# Patient Record
Sex: MALE | Race: WHITE | Employment: OTHER | URBAN - METROPOLITAN AREA
[De-identification: names, ages, dates, MRNs, and addresses within clinical notes are randomized per-mention and may not be internally consistent; named-entity substitution may affect disease eponyms.]

---

## 2019-08-01 ENCOUNTER — HOSPITAL ENCOUNTER (OUTPATIENT)
Dept: MRI IMAGING | Age: 74
Discharge: HOME | End: 2019-08-01
Attending: FAMILY MEDICINE
Payer: MEDICARE

## 2019-08-01 DIAGNOSIS — M54.42 ACUTE BILATERAL LOW BACK PAIN WITH BILATERAL SCIATICA: ICD-10-CM

## 2019-08-01 DIAGNOSIS — M48.50XA VERTEBRAL COMPRESSION FRACTURE (HCC): ICD-10-CM

## 2019-08-01 DIAGNOSIS — M54.41 ACUTE BILATERAL LOW BACK PAIN WITH BILATERAL SCIATICA: ICD-10-CM

## 2019-08-01 PROCEDURE — 72148 MRI LUMBAR SPINE W/O DYE: CPT

## 2019-08-02 NOTE — PROGRESS NOTES
Tell the patient  He does have significant stenosis or narrowing around lumbar spine so can refer to Dr Evelyne Plasencia or spine specialist if having persistent pain.

## 2021-05-20 ENCOUNTER — HOSPITAL ENCOUNTER (OUTPATIENT)
Dept: GENERAL RADIOLOGY | Age: 76
Discharge: HOME | End: 2021-05-20
Payer: MEDICARE
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
## 2021-05-20 DIAGNOSIS — S20.212A HEMATOMA OF LEFT CHEST WALL, INITIAL ENCOUNTER: ICD-10-CM

## 2021-05-20 PROBLEM — L02.213 CHEST WALL ABSCESS: Status: ACTIVE | Noted: 2021-05-20

## 2021-05-20 PROBLEM — L03.313 CELLULITIS OF CHEST WALL: Status: ACTIVE | Noted: 2021-05-20

## 2021-05-20 PROCEDURE — 71046 X-RAY EXAM CHEST 2 VIEWS: CPT

## 2021-05-24 ENCOUNTER — HOSPITAL ENCOUNTER (INPATIENT)
Age: 76
LOS: 25 days | Discharge: HOME HEALTH | DRG: 264 | End: 2021-06-18
Attending: SURGERY | Admitting: SURGERY
Payer: MEDICARE

## 2021-05-24 DIAGNOSIS — S31.109A OPEN WOUND OF ABDOMINAL WALL, INITIAL ENCOUNTER: ICD-10-CM

## 2021-05-24 DIAGNOSIS — D89.89 SUPPRESSION OF IMMUNE SYSTEM SUBTHERAPEUTIC (HCC): ICD-10-CM

## 2021-05-24 DIAGNOSIS — D72.828 OTHER ELEVATED WHITE BLOOD CELL (WBC) COUNT: ICD-10-CM

## 2021-05-24 DIAGNOSIS — F05 SUNDOWNING: ICD-10-CM

## 2021-05-24 DIAGNOSIS — S31.109A OPEN WOUND OF FLANK, INITIAL ENCOUNTER: ICD-10-CM
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
## 2021-05-24 DIAGNOSIS — S20.212D HEMATOMA OF LEFT CHEST WALL, SUBSEQUENT ENCOUNTER: ICD-10-CM

## 2021-05-24 DIAGNOSIS — S21.102D OPEN WOUND OF LEFT CHEST WALL, SUBSEQUENT ENCOUNTER: ICD-10-CM

## 2021-05-24 DIAGNOSIS — I47.1 SVT (SUPRAVENTRICULAR TACHYCARDIA) (HCC): ICD-10-CM

## 2021-05-24 DIAGNOSIS — S30.1XXA HEMATOMA OF RIGHT FLANK, INITIAL ENCOUNTER: ICD-10-CM

## 2021-05-24 DIAGNOSIS — S21.202D BACK WOUND, LEFT, SUBSEQUENT ENCOUNTER: ICD-10-CM

## 2021-05-24 DIAGNOSIS — R50.9 FEVER, UNSPECIFIED FEVER CAUSE: ICD-10-CM

## 2021-05-24 DIAGNOSIS — S41.102D OPEN WOUND OF LEFT AXILLARY REGION, SUBSEQUENT ENCOUNTER: ICD-10-CM

## 2021-05-24 DIAGNOSIS — S31.109D OPEN WOUND OF FLANK, SUBSEQUENT ENCOUNTER: ICD-10-CM

## 2021-05-24 DIAGNOSIS — S30.1XXD HEMATOMA OF RIGHT FLANK, SUBSEQUENT ENCOUNTER: ICD-10-CM

## 2021-05-24 DIAGNOSIS — N28.9 ACUTE RENAL INSUFFICIENCY: ICD-10-CM

## 2021-05-24 DIAGNOSIS — L02.213 CHEST WALL ABSCESS: ICD-10-CM

## 2021-05-24 DIAGNOSIS — S31.000D OPEN WOUND OF LOWER BACK AND PELVIS WITHOUT PENETRATION INTO RETROPERITONEUM, SUBSEQUENT ENCOUNTER: ICD-10-CM

## 2021-05-24 DIAGNOSIS — R41.0 CONFUSION: ICD-10-CM

## 2021-05-24 DIAGNOSIS — L03.313 CELLULITIS OF CHEST WALL: ICD-10-CM

## 2021-05-24 LAB
ALBUMIN SERPL-MCNC: 2.4 G/DL (ref 3.2–4.6)
ALBUMIN/GLOB SERPL: 0.6 {RATIO} (ref 1.2–3.5)
ALP SERPL-CCNC: 124 U/L (ref 50–136)
ALT SERPL-CCNC: 80 U/L (ref 12–65)
ANION GAP SERPL CALC-SCNC: 7 MMOL/L (ref 7–16)
AST SERPL-CCNC: 53 U/L (ref 15–37)
BILIRUB SERPL-MCNC: 0.9 MG/DL (ref 0.2–1.1)
BUN SERPL-MCNC: 51 MG/DL (ref 8–23)
CALCIUM SERPL-MCNC: 8.3 MG/DL (ref 8.3–10.4)
CHLORIDE SERPL-SCNC: 96 MMOL/L (ref 98–107)
CO2 SERPL-SCNC: 28 MMOL/L (ref 21–32)
CREAT SERPL-MCNC: 1.71 MG/DL (ref 0.8–1.5)
GLOBULIN SER CALC-MCNC: 4.1 G/DL (ref 2.3–3.5)
GLUCOSE SERPL-MCNC: 83 MG/DL (ref 65–100)
POTASSIUM SERPL-SCNC: 4.7 MMOL/L (ref 3.5–5.1)
PROT SERPL-MCNC: 6.5 G/DL (ref 6.3–8.2)
SODIUM SERPL-SCNC: 131 MMOL/L (ref 138–145)

## 2021-05-24 PROCEDURE — 77030040361 HC SLV COMPR DVT MDII -B

## 2021-05-24 PROCEDURE — 74011250636 HC RX REV CODE- 250/636: Performed by: SURGERY

## 2021-05-24 PROCEDURE — 74011000258 HC RX REV CODE- 258: Performed by: SURGERY

## 2021-05-24 PROCEDURE — 2709999900 HC NON-CHARGEABLE SUPPLY

## 2021-05-24 PROCEDURE — 74011250637 HC RX REV CODE- 250/637: Performed by: SURGERY

## 2021-05-24 PROCEDURE — 36415 COLL VENOUS BLD VENIPUNCTURE: CPT

## 2021-05-24 PROCEDURE — 80053 COMPREHEN METABOLIC PANEL: CPT

## 2021-05-24 PROCEDURE — 99223 1ST HOSP IP/OBS HIGH 75: CPT | Performed by: SURGERY

## 2021-05-24 PROCEDURE — 65270000029 HC RM PRIVATE

## 2021-05-24 RX ORDER — ONDANSETRON 2 MG/ML
4 INJECTION INTRAMUSCULAR; INTRAVENOUS
Status: DISCONTINUED | OUTPATIENT
Start: 2021-05-24 | End: 2021-06-18 | Stop reason: HOSPADM

## 2021-05-24 RX ORDER — FAMOTIDINE 20 MG/1
20 TABLET, FILM COATED ORAL 2 TIMES DAILY
Status: DISCONTINUED | OUTPATIENT
Start: 2021-05-24 | End: 2021-05-24

## 2021-05-24 RX ORDER — FAMOTIDINE 20 MG/1
20 TABLET, FILM COATED ORAL DAILY
Status: DISCONTINUED | OUTPATIENT
Start: 2021-05-24 | End: 2021-06-01

## 2021-05-24 RX ORDER — HYDROCODONE BITARTRATE AND ACETAMINOPHEN 5; 325 MG/1; MG/1
1 TABLET ORAL
Status: DISCONTINUED | OUTPATIENT
Start: 2021-05-24 | End: 2021-06-18 | Stop reason: HOSPADM

## 2021-05-24 RX ORDER — ACETAMINOPHEN 500 MG
1000 TABLET ORAL
Status: DISCONTINUED | OUTPATIENT
Start: 2021-05-24 | End: 2021-06-18 | Stop reason: HOSPADM

## 2021-05-24 RX ORDER — TAMSULOSIN HYDROCHLORIDE 0.4 MG/1
0.4 CAPSULE ORAL DAILY
Status: DISCONTINUED | OUTPATIENT
Start: 2021-05-25 | End: 2021-06-18 | Stop reason: HOSPADM

## 2021-05-24 RX ADMIN — CEFTRIAXONE 1 G: 1 INJECTION, POWDER, FOR SOLUTION INTRAMUSCULAR; INTRAVENOUS at 13:51

## 2021-05-24 RX ADMIN — VANCOMYCIN HYDROCHLORIDE 1000 MG: 1 INJECTION, POWDER, LYOPHILIZED, FOR SOLUTION INTRAVENOUS at 13:58

## 2021-05-24 RX ADMIN — HYDROCODONE BITARTRATE AND ACETAMINOPHEN 1 TABLET: 5; 325 TABLET ORAL at 18:49

## 2021-05-24 RX ADMIN — FAMOTIDINE 20 MG: 20 TABLET ORAL at 14:00

## 2021-05-24 NOTE — PROGRESS NOTES
END OF SHIFT NOTE:    INTAKE/OUTPUT  No intake/output data recorded. Voiding: YES  Catheter: NO  Drain:              Flatus: Patient does have flatus present. Stool:  0 occurrences. Characteristics:       Emesis: 0 occurrences. Characteristics:        VITAL SIGNS  Patient Vitals for the past 12 hrs:   Temp Pulse Resp BP SpO2   05/24/21 1454 97.6 °F (36.4 °C) 85 18 120/61 96 %   05/24/21 1250 97.4 °F (36.3 °C) 89 18 127/78 98 %       Pain Assessment  Pain Intensity 1: 7 (05/24/21 1849)  Pain Location 1: Flank  Pain Intervention(s) 1: Medication (see MAR)  Patient Stated Pain Goal: 0    Ambulating  Yes    Shift report given to oncoming nurse at the bedside.     Amor Lindsay

## 2021-05-24 NOTE — PROGRESS NOTES
Care Management Interventions  PCP Verified by CM: Yes  Mode of Transport at Discharge: Self  Transition of Care Consult (CM Consult): Discharge Planning  Discharge Durable Medical Equipment: No  Physical Therapy Consult: No  Occupational Therapy Consult: No  Speech Therapy Consult: No  Current Support Network: Own Home, Lives Alone  Confirm Follow Up Transport: Self  The Patient and/or Patient Representative was Provided with a Choice of Provider and Agrees with the Discharge Plan?: Yes  Name of the Patient Representative Who was Provided with a Choice of Provider and Agrees with the Discharge Plan: self  Freedom of Choice List was Provided with Basic Dialogue that Supports the Patient's Individualized Plan of Care/Goals, Treatment Preferences and Shares the Quality Data Associated with the Providers?: Yes  [place] Resource Information Provided?: No  Discharge Location  Discharge Placement: Home    Chart screened by  for discharge planning. Patient will likely return home at discharge. PT/OT have not been consulted at this time. It is unclear at this time if patient will need outpatient IV antibiotics at discharge. CM will continue top follow patient during hospitalization for discharge planning and needs. No needs identified at this time. Please consult  if any new issues arise.

## 2021-05-24 NOTE — H&P
H&P/Consult Note/Progress Note/Office Note:   Wing Matos MRN: [id number]  :1945  Age:75 y.o.    HPI: Wing Matos is a 77 y.o. male who was referred in consultation by Cristi Velazquez PA-C with a left chest wall hematoma and cellulitis   after a fall at home on the floor approx 21. He sustained a blunt injury to his left chest wall. CXR below showed no PTx or rib fractures. He taked Humira for arthritis. We saw him for the 1st time on 21 with erythema of the left chest wall and placed him on Bactrim. Nothing in particular made it better or worse. When he came to the office on 2021 he reported high fevers over 101 degrees associate with nausea with his Bactrim. The erythema had extended well beyond the original inscribed lines of his left chest wall and extended down his left flank and in his left lower quadrant. I offered him direct admission to the hospital.    He denies any new falls since 2021. On 2021 he did have a new hematoma involving his right flank which was not present on 2021 CXR, 2 views  Hx: Left chest wall hematoma with cellulitis; r/o rib fracture or pneumothorax after a fall at home on the floor.     Clear lungs. The cardiac and mediastinal contours and pulmonary vascularity are normal.   The bones and soft tissues are within normal limits. No evidence of a pneumothorax.     IMPRESSION:  Normal chest.            Past Medical History:   Diagnosis Date    Depression     H/O urinary frequency 2013    History of BPH 2013    HLD (hyperlipidemia) 2013    Hx: UTI (urinary tract infection) 2013    Insomnia     Psoriasis     Psoriatic arthritis ([facility] [address] 2013    Wears hearing aid 2013     No past surgical history on file.   Current Facility-Administered Medications   Medication Dose Route Frequency    vancomycin (VANCOCIN) 1,000 mg in 0.9% sodium chloride 250 mL (VIAL-MATE)  1,000 mg IntraVENous Q12H    cefTRIAXone (ROCEPHIN) 2 g in 0.9% sodium chloride (MBP/ADV) 50 mL MBP  2 g IntraVENous Q24H    famotidine (PEPCID) tablet 20 mg  20 mg Oral BID    acetaminophen (TYLENOL) tablet 1,000 mg  1,000 mg Oral Q6H PRN    ondansetron (ZOFRAN) injection 4 mg  4 mg IntraVENous Q4H PRN     Current Outpatient Medications   Medication Sig    trimethoprim-sulfamethoxazole (Bactrim DS) 160-800 mg per tablet Take 1 Tablet by mouth two (2) times a day for 14 days.  HYDROcodone-acetaminophen (NORCO) 5-325 mg per tablet Take 1 Tablet by mouth every four (4) hours as needed for Pain for up to 7 days. Max Daily Amount: 6 Tablets.  clonazePAM (KlonoPIN) 1 mg tablet Take 1 Tab by mouth three (3) times daily. Max Daily Amount: 3 mg. Cancel Xanax    predniSONE (DELTASONE) 10 mg tablet 3 po daily x 5 days, 2 po daily x 5 days, 1 po daily x 5 days, 1/2 po daily x 5 days and stop    adalimumab (Humira Pen) 40 mg/0.8 mL injection pen 40 mg by SubCUTAneous route Once every 2 weeks.  finasteride (PROSCAR) 5 mg tablet Take 1 Tab by mouth daily. Stop dutasteride 0.5 mg cap 0.5 mg, tamsulosin 0.4 mg cap 0.4 mg [    tamsulosin (FLOMAX) 0.4 mg capsule Take 2 Caps by mouth daily. Stop dutasteride 0.5 mg cap 0.5 mg, tamsulosin 0.4 mg cap 0.4 mg [    methotrexate (RHEUMATREX) 2.5 mg tablet 8 tablets po weekly    meloxicam (MOBIC) 15 mg tablet Take 1 Tab by mouth daily.  fluocinoNIDE (LIDEX) 0.05 % topical cream APPLY  CREAM TOPICALLY TO AFFECTED AREA ON SCALP TWICE DAILY    hydrocortisone valerate (WESTCORT) 0.2 % topical cream APPLY  A THIN LAYER TOPICALLY TO AFFECTED AREA ON FACE TWICE DAILY    DULoxetine (CYMBALTA) 60 mg capsule Take 1 Cap by mouth daily. Patient has no known allergies.   Social History     Socioeconomic History    Marital status: SINGLE     Spouse name: Not on file    Number of children: Not on file    Years of education: Not on file    Highest education level: Not on file   Tobacco Use    Smoking status: Former Smoker    Smokeless tobacco: Never Used    Tobacco comment: pt states quit in early 60's late 52's    Vaping Use    Vaping Use: Never used   Substance and Sexual Activity    Alcohol use: No     Alcohol/week: 0.0 standard drinks    Drug use: No    Sexual activity: Yes     Partners: Female     Social Determinants of Health     Financial Resource Strain:     Difficulty of Paying Living Expenses:    Food Insecurity:     Worried About Running Out of Food in the Last Year:     Ran Out of Food in the Last Year:    Transportation Needs:     Lack of Transportation (Medical):  Lack of Transportation (Non-Medical):    Physical Activity:     Days of Exercise per Week:     Minutes of Exercise per Session:    Stress:     Feeling of Stress :    Social Connections:     Frequency of Communication with Friends and Family:     Frequency of Social Gatherings with Friends and Family:     Attends Holiness Services:     Active Member of Clubs or Organizations:     Attends Club or Organization Meetings:     Marital Status:      Social History     Tobacco Use   Smoking Status Former Smoker   Smokeless Tobacco Never Used   Tobacco Comment    pt states quit in early 60's late 52's      Family History   Problem Relation Age of Onset    Heart Attack Father     Cancer Father         skin     Other Sister     Cancer Sister     Other Brother         melanoma     ROS: The patient has no difficulty with chest pain or shortness of breath. No fever or chills. Comprehensive review of systems was otherwise unremarkable except as noted above. Physical Exam:   There were no vitals taken for this visit. There were no vitals filed for this visit. [unfilled]  [unfilled]    Constitutional: Alert, oriented, cooperative patient in no acute distress; appears stated age    Eyes:Sclera are clear. EOMs intact  ENMT: no external lesions; he is hard of hearing.  no obvious neck masses, no ear or lip lesions, nares normal  CV: RRR. Normal perfusion      He is draining pus from his left anterior axillary line with some ischemic skin necrosis present there which is about 1-1/2 x 1-1/2 cm in size. The erythema of the left chest wall improved from about 12 cm to about 9 cm in size   There is induration. We evacuated about 20 cc of pus in the office just prior to admission. The erythema and cellulitis extends from the left chest wall down to the left lower quadrant and left flank and involves an area of about 30 cm² in size. New hematoma right flank    Resp: No JVD. Breathing is  non-labored; no audible wheezing. GI: soft and non-distended     Musculoskeletal: unremarkable with normal function. No embolic signs or cyanosis. Neuro:  Oriented; moves all 4; no focal deficits; slow moving and slow talking. Psychiatric: normal affect and mood, no memory impairment    Recent vitals (if inpt):  @IPVITALS(24:)@    Amount and/or Complexity of Data Reviewed and Analyzed:  I reviewed and analyzed all of the unique labs and radiologic studies that are shown below as well as any that are in the HPI, and any that are in the expanded problem list below  *Each unique test, order, or document contributes to the combination of 2 or combination of 3 in Category 1 below. For this visit I also reviewed old records and prior notes. No results for input(s): WBC, HGB, PLT, NA, K, CL, CO2, BUN, CREA, GLU, PTP, INR, APTT, TBIL, TBILI, CBIL, ALT, AP, AML, AML, LPSE, LCAD, NH4, TROPT, TROIQ, PCO2, PO2, HCO3, HGBEXT, PLTEXT, INREXT, HGBEXT, PLTEXT, INREXT in the last 72 hours.     No lab exists for component: SGOT, GPT,  PH  Review of most recent CBC  Lab Results   Component Value Date/Time    WBC 8.8 03/31/2016 11:26 AM    HGB 14.3 03/31/2016 11:26 AM    HCT 42.9 03/31/2016 11:26 AM    PLATELET 811 51/11/3715 11:26 AM    MCV 92 03/31/2016 11:26 AM       Review of most recent BMP  Lab Results   Component Value Date/Time Sodium 140 03/31/2016 11:26 AM    Potassium 4.8 03/31/2016 11:26 AM    Chloride 101 03/31/2016 11:26 AM    CO2 24 03/31/2016 11:26 AM    Glucose 92 03/31/2016 11:26 AM    BUN 14 03/31/2016 11:26 AM    Creatinine 0.73 (L) 03/31/2016 11:26 AM    BUN/Creatinine ratio 19 03/31/2016 11:26 AM    GFR est  03/31/2016 11:26 AM    GFR est non-AA 95 03/31/2016 11:26 AM    Calcium 8.7 03/31/2016 11:26 AM       Review of most recent LFTs (and lipase if done)  Lab Results   Component Value Date/Time    ALT (SGPT) 19 03/31/2016 11:26 AM    AST (SGOT) 17 03/31/2016 11:26 AM    Alk. phosphatase 68 03/31/2016 11:26 AM    Bilirubin, total 0.4 03/31/2016 11:26 AM     No results found for: LPSE    No results found for: INR, APTT, CBIL, LCAD, NH4, TROPT, TROIQ, INREXT, INREXT    Review of most recent HgbA1c  No results found for: HBA1C, WJK8YZIO, KYC6QOOP, ZUH9HMPJ    Nutritional assessment screen for wound healing issues:  Lab Results   Component Value Date/Time    Protein, total 6.5 03/31/2016 11:26 AM    Albumin 4.0 03/31/2016 11:26 AM       @lastcovr@  XR Results (most recent):  Results from Hospital Encounter encounter on 05/20/21    XR CHEST PA LAT    Narrative  EXAM: XR CHEST PA LAT    INDICATION: Left chest wall hematoma with cellulitis rule out rib fracture or  pneumothorax after a fall at home on the floor. COMPARISON: None. FINDINGS: PA and lateral radiographs of the chest demonstrate clear lungs. The  cardiac and mediastinal contours and pulmonary vascularity are normal. The bones  and soft tissues are within normal limits. No evidence of a pneumothorax. Impression  Normal chest.      CT Results (most recent):  No results found for this or any previous visit. US Results (most recent):  No results found for this or any previous visit.         Admission date (for inpatients): (Not on file)   * No surgery found *  * No surgery found *        ASSESSMENT/PLAN:  Problem List  Date Reviewed: 5/24/2021 Codes Class Noted    Fever ICD-10-CM: R50.9  ICD-9-CM: 780.60  5/24/2021        Suppression of immune system subtherapeutic ([facility] ICD-10-CM: D07.41  ICD-9-CM: 279.8  5/24/2021        Right flank hematoma ICD-10-CM: S30. 1XXA  ICD-9-CM: 922.2  5/24/2021        Chest wall abscess ICD-10-CM: L02.213  ICD-9-CM: 682.2  5/20/2021        Hematoma of left chest wall ICD-10-CM: N04.935I  ICD-9-CM: 922.1  5/20/2021        Cellulitis of chest wall ICD-10-CM: L03.313  ICD-9-CM: 682.2  5/20/2021        Fall ICD-10-CM: O01. Kirk Hazy  ICD-9-CM: E888.9  5/20/2021        Psoriasis ICD-10-CM: L40.9  ICD-9-CM: 696.1  Unknown        Insomnia ICD-10-CM: G47.00  ICD-9-CM: 780.52  Unknown        Depression ICD-10-CM: F32.9  ICD-9-CM: 214  Unknown        Tachycardia ICD-10-CM: R00.0  ICD-9-CM: 785.0  6/13/2016        Murmur ICD-10-CM: R01.1  ICD-9-CM: 785.2  6/13/2016        Psoriatic arthritis ([facility] ICD-10-CM: L40.50  ICD-9-CM: 696.0  2/1/2013        Wears hearing aid ICD-10-CM: Z97.4  ICD-9-CM: V45.89  2/1/2013        HLD (hyperlipidemia) ICD-10-CM: E78.5  ICD-9-CM: 272.4  2/1/2013        H/O urinary frequency ICD-10-CM: K16.007  ICD-9-CM: V13.09  2/1/2013        History of BPH ICD-10-CM: V76.915  ICD-9-CM: V13.89  2/1/2013            Active Problems:    Chest wall abscess (5/20/2021)      Cellulitis of chest wall (5/20/2021)      Fall (5/20/2021)      Fever (5/24/2021)      Suppression of immune system subtherapeutic ([address] (5/24/2021)      Right flank hematoma (5/24/2021)           Number and Complexity of Problems addressed and   Risks of complications and/or morbidity of management        Progressive cellulitis of left chest wall with abscess and extension of cellulitis in the left flank and left lower quadrant associated with fever  This failed outpt Rx with PO Bactrim  We decided to proceed with admission for IV antibiotics. He has a left chest wall hematoma with superimposed cellulitis. Despite p.o.  Bactrim his cellulitis has progressed and extended down his left flank and left lower quadrant. He was having fevers and draining pus from his left chest wall where he spontaneously developed ischemic necrosis of the skin. I evacuated as much of this is possible and debrided the wound in the office on 5/24/2021  He has a new hematoma involving his left flank which may be from a second fall. He is elderly frail and hard of hearing. He and his family did not call when the erythema extended beyond the marked border from his first visit on 5/20/2021. His CXR identified no PTx or rib fractures    Chronic immune suppression with acute infection and fever  We will hold his methotrexate, prednisone, and Humira to help reduce his immune suppression. I ordered IV Vanc and Rocpehin  Will watch for vanc toxiticty  Baseline Bun/Cr and labs      Open left chest wound  Repack with dry 4x4 daily and prn    Hematoma right flank  Watch  Check Hgb            Level of MDM (2/3 elements below)  Number and Complexity of Problems Addressed Amount and/or Complexity of Data to be Reviewed and Analyzed  *Each unique test, order, or document contributes to the combination of 2 or combination of 3 in Category 1 below.  Risk of Complications and/or Morbidity or Mortality of pt Management     25827  83900 SF Minimal  1self-limited or minor problem Minimal or none Minimal risk of morbidity from additional diagnostic testing or Rx   77736  86926 Low Low  2or more self-limited or minor problems;    or  1stable chronic illness;    or  9BFSDA, uncomplicated illness or injury   Limited  (Must meet the requirements of at least 1 of the 2 categories)  Category 1: Tests and documents   Any combination of 2 from the following:  Review of prior external note(s) from each unique source*;  review of the result(s) of each unique test*;   ordering of each unique test*    or   Category 2: Assessment requiring an independent historian(s)  (For the categories of independent interpretation of tests and discussion of management or test interpretation, see moderate or high) Low risk of morbidity from additional diagnostic testing or treatment     14582  13611 Mod Moderate  1or more chronic illnesses with exacerbation, progression, or side effects of treatment;    or  2or more stable chronic illnesses;    or  1undiagnosed new problem with uncertain prognosis;    or  1acute illness with systemic symptoms;    or  [id number] complicated injury   Moderate  (Must meet the requirements of at least 1 out of 3 categories)  Category 1: Tests, documents, or independent historian(s)  Any combination of 3 from the following:   Review of prior external note(s) from each unique source*;  Review of the result(s) of each unique test*;  Ordering of each unique test*;  Assessment requiring an independent historian(s)    or  Category 2: Independent interpretation of tests   Independent interpretation of a test performed by another physician/other qualified health care professional (not separately reported);     or  Category 3: Discussion of management or test interpretation  Discussion of management or test interpretation with external physician/other qualified health care professional/appropriate source (not separately reported)   Moderate risk of morbidity from additional diagnostic testing or treatment  Examples only:  Prescription drug management   Decision regarding minor surgery with identified patient or procedure risk factors  Decision regarding elective major surgery without identified patient or procedure risk factors   Diagnosis or treatment significantly limited by social determinants of health       [id number] High High  1or more chronic illnesses with severe exacerbation, progression, or side effects of treatment;    or  1 acute or chronic illness or injury that poses a threat to life or bodily function   Extensive  (Must meet the requirements of at least 2 out of 3 categories)  Category 1: Tests, documents, or independent historian(s)  Any combination of 3 from the following:   Review of prior external note(s) from each unique source*;  Review of the result(s) of each unique test*;   Ordering of each unique test*;   Assessment requiring an independent historian(s)    or   Category 2: Independent interpretation of tests   Independent interpretation of a test performed by another physician/other qualified health care professional (not separately reported);     or  Category 3: Discussion of management or test interpretation  Discussion of management or test interpretation with external physician/other qualified health care professional/appropriate source (not separately reported)   High risk of morbidity from additional diagnostic testing or treatment  Examples only:  Drug therapy requiring intensive monitoring for toxicity  Decision regarding elective major surgery with identified patient or procedure risk factors  Decision regarding emergency major surgery  Decision regarding hospitalization  Decision not to resuscitate or to de-escalate care because of poor prognosis             I have personally performed a face-to-face diagnostic evaluation and management  service on this patient. I have independently seen the patient. I have independently obtained the above history from the patient/family. I have independently examined the patient with above findings. I have independently reviewed data/labs for this patient and developed the above plan of care (MDM). Signed: Glendy Andrade.  Rasta Brown MD, FACS

## 2021-05-24 NOTE — PROGRESS NOTES
NOTED:    NO MARY PREFERENCE    FRIEND -  MOON    LIVES IN [facility]    NO CODE STATUS ON FILE    NO ACP ON FILE    EXPECTED D/C  5  DAYS    WEARS HEARING AIDS    DEPRESSION        Will continue to assess how to best serve this family

## 2021-05-25 PROBLEM — N28.9 ACUTE RENAL INSUFFICIENCY: Status: ACTIVE | Noted: 2021-05-25

## 2021-05-25 LAB — CREAT SERPL-MCNC: 1.2 MG/DL (ref 0.8–1.5)

## 2021-05-25 PROCEDURE — 74011000258 HC RX REV CODE- 258: Performed by: SURGERY

## 2021-05-25 PROCEDURE — 65270000029 HC RM PRIVATE

## 2021-05-25 PROCEDURE — 87077 CULTURE AEROBIC IDENTIFY: CPT

## 2021-05-25 PROCEDURE — 2709999900 HC NON-CHARGEABLE SUPPLY

## 2021-05-25 PROCEDURE — 97161 PT EVAL LOW COMPLEX 20 MIN: CPT

## 2021-05-25 PROCEDURE — 97530 THERAPEUTIC ACTIVITIES: CPT

## 2021-05-25 PROCEDURE — 82565 ASSAY OF CREATININE: CPT

## 2021-05-25 PROCEDURE — 87186 SC STD MICRODIL/AGAR DIL: CPT

## 2021-05-25 PROCEDURE — 74011250636 HC RX REV CODE- 250/636: Performed by: SURGERY

## 2021-05-25 PROCEDURE — 74011250637 HC RX REV CODE- 250/637: Performed by: SURGERY

## 2021-05-25 PROCEDURE — 74011000302 HC RX REV CODE- 302: Performed by: SURGERY

## 2021-05-25 PROCEDURE — 86580 TB INTRADERMAL TEST: CPT | Performed by: SURGERY

## 2021-05-25 PROCEDURE — 99233 SBSQ HOSP IP/OBS HIGH 50: CPT | Performed by: SURGERY

## 2021-05-25 PROCEDURE — 36415 COLL VENOUS BLD VENIPUNCTURE: CPT

## 2021-05-25 PROCEDURE — 87205 SMEAR GRAM STAIN: CPT

## 2021-05-25 RX ORDER — VANCOMYCIN HYDROCHLORIDE
1250
Status: DISCONTINUED | OUTPATIENT
Start: 2021-05-25 | End: 2021-05-28

## 2021-05-25 RX ORDER — SODIUM CHLORIDE, SODIUM LACTATE, POTASSIUM CHLORIDE, CALCIUM CHLORIDE 600; 310; 30; 20 MG/100ML; MG/100ML; MG/100ML; MG/100ML
30 INJECTION, SOLUTION INTRAVENOUS CONTINUOUS
Status: DISCONTINUED | OUTPATIENT
Start: 2021-05-25 | End: 2021-05-28

## 2021-05-25 RX ADMIN — CEFTRIAXONE 1 G: 1 INJECTION, POWDER, FOR SOLUTION INTRAMUSCULAR; INTRAVENOUS at 11:49

## 2021-05-25 RX ADMIN — TAMSULOSIN HYDROCHLORIDE 0.4 MG: 0.4 CAPSULE ORAL at 08:22

## 2021-05-25 RX ADMIN — HYDROCODONE BITARTRATE AND ACETAMINOPHEN 1 TABLET: 5; 325 TABLET ORAL at 03:17

## 2021-05-25 RX ADMIN — FAMOTIDINE 20 MG: 20 TABLET ORAL at 08:22

## 2021-05-25 RX ADMIN — SODIUM CHLORIDE, SODIUM LACTATE, POTASSIUM CHLORIDE, AND CALCIUM CHLORIDE 100 ML/HR: 600; 310; 30; 20 INJECTION, SOLUTION INTRAVENOUS at 12:11

## 2021-05-25 RX ADMIN — VANCOMYCIN HYDROCHLORIDE 1000 MG: 1 INJECTION, POWDER, LYOPHILIZED, FOR SOLUTION INTRAVENOUS at 01:30

## 2021-05-25 RX ADMIN — TUBERCULIN PURIFIED PROTEIN DERIVATIVE 5 UNITS: 5 INJECTION, SOLUTION INTRADERMAL at 15:26

## 2021-05-25 RX ADMIN — HYDROCODONE BITARTRATE AND ACETAMINOPHEN 1 TABLET: 5; 325 TABLET ORAL at 13:39

## 2021-05-25 RX ADMIN — VANCOMYCIN HYDROCHLORIDE 1250 MG: 10 INJECTION, POWDER, LYOPHILIZED, FOR SOLUTION INTRAVENOUS at 17:38

## 2021-05-25 NOTE — PROGRESS NOTES
Pharmacokinetic Consult to Pharmacist    Lei Avalos. is a 77 y.o. male being treated for SSTI with vancomycin. Height: 5' 11\" (180.3 cm)  Weight: 83 kg (183 lb)  Lab Results   Component Value Date/Time    BUN 51 (H) 05/24/2021 12:57 PM    Creatinine 1.20 05/25/2021 01:03 PM    WBC 8.8 03/31/2016 11:26 AM      Estimated Creatinine Clearance: 57.5 mL/min (based on SCr of 1.2 mg/dL). No results found for: Zoë Noble    Day 2 of vancomycin. Goal trough is 10-20. Repeat Scr 1.2 today. Dosing changed to 1250 mg q18h. Will continue to follow patient and order levels when clinically indicated.       Thank you,  Steff Barnes, PharmD, [address] [place] [address]  Clinical Pharmacist  [phone]

## 2021-05-25 NOTE — PROGRESS NOTES
END OF SHIFT NOTE:    INTAKE/OUTPUT  05/24 0701 - 05/25 0700  In: 829 [P.O.:240; I.V.:253]  Out: 825 [Urine:825]  Voiding: YES  Catheter: NO  Drain:      Flatus: Patient does have flatus present. Stool:  0 occurrences. Emesis: 0 occurrences. VITAL SIGNS  Patient Vitals for the past 12 hrs:   Temp Pulse Resp BP SpO2   05/25/21 0302 98.3 °F (36.8 °C) 77 17 127/74 93 %   05/24/21 2350 98.6 °F (37 °C) 92 18 (!) 106/52 92 %   05/24/21 1939 98.7 °F (37.1 °C) 100 18 120/73 95 %     Pain Assessment  Pain Intensity 1: 8 (05/25/21 0317)  Pain Location 1: Flank  Pain Intervention(s) 1: Medication (see MAR)  Patient Stated Pain Goal: 0    Ambulating  Yes    Shift report given to oncoming nurse at the bedside.     [address] [facility] Systemsphillip

## 2021-05-25 NOTE — PROGRESS NOTES
END OF SHIFT NOTE:    INTAKE/OUTPUT  05/24 0701 - 05/25 0700  In: 608 [P.O.:240; I.V.:253]  Out: 825 [Urine:825]  Voiding: YES  Catheter: NO  Drain:              Flatus: Patient does have flatus present. Stool:  0 occurrences. Characteristics:       Emesis: 0 occurrences. Characteristics:        VITAL SIGNS  Patient Vitals for the past 12 hrs:   Temp Pulse Resp BP SpO2   05/25/21 1522 98.6 °F (37 °C) 69 18 117/67 96 %   05/25/21 1123 97.9 °F (36.6 °C) 84 18 119/70 96 %   05/25/21 0710 97.9 °F (36.6 °C) 90 20 133/76 97 %       Pain Assessment  Pain Intensity 1: 0 (05/25/21 1430)  Pain Location 1: Flank  Pain Intervention(s) 1: Rest  Patient Stated Pain Goal: 0    Ambulating  Yes    Shift report given to oncoming nurse at the bedside.     Richard Montgomery

## 2021-05-25 NOTE — PROGRESS NOTES
PT has recommended STR for patient. CM attempted to speak with patient however, he is confused and not making sense at this time. CM called and left message for Hillary who is listed as patient emergency contact. CM will attempt to follow up with Hillary again for discharge planning.

## 2021-05-25 NOTE — PROGRESS NOTES
ACUTE PHYSICAL THERAPY GOALS:  (Developed with and agreed upon by patient and/or caregiver. )    LTG:  (1.)Mr. Lisa Kiser will move from supine to sit and sit to supine , scoot up and down and roll side to side in bed with MODIFIED INDEPENDENCE within 7 treatment day(s). (2.)Mr. Lisa Kiser will transfer from bed to chair and chair to bed with SUPERVISION using the least restrictive device within 7 treatment day(s). (3.)Mr. Lisa Kiser will ambulate with SUPERVISION for 250 feet with the least restrictive device within 7 treatment day(s). (4.)Mr. Lisa Kiser will perform seated and standing exercises for 15+ minutes to improve strength and mobility within 7 days.   ________________________________________________________________________________________________      PHYSICAL THERAPY ASSESSMENT: Initial Assessment and AM PT Treatment Day # 1      Bradly Yee is a 77 y.o. male   PRIMARY DIAGNOSIS: <principal problem not specified>  Cellulitis of chest wall [L03.313]       Reason for Referral:  Weakness, fall  ICD-10: Treatment Diagnosis: Generalized Muscle Weakness (M62.81)  Difficulty in walking, Not elsewhere classified (R26.2)  Other abnormalities of gait and mobility (R26.89)  INPATIENT: Payor: [facility] MEDICARE / Plan: [employer] / Product Type: Managed Care Medicare /     ASSESSMENT:     REHAB RECOMMENDATIONS:   Recommendation to date pending progress:  Setting:   Short-term Rehab  Equipment:    To Be Determined     PRIOR LEVEL OF FUNCTION:  (Prior to Hospitalization) INITIAL/CURRENT LEVEL OF FUNCTION:  (Most Recently Demonstrated)   Bed Mobility:   Independent  Sit to Stand:   Independent  Transfers:   Independent  Gait/Mobility:   Modified Independent Bed Mobility:   Minimal Assistance  Sit to Stand:   Minimal Assistance  Transfers:   Minimal Assistance  Gait/Mobility:   Not tested     ASSESSMENT:  Mr. Lisa Kiser is a 77year old male admitted with chest wall cellulitis. He reports living with wife and ambulating with cane or walker; did have a recent fall. Per case management note, pt lives alone, so he may be a questionable historian. Presents today with some confusion, needs heavy encouragement to participate with limited activity. Transfers to sitting with min assist. Intact seated balance; worked on scooting and seated mobility, functional activities at edge of bed. Attempted sit-stand transfer however pt did not fully stand, then declined further attempts due to weakness. Attempted multiple times to encourage pt to stand again and take a few steps, however adamantly refuses. Positioned comfortably in supine after activity with needs in reach, bed alarm on, and family at bedside. Per nurse, pt was up and walked to bathroom this morning with unsteady gait. Will continue with therapy efforts. STR vs HH PT pending progress. SUBJECTIVE:   Mr. Dong Ashley states, \"I can't do it today. \"    SOCIAL HISTORY/LIVING ENVIRONMENT: Pt is poor historian; he reports living with wife, however per case management note he lives alone?   Home Environment: Private residence  One/Two Story Residence: One story  Living Alone: No  Support Systems: Spouse/Significant Other/Partner  OBJECTIVE:     PAIN: VITAL SIGNS: LINES/DRAINS:   Pre Treatment: Pain Screen  Pain Scale 1: Numeric (0 - 10)  Pain Intensity 1: 0  Post Treatment: 0/10 Vital Signs  O2 Device: None (Room air) none  O2 Device: None (Room air)     GROSS EVALUATION:   Within Functional Limits Abnormal/ Functional Abnormal/ Non-Functional (see comments) Not Tested Comments:   AROM [x] [] [] []    PROM [] [] [] []    Strength [] [x] [] []    Balance [] [x] [] []    Posture [] [x] [] []    Sensation [] [] [] []    Coordination [] [] [] []    Tone [] [] [] []    Edema [] [] [] []    Activity Tolerance [] [x] [] []     [] [] [] []      COGNITION/  PERCEPTION: Intact Impaired   (see comments) Comments:   Orientation [] [x]    Vision [x] []    Hearing [] [x]    Command Following [] [x]    Safety Awareness [] [x]     [] []      MOBILITY: I Mod I S SBA CGA Min Mod Max Total  NT x2 Comments:   Bed Mobility    Rolling [] [] [] [] [] [x] [] [] [] [] []    Supine to Sit [] [] [] [] [] [x] [] [] [] [] []    Scooting [] [] [] [] [] [x] [] [] [] [] []    Sit to Supine [] [] [] [] [] [x] [] [] [] [] []    Transfers    Sit to Stand [] [] [] [] [] [x] [] [] [] [] []    Bed to Chair [] [] [] [] [] [] [] [] [] [x] []    Stand to Sit [] [] [] [] [] [x] [] [] [] [] []    I=Independent, Mod I=Modified Independent, S=Supervision, SBA=Standby Assistance, CGA=Contact Guard Assistance,   Min=Minimal Assistance, Mod=Moderate Assistance, Max=Maximal Assistance, Total=Total Assistance, NT=Not Tested  GAIT: I Mod I S SBA CGA Min Mod Max Total  NT x2 Comments:   Level of Assistance [] [] [] [] [] [] [] [] [] [x] []    Distance NA    DME N/A    Gait Quality NA    Weightbearing Status N/A     I=Independent, Mod I=Modified Independent, S=Supervision, SBA=Standby Assistance, CGA=Contact Guard Assistance,   Min=Minimal Assistance, Mod=Moderate Assistance, Max=Maximal Assistance, Total=Total Assistance, NT=Not Tested    [address] [place] [facility]\"" [address] [address] [facility] [address] [facility] Mobility Inpatient Short Form       How much difficulty does the patient currently have. .. Unable A Lot A Little None   1. Turning over in bed (including adjusting bedclothes, sheets and blankets)? [] 1   [] 2   [x] 3   [] 4   2. Sitting down on and standing up from a chair with arms ( e.g., wheelchair, bedside commode, etc.)   [] 1   [] 2   [x] 3   [] 4   3. Moving from lying on back to sitting on the side of the bed? [] 1   [] 2   [x] 3   [] 4   How much help from another person does the patient currently need. .. Total A Lot A Little None   4. Moving to and from a bed to a chair (including a wheelchair)? [] 1   [] 2   [x] 3   [] 4   5. Need to walk in hospital room? [] 1   [] 2   [x] 3   [] 4   6. Climbing 3-5 steps with a railing? [] 1   [x] 2   [] 3   [] 4   © 2007, Trustees of [facility] [employer], under license to [employer]. All rights reserved     Score:  Initial: 17 Most Recent: X (Date: -- )    Interpretation of Tool:  Represents activities that are increasingly more difficult (i.e. Bed mobility, Transfers, Gait). PLAN:   FREQUENCY/DURATION: PT Plan of Care: 3 times/week for duration of hospital stay or until stated goals are met, whichever comes first.    PROBLEM LIST:   (Skilled intervention is medically necessary to address:)  1. Decreased Activity Tolerance  2. Decreased Cognition  3. Decreased Coordination  4. Decreased Gait Ability  5. Decreased Strength  6. Decreased Transfer Abilities   INTERVENTIONS PLANNED:   (Benefits and precautions of physical therapy have been discussed with the patient.)  1. Therapeutic Activity  2. Therapeutic Exercise/HEP  3. Neuromuscular Re-education  4. Gait Training  5. Manual Therapy  6. Education     TREATMENT:     EVALUATION: Low Complexity : (Untimed Charge)    TREATMENT:   ($$ Therapeutic Activity: 8-22 mins    )  Therapeutic Activity (8 Minutes): Therapeutic activity included Rolling, Supine to Sit, Sit to Supine, Scooting, Transfer Training and Sitting balance  to improve functional Mobility, Strength, Activity tolerance and balance.     TREATMENT GRID:  N/A    AFTER TREATMENT POSITION/PRECAUTIONS:  Alarm Activated, Bed, Needs within reach, RN notified and Visitors at bedside    INTERDISCIPLINARY COLLABORATION:  RN/PCT and PT/PTA    TOTAL TREATMENT DURATION:  PT Patient Time In/Time Out  Time In: 1042  Time Out: [place] 80, DPT

## 2021-05-25 NOTE — PROGRESS NOTES
Patient girlfriend Hillary at bedside. CM discussed discharge planning and PT recommendations. Patient has been recommended for STR. Patient and girlfriend Lubna First are in agreement and provided [facility] and [facility] as choices. CM has sent referrals. [facility] has offered patient a bed, patient accepted. Insurance authorization has been initiated.

## 2021-05-26 PROBLEM — D72.829 LEUKOCYTOSIS: Status: ACTIVE | Noted: 2021-05-26

## 2021-05-26 LAB
ANION GAP SERPL CALC-SCNC: 5 MMOL/L (ref 7–16)
BUN SERPL-MCNC: 26 MG/DL (ref 8–23)
CALCIUM SERPL-MCNC: 7.7 MG/DL (ref 8.3–10.4)
CHLORIDE SERPL-SCNC: 104 MMOL/L (ref 98–107)
CO2 SERPL-SCNC: 26 MMOL/L (ref 21–32)
CREAT SERPL-MCNC: 0.99 MG/DL (ref 0.8–1.5)
ERYTHROCYTE [DISTWIDTH] IN BLOOD BY AUTOMATED COUNT: 15.8 % (ref 11.9–14.6)
GLUCOSE SERPL-MCNC: 112 MG/DL (ref 65–100)
HCT VFR BLD AUTO: 39 % (ref 41.1–50.3)
HGB BLD-MCNC: 13.5 G/DL (ref 13.6–17.2)
MCH RBC QN AUTO: 32.8 PG (ref 26.1–32.9)
MCHC RBC AUTO-ENTMCNC: 34.6 G/DL (ref 31.4–35)
MCV RBC AUTO: 94.7 FL (ref 79.6–97.8)
MM INDURATION POC: 0 MM (ref 0–0)
NRBC # BLD: 0 K/UL (ref 0–0.2)
PLATELET # BLD AUTO: 186 K/UL (ref 150–450)
PMV BLD AUTO: 9.9 FL (ref 9.4–12.3)
POTASSIUM SERPL-SCNC: 4.5 MMOL/L (ref 3.5–5.1)
PPD POC: NEGATIVE NEGATIVE
RBC # BLD AUTO: 4.12 M/UL (ref 4.23–5.6)
SARS-COV-2, COV2: NORMAL
SODIUM SERPL-SCNC: 135 MMOL/L (ref 136–145)
WBC # BLD AUTO: 14.9 K/UL (ref 4.3–11.1)

## 2021-05-26 PROCEDURE — 36415 COLL VENOUS BLD VENIPUNCTURE: CPT

## 2021-05-26 PROCEDURE — 2709999900 HC NON-CHARGEABLE SUPPLY

## 2021-05-26 PROCEDURE — 74011250637 HC RX REV CODE- 250/637: Performed by: SURGERY

## 2021-05-26 PROCEDURE — 80048 BASIC METABOLIC PNL TOTAL CA: CPT

## 2021-05-26 PROCEDURE — 99233 SBSQ HOSP IP/OBS HIGH 50: CPT | Performed by: SURGERY

## 2021-05-26 PROCEDURE — 74011250636 HC RX REV CODE- 250/636: Performed by: SURGERY

## 2021-05-26 PROCEDURE — 85027 COMPLETE CBC AUTOMATED: CPT

## 2021-05-26 PROCEDURE — 74011000258 HC RX REV CODE- 258: Performed by: SURGERY

## 2021-05-26 PROCEDURE — 65270000029 HC RM PRIVATE

## 2021-05-26 PROCEDURE — U0005 INFEC AGEN DETEC AMPLI PROBE: HCPCS

## 2021-05-26 RX ADMIN — FAMOTIDINE 20 MG: 20 TABLET ORAL at 09:25

## 2021-05-26 RX ADMIN — VANCOMYCIN HYDROCHLORIDE 1250 MG: 10 INJECTION, POWDER, LYOPHILIZED, FOR SOLUTION INTRAVENOUS at 13:32

## 2021-05-26 RX ADMIN — SODIUM CHLORIDE, SODIUM LACTATE, POTASSIUM CHLORIDE, AND CALCIUM CHLORIDE 75 ML/HR: 600; 310; 30; 20 INJECTION, SOLUTION INTRAVENOUS at 04:01

## 2021-05-26 RX ADMIN — TAMSULOSIN HYDROCHLORIDE 0.4 MG: 0.4 CAPSULE ORAL at 09:25

## 2021-05-26 RX ADMIN — CEFTRIAXONE 1 G: 1 INJECTION, POWDER, FOR SOLUTION INTRAMUSCULAR; INTRAVENOUS at 12:23

## 2021-05-26 NOTE — PROGRESS NOTES
END OF SHIFT NOTE:    INTAKE/OUTPUT  05/25 0701 - 05/26 0700  In: 2276.6 [P.O.:840; I.V.:1436.6]  Out: 2300 [Urine:2125]  Voiding: YES  Catheter: NO  Drain:              Flatus: Patient does have flatus present. Stool:  0 occurrences. Characteristics:       Emesis: 0 occurrences. Characteristics:        VITAL SIGNS  Patient Vitals for the past 12 hrs:   Temp Pulse Resp BP SpO2   05/26/21 1610 98.7 °F (37.1 °C) 86 19 134/74 95 %   05/26/21 1036 98.3 °F (36.8 °C) 93 19 137/72 94 %   05/26/21 0721 98 °F (36.7 °C) 94 19 134/71 96 %       Pain Assessment  Pain Intensity 1: 0 (05/25/21 2158)  Pain Location 1: Flank  Pain Intervention(s) 1: Rest  Patient Stated Pain Goal: 0    Ambulating  Yes    Shift report given to oncoming nurse at the bedside.     Roberta Bales RN

## 2021-05-26 NOTE — PROGRESS NOTES
Attempted to change pt's dressing, but pt stated he would like to get in shower. Crystal Stratton NP asked if pt could get in the shower and stated to keep wound dry. Offered to place tegaderm over dressing for pt to shower and the pt stated that he would like to wait for dinner and that he would call me when he was ready for shower and to change his dressing. Will pass along to night shift. Pt very addiment on waiting for dressing change at this time, and pt was alert and oriented to person and place and partially situation.

## 2021-05-26 NOTE — PROGRESS NOTES
END OF SHIFT NOTE:    INTAKE/OUTPUT  05/25 0701 - 05/26 0700  In: 2276.6 [P.O.:840; I.V.:1436.6]  Out: 2300 [Urine:2125]  Voiding: YES  Catheter: NO    Flatus: Patient does have flatus present. Stool:  0 occurrences. Emesis: 0 occurrences. VITAL SIGNS  Patient Vitals for the past 12 hrs:   Temp Pulse Resp BP SpO2   05/26/21 0227 98.7 °F (37.1 °C) 87 19 (!) 145/80 94 %   05/25/21 2310 98.6 °F (37 °C) 70 17 133/74 95 %   05/25/21 1901 98.9 °F (37.2 °C) (!) 104 20 117/70 93 %     Pain Assessment  Pain Intensity 1: 0 (05/25/21 2158)  Pain Location 1: Flank  Pain Intervention(s) 1: Rest  Patient Stated Pain Goal: 0    Ambulating  Yes    Shift report to be given to oncoming nurse at the bedside.     [address]

## 2021-05-26 NOTE — PROGRESS NOTES
H&P/Consult Note/Progress Note/Office Note:   Ayaka Boo MRN: [id number]  :1945  Age:75 y.o.    HPI: Ayaka Agudelo. is a 77 y.o. male who was referred in consultation by Enoc Lainez PA-C with a left chest wall hematoma and cellulitis   after a fall at home on the floor approx 21. He sustained a blunt injury to his left chest wall. CXR below showed no PTx or rib fractures. He taked Humira for arthritis. We saw him for the 1st time on 21 with erythema of the left chest wall and placed him on Bactrim. Nothing in particular made it better or worse. When he came to the office on 2021 he reported high fevers over 101 degrees associate with nausea with his Bactrim. The erythema had extended well beyond the original inscribed lines of his left chest wall and extended down his left flank and in his left lower quadrant. I offered him direct admission to the hospital.    He denies any new falls since 2021. On 2021 he did have a new hematoma involving his right flank which was not present on 2021 CXR, 2 views  Hx: Left chest wall hematoma with cellulitis; r/o rib fracture or pneumothorax after a fall at home on the floor.     Clear lungs. The cardiac and mediastinal contours and pulmonary vascularity are normal.   The bones and soft tissues are within normal limits.  No evidence of a pneumothorax.     IMPRESSION:  Normal chest.      Additional hx:  21 admitted directly from office  21 continues IV Abx for seveer soft tissue infection left chest and left flank  21 confused and delerious; requires a sitter;  IV Vanc and Rocephin; leukocytosis noted for 1st time; creatinine better with IVF (1.7-->0.99)                Past Medical History:   Diagnosis Date    Depression     H/O urinary frequency 2013    History of BPH 2013    HLD (hyperlipidemia) 2013    Hx: UTI (urinary tract infection) 2013    Insomnia  Psoriasis     Psoriatic arthritis ([facility] [address] 2/1/2013    Wears hearing aid 2/1/2013     No past surgical history on file. Current Facility-Administered Medications   Medication Dose Route Frequency    lactated Ringers infusion  75 mL/hr IntraVENous CONTINUOUS    vancomycin (VANCOCIN) 1250 mg in  ml infusion  1,250 mg IntraVENous Q18H    tuberculin injection 5 Units  5 Units IntraDERMal ONCE    acetaminophen (TYLENOL) tablet 1,000 mg  1,000 mg Oral Q6H PRN    ondansetron (ZOFRAN) injection 4 mg  4 mg IntraVENous Q4H PRN    cefTRIAXone (ROCEPHIN) 1 g in 0.9% sodium chloride (MBP/ADV) 50 mL MBP  1 g IntraVENous Q24H    HYDROcodone-acetaminophen (NORCO) 5-325 mg per tablet 1 Tablet  1 Tablet Oral Q6H PRN    tamsulosin (FLOMAX) capsule 0.4 mg  0.4 mg Oral DAILY    famotidine (PEPCID) tablet 20 mg  20 mg Oral DAILY     Patient has no known allergies. Social History     Socioeconomic History    Marital status: SINGLE     Spouse name: Not on file    Number of children: Not on file    Years of education: Not on file    Highest education level: Not on file   Tobacco Use    Smoking status: Former Smoker    Smokeless tobacco: Never Used    Tobacco comment: pt states quit in early 60's late 52's    Vaping Use    Vaping Use: Never used   Substance and Sexual Activity    Alcohol use: No     Alcohol/week: 0.0 standard drinks    Drug use: No    Sexual activity: Yes     Partners: Female     Social Determinants of Health     Financial Resource Strain:     Difficulty of Paying Living Expenses:    Food Insecurity:     Worried About Running Out of Food in the Last Year:     [address] in the Last Year:    Transportation Needs:     Lack of Transportation (Medical):      Lack of Transportation (Non-Medical):    Physical Activity:     Days of Exercise per Week:     Minutes of Exercise per Session:    Stress:     Feeling of Stress :    Social Connections:     Frequency of Communication with Friends and Family:     Frequency of Social Gatherings with Friends and Family:     Attends [employer] Services:     Active Member of Clubs or Organizations:     Attends Club or Organization Meetings:     Marital Status:      Social History     Tobacco Use   Smoking Status Former Smoker   Smokeless Tobacco Never Used   Tobacco Comment    pt states quit in early 60's late 52's      Family History   Problem Relation Age of Onset    Heart Attack Father     Cancer Father         skin     Other Sister     Cancer Sister     Other Brother         melanoma     ROS: The patient has no difficulty with chest pain or shortness of breath. No fever or chills. Comprehensive review of systems was otherwise unremarkable except as noted above. Physical Exam:   Visit Vitals  BP (!) 145/80   Pulse 87   Temp 98.7 °F (37.1 °C)   Resp 19   Ht 5' 11\" (1.803 m)   Wt 183 lb (83 kg)   SpO2 94%   BMI 25.52 kg/m²     Vitals:    05/25/21 1522 05/25/21 1901 05/25/21 2310 05/26/21 0227   BP: 117/67 117/70 133/74 (!) 145/80   Pulse: 69 (!) 104 70 87   Resp: 18 20 17 19   Temp: 98.6 °F (37 °C) 98.9 °F (37.2 °C) 98.6 °F (37 °C) 98.7 °F (37.1 °C)   SpO2: 96% 93% 95% 94%   Weight:       Height:         [unfilled]  [unfilled]    Constitutional: Alert, oriented, cooperative patient in no acute distress; appears stated age    Eyes:Sclera are clear. EOMs intact  ENMT: no external lesions; he is hard of hearing. no obvious neck masses, no ear or lip lesions, nares normal  CV: RRR. Normal perfusion      left anterior axillary line wound with packing and with some ischemic skin necrosis present there which is about 1-1/2 x 1-1/2 cm in size. No fluctuance  The erythema of the left chest wall improved from about 12 cm to about 9 cm in size   There is induration. We evacuated about 20 cc of pus in the office just prior to admission.   The erythema and cellulitis extends from the left chest wall down to the left lower quadrant and left flank and involves an area of about 30 cm² in size. slight improvement on 5/26/21  New hematoma right flank noted at time of admission    Resp: No JVD. Breathing is  non-labored; no audible wheezing. GI: soft and non-distended     Musculoskeletal: unremarkable with normal function. No embolic signs or cyanosis. Neuro:  Confused; restless, agitaate; no focal deficits; slow moving and slow talking. Psychiatric: blunted affect, no memory impairment    Recent vitals (if inpt):  @IPVITALS(24:)@    Amount and/or Complexity of Data Reviewed and Analyzed:  I reviewed and analyzed all of the unique labs and radiologic studies that are shown below as well as any that are in the HPI, and any that are in the expanded problem list below  *Each unique test, order, or document contributes to the combination of 2 or combination of 3 in Category 1 below. For this visit I also reviewed old records and prior notes.       Recent Labs     05/26/21  0508 05/24/21  1257   WBC 14.9*  --    HGB 13.5*  --      --    * 131*   K 4.5 4.7    96*   CO2 26 28   BUN 26* 51*   CREA 0.99 1.71*   * 83   TBILI  --  0.9   ALT  --  80*   AP  --  124     Review of most recent CBC  Lab Results   Component Value Date/Time    WBC 14.9 (H) 05/26/2021 05:08 AM    HGB 13.5 (L) 05/26/2021 05:08 AM    HCT 39.0 (L) 05/26/2021 05:08 AM    PLATELET 077 46/00/3783 05:08 AM    MCV 94.7 05/26/2021 05:08 AM       Review of most recent BMP  Lab Results   Component Value Date/Time    Sodium 135 (L) 05/26/2021 05:08 AM    Potassium 4.5 05/26/2021 05:08 AM    Chloride 104 05/26/2021 05:08 AM    CO2 26 05/26/2021 05:08 AM    Anion gap 5 (L) 05/26/2021 05:08 AM    Glucose 112 (H) 05/26/2021 05:08 AM    BUN 26 (H) 05/26/2021 05:08 AM    Creatinine 0.99 05/26/2021 05:08 AM    BUN/Creatinine ratio 19 03/31/2016 11:26 AM    GFR est AA >60 05/26/2021 05:08 AM    GFR est non-AA >60 05/26/2021 05:08 AM    Calcium 7.7 (L) 05/26/2021 05:08 AM       Review of most recent LFTs (and lipase if done)  Lab Results   Component Value Date/Time    ALT (SGPT) 80 (H) 05/24/2021 12:57 PM    AST (SGOT) 53 (H) 05/24/2021 12:57 PM    Alk. phosphatase 124 05/24/2021 12:57 PM    Bilirubin, total 0.9 05/24/2021 12:57 PM     No results found for: LPSE    No results found for: INR, APTT, CBIL, LCAD, NH4, TROPT, TROIQ, INREXT, INREXT    Review of most recent HgbA1c  No results found for: HBA1C, VQS9QJFG, AXQ2FSQW, SXR5FOVC    Nutritional assessment screen for wound healing issues:  Lab Results   Component Value Date/Time    Protein, total 6.5 05/24/2021 12:57 PM    Albumin 2.4 (L) 05/24/2021 12:57 PM       @lastcovr@  XR Results (most recent):  Results from Hospital Encounter encounter on 05/20/21    XR CHEST PA LAT    Narrative  EXAM: XR CHEST PA LAT    INDICATION: Left chest wall hematoma with cellulitis rule out rib fracture or  pneumothorax after a fall at home on the floor. COMPARISON: None. FINDINGS: PA and lateral radiographs of the chest demonstrate clear lungs. The  cardiac and mediastinal contours and pulmonary vascularity are normal. The bones  and soft tissues are within normal limits. No evidence of a pneumothorax. Impression  Normal chest.      CT Results (most recent):  No results found for this or any previous visit. US Results (most recent):  No results found for this or any previous visit. Admission date (for inpatients): 5/24/2021   * No surgery found *  * No surgery found *        ASSESSMENT/PLAN:  Problem List  Date Reviewed: 5/24/2021        Codes Class Noted    Leukocytosis ICD-10-CM: D72.829  ICD-9-CM: 288.60  5/26/2021        Acute renal insufficiency ICD-10-CM: N28.9  ICD-9-CM: 593.9  5/25/2021        Fever ICD-10-CM: R50.9  ICD-9-CM: 780.60  5/24/2021        Suppression of immune system subtherapeutic (HCC) ICD-10-CM: D89.89  ICD-9-CM: 279.8  5/24/2021        Right flank hematoma ICD-10-CM: S30. 1XXA  ICD-9-CM: 922.2  5/24/2021        Chest wall abscess ICD-10-CM: L02.213  ICD-9-CM: 682.2  5/20/2021        Hematoma of left chest wall ICD-10-CM: B80.532S  ICD-9-CM: 922.1  5/20/2021        Cellulitis of chest wall ICD-10-CM: L03.313  ICD-9-CM: 682.2  5/20/2021        Fall ICD-10-CM: W19. Liu Neely  ICD-9-CM: E888.9  5/20/2021        Psoriasis ICD-10-CM: L40.9  ICD-9-CM: 696.1  Unknown        Insomnia ICD-10-CM: G47.00  ICD-9-CM: 780.52  Unknown        Depression ICD-10-CM: F32.9  ICD-9-CM: 233  Unknown        Tachycardia ICD-10-CM: R00.0  ICD-9-CM: 785.0  6/13/2016        Murmur ICD-10-CM: R01.1  ICD-9-CM: 785.2  6/13/2016        Psoriatic arthritis ([facility] ICD-10-CM: L40.50  ICD-9-CM: 696.0  2/1/2013        Wears hearing aid ICD-10-CM: Z97.4  ICD-9-CM: V45.89  2/1/2013        HLD (hyperlipidemia) ICD-10-CM: E78.5  ICD-9-CM: 272.4  2/1/2013        H/O urinary frequency ICD-10-CM: Z87.898  ICD-9-CM: V13.09  2/1/2013        History of BPH ICD-10-CM: D78.020  ICD-9-CM: V13.89  2/1/2013            Active Problems:    Chest wall abscess (5/20/2021)      Cellulitis of chest wall (5/20/2021)      Fall (5/20/2021)      Fever (5/24/2021)      Suppression of immune system subtherapeutic (HCC) (5/24/2021)      Right flank hematoma (5/24/2021)      Acute renal insufficiency (5/25/2021)      Leukocytosis (5/26/2021)           Number and Complexity of Problems addressed and   Risks of complications and/or morbidity of management        Acute renal insuff, Cr 1.7, new problem  Hydrate with LR  Lengthen Vanc dose interval  Vanc trough follow-up with pharmacy  Cr better by 5/26/21     Progressive cellulitis of left chest wall with abscess and extension of cellulitis in the left flank and left lower quadrant associated with fever  This failed outpt Rx with PO Bactrim  Continue inpt admission  Leukocytosis is new problem as of 5/26/21; follow on IV Vanc/Rocephin  Wound culture pending  He has a left chest wall hematoma with superimposed cellulitis with a large area of involvement. Despite p.o. Bactrim his cellulitis has progressed and extended down his left flank and left lower quadrant. He was having fevers and draining pus from his left chest wall where he spontaneously developed ischemic necrosis of the skin. I evacuated as much of this is possible and debrided the wound in the office on 5/24/2021  Wound culture ordered  He has a new hematoma involving his left flank which may be from a second fall. He is elderly frail and hard of hearing. He is also delerious, confused, sundowns, pulls IVs out and requires a sitter for supervision  He and his family did not call when the erythema extended beyond the marked border from his first visit on 5/20/2021. His CXR identified no PTx or rib fractures    Chronic immune suppression with acute infection and fever  We will hold his methotrexate, prednisone, and Humira to help reduce his immune suppression. I ordered IV Vanc and Rocpehin  Will watch for vanc toxicity      Open left chest wound  Repack with dry 4x4 daily and prn    Hematoma right flank  Watch  Check Hgb            Level of MDM (2/3 elements below)  Number and Complexity of Problems Addressed Amount and/or Complexity of Data to be Reviewed and Analyzed  *Each unique test, order, or document contributes to the combination of 2 or combination of 3 in Category 1 below.  Risk of Complications and/or Morbidity or Mortality of pt Management     24421  86369 SF Minimal  1self-limited or minor problem Minimal or none Minimal risk of morbidity from additional diagnostic testing or Rx   30085  71132 Low Low  2or more self-limited or minor problems;    or  1stable chronic illness;    or  7TCLMA, uncomplicated illness or injury   Limited  (Must meet the requirements of at least 1 of the 2 categories)  Category 1: Tests and documents   Any combination of 2 from the following:  Review of prior external note(s) from each unique source*;  review of the result(s) of each unique test*;   ordering of each unique test*    or   Category 2: Assessment requiring an independent historian(s)  (For the categories of independent interpretation of tests and discussion of management or test interpretation, see moderate or high) Low risk of morbidity from additional diagnostic testing or treatment     49017  39238 Mod Moderate  1or more chronic illnesses with exacerbation, progression, or side effects of treatment;    or  2or more stable chronic illnesses;    or  1undiagnosed new problem with uncertain prognosis;    or  1acute illness with systemic symptoms;    or  5UCAYO complicated injury   Moderate  (Must meet the requirements of at least 1 out of 3 categories)  Category 1: Tests, documents, or independent historian(s)  Any combination of 3 from the following:   Review of prior external note(s) from each unique source*;  Review of the result(s) of each unique test*;  Ordering of each unique test*;  Assessment requiring an independent historian(s)    or  Category 2: Independent interpretation of tests   Independent interpretation of a test performed by another physician/other qualified health care professional (not separately reported);     or  Category 3: Discussion of management or test interpretation  Discussion of management or test interpretation with external physician/other qualified health care professional/appropriate source (not separately reported)   Moderate risk of morbidity from additional diagnostic testing or treatment  Examples only:  Prescription drug management   Decision regarding minor surgery with identified patient or procedure risk factors  Decision regarding elective major surgery without identified patient or procedure risk factors   Diagnosis or treatment significantly limited by social determinants of health       23541 [id number] High High  1or more chronic illnesses with severe exacerbation, progression, or side effects of treatment;    or  1 acute or chronic illness or injury that poses a threat to life or bodily function-- acute infection involving large portion of left torso(chest/left flank) with leukocytosis and renal insuff, delerium; Risk of sepsis   Extensive  (Must meet the requirements of at least 2 out of 3 categories)  Category 1: Tests, documents, or independent historian(s)  Any combination of 3 from the following:   Review of prior external note(s) from each unique source*;  Review of the result(s) of each unique test*;   Ordering of each unique test*;   Assessment requiring an independent historian(s)    or   Category 2: Independent interpretation of tests   Independent interpretation of a test performed by another physician/other qualified health care professional (not separately reported);     or  Category 3: Discussion of management or test interpretation  Discussion of management or test interpretation with external physician/other qualified health care professional/appropriate source (not separately reported)   High risk of morbidity from additional diagnostic testing or treatment  Examples only:  Drug therapy requiring intensive monitoring for toxicity  Decision regarding elective major surgery with identified patient or procedure risk factors  Decision regarding emergency major surgery  Decision regarding hospitalization  Decision not to resuscitate or to de-escalate care because of poor prognosis             I have personally performed a face-to-face diagnostic evaluation and management  service on this patient. I have independently seen the patient. I have independently obtained the above history from the patient/family. I have independently examined the patient with above findings. I have independently reviewed data/labs for this patient and developed the above plan of care (MDM). Signed: Dave Still.  Bria Diaz MD, FACS

## 2021-05-27 PROBLEM — F05 SUNDOWNING: Status: ACTIVE | Noted: 2021-05-27

## 2021-05-27 PROBLEM — R41.0 CONFUSION: Status: ACTIVE | Noted: 2021-05-27

## 2021-05-27 LAB
ANION GAP SERPL CALC-SCNC: 7 MMOL/L (ref 7–16)
BUN SERPL-MCNC: 22 MG/DL (ref 8–23)
CALCIUM SERPL-MCNC: 7.5 MG/DL (ref 8.3–10.4)
CHLORIDE SERPL-SCNC: 106 MMOL/L (ref 98–107)
CO2 SERPL-SCNC: 23 MMOL/L (ref 21–32)
CREAT SERPL-MCNC: 0.79 MG/DL (ref 0.8–1.5)
ERYTHROCYTE [DISTWIDTH] IN BLOOD BY AUTOMATED COUNT: 16.1 % (ref 11.9–14.6)
GLUCOSE SERPL-MCNC: 115 MG/DL (ref 65–100)
HCT VFR BLD AUTO: 38.8 % (ref 41.1–50.3)
HGB BLD-MCNC: 13.1 G/DL (ref 13.6–17.2)
MCH RBC QN AUTO: 32.4 PG (ref 26.1–32.9)
MCHC RBC AUTO-ENTMCNC: 33.8 G/DL (ref 31.4–35)
MCV RBC AUTO: 96 FL (ref 79.6–97.8)
MM INDURATION POC: 0 MM (ref 0–0)
NRBC # BLD: 0 K/UL (ref 0–0.2)
PLATELET # BLD AUTO: 220 K/UL (ref 150–450)
PMV BLD AUTO: 9.9 FL (ref 9.4–12.3)
POTASSIUM SERPL-SCNC: 4.3 MMOL/L (ref 3.5–5.1)
PPD POC: NEGATIVE NEGATIVE
RBC # BLD AUTO: 4.04 M/UL (ref 4.23–5.6)
SARS-COV-2, COV2: NOT DETECTED
SODIUM SERPL-SCNC: 136 MMOL/L (ref 138–145)
SPECIMEN SOURCE, FCOV2M: NORMAL
WBC # BLD AUTO: 12.9 K/UL (ref 4.3–11.1)

## 2021-05-27 PROCEDURE — 99233 SBSQ HOSP IP/OBS HIGH 50: CPT | Performed by: SURGERY

## 2021-05-27 PROCEDURE — 80202 ASSAY OF VANCOMYCIN: CPT

## 2021-05-27 PROCEDURE — 74011000258 HC RX REV CODE- 258: Performed by: SURGERY

## 2021-05-27 PROCEDURE — 97530 THERAPEUTIC ACTIVITIES: CPT

## 2021-05-27 PROCEDURE — 80048 BASIC METABOLIC PNL TOTAL CA: CPT

## 2021-05-27 PROCEDURE — 65270000029 HC RM PRIVATE

## 2021-05-27 PROCEDURE — 74011250637 HC RX REV CODE- 250/637: Performed by: SURGERY

## 2021-05-27 PROCEDURE — 36415 COLL VENOUS BLD VENIPUNCTURE: CPT

## 2021-05-27 PROCEDURE — 74011250636 HC RX REV CODE- 250/636: Performed by: SURGERY

## 2021-05-27 PROCEDURE — 85027 COMPLETE CBC AUTOMATED: CPT

## 2021-05-27 PROCEDURE — 2709999900 HC NON-CHARGEABLE SUPPLY

## 2021-05-27 RX ADMIN — FAMOTIDINE 20 MG: 20 TABLET ORAL at 08:30

## 2021-05-27 RX ADMIN — SODIUM CHLORIDE, SODIUM LACTATE, POTASSIUM CHLORIDE, AND CALCIUM CHLORIDE 75 ML/HR: 600; 310; 30; 20 INJECTION, SOLUTION INTRAVENOUS at 09:11

## 2021-05-27 RX ADMIN — HYDROCODONE BITARTRATE AND ACETAMINOPHEN 1 TABLET: 5; 325 TABLET ORAL at 12:19

## 2021-05-27 RX ADMIN — VANCOMYCIN HYDROCHLORIDE 1250 MG: 10 INJECTION, POWDER, LYOPHILIZED, FOR SOLUTION INTRAVENOUS at 05:59

## 2021-05-27 RX ADMIN — CEFTRIAXONE 1 G: 1 INJECTION, POWDER, FOR SOLUTION INTRAMUSCULAR; INTRAVENOUS at 12:19

## 2021-05-27 RX ADMIN — TAMSULOSIN HYDROCHLORIDE 0.4 MG: 0.4 CAPSULE ORAL at 08:30

## 2021-05-27 NOTE — PROGRESS NOTES
END OF SHIFT NOTE:    INTAKE/OUTPUT  05/26 0701 - 05/27 0700  In: 1488.7 [P.O.:840; I.V.:648.7]  Out: 3300 [Urine:3300]  Voiding: YES  Catheter: NO  Drain:      Flatus: Patient does have flatus present. Stool:  1 occurrences. Characteristics:  Stool Assessment  Stool Color: Brown  Stool Appearance: Soft  Stool Amount: Medium  Stool Source/Status: Rectum    Emesis: 0 occurrences. VITAL SIGNS  Patient Vitals for the past 12 hrs:   Temp Pulse Resp BP SpO2   05/27/21 0241 98.7 °F (37.1 °C) 80 17 (!) 142/75 95 %   05/26/21 2307 99.8 °F (37.7 °C) 74 17 (!) 148/81 95 %   05/26/21 1912 99.5 °F (37.5 °C) 63 17 (!) 142/66 95 %     Pain Assessment  Pain Intensity 1: 0 (05/26/21 2015)  Pain Location 1: Flank  Pain Intervention(s) 1: Rest  Patient Stated Pain Goal: 0    Ambulating  Yes    Shift report to be given to oncoming nurse at the bedside.     [address]

## 2021-05-27 NOTE — PROGRESS NOTES
ACUTE PHYSICAL THERAPY GOALS:  (Developed with and agreed upon by patient and/or caregiver.)     LTG:  (1.)Mr. Lisa Kiser will move from supine to sit and sit to supine , scoot up and down and roll side to side in bed with MODIFIED INDEPENDENCE within 7 treatment day(s). (2.)Mr. Lisa Kiser will transfer from bed to chair and chair to bed with SUPERVISION using the least restrictive device within 7 treatment day(s). (3.)Mr. Lisa Kiser will ambulate with SUPERVISION for 250 feet with the least restrictive device within 7 treatment day(s). (4.)Mr. iLsa Kiser will perform seated and standing exercises for 15+ minutes to improve strength and mobility within 7 days. PHYSICAL THERAPY: Daily Note and AM Treatment Day # 2    Bradly Yee is a 77 y.o. male   PRIMARY DIAGNOSIS: <principal problem not specified>  Cellulitis of chest wall [L03.313]         ASSESSMENT:     REHAB RECOMMENDATIONS: CURRENT LEVEL OF FUNCTION:  (Most Recently Demonstrated)   Recommendation to date pending progress:  Setting:   Short-term Rehab  Equipment:    To Be Determined Bed Mobility:   Standby Assistance  Sit to Stand:  [employer] Assistance  Transfers:   Contact Guard Assistance  Gait/Mobility:   Contact Guard Assistance     ASSESSMENT:  Mr. Lisa Kiser was up in restroom on arrival. He is confused and not wanting to cooperate with therapy. He ambulated out into room but refused to walk any further. He sat up in his chair for a few minutes before walking back over to the bed. He declined brushing his teeth. He states he is very tired and just wants to return to bed to sleep. Pt left supine with needs in reach. SUBJECTIVE:   Mr. Lisa Kiser states, \"I'm just really tired. \"    SOCIAL HISTORY/ LIVING ENVIRONMENT:   Home Environment: Private residence  One/Two Story Residence: One story  Living Alone: No  Support Systems: Spouse/Significant Other/Partner  OBJECTIVE:     PAIN: VITAL SIGNS: LINES/DRAINS:   Pre Treatment:   not rated  Post Treatment: not rated   IV  O2 Device: None (Room air)     MOBILITY: I Mod I S SBA CGA Min Mod Max Total  NT x2 Comments:   Bed Mobility    Rolling [] [] [] [] [] [] [] [] [] [] []    Supine to Sit [] [] [] [] [] [] [] [] [] [] []    Scooting [] [] [] [x] [] [] [] [] [] [] []    Sit to Supine [] [] [] [x] [] [] [] [] [] [] []    Transfers    Sit to Stand [] [] [] [] [x] [] [] [] [] [] []    Bed to Chair [] [] [] [] [x] [] [] [] [] [] []    Stand to Sit [] [] [] [] [x] [] [] [] [] [] []    I=Independent, Mod I=Modified Independent, S=Supervision, SBA=Standby Assistance, CGA=Contact Guard Assistance,   Min=Minimal Assistance, Mod=Moderate Assistance, Max=Maximal Assistance, Total=Total Assistance, NT=Not Tested    BALANCE: Good Fair+ Fair Fair- Poor NT Comments   Sitting Static [] [x] [] [] [] []    Sitting Dynamic [] [x] [] [] [] []              Standing Static [] [] [x] [] [] []    Standing Dynamic [] [] [x] [] [] []      GAIT: I Mod I S SBA CGA Min Mod Max Total  NT x2 Comments:   Level of Assistance [] [] [] [] [x] [] [] [] [] [] []    Distance 10' plus additional 5'     DME None    Gait Quality Unsteady and impulsive    Weightbearing  Status N/A     I=Independent, Mod I=Modified Independent, S=Supervision, SBA=Standby Assistance, CGA=Contact Guard Assistance,   Min=Minimal Assistance, Mod=Moderate Assistance, Max=Maximal Assistance, Total=Total Assistance, NT=Not Tested    PLAN:   FREQUENCY/DURATION: PT Plan of Care: 3 times/week for duration of hospital stay or until stated goals are met, whichever comes first.  TREATMENT:     TREATMENT:   ($$ Therapeutic Activity: 8-22 mins    )  Therapeutic Activity (10 Minutes): Therapeutic activity included Sit to Supine, Ambulation on level ground, Sitting balance  and Standing balance to improve functional Mobility, Strength and Activity tolerance.     TREATMENT GRID:  N/A    AFTER TREATMENT POSITION/PRECAUTIONS:  Bed and sitter at bedside     INTERDISCIPLINARY COLLABORATION:  RN/PCT    TOTAL TREATMENT DURATION:  PT Patient Time In/Time Out  Time In: 1040  Time Out: 2211 [address]

## 2021-05-27 NOTE — PROGRESS NOTES
H&P/Consult Note/Progress Note/Office Note:   Danny Hardin MRN: [id number]  :1945  Age:75 y.o.    HPI: Danny Hardin is a 77 y.o. male who was referred in consultation by Haider Gallego PA-C with a left chest wall hematoma and cellulitis   after a fall at home on the floor approx 21. He sustained a blunt injury to his left chest wall. CXR below showed no PTx or rib fractures. He taked Humira for arthritis. We saw him for the 1st time on 21 with erythema of the left chest wall and placed him on Bactrim. Nothing in particular made it better or worse. When he came to the office on 2021 he reported high fevers over 101 degrees associate with nausea with his Bactrim. The erythema had extended well beyond the original inscribed lines of his left chest wall and extended down his left flank and in his left lower quadrant. I offered him direct admission to the hospital.    He denies any new falls since 2021. On 2021 he did have a new hematoma involving his right flank which was not present on 2021 CXR, 2 views  Hx: Left chest wall hematoma with cellulitis; r/o rib fracture or pneumothorax after a fall at home on the floor.     Clear lungs. The cardiac and mediastinal contours and pulmonary vascularity are normal.   The bones and soft tissues are within normal limits.  No evidence of a pneumothorax.     IMPRESSION:  Normal chest.      Additional hx:  21 admitted directly from office  21 continues IV Abx for seveer soft tissue infection left chest and left flank  21 confused and delerious; requires a sitter;  IV Vanc and Rocephin; leukocytosis noted for 1st time; creatinine better with IVF (1.7-->0.99)  21 more pus from chest wound; cellulitis improving; wbc improving; Cr normal                  Past Medical History:   Diagnosis Date    Depression     H/O urinary frequency 2013    History of BPH 2013    HLD (hyperlipidemia) 2/1/2013    Hx: UTI (urinary tract infection) 2/1/2013    Insomnia     Psoriasis     Psoriatic arthritis ([facility] [address] 2/1/2013    Wears hearing aid 2/1/2013     No past surgical history on file. Current Facility-Administered Medications   Medication Dose Route Frequency    Vancomycin Trough Reminder   Other ONCE    lactated Ringers infusion  75 mL/hr IntraVENous CONTINUOUS    vancomycin (VANCOCIN) 1250 mg in  ml infusion  1,250 mg IntraVENous Q18H    acetaminophen (TYLENOL) tablet 1,000 mg  1,000 mg Oral Q6H PRN    ondansetron (ZOFRAN) injection 4 mg  4 mg IntraVENous Q4H PRN    cefTRIAXone (ROCEPHIN) 1 g in 0.9% sodium chloride (MBP/ADV) 50 mL MBP  1 g IntraVENous Q24H    HYDROcodone-acetaminophen (NORCO) 5-325 mg per tablet 1 Tablet  1 Tablet Oral Q6H PRN    tamsulosin (FLOMAX) capsule 0.4 mg  0.4 mg Oral DAILY    famotidine (PEPCID) tablet 20 mg  20 mg Oral DAILY     Patient has no known allergies. Social History     Socioeconomic History    Marital status: SINGLE     Spouse name: Not on file    Number of children: Not on file    Years of education: Not on file    Highest education level: Not on file   Tobacco Use    Smoking status: Former Smoker    Smokeless tobacco: Never Used    Tobacco comment: pt states quit in early 60's late 52's    Vaping Use    Vaping Use: Never used   Substance and Sexual Activity    Alcohol use: No     Alcohol/week: 0.0 standard drinks    Drug use: No    Sexual activity: Yes     Partners: Female     Social Determinants of Health     Financial Resource Strain:     Difficulty of Paying Living Expenses:    Food Insecurity:     Worried About Running Out of Food in the Last Year:     [address] in the Last Year:    Transportation Needs:     Lack of Transportation (Medical):      Lack of Transportation (Non-Medical):    Physical Activity:     Days of Exercise per Week:     Minutes of Exercise per Session:    Stress:     Feeling of Stress :    Social Connections:     Frequency of Communication with Friends and Family:     Frequency of Social Gatherings with Friends and Family:     Attends [employer] Services:     Active Member of Clubs or Organizations:     Attends Club or Organization Meetings:     Marital Status:      Social History     Tobacco Use   Smoking Status Former Smoker   Smokeless Tobacco Never Used   Tobacco Comment    pt states quit in early 60's late 52's      Family History   Problem Relation Age of Onset    Heart Attack Father     Cancer Father         skin     Other Sister     Cancer Sister     Other Brother         melanoma     ROS: The patient has no difficulty with chest pain or shortness of breath. No fever or chills. Comprehensive review of systems was otherwise unremarkable except as noted above. Physical Exam:   Visit Vitals  BP (!) 146/72   Pulse 75   Temp 98.6 °F (37 °C)   Resp 18   Ht 5' 11\" (1.803 m)   Wt 180 lb 12.8 oz (82 kg)   SpO2 97%   BMI 25.22 kg/m²     Vitals:    05/27/21 0241 05/27/21 0705 05/27/21 1116 05/27/21 1517   BP: (!) 142/75 (!) 148/83 (!) 147/79 (!) 146/72   Pulse: 80 75 73 75   Resp: 17 17 17 18   Temp: 98.7 °F (37.1 °C) 98.7 °F (37.1 °C) 98.4 °F (36.9 °C) 98.6 °F (37 °C)   SpO2: 95% 96% 95% 97%   Weight: 180 lb 12.8 oz (82 kg)      Height:         [facility]  [unfilled]    Constitutional: Alert, oriented, cooperative patient in no acute distress; appears stated age    Eyes:Sclera are clear. EOMs intact  ENMT: no external lesions; he is hard of hearing. no obvious neck masses, no ear or lip lesions, nares normal  CV: RRR. Normal perfusion      left anterior axillary line wound with packing and with some ischemic skin necrosis present there which is about 1-1/2 x 1-1/2 cm in size. No fluctuance  The erythema of the left chest wall improved from about 12 cm to about 9 cm in size   There is induration. We evacuated about 20 cc of pus in the office just prior to admission.   The erythema and cellulitis extends from the left chest wall down to the left lower quadrant and left flank and involves an area of about 30 cm² in size. slight improvement on 5/26/21  New hematoma right flank noted at time of admission    Resp: No JVD. Breathing is  non-labored; no audible wheezing. GI: soft and non-distended     Musculoskeletal: unremarkable with normal function. No embolic signs or cyanosis. Neuro:  Confused; restless, agitaate; no focal deficits; slow moving and slow talking. Psychiatric: blunted affect, no memory impairment    Recent vitals (if inpt):  @IPVITALS(24:)@    Amount and/or Complexity of Data Reviewed and Analyzed:  I reviewed and analyzed all of the unique labs and radiologic studies that are shown below as well as any that are in the HPI, and any that are in the expanded problem list below  *Each unique test, order, or document contributes to the combination of 2 or combination of 3 in Category 1 below. For this visit I also reviewed old records and prior notes.       Recent Labs     05/27/21  0425   WBC 12.9*   HGB 13.1*      *   K 4.3      CO2 23   BUN 22   CREA 0.79*   *     Review of most recent CBC  Lab Results   Component Value Date/Time    WBC 12.9 (H) 05/27/2021 04:25 AM    HGB 13.1 (L) 05/27/2021 04:25 AM    HCT 38.8 (L) 05/27/2021 04:25 AM    PLATELET 851 94/20/2703 04:25 AM    MCV 96.0 05/27/2021 04:25 AM       Review of most recent BMP  Lab Results   Component Value Date/Time    Sodium 136 (L) 05/27/2021 04:25 AM    Potassium 4.3 05/27/2021 04:25 AM    Chloride 106 05/27/2021 04:25 AM    CO2 23 05/27/2021 04:25 AM    Anion gap 7 05/27/2021 04:25 AM    Glucose 115 (H) 05/27/2021 04:25 AM    BUN 22 05/27/2021 04:25 AM    Creatinine 0.79 (L) 05/27/2021 04:25 AM    BUN/Creatinine ratio 19 03/31/2016 11:26 AM    GFR est AA >60 05/27/2021 04:25 AM    GFR est non-AA >60 05/27/2021 04:25 AM    Calcium 7.5 (L) 05/27/2021 04:25 AM       Review of most recent LFTs (and lipase if done)  Lab Results   Component Value Date/Time    ALT (SGPT) 80 (H) 05/24/2021 12:57 PM    AST (SGOT) 53 (H) 05/24/2021 12:57 PM    Alk. phosphatase 124 05/24/2021 12:57 PM    Bilirubin, total 0.9 05/24/2021 12:57 PM     No results found for: LPSE    No results found for: INR, APTT, CBIL, LCAD, NH4, TROPT, TROIQ, INREXT, INREXT    Review of most recent HgbA1c  No results found for: HBA1C, RTQ3MPTE, QEI3GIPC, ALL8RZYB    Nutritional assessment screen for wound healing issues:  Lab Results   Component Value Date/Time    Protein, total 6.5 05/24/2021 12:57 PM    Albumin 2.4 (L) 05/24/2021 12:57 PM       @lastcovr@  XR Results (most recent):  Results from Hospital Encounter encounter on 05/20/21    XR CHEST PA LAT    Narrative  EXAM: XR CHEST PA LAT    INDICATION: Left chest wall hematoma with cellulitis rule out rib fracture or  pneumothorax after a fall at home on the floor. COMPARISON: None. FINDINGS: PA and lateral radiographs of the chest demonstrate clear lungs. The  cardiac and mediastinal contours and pulmonary vascularity are normal. The bones  and soft tissues are within normal limits. No evidence of a pneumothorax. Impression  Normal chest.      CT Results (most recent):  No results found for this or any previous visit. US Results (most recent):  No results found for this or any previous visit. Admission date (for inpatients): 5/24/2021   * No surgery found *  * No surgery found *        ASSESSMENT/PLAN:  Problem List  Date Reviewed: 5/24/2021        Codes Class Noted    Leukocytosis ICD-10-CM: D72.829  ICD-9-CM: 288.60  5/26/2021        Acute renal insufficiency ICD-10-CM: N28.9  ICD-9-CM: 593.9  5/25/2021        Fever ICD-10-CM: R50.9  ICD-9-CM: 780.60  5/24/2021        Suppression of immune system subtherapeutic (HCC) ICD-10-CM: D89.89  ICD-9-CM: 279.8  5/24/2021        Right flank hematoma ICD-10-CM: S30. 1XXA  ICD-9-CM: 922.2  5/24/2021        Chest wall abscess ICD-10-CM: L02.213  ICD-9-CM: 682.2  5/20/2021        Hematoma of left chest wall ICD-10-CM: A68.528B  ICD-9-CM: 922.1  5/20/2021        Cellulitis of chest wall ICD-10-CM: L03.313  ICD-9-CM: 682.2  5/20/2021        Fall ICD-10-CM: W19. Humphrey Maid  ICD-9-CM: E888.9  5/20/2021        Psoriasis ICD-10-CM: L40.9  ICD-9-CM: 696.1  Unknown        Insomnia ICD-10-CM: G47.00  ICD-9-CM: 780.52  Unknown        Depression ICD-10-CM: F32.9  ICD-9-CM: 493  Unknown        Tachycardia ICD-10-CM: R00.0  ICD-9-CM: 785.0  6/13/2016        Murmur ICD-10-CM: R01.1  ICD-9-CM: 785.2  6/13/2016        Psoriatic arthritis ([facility] [address] ICD-10-CM: L40.50  ICD-9-CM: 696.0  2/1/2013        Wears hearing aid ICD-10-CM: Z97.4  ICD-9-CM: V45.89  2/1/2013        HLD (hyperlipidemia) ICD-10-CM: E78.5  ICD-9-CM: 272.4  2/1/2013        H/O urinary frequency ICD-10-CM: Z87.898  ICD-9-CM: V13.09  2/1/2013        History of BPH ICD-10-CM: C56.568  ICD-9-CM: V13.89  2/1/2013            Active Problems:    Chest wall abscess (5/20/2021)      Cellulitis of chest wall (5/20/2021)      Fall (5/20/2021)      Fever (5/24/2021)      Suppression of immune system subtherapeutic ([facility] [address] (5/24/2021)      Right flank hematoma (5/24/2021)      Acute renal insufficiency (5/25/2021)      Leukocytosis (5/26/2021)           Number and Complexity of Problems addressed and   Risks of complications and/or morbidity of management        Acute renal insuff, Cr 1.7, new problem  Hydrate with LR  Lengthen Vanc dose interval  Vanc trough follow-up with pharmacy  Cr better by 5/26/21 and 5/27/1     Progressive cellulitis of left chest wall with abscess and extension of cellulitis in the left flank and left lower quadrant associated with fever  This failed outpt Rx with PO Bactrim    He and his family did not call when the erythema extended beyond the marked border from his first visit on 5/20/2021. Despite p.o.  Bactrim as an outpt his the chest wall hematoma progressed to cellulitis and then abscess and extended down his left flank and left lower quadrant. There is a large area of involvement >20cm. He also has a new hematoma involving his left flank which may be from a second fall. His admission CXR identified no PTx or rib fractures      He was having fevers and draining pus from his left chest wall where he spontaneously developed ischemic necrosis of the skin. I evacuated as much of this is possible and debrided the wound in the office on 5/24/2021  Wound culture pending  I evacuated 30cc of pus from the chest wall wound at bedside on 5/27/21  He will need more frequent dressing changes    Continue inpt admission  Leukocytosis is new problem as of 5/26/21 and still elevated on 5/27/21; follow on IV Vanc/Rocephin      He is elderly frail and hard of hearing. He is also delerious, confused, sundowns, pulls IVs out and requires a sitter for supervision        Chronic immune suppression with acute infection and fever  We will hold his methotrexate, prednisone, and Humira to help reduce his immune suppression. Continue IV Vanc and Rocpehin  Will watch for vanc toxicity      Open left chest wound  Repack with dry 4x4 TID  and prn    Hematoma right flank  Watch  Check Hgb            Level of MDM (2/3 elements below)  Number and Complexity of Problems Addressed Amount and/or Complexity of Data to be Reviewed and Analyzed  *Each unique test, order, or document contributes to the combination of 2 or combination of 3 in Category 1 below.  Risk of Complications and/or Morbidity or Mortality of pt Management     08068  94634 SF Minimal  1self-limited or minor problem Minimal or none Minimal risk of morbidity from additional diagnostic testing or Rx   08740  81689 Low Low  2or more self-limited or minor problems;    or  1stable chronic illness;    or  9DDKOG, uncomplicated illness or injury   Limited  (Must meet the requirements of at least 1 of the 2 categories)  Category 1: Tests and documents   Any combination of 2 from the following:  Review of prior external note(s) from each unique source*;  review of the result(s) of each unique test*;   ordering of each unique test*    or   Category 2: Assessment requiring an independent historian(s)  (For the categories of independent interpretation of tests and discussion of management or test interpretation, see moderate or high) Low risk of morbidity from additional diagnostic testing or treatment     12869  93721 Mod Moderate  1or more chronic illnesses with exacerbation, progression, or side effects of treatment;    or  2or more stable chronic illnesses;    or  1undiagnosed new problem with uncertain prognosis;    or  1acute illness with systemic symptoms;    or  9HLGDN complicated injury   Moderate  (Must meet the requirements of at least 1 out of 3 categories)  Category 1: Tests, documents, or independent historian(s)  Any combination of 3 from the following:   Review of prior external note(s) from each unique source*;  Review of the result(s) of each unique test*;  Ordering of each unique test*;  Assessment requiring an independent historian(s)    or  Category 2: Independent interpretation of tests   Independent interpretation of a test performed by another physician/other qualified health care professional (not separately reported);     or  Category 3: Discussion of management or test interpretation  Discussion of management or test interpretation with external physician/other qualified health care professional/appropriate source (not separately reported)   Moderate risk of morbidity from additional diagnostic testing or treatment  Examples only:  Prescription drug management   Decision regarding minor surgery with identified patient or procedure risk factors  Decision regarding elective major surgery without identified patient or procedure risk factors   Diagnosis or treatment significantly limited by social determinants of [place] [id number]  91161 High High  1or more chronic illnesses with severe exacerbation, progression, or side effects of treatment;    or  1 acute or chronic illness or injury that poses a threat to life or bodily function-- acute infection involving large portion of left torso(chest/left flank) with leukocytosis and renal insuff, delerium; Risk of sepsis   Extensive  (Must meet the requirements of at least 2 out of 3 categories)  Category 1: Tests, documents, or independent historian(s)  Any combination of 3 from the following:   Review of prior external note(s) from each unique source*;  Review of the result(s) of each unique test*;   Ordering of each unique test*;   Assessment requiring an independent historian(s)    or   Category 2: Independent interpretation of tests   Independent interpretation of a test performed by another physician/other qualified health care professional (not separately reported);     or  Category 3: Discussion of management or test interpretation  Discussion of management or test interpretation with external physician/other qualified health care professional/appropriate source (not separately reported)   High risk of morbidity from additional diagnostic testing or treatment  Examples only:  Drug therapy requiring intensive monitoring for toxicity  Decision regarding elective major surgery with identified patient or procedure risk factors  Decision regarding emergency major surgery  Decision regarding hospitalization  Decision not to resuscitate or to de-escalate care because of poor prognosis             I have personally performed a face-to-face diagnostic evaluation and management  service on this patient. I have independently seen the patient. I have independently obtained the above history from the patient/family. I have independently examined the patient with above findings.   I have independently reviewed data/labs for this patient and developed the above plan of care (MDM). Signed: Micheal Johnson.  Lisa Cerda MD, FACS

## 2021-05-27 NOTE — PROGRESS NOTES
Physician Progress Note      Amrit Bryant  CSN #:                  [id number]  :                       1945  ADMIT DATE:       2021 12:26 PM  [address] [place] DATE:  RESPONDING  PROVIDER #:        Mere Richards MD          QUERY TEXT:    Pt admitted with cellulitis and on May 26 documentation of confusion and sundowns and requiring a sitter. If possible, please document in progress notes and discharge summary if patient is being treated for any of the following: The medical record reflects the following:  Risk Factors: advanced age, confusion  Clinical Indicators: Documentation of confusion, agitation. As per progress note, He is also delerious, confused, sundowns, pulls IVs out and requires a sitter for supervision  Treatment: Sitter. Antibiotics. Thank Kristi Young RN [facility]  [phone]  Options provided:  -- Metabolic encephalopathy  -- Septic encephalopathy  -- Confusion only with sundowing  -- Other - I will add my own diagnosis  -- Disagree - Not applicable / Not valid  -- Disagree - Clinically unable to determine / Unknown  -- Refer to Clinical Documentation Reviewer    PROVIDER RESPONSE TEXT:    This patient has confusion only with sundowing. Query created by: Any Soliz on 2021 1:51 PM      QUERY TEXT:    Pt admitted with cellulitis of chest wall. Pt noted to have leukocytosis and documentation of confusion. If possible, please document in the progress notes and discharge summary if you are evaluating and /or treating any of the following: The medical record reflects the following:  Risk Factors: cellulitis  Clinical Indicators: WBC 14.9 on lab check on May 26. HR > 90. Documentation of confusion.   Treatment: Antibiotics    Thank Chelsey Puentes [facility]  [phone]  Options provided:  -- Sepsis, present on admission  -- Sepsis, not present on admission  -- No Sepsis,  cellulitis only  -- Other - I will add my own diagnosis  -- Disagree - Not applicable / Not valid  -- Disagree - Clinically unable to determine / Unknown  -- Refer to Clinical Documentation Reviewer    PROVIDER RESPONSE TEXT:    This patient has cellulitis only, patient is not septic.     Query created by: Arturo Lacey on 5/26/2021 1:58 PM      Electronically signed by:  Moishe Dance MD 5/27/2021 7:40 PM

## 2021-05-27 NOTE — PROGRESS NOTES
Insurance authorization pending for patient to discharge to [facility].  will continue to follow patient during hospitalization for discharge planning and needs.

## 2021-05-28 LAB
ANION GAP SERPL CALC-SCNC: 5 MMOL/L (ref 7–16)
BACTERIA SPEC CULT: ABNORMAL
BACTERIA SPEC CULT: ABNORMAL
BUN SERPL-MCNC: 20 MG/DL (ref 8–23)
CALCIUM SERPL-MCNC: 7.6 MG/DL (ref 8.3–10.4)
CHLORIDE SERPL-SCNC: 106 MMOL/L (ref 98–107)
CO2 SERPL-SCNC: 26 MMOL/L (ref 21–32)
CREAT SERPL-MCNC: 0.87 MG/DL (ref 0.8–1.5)
ERYTHROCYTE [DISTWIDTH] IN BLOOD BY AUTOMATED COUNT: 16 % (ref 11.9–14.6)
GLUCOSE SERPL-MCNC: 118 MG/DL (ref 65–100)
GRAM STN SPEC: ABNORMAL
GRAM STN SPEC: ABNORMAL
HCT VFR BLD AUTO: 39.6 % (ref 41.1–50.3)
HGB BLD-MCNC: 13.6 G/DL (ref 13.6–17.2)
MCH RBC QN AUTO: 33 PG (ref 26.1–32.9)
MCHC RBC AUTO-ENTMCNC: 34.3 G/DL (ref 31.4–35)
MCV RBC AUTO: 96.1 FL (ref 79.6–97.8)
NRBC # BLD: 0 K/UL (ref 0–0.2)
PLATELET # BLD AUTO: 209 K/UL (ref 150–450)
PMV BLD AUTO: 9.9 FL (ref 9.4–12.3)
POTASSIUM SERPL-SCNC: 4.4 MMOL/L (ref 3.5–5.1)
RBC # BLD AUTO: 4.12 M/UL (ref 4.23–5.6)
SERVICE CMNT-IMP: ABNORMAL
SODIUM SERPL-SCNC: 137 MMOL/L (ref 138–145)
VANCOMYCIN TROUGH SERPL-MCNC: 8.4 UG/ML (ref 5–20)
WBC # BLD AUTO: 12 K/UL (ref 4.3–11.1)

## 2021-05-28 PROCEDURE — 85027 COMPLETE CBC AUTOMATED: CPT

## 2021-05-28 PROCEDURE — 99232 SBSQ HOSP IP/OBS MODERATE 35: CPT | Performed by: SURGERY

## 2021-05-28 PROCEDURE — 36415 COLL VENOUS BLD VENIPUNCTURE: CPT

## 2021-05-28 PROCEDURE — 97530 THERAPEUTIC ACTIVITIES: CPT

## 2021-05-28 PROCEDURE — 65270000029 HC RM PRIVATE

## 2021-05-28 PROCEDURE — 74011250636 HC RX REV CODE- 250/636: Performed by: SURGERY

## 2021-05-28 PROCEDURE — 80048 BASIC METABOLIC PNL TOTAL CA: CPT

## 2021-05-28 PROCEDURE — 74011000258 HC RX REV CODE- 258: Performed by: SURGERY

## 2021-05-28 PROCEDURE — 2709999900 HC NON-CHARGEABLE SUPPLY

## 2021-05-28 PROCEDURE — 74011250637 HC RX REV CODE- 250/637: Performed by: SURGERY

## 2021-05-28 RX ORDER — VANCOMYCIN/0.9 % SOD CHLORIDE 1.5G/250ML
1500 PLASTIC BAG, INJECTION (ML) INTRAVENOUS
Status: COMPLETED | OUTPATIENT
Start: 2021-05-28 | End: 2021-06-06

## 2021-05-28 RX ADMIN — SODIUM CHLORIDE, SODIUM LACTATE, POTASSIUM CHLORIDE, AND CALCIUM CHLORIDE 30 ML/HR: 600; 310; 30; 20 INJECTION, SOLUTION INTRAVENOUS at 05:08

## 2021-05-28 RX ADMIN — Medication: at 15:13

## 2021-05-28 RX ADMIN — VANCOMYCIN HYDROCHLORIDE 1500 MG: 10 INJECTION, POWDER, LYOPHILIZED, FOR SOLUTION INTRAVENOUS at 18:50

## 2021-05-28 RX ADMIN — CEFTRIAXONE 1 G: 1 INJECTION, POWDER, FOR SOLUTION INTRAMUSCULAR; INTRAVENOUS at 11:54

## 2021-05-28 RX ADMIN — HYDROCODONE BITARTRATE AND ACETAMINOPHEN 1 TABLET: 5; 325 TABLET ORAL at 19:54

## 2021-05-28 RX ADMIN — Medication: at 22:00

## 2021-05-28 RX ADMIN — TAMSULOSIN HYDROCHLORIDE 0.4 MG: 0.4 CAPSULE ORAL at 10:49

## 2021-05-28 RX ADMIN — Medication: at 15:12

## 2021-05-28 RX ADMIN — VANCOMYCIN HYDROCHLORIDE 1250 MG: 10 INJECTION, POWDER, LYOPHILIZED, FOR SOLUTION INTRAVENOUS at 00:30

## 2021-05-28 RX ADMIN — FAMOTIDINE 20 MG: 20 TABLET ORAL at 10:49

## 2021-05-28 RX ADMIN — HYDROCODONE BITARTRATE AND ACETAMINOPHEN 1 TABLET: 5; 325 TABLET ORAL at 00:52

## 2021-05-28 NOTE — PROGRESS NOTES
Pharmacokinetic Consult to Pharmacist    Mellisa Izquierdo. is a 77 y.o. male being treated for SSTI with vancomycin. Height: 5' 11\" (180.3 cm)  Weight: 84.8 kg (187 lb)  Lab Results   Component Value Date/Time    BUN 20 05/28/2021 04:31 AM    Creatinine 0.87 05/28/2021 04:31 AM    WBC 12.0 (H) 05/28/2021 04:31 AM      Estimated Creatinine Clearance: 79.3 mL/min (based on SCr of 0.87 mg/dL). Lab Results   Component Value Date/Time    Vancomycin,trough 8.4 05/27/2021 11:27 PM       Day 5 of vancomycin. Goal trough is 10-20. Creatinine improving compared to admission, trough low at 8.4. Will increase dose to 1500 mg every 18 hours for a calculated trough of 10 with current kinetics. Will continue to follow patient and order levels when clinically indicated. Thank you,  Martine Suazo, Pharm. D.   PGY1 Pharmacy Resident

## 2021-05-28 NOTE — PROGRESS NOTES
END OF SHIFT NOTE:    INTAKE/OUTPUT  05/26 0701 - 05/27 0700  In: 1488.7 [P.O.:840; I.V.:648.7]  Out: 3300 [Urine:3300]  Voiding: YES  Catheter: NO  Drain:              Flatus: Patient does have flatus present. Stool:  0 occurrences. Characteristics:  Stool Assessment  Stool Color: Brown  Stool Appearance: Soft  Stool Amount: Medium  Stool Source/Status: Rectum    Emesis: 0 occurrences. Characteristics:        VITAL SIGNS  Patient Vitals for the past 12 hrs:   Temp Pulse Resp BP SpO2   05/27/21 1918 99 °F (37.2 °C) 77 18 (!) 165/87 97 %   05/27/21 1517 98.6 °F (37 °C) 75 18 (!) 146/72 97 %   05/27/21 1116 98.4 °F (36.9 °C) 73 17 (!) 147/79 95 %       Pain Assessment  Pain Intensity 1: 0 (05/26/21 2015)  Pain Location 1: Flank  Pain Intervention(s) 1: Rest  Patient Stated Pain Goal: 0    Ambulating  Yes    Shift report given to oncoming nurse at the bedside.     Harika Booth RN

## 2021-05-28 NOTE — PROGRESS NOTES
ACUTE PHYSICAL THERAPY GOALS:  (Developed with and agreed upon by patient and/or caregiver. )  LTG:  (1.)Mr. Coffey will move from supine to sit and sit to supine , scoot up and down and roll side to side in bed with MODIFIED INDEPENDENCE within 7 treatment day(s).  (2.)Mr. Coffey will transfer from bed to chair and chair to bed with SUPERVISION using the least restrictive device within 7 treatment day(s).    (3.)Mr. Coffey will ambulate with SUPERVISION for 250 feet with the least restrictive device within 7 treatment day(s). (4.)Mr. Jaqui Vera will perform seated and standing exercises for 15+ minutes to improve strength and mobility within 7 days. PHYSICAL THERAPY: Daily Note and AM Treatment Day # 3    Alcides Fan is a 77 y.o. male   PRIMARY DIAGNOSIS: <principal problem not specified>  Cellulitis of chest wall [L03.313]         ASSESSMENT:     REHAB RECOMMENDATIONS: CURRENT LEVEL OF FUNCTION:  (Most Recently Demonstrated)   Recommendation to date pending progress:  Setting:   Short-term Rehab  Equipment:    Rolling Walker Bed Mobility:   Standby Assistance  Sit to Stand:  [employer] Assistance  Transfers:   Contact Guard Assistance  Gait/Mobility:   Contact Guard Assistance     ASSESSMENT:  Mr. Jaqui Vera presents walking out of the bathroom with the sitter by his side. His gait is slightly unsteady but he is holding to the door and grab bars for stability. He was oriented to his name and place and was agreeable to have therapy. He states he did not want to walk outside of the room and complained of his left side hurting with increased activity. He sat on the EOB and participated in BLE AROM exercises. He stood with SBA at the bedside and worked on some standing balance and mobility activities: forward walking/backward walking and side stepping with BUE HHA. He sat to rest, then stood again and stepped in place using the RW for UE support.   He stepped 10 times then ambulated forward and backward 10 steps with the RW with improved stability. He returned to sit and asked to lie back down. He was kind and cooperative today with therapy. PT told him we would be back and he would need to be ready to ambulate more out in the maldonado. Patient stated once his side was not hurting so much he would be glad to walk more. PT will continue to treat and progress as tolerated.      SUBJECTIVE:   Mr. Mariana Ch states, \"I sure am sore on my side where I fell\"    SOCIAL HISTORY/ LIVING ENVIRONMENT:   Home Environment: Private residence  One/Two Story Residence: One story  Living Alone: No  Support Systems: Spouse/Significant Other/Partner  OBJECTIVE:     PAIN: VITAL SIGNS: LINES/DRAINS:   Pre Treatment: Pain Screen  Pain Scale 1: FLACC  Pain Intensity 1: 2  Post Treatment: 2/10     Visit Vitals  BP (!) 147/77   Pulse 77   Temp 97.9 °F (36.6 °C)   Resp 18   Ht 5' 11\" (1.803 m)   Wt 84.8 kg (187 lb)   SpO2 98%   BMI 26.08 kg/m²    IV  O2 Device: None (Room air)     MOBILITY: I Mod I S SBA CGA Min Mod Max Total  NT x2 Comments:   Bed Mobility    Rolling [] [x] [] [] [] [] [] [] [] [] []    Supine to Sit [] [x] [] [] [] [] [] [] [] [] []    Scooting [] [x] [] [] [] [] [] [] [] [] []    Sit to Supine [] [x] [] [] [] [] [] [] [] [] []    Transfers    Sit to Stand [] [] [] [x] [] [] [] [] [] [] []    Bed to Chair [] [] [] [] [x] [] [] [] [] [] []    Stand to Sit [] [] [] [x] [] [] [] [] [] [] []    I=Independent, Mod I=Modified Independent, S=Supervision, SBA=Standby Assistance, CGA=Contact Guard Assistance,   Min=Minimal Assistance, Mod=Moderate Assistance, Max=Maximal Assistance, Total=Total Assistance, NT=Not Tested    BALANCE: Good Fair+ Fair Fair- Poor NT Comments   Sitting Static [x] [] [] [] [] []    Sitting Dynamic [x] [] [] [] [] []              Standing Static [] [x] [] [] [] []    Standing Dynamic [] [] [x] [] [] []      GAIT: I Mod I S SBA CGA Min Mod Max Total  NT x2 Comments:   Level of Assistance [] [] [] [] [x] [] [] [] [] [] []    Distance 25' in room forward/backward/side stepping and short distance gait    DME Rolling Walker    Gait Quality Unsteady and without RW    Weightbearing  Status N/A     I=Independent, Mod I=Modified Independent, S=Supervision, SBA=Standby Assistance, CGA=Contact Guard Assistance,   Min=Minimal Assistance, Mod=Moderate Assistance, Max=Maximal Assistance, Total=Total Assistance, NT=Not Tested    PLAN:   FREQUENCY/DURATION: PT Plan of Care: 3 times/week for duration of hospital stay or until stated goals are met, whichever comes first.  TREATMENT:     TREATMENT:   ($$ Therapeutic Activity: 23-37 mins    )  Therapeutic Activity (24  Minutes): Therapeutic activity included Rolling, Supine to Sit, Sit to Supine, Scooting, Ambulation on level ground, Sitting balance , Standing balance and sit to stand to improve functional Mobility, Strength and Activity tolerance. TREATMENT GRID:   Date:  5/28/21 Date:   Date:     Activity/Exercise Parameters Parameters Parameters   Ankle pumps 10 B     Seated knee extension 10 B     Seated hip flexion/marching 10 B           Standing:   Forward stepping 5 x 2     Backward stepping 5 x 2     Side stepping 5 x 2       AFTER TREATMENT POSITION/PRECAUTIONS:  Bed, Needs within reach, RN notified and sitter at the bedside    INTERDISCIPLINARY COLLABORATION:  RN/PCT    TOTAL TREATMENT DURATION:  PT Patient Time In/Time Out  Time In: 1120  Time Out: 520 [facility]

## 2021-05-28 NOTE — PROGRESS NOTES
H&P/Consult Note/Progress Note/Office Note:   Ayaka Boo MRN: [id number]  :1945  Age:75 y.o.    HPI: Ayaka Boo is a 77 y.o. male who was referred in consultation by Enoc Lainez PA-C with a left chest wall hematoma and cellulitis   after a fall at home on the floor approx 21. He sustained a blunt injury to his left chest wall. CXR below showed no PTx or rib fractures. He taked Humira for arthritis and is immune suppressed. We saw him for the 1st time on 21 with erythema of the left chest wall and placed him on Bactrim and marked the cellulitis border. He was instructed to call if any fevers or worsening erythema and given f/u 4 days later  He did not call  When he came to the office on 21 he reported high fevers over 101 degrees associate with nausea with his Bactrim. The erythema had extended >20cm  beyond the original inscribed lines of his left chest wall and extended down his left flank and in his left lower quadrant. We was admitted directly from office. He denies any new falls since 21. On 21 he also had a new hematoma involving his right flank which was not present on 21 (likely a second fall)        21 CXR, 2 views  Hx: Left chest wall hematoma with cellulitis; r/o rib fracture or pneumothorax after a fall at home on the floor.     Clear lungs. The cardiac and mediastinal contours and pulmonary vascularity are normal.   The bones and soft tissues are within normal limits.  No evidence of a pneumothorax.     IMPRESSION:  Normal chest.      Additional hx:  21 wound culture growing MRSA; Pharmacy dosing IV Vanc; trough tonight  21  Less Confused; no fever; WBC elevated but better;  on IV Abx         Past Medical History:   Diagnosis Date    Depression     H/O urinary frequency 2013    History of BPH 2013    HLD (hyperlipidemia) 2013    Hx: UTI (urinary tract infection) 2013    Insomnia     Psoriasis     Psoriatic arthritis ([facility] [address] 2/1/2013    Wears hearing aid 2/1/2013     No past surgical history on file. Current Facility-Administered Medications   Medication Dose Route Frequency    vancomycin (VANCOCIN) 1250 mg in  ml infusion  1,250 mg IntraVENous Q18H    acetaminophen (TYLENOL) tablet 1,000 mg  1,000 mg Oral Q6H PRN    ondansetron (ZOFRAN) injection 4 mg  4 mg IntraVENous Q4H PRN    cefTRIAXone (ROCEPHIN) 1 g in 0.9% sodium chloride (MBP/ADV) 50 mL MBP  1 g IntraVENous Q24H    HYDROcodone-acetaminophen (NORCO) 5-325 mg per tablet 1 Tablet  1 Tablet Oral Q6H PRN    tamsulosin (FLOMAX) capsule 0.4 mg  0.4 mg Oral DAILY    famotidine (PEPCID) tablet 20 mg  20 mg Oral DAILY     Patient has no known allergies. Social History     Socioeconomic History    Marital status: SINGLE     Spouse name: Not on file    Number of children: Not on file    Years of education: Not on file    Highest education level: Not on file   Tobacco Use    Smoking status: Former Smoker    Smokeless tobacco: Never Used    Tobacco comment: pt states quit in early 60's late 52's    Vaping Use    Vaping Use: Never used   Substance and Sexual Activity    Alcohol use: No     Alcohol/week: 0.0 standard drinks    Drug use: No    Sexual activity: Yes     Partners: Female     Social Determinants of Health     Financial Resource Strain:     Difficulty of Paying Living Expenses:    Food Insecurity:     Worried About Running Out of Food in the Last Year:     [address] in the Last Year:    Transportation Needs:     Lack of Transportation (Medical):      Lack of Transportation (Non-Medical):    Physical Activity:     Days of Exercise per Week:     Minutes of Exercise per Session:    Stress:     Feeling of Stress :    Social Connections:     Frequency of Communication with Friends and Family:     Frequency of Social Gatherings with Friends and Family:     Attends Faith Services:     Active Member of Clubs or Organizations:     Attends Club or Organization Meetings:     Marital Status:      Social History     Tobacco Use   Smoking Status Former Smoker   Smokeless Tobacco Never Used   Tobacco Comment    pt states quit in early 63's late 52's      Family History   Problem Relation Age of Onset    Heart Attack Father     Cancer Father         skin     Other Sister     Cancer Sister     Other Brother         melanoma     ROS: The patient has no difficulty with chest pain or shortness of breath. No fever or chills. Comprehensive review of systems was otherwise unremarkable except as noted above. Physical Exam:   Visit Vitals  BP (!) 159/78   Pulse (!) 55   Temp 98.1 °F (36.7 °C)   Resp 18   Ht 5' 11\" (1.803 m)   Wt 187 lb (84.8 kg)   SpO2 95%   BMI 26.08 kg/m²     Vitals:    05/28/21 0036 05/28/21 0126 05/28/21 0411 05/28/21 0446   BP: (!) 184/89 (!) 166/84 (!) 159/78    Pulse: 78  (!) 55    Resp: 18  18    Temp: 98.2 °F (36.8 °C)  98.1 °F (36.7 °C)    SpO2: 95%  95%    Weight:    187 lb (84.8 kg)   Height:         [unfilled]  [unfilled]    Constitutional: Alert, oriented, cooperative patient in no acute distress; appears stated age    Eyes:Sclera are clear. EOMs intact  ENMT: no external lesions; he is hard of hearing. no obvious neck masses, no ear or lip lesions, nares normal  CV: RRR. Normal perfusion      Left anterior axillary line open wound approx 2cm with surrounding dermatitis and with purulent drainage. The erythema of the left chest wall improved is improving   There is induration. We evacuated about 20 cc of pus in the office just prior to admission. The erythema and cellulitis extended from the left chest wall down to the left lower quadrant and left flank and involves an area of about 30 cm² in size. New hematoma right flank    Resp: No JVD. Breathing is  non-labored; no audible wheezing. GI: soft and non-distended     Musculoskeletal: unremarkable with normal function.  No embolic signs or cyanosis. Neuro:  Intermittent confusion; moves all 4; no focal deficits; slow moving and slow talking. Psychiatric: blunted affect, no memory impairment    Recent vitals (if inpt):  @IPVITALS(24:)@    Amount and/or Complexity of Data Reviewed and Analyzed:  I reviewed and analyzed all of the unique labs and radiologic studies that are shown below as well as any that are in the HPI, and any that are in the expanded problem list below  *Each unique test, order, or document contributes to the combination of 2 or combination of 3 in Category 1 below. For this visit I also reviewed old records and prior notes. Recent Labs     05/28/21  0431   WBC 12.0*   HGB 13.6      *   K 4.4      CO2 26   BUN 20   CREA 0.87   *     Review of most recent CBC  Lab Results   Component Value Date/Time    WBC 12.0 (H) 05/28/2021 04:31 AM    HGB 13.6 05/28/2021 04:31 AM    HCT 39.6 (L) 05/28/2021 04:31 AM    PLATELET 990 14/44/9815 04:31 AM    MCV 96.1 05/28/2021 04:31 AM       Review of most recent BMP  Lab Results   Component Value Date/Time    Sodium 137 (L) 05/28/2021 04:31 AM    Potassium 4.4 05/28/2021 04:31 AM    Chloride 106 05/28/2021 04:31 AM    CO2 26 05/28/2021 04:31 AM    Anion gap 5 (L) 05/28/2021 04:31 AM    Glucose 118 (H) 05/28/2021 04:31 AM    BUN 20 05/28/2021 04:31 AM    Creatinine 0.87 05/28/2021 04:31 AM    BUN/Creatinine ratio 19 03/31/2016 11:26 AM    GFR est AA >60 05/28/2021 04:31 AM    GFR est non-AA >60 05/28/2021 04:31 AM    Calcium 7.6 (L) 05/28/2021 04:31 AM       Review of most recent LFTs (and lipase if done)  Lab Results   Component Value Date/Time    ALT (SGPT) 80 (H) 05/24/2021 12:57 PM    AST (SGOT) 53 (H) 05/24/2021 12:57 PM    Alk.  phosphatase 124 05/24/2021 12:57 PM    Bilirubin, total 0.9 05/24/2021 12:57 PM     No results found for: LPSE    No results found for: INR, APTT, CBIL, LCAD, NH4, TROPT, TROIQ, INREXT, INREXT    Review of most recent HgbA1c  No results found for: HBA1C, WNS1KAEY, JON0IGSX, QBA5YCNA    Nutritional assessment screen for wound healing issues:  Lab Results   Component Value Date/Time    Protein, total 6.5 05/24/2021 12:57 PM    Albumin 2.4 (L) 05/24/2021 12:57 PM       @lastcovr@  XR Results (most recent):  Results from Hospital Encounter encounter on 05/20/21    XR CHEST PA LAT    Narrative  EXAM: XR CHEST PA LAT    INDICATION: Left chest wall hematoma with cellulitis rule out rib fracture or  pneumothorax after a fall at home on the floor. COMPARISON: None. FINDINGS: PA and lateral radiographs of the chest demonstrate clear lungs. The  cardiac and mediastinal contours and pulmonary vascularity are normal. The bones  and soft tissues are within normal limits. No evidence of a pneumothorax. Impression  Normal chest.      CT Results (most recent):  No results found for this or any previous visit. US Results (most recent):  No results found for this or any previous visit. Admission date (for inpatients): 5/24/2021   * No surgery found *  * No surgery found *        ASSESSMENT/PLAN:  Problem List  Date Reviewed: 5/24/2021        Codes Class Noted    Confusion ICD-10-CM: R41.0  ICD-9-CM: 298.9  5/27/2021        Sundowning ICD-10-CM: F05  ICD-9-CM: Anirudh Qureshi  5/27/2021        Leukocytosis ICD-10-CM: F52.009  ICD-9-CM: 288.60  5/26/2021        Acute renal insufficiency ICD-10-CM: N28.9  ICD-9-CM: 593.9  5/25/2021        Fever ICD-10-CM: R50.9  ICD-9-CM: 780.60  5/24/2021        Suppression of immune system subtherapeutic ([facility] [address] ICD-10-CM: D89.89  ICD-9-CM: 279.8  5/24/2021        Right flank hematoma ICD-10-CM: S30. 1XXA  ICD-9-CM: 922.2  5/24/2021        Chest wall abscess ICD-10-CM: L02.213  ICD-9-CM: 682.2  5/20/2021        Hematoma of left chest wall ICD-10-CM: T33.384J  ICD-9-CM: 922.1  5/20/2021        Cellulitis of chest wall ICD-10-CM: L03.313  ICD-9-CM: 682.2  5/20/2021        Fall ICD-10-CM: K81. Robert Nuñez  ICD-9-CM: A622.9  5/20/2021        Psoriasis ICD-10-CM: L40.9  ICD-9-CM: 696.1  Unknown        Insomnia ICD-10-CM: G47.00  ICD-9-CM: 780.52  Unknown        Depression ICD-10-CM: F32.9  ICD-9-CM: 569  Unknown        Tachycardia ICD-10-CM: R00.0  ICD-9-CM: 785.0  6/13/2016        Murmur ICD-10-CM: R01.1  ICD-9-CM: 785.2  6/13/2016        Psoriatic arthritis ([facility] [address] ICD-10-CM: L40.50  ICD-9-CM: 696.0  2/1/2013        Wears hearing aid ICD-10-CM: Z97.4  ICD-9-CM: V45.89  2/1/2013        HLD (hyperlipidemia) ICD-10-CM: E78.5  ICD-9-CM: 272.4  2/1/2013        H/O urinary frequency ICD-10-CM: P52.931  ICD-9-CM: V13.09  2/1/2013        History of BPH ICD-10-CM: L06.871  ICD-9-CM: V13.89  2/1/2013            Active Problems:    Chest wall abscess (5/20/2021)      Cellulitis of chest wall (5/20/2021)      Fall (5/20/2021)      Fever (5/24/2021)      Supp[facility] of immune system [facility]the[facility] (5/24/2021)      Right flank hematoma (5/24/2021)      Acute renal insufficiency (5/25/2021)      Leukocytosis (5/26/2021)      Confusion (5/27/2021)      Sundowning (5/27/2021)           Number and Complexity of Problems addressed and   Risks of complications and/or morbidity of management        MRSA Cellulitis of left chest wall with abscess and extension of cellulitis in the left flank and left lower quadrant associated with fever  This failed outpt Rx with PO Bactrim    Continue inpt admission with IV Vanc/Rocephin for left chest wall hematoma with superimposed cellulitis/abscess. He was having fevers and draining pus from his left chest wall where he spontaneously developed ischemic necrosis of the skin. We evacuated as much fluid as possible though the open wound on 5/24/21, 5/27/21 and 5/28/21    He has a new hematoma involving his left flank which may be from a second fall. He is elderly frail and hard of hearing.       His CXR identified no PTx or rib fractures    We will hold his methotrexate, prednisone, and Humira to help reduce his immune suppression. Dermatitis around left chest wall wound from moisture maceration  Increase frequency of dressing changes to TID  Desitin TID to destinee-wound skin also        Confusion/sundowning  He has been uncooperative and pulling IV sites this admission  This is improving significantly  Sitter prn            Level of MDM (2/3 elements below)  Number and Complexity of Problems Addressed Amount and/or Complexity of Data to be Reviewed and Analyzed  *Each unique test, order, or document contributes to the combination of 2 or combination of 3 in Category 1 below.  Risk of Complications and/or Morbidity or Mortality of pt Management     99248  42653 SF Minimal  1self-limited or minor problem Minimal or none Minimal risk of morbidity from additional diagnostic testing or Rx   21269  06605 Low Low  2or more self-limited or minor problems;    or  1stable chronic illness;    or  2LWGMD, uncomplicated illness or injury   Limited  (Must meet the requirements of at least 1 of the 2 categories)  Category 1: Tests and documents   Any combination of 2 from the following:  Review of prior external note(s) from each unique source*;  review of the result(s) of each unique test*;   ordering of each unique test*    or   Category 2: Assessment requiring an independent historian(s)  (For the categories of independent interpretation of tests and discussion of management or test interpretation, see moderate or high) Low risk of morbidity from additional diagnostic testing or treatment     27415  65935 Mod Moderate  1or more chronic illnesses with exacerbation, progression, or side effects of treatment;    or  2or more stable chronic illnesses;    or  1undiagnosed new problem with uncertain prognosis;    or  1acute illness with systemic symptoms;    or  1WKSDQ complicated injury   Moderate  (Must meet the requirements of at least 1 out of 3 categories)  Category 1: Tests, documents, or independent historian(s)  Any combination of 3 from the following:   Review of prior external note(s) from each unique source*;  Review of the result(s) of each unique test*;  Ordering of each unique test*;  Assessment requiring an independent historian(s)    or  Category 2: Independent interpretation of tests   Independent interpretation of a test performed by another physician/other qualified health care professional (not separately reported);     or  Category 3: Discussion of management or test interpretation  Discussion of management or test interpretation with external physician/other qualified health care professional/appropriate source (not separately reported)   Moderate risk of morbidity from additional diagnostic testing or treatment  Examples only:  Prescription drug management   Decision regarding minor surgery with identified patient or procedure risk factors  Decision regarding elective major surgery without identified patient or procedure risk factors   Diagnosis or treatment significantly limited by social determinants of health       03363  32832 High High  1or more chronic illnesses with severe exacerbation, progression, or side effects of treatment;    or  1 acute or chronic illness or injury that poses a threat to life or bodily function   Extensive  (Must meet the requirements of at least 2 out of 3 categories)  Category 1: Tests, documents, or independent historian(s)  Any combination of 3 from the following:   Review of prior external note(s) from each unique source*;  Review of the result(s) of each unique test*;   Ordering of each unique test*;   Assessment requiring an independent historian(s)    or   Category 2: Independent interpretation of tests   Independent interpretation of a test performed by another physician/other qualified health care professional (not separately reported);     or  Category 3: Discussion of management or test interpretation  Discussion of management or test interpretation with external physician/other qualified health care professional/appropriate source (not separately reported)   High risk of morbidity from additional diagnostic testing or treatment  Examples only:  Drug therapy requiring intensive monitoring for toxicity  Decision regarding elective major surgery with identified patient or procedure risk factors  Decision regarding emergency major surgery  Decision regarding hospitalization  Decision not to resuscitate or to de-escalate care because of poor prognosis             I have personally performed a face-to-face diagnostic evaluation and management  service on this patient. I have independently seen the patient. I have independently obtained the above history from the patient/family. I have independently examined the patient with above findings. I have independently reviewed data/labs for this patient and developed the above plan of care (MDM). Signed: Erick Aguilar.  Shubham Anderson MD, FACS

## 2021-05-29 LAB
ANION GAP SERPL CALC-SCNC: 5 MMOL/L (ref 7–16)
BUN SERPL-MCNC: 16 MG/DL (ref 8–23)
CALCIUM SERPL-MCNC: 7.7 MG/DL (ref 8.3–10.4)
CHLORIDE SERPL-SCNC: 106 MMOL/L (ref 98–107)
CO2 SERPL-SCNC: 26 MMOL/L (ref 21–32)
CREAT SERPL-MCNC: 0.68 MG/DL (ref 0.8–1.5)
ERYTHROCYTE [DISTWIDTH] IN BLOOD BY AUTOMATED COUNT: 15.7 % (ref 11.9–14.6)
GLUCOSE SERPL-MCNC: 107 MG/DL (ref 65–100)
HCT VFR BLD AUTO: 39.4 % (ref 41.1–50.3)
HGB BLD-MCNC: 13.5 G/DL (ref 13.6–17.2)
MCH RBC QN AUTO: 32.5 PG (ref 26.1–32.9)
MCHC RBC AUTO-ENTMCNC: 34.3 G/DL (ref 31.4–35)
MCV RBC AUTO: 94.9 FL (ref 79.6–97.8)
NRBC # BLD: 0 K/UL (ref 0–0.2)
PLATELET # BLD AUTO: 230 K/UL (ref 150–450)
PMV BLD AUTO: 9.7 FL (ref 9.4–12.3)
POTASSIUM SERPL-SCNC: 3.9 MMOL/L (ref 3.5–5.1)
RBC # BLD AUTO: 4.15 M/UL (ref 4.23–5.6)
SODIUM SERPL-SCNC: 137 MMOL/L (ref 136–145)
WBC # BLD AUTO: 12.3 K/UL (ref 4.3–11.1)

## 2021-05-29 PROCEDURE — 80048 BASIC METABOLIC PNL TOTAL CA: CPT

## 2021-05-29 PROCEDURE — 74011250637 HC RX REV CODE- 250/637: Performed by: SURGERY

## 2021-05-29 PROCEDURE — 36415 COLL VENOUS BLD VENIPUNCTURE: CPT

## 2021-05-29 PROCEDURE — 2709999900 HC NON-CHARGEABLE SUPPLY

## 2021-05-29 PROCEDURE — 65270000029 HC RM PRIVATE

## 2021-05-29 PROCEDURE — 85027 COMPLETE CBC AUTOMATED: CPT

## 2021-05-29 PROCEDURE — 74011000258 HC RX REV CODE- 258: Performed by: SURGERY

## 2021-05-29 PROCEDURE — 74011250636 HC RX REV CODE- 250/636: Performed by: SURGERY

## 2021-05-29 RX ADMIN — FAMOTIDINE 20 MG: 20 TABLET ORAL at 08:27

## 2021-05-29 RX ADMIN — HYDROCODONE BITARTRATE AND ACETAMINOPHEN 1 TABLET: 5; 325 TABLET ORAL at 19:32

## 2021-05-29 RX ADMIN — TAMSULOSIN HYDROCHLORIDE 0.4 MG: 0.4 CAPSULE ORAL at 08:27

## 2021-05-29 RX ADMIN — Medication: at 14:00

## 2021-05-29 RX ADMIN — CEFTRIAXONE 1 G: 1 INJECTION, POWDER, FOR SOLUTION INTRAMUSCULAR; INTRAVENOUS at 12:39

## 2021-05-29 RX ADMIN — Medication: at 06:00

## 2021-05-29 RX ADMIN — Medication: at 22:00

## 2021-05-29 RX ADMIN — VANCOMYCIN HYDROCHLORIDE 1500 MG: 10 INJECTION, POWDER, LYOPHILIZED, FOR SOLUTION INTRAVENOUS at 13:28

## 2021-05-29 NOTE — PROGRESS NOTES
END OF SHIFT NOTE:    INTAKE/OUTPUT  05/28 0701 - 05/29 0700  In: 2113 [P.O.:720; I.V.:1393]  Out: 4700 [Urine:4700]  Voiding: YES  Catheter: NO  Drain:              Flatus: Patient does have flatus present. Stool:  0 occurrences. Characteristics:  Stool Assessment  Stool Color: Brown  Stool Appearance: Soft  Stool Amount: Medium  Stool Source/Status: Rectum    Emesis: 0 occurrences. Characteristics:        VITAL SIGNS  Patient Vitals for the past 12 hrs:   Temp Pulse Resp BP SpO2   05/29/21 1558 99.1 °F (37.3 °C) 74 18 (!) 150/81 95 %   05/29/21 1219 98.2 °F (36.8 °C) 68 18 (!) 165/98 97 %       Pain Assessment  Pain Intensity 1: 6 (05/29/21 1930)  Pain Location 1: Abdomen, Flank  Pain Intervention(s) 1: Medication (see MAR)  Patient Stated Pain Goal: 0    Ambulating  Yes    Shift report given to oncoming nurse at the bedside.     Mark Alaniz RN

## 2021-05-29 NOTE — PROGRESS NOTES
END OF SHIFT NOTE:    INTAKE/OUTPUT  05/28 0701 - 05/29 0700  In: 2113 [P.O.:720; I.V.:1393]  Out: 4700 [Urine:4700]  Voiding: YES  Catheter: NO  Drain:              Flatus: Patient does have flatus present. Stool:  0 occurrences. Characteristics:  Stool Assessment  Stool Color: Brown  Stool Appearance: Soft  Stool Amount: Medium  Stool Source/Status: Rectum    Emesis: 0 occurrences. Characteristics:        VITAL SIGNS  Patient Vitals for the past 12 hrs:   Temp Pulse Resp BP SpO2   05/29/21 0340 98.2 °F (36.8 °C) 76 18 (!) 148/78 95 %   05/28/21 2255 98.4 °F (36.9 °C) 67 18 (!) 160/86 95 %   05/28/21 1926 97.2 °F (36.2 °C) 76 17 (!) 140/79 95 %       Pain Assessment  Pain Intensity 1: 4 (05/28/21 2054)  Pain Location 1: Flank  Pain Intervention(s) 1: Ambulation/Increased Activity  Patient Stated Pain Goal: 0    Ambulating  Yes    Shift report given to oncoming nurse at the bedside.     Caryle Munroe, RN

## 2021-05-29 NOTE — PROGRESS NOTES
Reviewed notes for spiritual concerns    Noted:      DISCHARGE  SOON      Will continue to assess how we can best serve this family

## 2021-05-29 NOTE — PROGRESS NOTES
H&P/Consult Note/Progress Note/Office Note:   Cheri Jacques MRN: [id number]  :1945  Age:75 y.o.    HPI: Cheri Jacques is a 77 y.o. male who was referred in consultation by Glendy Irving PA-C with a left chest wall hematoma and cellulitis   after a fall at home on the floor approx 21. He sustained a blunt injury to his left chest wall. CXR below showed no PTx or rib fractures. He taked Humira for arthritis and is immune suppressed. We saw him for the 1st time on 21 with erythema of the left chest wall and placed him on Bactrim and marked the cellulitis border. He was instructed to call if any fevers or worsening erythema and given f/u 4 days later  He did not call  When he came to the office on 21 he reported high fevers over 101 degrees associate with nausea with his Bactrim. The erythema had extended >20cm  beyond the original inscribed lines of his left chest wall and extended down his left flank and in his left lower quadrant. We was admitted directly from office. He denies any new falls since 21. On 21 he also had a new hematoma involving his right flank which was not present on 21 (likely a second fall)        21 CXR, 2 views  Hx: Left chest wall hematoma with cellulitis; r/o rib fracture or pneumothorax after a fall at home on the floor.     Clear lungs. The cardiac and mediastinal contours and pulmonary vascularity are normal.   The bones and soft tissues are within normal limits.  No evidence of a pneumothorax.     IMPRESSION:  Normal chest.      Additional hx:  21 wound culture growing MRSA; Pharmacy dosing IV Vanc; trough tonight  21  Less Confused; no fever; WBC elevated but better;  on IV Abx  21  Resting in bed; no fever; WBC 12.3;  on IV Abx         Past Medical History:   Diagnosis Date    Depression     H/O urinary frequency 2013    History of BPH 2013    HLD (hyperlipidemia) 2013    Hx: UTI (urinary tract infection) 2/1/2013    Insomnia     Psoriasis     Psoriatic arthritis ([facility] [address] 2/1/2013    Wears hearing aid 2/1/2013     No past surgical history on file. Current Facility-Administered Medications   Medication Dose Route Frequency    vancomycin (VANCOCIN) 1500 mg in  ml infusion  1,500 mg IntraVENous Q18H    zinc oxide-cod liver oil (DESITIN) 40 % paste   Topical Q8H    acetaminophen (TYLENOL) tablet 1,000 mg  1,000 mg Oral Q6H PRN    ondansetron (ZOFRAN) injection 4 mg  4 mg IntraVENous Q4H PRN    cefTRIAXone (ROCEPHIN) 1 g in 0.9% sodium chloride (MBP/ADV) 50 mL MBP  1 g IntraVENous Q24H    HYDROcodone-acetaminophen (NORCO) 5-325 mg per tablet 1 Tablet  1 Tablet Oral Q6H PRN    tamsulosin (FLOMAX) capsule 0.4 mg  0.4 mg Oral DAILY    famotidine (PEPCID) tablet 20 mg  20 mg Oral DAILY     Patient has no known allergies. Social History     Socioeconomic History    Marital status: SINGLE     Spouse name: Not on file    Number of children: Not on file    Years of education: Not on file    Highest education level: Not on file   Tobacco Use    Smoking status: Former Smoker    Smokeless tobacco: Never Used    Tobacco comment: pt states quit in early 60's late 52's    Vaping Use    Vaping Use: Never used   Substance and Sexual Activity    Alcohol use: No     Alcohol/week: 0.0 standard drinks    Drug use: No    Sexual activity: Yes     Partners: Female     Social Determinants of Health     Financial Resource Strain:     Difficulty of Paying Living Expenses:    Food Insecurity:     Worried About Running Out of Food in the Last Year:     [address] in the Last Year:    Transportation Needs:     Lack of Transportation (Medical):      Lack of Transportation (Non-Medical):    Physical Activity:     Days of Exercise per Week:     Minutes of Exercise per Session:    Stress:     Feeling of Stress :    Social Connections:     Frequency of Communication with Friends and Family:     Frequency of Social Gatherings with Friends and Family:     Attends Rastafarian Services:     Active Member of Clubs or Organizations:     Attends Club or Organization Meetings:     Marital Status:      Social History     Tobacco Use   Smoking Status Former Smoker   Smokeless Tobacco Never Used   Tobacco Comment    pt states quit in early 60's late 52's      Family History   Problem Relation Age of Onset    Heart Attack Father     Cancer Father         skin     Other Sister     Cancer Sister     Other Brother         melanoma     ROS: The patient has no difficulty with chest pain or shortness of breath. No fever or chills. Comprehensive review of systems was otherwise unremarkable except as noted above. Physical Exam:   Visit Vitals  BP (!) 167/78   Pulse 73   Temp 98 °F (36.7 °C)   Resp 18   Ht 5' 11\" (1.803 m)   Wt 187 lb (84.8 kg)   SpO2 97%   BMI 26.08 kg/m²     Vitals:    05/28/21 1926 05/28/21 2255 05/29/21 0340 05/29/21 0721   BP: (!) 140/79 (!) 160/86 (!) 148/78 (!) 167/78   Pulse: 76 67 76 73   Resp: 17 18 18 18   Temp: 97.2 °F (36.2 °C) 98.4 °F (36.9 °C) 98.2 °F (36.8 °C) 98 °F (36.7 °C)   SpO2: 95% 95% 95% 97%   Weight:       Height:         [unfilled]  [unfilled]    Constitutional: Alert, oriented, cooperative patient in no acute distress; appears stated age    Eyes:Sclera are clear. EOMs intact  ENMT: no external lesions; he is hard of hearing. no obvious neck masses, no ear or lip lesions, nares normal  CV: RRR. Normal perfusion      Left anterior axillary line open wound approx 2cm with surrounding dermatitis and with purulent drainage. The erythema of the left chest wall improved is improving   There is induration. We evacuated about 20 cc of pus in the office just prior to admission. The erythema and cellulitis extended from the left chest wall down to the left lower quadrant and left flank and involves an area of about 30 cm² in size.     New hematoma right flank    Resp: No JVD. Breathing is  non-labored; no audible wheezing. GI: soft and non-distended     Musculoskeletal: unremarkable with normal function. No embolic signs or cyanosis. Neuro:  Intermittent confusion; moves all 4; no focal deficits; slow moving and slow talking. Psychiatric: blunted affect, no memory impairment    Recent vitals (if inpt):  @IPVITALS(24:)@    Amount and/or Complexity of Data Reviewed and Analyzed:  I reviewed and analyzed all of the unique labs and radiologic studies that are shown below as well as any that are in the HPI, and any that are in the expanded problem list below  *Each unique test, order, or document contributes to the combination of 2 or combination of 3 in Category 1 below. For this visit I also reviewed old records and prior notes. Recent Labs     05/29/21  0437   WBC 12.3*   HGB 13.5*         K 3.9      CO2 26   BUN 16   CREA 0.68*   *     Review of most recent CBC  Lab Results   Component Value Date/Time    WBC 12.3 (H) 05/29/2021 04:37 AM    HGB 13.5 (L) 05/29/2021 04:37 AM    HCT 39.4 (L) 05/29/2021 04:37 AM    PLATELET 950 78/84/8980 04:37 AM    MCV 94.9 05/29/2021 04:37 AM       Review of most recent BMP  Lab Results   Component Value Date/Time    Sodium 137 05/29/2021 04:37 AM    Potassium 3.9 05/29/2021 04:37 AM    Chloride 106 05/29/2021 04:37 AM    CO2 26 05/29/2021 04:37 AM    Anion gap 5 (L) 05/29/2021 04:37 AM    Glucose 107 (H) 05/29/2021 04:37 AM    BUN 16 05/29/2021 04:37 AM    Creatinine 0.68 (L) 05/29/2021 04:37 AM    BUN/Creatinine ratio 19 03/31/2016 11:26 AM    GFR est AA >60 05/29/2021 04:37 AM    GFR est non-AA >60 05/29/2021 04:37 AM    Calcium 7.7 (L) 05/29/2021 04:37 AM       Review of most recent LFTs (and lipase if done)  Lab Results   Component Value Date/Time    ALT (SGPT) 80 (H) 05/24/2021 12:57 PM    AST (SGOT) 53 (H) 05/24/2021 12:57 PM    Alk.  phosphatase 124 05/24/2021 12:57 PM    Bilirubin, total 0.9 05/24/2021 12:57 PM     No results found for: LPSE    No results found for: INR, APTT, CBIL, LCAD, NH4, TROPT, TROIQ, INREXT, INREXT    Review of most recent HgbA1c  No results found for: HBA1C, LNP7GWER, RBF6UQUD, GHS3TWCX    Nutritional assessment screen for wound healing issues:  Lab Results   Component Value Date/Time    Protein, total 6.5 05/24/2021 12:57 PM    Albumin 2.4 (L) 05/24/2021 12:57 PM       @lastcovr@  XR Results (most recent):  Results from Hospital Encounter encounter on 05/20/21    XR CHEST PA LAT    Narrative  EXAM: XR CHEST PA LAT    INDICATION: Left chest wall hematoma with cellulitis rule out rib fracture or  pneumothorax after a fall at home on the floor. COMPARISON: None. FINDINGS: PA and lateral radiographs of the chest demonstrate clear lungs. The  cardiac and mediastinal contours and pulmonary vascularity are normal. The bones  and soft tissues are within normal limits. No evidence of a pneumothorax. Impression  Normal chest.      CT Results (most recent):  No results found for this or any previous visit. US Results (most recent):  No results found for this or any previous visit. Admission date (for inpatients): 5/24/2021   * No surgery found *  * No surgery found *        ASSESSMENT/PLAN:  Problem List  Date Reviewed: 5/24/2021        Codes Class Noted    Confusion ICD-10-CM: R41.0  ICD-9-CM: 298.9  5/27/2021        Sundowning ICD-10-CM: F05  ICD-9-CM: Clint Rinne  5/27/2021        Leukocytosis ICD-10-CM: J93.574  ICD-9-CM: 288.60  5/26/2021        Acute renal insufficiency ICD-10-CM: N28.9  ICD-9-CM: 593.9  5/25/2021        Fever ICD-10-CM: R50.9  ICD-9-CM: 780.60  5/24/2021        Suppression of immune system subtherapeutic ([facility] [address] ICD-10-CM: D89.89  ICD-9-CM: 279.8  5/24/2021        Right flank hematoma ICD-10-CM: S30. 1XXA  ICD-9-CM: 922.2  5/24/2021        * (Principal) Chest wall abscess ICD-10-CM: L02.213  ICD-9-CM: 682.2  5/20/2021        Hematoma of left chest wall ICD-10-CM: O69.872X  ICD-9-CM: 922.1  5/20/2021        Cellulitis of chest wall ICD-10-CM: L03.313  ICD-9-CM: 682.2  5/20/2021        Fall ICD-10-CM: W19. Colltaylor Dice  ICD-9-CM: E888.9  5/20/2021        Psoriasis ICD-10-CM: L40.9  ICD-9-CM: 696.1  Unknown        Insomnia ICD-10-CM: G47.00  ICD-9-CM: 780.52  Unknown        Depression ICD-10-CM: F32.9  ICD-9-CM: 882  Unknown        Tachycardia ICD-10-CM: R00.0  ICD-9-CM: 785.0  6/13/2016        Murmur ICD-10-CM: R01.1  ICD-9-CM: 785.2  6/13/2016        Psoriatic arthritis ([facility] [address] ICD-10-CM: L40.50  ICD-9-CM: 696.0  2/1/2013        Wears hearing aid ICD-10-CM: Z97.4  ICD-9-CM: V45.89  2/1/2013        HLD (hyperlipidemia) ICD-10-CM: E78.5  ICD-9-CM: 272.4  2/1/2013        H/O urinary frequency ICD-10-CM: M45.095  ICD-9-CM: V13.09  2/1/2013        History of BPH ICD-10-CM: R84.217  ICD-9-CM: V13.89  2/1/2013            Principal Problem:    Chest wall abscess (5/20/2021)    Active Problems:    Cellulitis of chest wall (5/20/2021)      Fall (5/20/2021)      Fever (5/24/2021)      Suppression of immune system subtherapeutic ([facility] [address] (5/24/2021)      Right flank hematoma (5/24/2021)      Acute renal insufficiency (5/25/2021)      Leukocytosis (5/26/2021)      Confusion (5/27/2021)      Sundowning (5/27/2021)           Number and Complexity of Problems addressed and   Risks of complications and/or morbidity of management        MRSA Cellulitis of left chest wall with abscess and extension of cellulitis in the left flank and left lower quadrant associated with fever  This failed outpt Rx with PO Bactrim    Continue inpt admission with IV Vanc/Rocephin for left chest wall hematoma with superimposed cellulitis/abscess. He was having fevers and draining pus from his left chest wall where he spontaneously developed ischemic necrosis of the skin.     We evacuated as much fluid as possible though the open wound on 5/24/21, 5/27/21 and 5/28/21    He has a new hematoma involving his left flank which may be from a second fall. He is elderly frail and hard of hearing. His CXR identified no PTx or rib fractures    We will hold his methotrexate, prednisone, and Humira to help reduce his immune suppression.       Dermatitis around left chest wall wound from moisture maceration  Increase frequency of dressing changes to TID  Desitin TID to destinee-wound skin also        Confusion/sundowning  He has been uncooperative and pulling IV sites this admission  This is improving significantly  Charlee Claudio, NP

## 2021-05-30 LAB
ANION GAP SERPL CALC-SCNC: 4 MMOL/L (ref 7–16)
BUN SERPL-MCNC: 17 MG/DL (ref 8–23)
CALCIUM SERPL-MCNC: 7.9 MG/DL (ref 8.3–10.4)
CHLORIDE SERPL-SCNC: 104 MMOL/L (ref 98–107)
CO2 SERPL-SCNC: 28 MMOL/L (ref 21–32)
CREAT SERPL-MCNC: 0.79 MG/DL (ref 0.8–1.5)
ERYTHROCYTE [DISTWIDTH] IN BLOOD BY AUTOMATED COUNT: 15.6 % (ref 11.9–14.6)
GLUCOSE SERPL-MCNC: 113 MG/DL (ref 65–100)
HCT VFR BLD AUTO: 40.7 % (ref 41.1–50.3)
HGB BLD-MCNC: 13.5 G/DL (ref 13.6–17.2)
MCH RBC QN AUTO: 32.4 PG (ref 26.1–32.9)
MCHC RBC AUTO-ENTMCNC: 33.2 G/DL (ref 31.4–35)
MCV RBC AUTO: 97.6 FL (ref 79.6–97.8)
NRBC # BLD: 0 K/UL (ref 0–0.2)
PLATELET # BLD AUTO: 243 K/UL (ref 150–450)
PMV BLD AUTO: 9.6 FL (ref 9.4–12.3)
POTASSIUM SERPL-SCNC: 4 MMOL/L (ref 3.5–5.1)
RBC # BLD AUTO: 4.17 M/UL (ref 4.23–5.6)
SODIUM SERPL-SCNC: 136 MMOL/L (ref 138–145)
VANCOMYCIN TROUGH SERPL-MCNC: 11.9 UG/ML (ref 5–20)
WBC # BLD AUTO: 13.8 K/UL (ref 4.3–11.1)

## 2021-05-30 PROCEDURE — 74011000258 HC RX REV CODE- 258: Performed by: SURGERY

## 2021-05-30 PROCEDURE — 36415 COLL VENOUS BLD VENIPUNCTURE: CPT

## 2021-05-30 PROCEDURE — 65270000029 HC RM PRIVATE

## 2021-05-30 PROCEDURE — 74011250636 HC RX REV CODE- 250/636: Performed by: SURGERY

## 2021-05-30 PROCEDURE — 85027 COMPLETE CBC AUTOMATED: CPT

## 2021-05-30 PROCEDURE — 2709999900 HC NON-CHARGEABLE SUPPLY

## 2021-05-30 PROCEDURE — 80202 ASSAY OF VANCOMYCIN: CPT

## 2021-05-30 PROCEDURE — 80048 BASIC METABOLIC PNL TOTAL CA: CPT

## 2021-05-30 PROCEDURE — 74011250637 HC RX REV CODE- 250/637: Performed by: SURGERY

## 2021-05-30 RX ADMIN — HYDROCODONE BITARTRATE AND ACETAMINOPHEN 1 TABLET: 5; 325 TABLET ORAL at 05:11

## 2021-05-30 RX ADMIN — VANCOMYCIN HYDROCHLORIDE 1500 MG: 10 INJECTION, POWDER, LYOPHILIZED, FOR SOLUTION INTRAVENOUS at 05:12

## 2021-05-30 RX ADMIN — Medication: at 12:40

## 2021-05-30 RX ADMIN — CEFTRIAXONE 1 G: 1 INJECTION, POWDER, FOR SOLUTION INTRAMUSCULAR; INTRAVENOUS at 12:28

## 2021-05-30 RX ADMIN — Medication: at 05:12

## 2021-05-30 RX ADMIN — HYDROCODONE BITARTRATE AND ACETAMINOPHEN 1 TABLET: 5; 325 TABLET ORAL at 19:32

## 2021-05-30 RX ADMIN — Medication: at 20:15

## 2021-05-30 RX ADMIN — FAMOTIDINE 20 MG: 20 TABLET ORAL at 08:50

## 2021-05-30 RX ADMIN — TAMSULOSIN HYDROCHLORIDE 0.4 MG: 0.4 CAPSULE ORAL at 08:50

## 2021-05-30 NOTE — PROGRESS NOTES
H&P/Consult Note/Progress Note/Office Note:   Sarah Larose MRN: [id number]  :1945  Age:75 y.o.    HPI: Sarah Larose is a 77 y.o. male who was referred in consultation by Gee Rene PA-C with a left chest wall hematoma and cellulitis   after a fall at home on the floor approx 21. He sustained a blunt injury to his left chest wall. CXR below showed no PTx or rib fractures. He taked Humira for arthritis and is immune suppressed. We saw him for the 1st time on 21 with erythema of the left chest wall and placed him on Bactrim and marked the cellulitis border. He was instructed to call if any fevers or worsening erythema and given f/u 4 days later  He did not call  When he came to the office on 21 he reported high fevers over 101 degrees associate with nausea with his Bactrim. The erythema had extended >20cm  beyond the original inscribed lines of his left chest wall and extended down his left flank and in his left lower quadrant. We was admitted directly from office. He denies any new falls since 21. On 21 he also had a new hematoma involving his right flank which was not present on 21 (likely a second fall)        21 CXR, 2 views  Hx: Left chest wall hematoma with cellulitis; r/o rib fracture or pneumothorax after a fall at home on the floor.     Clear lungs. The cardiac and mediastinal contours and pulmonary vascularity are normal.   The bones and soft tissues are within normal limits.  No evidence of a pneumothorax.     IMPRESSION:  Normal chest.      Additional hx:  21 wound culture growing MRSA; Pharmacy dosing IV Vanc; trough tonight  21  Less Confused; no fever; WBC elevated but better;  on IV Abx  21  Resting in bed; no fever; WBC 12.3;  on IV Abx  21  Sitting on side of bed; no fever; WBC 13.8;  on IV Abx            Past Medical History:   Diagnosis Date    Depression     H/O urinary frequency 2013    History of BPH 2/1/2013    HLD (hyperlipidemia) 2/1/2013    Hx: UTI (urinary tract infection) 2/1/2013    Insomnia     Psoriasis     Psoriatic arthritis ([address] 2/1/2013    Wears hearing aid 2/1/2013     No past surgical history on file. Current Facility-Administered Medications   Medication Dose Route Frequency    Vancomycin Trough Reminder   Other ONCE    vancomycin (VANCOCIN) 1500 mg in  ml infusion  1,500 mg IntraVENous Q18H    zinc oxide-cod liver oil (DESITIN) 40 % paste   Topical Q8H    acetaminophen (TYLENOL) tablet 1,000 mg  1,000 mg Oral Q6H PRN    ondansetron (ZOFRAN) injection 4 mg  4 mg IntraVENous Q4H PRN    cefTRIAXone (ROCEPHIN) 1 g in 0.9% sodium chloride (MBP/ADV) 50 mL MBP  1 g IntraVENous Q24H    HYDROcodone-acetaminophen (NORCO) 5-325 mg per tablet 1 Tablet  1 Tablet Oral Q6H PRN    tamsulosin (FLOMAX) capsule 0.4 mg  0.4 mg Oral DAILY    famotidine (PEPCID) tablet 20 mg  20 mg Oral DAILY     Patient has no known allergies. Social History     Socioeconomic History    Marital status: SINGLE     Spouse name: Not on file    Number of children: Not on file    Years of education: Not on file    Highest education level: Not on file   Tobacco Use    Smoking status: Former Smoker    Smokeless tobacco: Never Used    Tobacco comment: pt states quit in early 60's late 52's    Vaping Use    Vaping Use: Never used   Substance and Sexual Activity    Alcohol use: No     Alcohol/week: 0.0 standard drinks    Drug use: No    Sexual activity: Yes     Partners: Female     Social Determinants of Health     Financial Resource Strain:     Difficulty of Paying Living Expenses:    Food Insecurity:     Worried About Running Out of Food in the Last Year:     [address] in the Last Year:    Transportation Needs:     Lack of Transportation (Medical):      Lack of Transportation (Non-Medical):    Physical Activity:     Days of Exercise per Week:     Minutes of Exercise per Session: Stress:     Feeling of Stress :    Social Connections:     Frequency of Communication with Friends and Family:     Frequency of Social Gatherings with Friends and Family:     Attends Restoration Services:     Active Member of Clubs or Organizations:     Attends Club or Organization Meetings:     Marital Status:      Social History     Tobacco Use   Smoking Status Former Smoker   Smokeless Tobacco Never Used   Tobacco Comment    pt states quit in early 60's late 52's      Family History   Problem Relation Age of Onset    Heart Attack Father     Cancer Father         skin     Other Sister     Cancer Sister     Other Brother         melanoma     ROS: The patient has no difficulty with chest pain or shortness of breath. No fever or chills. Comprehensive review of systems was otherwise unremarkable except as noted above. Physical Exam:   Visit Vitals  BP (!) 150/85   Pulse 69   Temp 98.2 °F (36.8 °C)   Resp 17   Ht 5' 11\" (1.803 m)   Wt 187 lb (84.8 kg)   SpO2 98%   BMI 26.08 kg/m²     Vitals:    05/29/21 1910 05/29/21 2315 05/30/21 0235 05/30/21 0737   BP: (!) 160/84 (!) 157/81 (!) 152/81 (!) 150/85   Pulse: 89 88 72 69   Resp: 17 17 17 17   Temp: 99.3 °F (37.4 °C) 97.5 °F (36.4 °C) 98.2 °F (36.8 °C) 98.2 °F (36.8 °C)   SpO2: 97% 96% 98% 98%   Weight:       Height:         [unfilled]  [unfilled]    Constitutional: Alert, oriented, cooperative patient in no acute distress; appears stated age    Eyes:Sclera are clear. EOMs intact  ENMT: no external lesions; he is hard of hearing. no obvious neck masses, no ear or lip lesions, nares normal  CV: RRR. Normal perfusion      Left anterior axillary line open wound approx 2cm with surrounding dermatitis and with purulent drainage. The erythema of the left chest wall improved is improving   There is induration. We evacuated about 20 cc of pus in the office just prior to admission.   The erythema and cellulitis extended from the left chest wall down to the left lower quadrant and left flank and involves an area of about 30 cm² in size. New hematoma right flank    Resp: No JVD. Breathing is  non-labored; no audible wheezing. GI: soft and non-distended     Musculoskeletal: unremarkable with normal function. No embolic signs or cyanosis. Neuro:  Intermittent confusion; moves all 4; no focal deficits; slow moving and slow talking. Psychiatric: blunted affect, no memory impairment    Recent vitals (if inpt):  @IPVITALS(24:)@    Amount and/or Complexity of Data Reviewed and Analyzed:  I reviewed and analyzed all of the unique labs and radiologic studies that are shown below as well as any that are in the HPI, and any that are in the expanded problem list below  *Each unique test, order, or document contributes to the combination of 2 or combination of 3 in Category 1 below. For this visit I also reviewed old records and prior notes.       Recent Labs     05/30/21  0419   WBC 13.8*   HGB 13.5*      *   K 4.0      CO2 28   BUN 17   CREA 0.79*   *     Review of most recent CBC  Lab Results   Component Value Date/Time    WBC 13.8 (H) 05/30/2021 04:19 AM    HGB 13.5 (L) 05/30/2021 04:19 AM    HCT 40.7 (L) 05/30/2021 04:19 AM    PLATELET 688 78/05/2339 04:19 AM    MCV 97.6 05/30/2021 04:19 AM       Review of most recent BMP  Lab Results   Component Value Date/Time    Sodium 136 (L) 05/30/2021 04:19 AM    Potassium 4.0 05/30/2021 04:19 AM    Chloride 104 05/30/2021 04:19 AM    CO2 28 05/30/2021 04:19 AM    Anion gap 4 (L) 05/30/2021 04:19 AM    Glucose 113 (H) 05/30/2021 04:19 AM    BUN 17 05/30/2021 04:19 AM    Creatinine 0.79 (L) 05/30/2021 04:19 AM    BUN/Creatinine ratio 19 03/31/2016 11:26 AM    GFR est AA >60 05/30/2021 04:19 AM    GFR est non-AA >60 05/30/2021 04:19 AM    Calcium 7.9 (L) 05/30/2021 04:19 AM       Review of most recent LFTs (and lipase if done)  Lab Results   Component Value Date/Time    ALT (SGPT) 80 (H) 05/24/2021 12:57 PM    AST (SGOT) 53 (H) 05/24/2021 12:57 PM    Alk. phosphatase 124 05/24/2021 12:57 PM    Bilirubin, total 0.9 05/24/2021 12:57 PM     No results found for: LPSE    No results found for: INR, APTT, CBIL, LCAD, NH4, TROPT, TROIQ, INREXT, INREXT    Review of most recent HgbA1c  No results found for: HBA1C, GFJ3QXMV, QAD7SPPR, DIZ7OYND    Nutritional assessment screen for wound healing issues:  Lab Results   Component Value Date/Time    Protein, total 6.5 05/24/2021 12:57 PM    Albumin 2.4 (L) 05/24/2021 12:57 PM       @lastcovr@  XR Results (most recent):  Results from Hospital Encounter encounter on 05/20/21    XR CHEST PA LAT    Narrative  EXAM: XR CHEST PA LAT    INDICATION: Left chest wall hematoma with cellulitis rule out rib fracture or  pneumothorax after a fall at home on the floor. COMPARISON: None. FINDINGS: PA and lateral radiographs of the chest demonstrate clear lungs. The  cardiac and mediastinal contours and pulmonary vascularity are normal. The bones  and soft tissues are within normal limits. No evidence of a pneumothorax. Impression  Normal chest.      CT Results (most recent):  No results found for this or any previous visit. US Results (most recent):  No results found for this or any previous visit. Admission date (for inpatients): 5/24/2021   * No surgery found *  * No surgery found *        ASSESSMENT/PLAN:  Problem List  Date Reviewed: 5/24/2021        Codes Class Noted    Confusion ICD-10-CM: R41.0  ICD-9-CM: 298.9  5/27/2021        Sundowning ICD-10-CM: F05  ICD-9-CM: Ridgewaynathalie Lopezmond  5/27/2021        Leukocytosis ICD-10-CM: F51.047  ICD-9-CM: 288.60  5/26/2021        Acute renal insufficiency ICD-10-CM: N28.9  ICD-9-CM: 593.9  5/25/2021        Fever ICD-10-CM: R50.9  ICD-9-CM: 780.60  5/24/2021        Suppression of immune system subtherapeutic ([facility] [address] ICD-10-CM: D89.89  ICD-9-CM: 279.8  5/24/2021        Right flank hematoma ICD-10-CM: S30. 1XXA  ICD-9-CM: 922.2  5/24/2021        * (Principal) Chest wall abscess ICD-10-CM: L02.213  ICD-9-CM: 682.2  5/20/2021        Hematoma of left chest wall ICD-10-CM: C81.974Z  ICD-9-CM: 922.1  5/20/2021        Cellulitis of chest wall ICD-10-CM: L03.313  ICD-9-CM: 682.2  5/20/2021        Fall ICD-10-CM: W19. Marilou Morelwood  ICD-9-CM: E888.9  5/20/2021        Psoriasis ICD-10-CM: L40.9  ICD-9-CM: 696.1  Unknown        Insomnia ICD-10-CM: G47.00  ICD-9-CM: 780.52  Unknown        Depression ICD-10-CM: F32.9  ICD-9-CM: 052  Unknown        Tachycardia ICD-10-CM: R00.0  ICD-9-CM: 785.0  6/13/2016        Murmur ICD-10-CM: R01.1  ICD-9-CM: 785.2  6/13/2016        Psoriatic arthritis ([facility] [address] ICD-10-CM: L40.50  ICD-9-CM: 696.0  2/1/2013        Wears hearing aid ICD-10-CM: Z97.4  ICD-9-CM: V45.89  2/1/2013        HLD (hyperlipidemia) ICD-10-CM: E78.5  ICD-9-CM: 272.4  2/1/2013        H/O urinary frequency ICD-10-CM: Q77.008  ICD-9-CM: V13.09  2/1/2013        History of BPH ICD-10-CM: S30.174  ICD-9-CM: V13.89  2/1/2013            Principal Problem:    Chest wall abscess (5/20/2021)    Active Problems:    Cellulitis of chest wall (5/20/2021)      Fall (5/20/2021)      Fever (5/24/2021)      Suppression of immune system subtherapeutic ([facility] [address] (5/24/2021)      Right flank hematoma (5/24/2021)      Acute renal insufficiency (5/25/2021)      Leukocytosis (5/26/2021)      Confusion (5/27/2021)      Sundowning (5/27/2021)           Number and Complexity of Problems addressed and   Risks of complications and/or morbidity of management        MRSA Cellulitis of left chest wall with abscess and extension of cellulitis in the left flank and left lower quadrant associated with fever  This failed outpt Rx with PO Bactrim    Continue inpt admission with IV Vanc/Rocephin for left chest wall hematoma with superimposed cellulitis/abscess. He was having fevers and draining pus from his left chest wall where he spontaneously developed ischemic necrosis of the skin.     We evacuated as much fluid as possible though the open wound on 5/24/21, 5/27/21 and 5/28/21    He has a new hematoma involving his left flank which may be from a second fall. He is elderly frail and hard of hearing. His CXR identified no PTx or rib fractures    We will hold his methotrexate, prednisone, and Humira to help reduce his immune suppression.       Dermatitis around left chest wall wound from moisture maceration  Increase frequency of dressing changes to TID  Desitin TID to destinee-wound skin also        Confusion/sundowning  He has been uncooperative and pulling IV sites this admission  This is improving significantly  Charlee Lyon NP

## 2021-05-31 LAB
ANION GAP SERPL CALC-SCNC: 3 MMOL/L (ref 7–16)
BUN SERPL-MCNC: 17 MG/DL (ref 8–23)
CALCIUM SERPL-MCNC: 8 MG/DL (ref 8.3–10.4)
CHLORIDE SERPL-SCNC: 107 MMOL/L (ref 98–107)
CO2 SERPL-SCNC: 28 MMOL/L (ref 21–32)
CREAT SERPL-MCNC: 0.76 MG/DL (ref 0.8–1.5)
ERYTHROCYTE [DISTWIDTH] IN BLOOD BY AUTOMATED COUNT: 15.6 % (ref 11.9–14.6)
GLUCOSE SERPL-MCNC: 109 MG/DL (ref 65–100)
HCT VFR BLD AUTO: 42.2 % (ref 41.1–50.3)
HGB BLD-MCNC: 14.2 G/DL (ref 13.6–17.2)
MCH RBC QN AUTO: 32.6 PG (ref 26.1–32.9)
MCHC RBC AUTO-ENTMCNC: 33.6 G/DL (ref 31.4–35)
MCV RBC AUTO: 96.8 FL (ref 79.6–97.8)
NRBC # BLD: 0 K/UL (ref 0–0.2)
PLATELET # BLD AUTO: 250 K/UL (ref 150–450)
PMV BLD AUTO: 9.4 FL (ref 9.4–12.3)
POTASSIUM SERPL-SCNC: 4.4 MMOL/L (ref 3.5–5.1)
RBC # BLD AUTO: 4.36 M/UL (ref 4.23–5.6)
SODIUM SERPL-SCNC: 138 MMOL/L (ref 138–145)
WBC # BLD AUTO: 11.6 K/UL (ref 4.3–11.1)

## 2021-05-31 PROCEDURE — 80048 BASIC METABOLIC PNL TOTAL CA: CPT

## 2021-05-31 PROCEDURE — 99232 SBSQ HOSP IP/OBS MODERATE 35: CPT | Performed by: SURGERY

## 2021-05-31 PROCEDURE — 74011250637 HC RX REV CODE- 250/637: Performed by: SURGERY

## 2021-05-31 PROCEDURE — 2709999900 HC NON-CHARGEABLE SUPPLY

## 2021-05-31 PROCEDURE — 85027 COMPLETE CBC AUTOMATED: CPT

## 2021-05-31 PROCEDURE — 36415 COLL VENOUS BLD VENIPUNCTURE: CPT

## 2021-05-31 PROCEDURE — 65270000029 HC RM PRIVATE

## 2021-05-31 PROCEDURE — 74011250636 HC RX REV CODE- 250/636: Performed by: SURGERY

## 2021-05-31 RX ADMIN — Medication: at 04:45

## 2021-05-31 RX ADMIN — HYDROCODONE BITARTRATE AND ACETAMINOPHEN 1 TABLET: 5; 325 TABLET ORAL at 13:36

## 2021-05-31 RX ADMIN — FAMOTIDINE 20 MG: 20 TABLET ORAL at 09:36

## 2021-05-31 RX ADMIN — HYDROCODONE BITARTRATE AND ACETAMINOPHEN 1 TABLET: 5; 325 TABLET ORAL at 03:48

## 2021-05-31 RX ADMIN — VANCOMYCIN HYDROCHLORIDE 1500 MG: 10 INJECTION, POWDER, LYOPHILIZED, FOR SOLUTION INTRAVENOUS at 17:49

## 2021-05-31 RX ADMIN — VANCOMYCIN HYDROCHLORIDE 1500 MG: 10 INJECTION, POWDER, LYOPHILIZED, FOR SOLUTION INTRAVENOUS at 00:03

## 2021-05-31 RX ADMIN — TAMSULOSIN HYDROCHLORIDE 0.4 MG: 0.4 CAPSULE ORAL at 09:36

## 2021-05-31 RX ADMIN — Medication: at 13:50

## 2021-05-31 NOTE — PROGRESS NOTES
H&P/Consult Note/Progress Note/Office Note:   Santiago Espinoza MRN: [id number]  :1945  Age:75 y.o.    HPI: Santiago Espinoza is a 77 y.o. male who was referred in consultation by Cindy Gross PA-C with a left chest wall hematoma and cellulitis   after a fall at home on the floor approx 21. He sustained a blunt injury to his left chest wall. CXR below showed no PTx or rib fractures. He taked Humira for arthritis and is immune suppressed. We saw him for the 1st time on 21 with erythema of the left chest wall and placed him on Bactrim and marked the cellulitis border. He was instructed to call if any fevers or worsening erythema and given f/u 4 days later  He did not call  When he came to the office on 21 he reported high fevers over 101 degrees associate with nausea with his Bactrim. The erythema had extended >20cm  beyond the original inscribed lines of his left chest wall and extended down his left flank and in his left lower quadrant. We was admitted directly from office. He denies any new falls since 21. On 21 he also had a new hematoma involving his right flank which was not present on 21 (likely a second fall)        21 CXR, 2 views  Hx: Left chest wall hematoma with cellulitis; r/o rib fracture or pneumothorax after a fall at home on the floor.     Clear lungs. The cardiac and mediastinal contours and pulmonary vascularity are normal.   The bones and soft tissues are within normal limits.  No evidence of a pneumothorax.     IMPRESSION:  Normal chest.      Additional hx:  21 wound culture growing MRSA; Pharmacy dosing IV Vanc; trough tonight  21  Less Confused; no fever; WBC elevated but better;  on IV Abx   21  Resting in bed; no fever; WBC 12.3;  on IV Abx  21  Sitting on side of bed; no fever; WBC 13.8;  on IV Abx  21 AF; wbc still elevated; left hemithorax , left flank and LLQ edematous and indurated           Past Medical History:   Diagnosis Date    Depression     H/O urinary frequency 2/1/2013    History of BPH 2/1/2013    HLD (hyperlipidemia) 2/1/2013    Hx: UTI (urinary tract infection) 2/1/2013    Insomnia     Psoriasis     Psoriatic arthritis ([facility] [address] 2/1/2013    Wears hearing aid 2/1/2013     No past surgical history on file. Current Facility-Administered Medications   Medication Dose Route Frequency    vancomycin (VANCOCIN) 1500 mg in  ml infusion  1,500 mg IntraVENous Q18H    zinc oxide-cod liver oil (DESITIN) 40 % paste   Topical Q8H    acetaminophen (TYLENOL) tablet 1,000 mg  1,000 mg Oral Q6H PRN    ondansetron (ZOFRAN) injection 4 mg  4 mg IntraVENous Q4H PRN    HYDROcodone-acetaminophen (NORCO) 5-325 mg per tablet 1 Tablet  1 Tablet Oral Q6H PRN    tamsulosin (FLOMAX) capsule 0.4 mg  0.4 mg Oral DAILY    famotidine (PEPCID) tablet 20 mg  20 mg Oral DAILY     Patient has no known allergies. Social History     Socioeconomic History    Marital status: SINGLE     Spouse name: Not on file    Number of children: Not on file    Years of education: Not on file    Highest education level: Not on file   Tobacco Use    Smoking status: Former Smoker    Smokeless tobacco: Never Used    Tobacco comment: pt states quit in early 60's late 52's    Vaping Use    Vaping Use: Never used   Substance and Sexual Activity    Alcohol use: No     Alcohol/week: 0.0 standard drinks    Drug use: No    Sexual activity: Yes     Partners: Female     Social Determinants of Health     Financial Resource Strain:     Difficulty of Paying Living Expenses:    Food Insecurity:     Worried About Running Out of Food in the Last Year:     [address] in the Last Year:    Transportation Needs:     Lack of Transportation (Medical):      Lack of Transportation (Non-Medical):    Physical Activity:     Days of Exercise per Week:     Minutes of Exercise per Session:    Stress:     Feeling of Stress :    Social Connections:     Frequency of Communication with Friends and Family:     Frequency of Social Gatherings with Friends and Family:     Attends Rastafarian Services:     Active Member of Clubs or Organizations:     Attends Club or Organization Meetings:     Marital Status:      Social History     Tobacco Use   Smoking Status Former Smoker   Smokeless Tobacco Never Used   Tobacco Comment    pt states quit in early 60's late 52's      Family History   Problem Relation Age of Onset    Heart Attack Father     Cancer Father         skin     Other Sister     Cancer Sister     Other Brother         melanoma     ROS: The patient has no difficulty with chest pain or shortness of breath. No fever or chills. Comprehensive review of systems was otherwise unremarkable except as noted above. Physical Exam:   Visit Vitals  BP (!) 145/83   Pulse 66   Temp 98.8 °F (37.1 °C)   Resp 18   Ht 5' 11\" (1.803 m)   Wt 187 lb (84.8 kg)   SpO2 95%   BMI 26.08 kg/m²     Vitals:    05/30/21 2312 05/31/21 0321 05/31/21 0352 05/31/21 0711   BP: 135/85 (!) 170/84 (!) 141/80 (!) 145/83   Pulse: 70 77  66   Resp: 18 17  18   Temp: 98.1 °F (36.7 °C) 98.1 °F (36.7 °C)  98.8 °F (37.1 °C)   SpO2: 96% 97%  95%   Weight:       Height:         [unfilled]  [unfilled]    Constitutional: Alert, oriented, cooperative patient in no acute distress; appears stated age    Eyes:Sclera are clear. EOMs intact  ENMT: no external lesions; he is hard of hearing. no obvious neck masses, no ear or lip lesions, nares normal  CV: RRR. Normal perfusion      Left anterior axillary line open wound approx 2cm with surrounding dermatitis and with purulent drainage. The erythema of the left chest wall improved is improving   There is induration. We evacuated about 20 cc of pus in the office just prior to admission.   The erythema and cellulitis extended from the left chest wall down to the left lower quadrant and left flank and involved an area of >30 cm in length size. Left flank, LLQ and left hemithorax indurated and edematous and with tenderness; erythema subsided; no fluctuant areas palpated    Right flank hematoma stable    Resp: No JVD. Breathing is  non-labored; no audible wheezing. GI: soft and non-distended     Musculoskeletal: unremarkable with normal function. No embolic signs or cyanosis. Neuro:  Intermittent confusion; moves all 4; no focal deficits; slow moving and slow talking. Psychiatric: blunted affect, no memory impairment    Recent vitals (if inpt):  @IPVITALS(24:)@    Amount and/or Complexity of Data Reviewed and Analyzed:  I reviewed and analyzed all of the unique labs and radiologic studies that are shown below as well as any that are in the HPI, and any that are in the expanded problem list below  *Each unique test, order, or document contributes to the combination of 2 or combination of 3 in Category 1 below. For this visit I also reviewed old records and prior notes.       Recent Labs     05/31/21  0434   WBC 11.6*   HGB 14.2         K 4.4      CO2 28   BUN 17   CREA 0.76*   *     Review of most recent CBC  Lab Results   Component Value Date/Time    WBC 11.6 (H) 05/31/2021 04:34 AM    HGB 14.2 05/31/2021 04:34 AM    HCT 42.2 05/31/2021 04:34 AM    PLATELET 662 70/49/9464 04:34 AM    MCV 96.8 05/31/2021 04:34 AM       Review of most recent BMP  Lab Results   Component Value Date/Time    Sodium 138 05/31/2021 04:34 AM    Potassium 4.4 05/31/2021 04:34 AM    Chloride 107 05/31/2021 04:34 AM    CO2 28 05/31/2021 04:34 AM    Anion gap 3 (L) 05/31/2021 04:34 AM    Glucose 109 (H) 05/31/2021 04:34 AM    BUN 17 05/31/2021 04:34 AM    Creatinine 0.76 (L) 05/31/2021 04:34 AM    BUN/Creatinine ratio 19 03/31/2016 11:26 AM    GFR est AA >60 05/31/2021 04:34 AM    GFR est non-AA >60 05/31/2021 04:34 AM    Calcium 8.0 (L) 05/31/2021 04:34 AM       Review of most recent LFTs (and lipase if done)  Lab Results   Component Value Date/Time    ALT (SGPT) 80 (H) 05/24/2021 12:57 PM    AST (SGOT) 53 (H) 05/24/2021 12:57 PM    Alk. phosphatase 124 05/24/2021 12:57 PM    Bilirubin, total 0.9 05/24/2021 12:57 PM     No results found for: LPSE    No results found for: INR, APTT, CBIL, LCAD, NH4, TROPT, TROIQ, INREXT, INREXT    Review of most recent HgbA1c  No results found for: HBA1C, LEB2HSPH, NFU1NESM, ERX7DWGA    Nutritional assessment screen for wound healing issues:  Lab Results   Component Value Date/Time    Protein, total 6.5 05/24/2021 12:57 PM    Albumin 2.4 (L) 05/24/2021 12:57 PM       @lastcovr@  XR Results (most recent):  Results from Hospital Encounter encounter on 05/20/21    XR CHEST PA LAT    Narrative  EXAM: XR CHEST PA LAT    INDICATION: Left chest wall hematoma with cellulitis rule out rib fracture or  pneumothorax after a fall at home on the floor. COMPARISON: None. FINDINGS: PA and lateral radiographs of the chest demonstrate clear lungs. The  cardiac and mediastinal contours and pulmonary vascularity are normal. The bones  and soft tissues are within normal limits. No evidence of a pneumothorax. Impression  Normal chest.      CT Results (most recent):  No results found for this or any previous visit. US Results (most recent):  No results found for this or any previous visit.         Admission date (for inpatients): 5/24/2021   * No surgery found *  * No surgery found *        ASSESSMENT/PLAN:  Problem List  Date Reviewed: 5/24/2021        Codes Class Noted    Confusion ICD-10-CM: R41.0  ICD-9-CM: 298.9  5/27/2021        Sundowning ICD-10-CM: F05  ICD-9-CM: Rene Salaam  5/27/2021        Leukocytosis ICD-10-CM: M80.096  ICD-9-CM: 288.60  5/26/2021        Acute renal insufficiency ICD-10-CM: N28.9  ICD-9-CM: 593.9  5/25/2021        Fever ICD-10-CM: R50.9  ICD-9-CM: 780.60  5/24/2021        Suppression of immune system subtherapeutic ([facility] [address] ICD-10-CM: Y80.40  ICD-9-CM: 279.8  5/24/2021        Right flank hematoma ICD-10-CM: S30. 1XXA  ICD-9-CM: 922.2  5/24/2021        * (Principal) Chest wall abscess ICD-10-CM: L02.213  ICD-9-CM: 682.2  5/20/2021        Hematoma of left chest wall ICD-10-CM: I84.617U  ICD-9-CM: 922.1  5/20/2021        Cellulitis of chest wall ICD-10-CM: L03.313  ICD-9-CM: 682.2  5/20/2021        Fall ICD-10-CM: E04. Mone Sers  ICD-9-CM: E888.9  5/20/2021        Psoriasis ICD-10-CM: L40.9  ICD-9-CM: 696.1  Unknown        Insomnia ICD-10-CM: G47.00  ICD-9-CM: 780.52  Unknown        Depression ICD-10-CM: F32.9  ICD-9-CM: 638  Unknown        Tachycardia ICD-10-CM: R00.0  ICD-9-CM: 785.0  6/13/2016        Murmur ICD-10-CM: R01.1  ICD-9-CM: 785.2  6/13/2016        Psoriatic arthritis ([facility] [address] ICD-10-CM: L40.50  ICD-9-CM: 696.0  2/1/2013        Wears hearing aid ICD-10-CM: Z97.4  ICD-9-CM: V45.89  2/1/2013        HLD (hyperlipidemia) ICD-10-CM: E78.5  ICD-9-CM: 272.4  2/1/2013        H/O urinary frequency ICD-10-CM: C98.999  ICD-9-CM: V13.09  2/1/2013        History of BPH ICD-10-CM: M31.118  ICD-9-CM: V13.89  2/1/2013            Principal Problem:    Chest wall abscess (5/20/2021)    Active Problems:    Cellulitis of chest wall (5/20/2021)      Fall (5/20/2021)      Fever (5/24/2021)      Suppression of immune system subtherapeutic ([facility] [address] (5/24/2021)      Right flank hematoma (5/24/2021)      Acute renal insufficiency (5/25/2021)      Leukocytosis (5/26/2021)      Confusion (5/27/2021)      Sundowning (5/27/2021)           Number and Complexity of Problems addressed and   Risks of complications and/or morbidity of management        MRSA Cellulitis of left chest wall with abscess and extension of cellulitis in the left flank and left lower quadrant associated with fever  This failed outpt Rx with PO Bactrim    Continue inpt admission with IV Vanc/Rocephin for left chest wall hematoma with superimposed cellulitis/abscess.       He was having fevers and draining pus from his left chest wall where he spontaneously developed ischemic necrosis of the skin. We evacuated as much fluid as possible though the open wound on 5/24/21, 5/27/21, 5/28/21, and 5/31/21    He has a new hematoma involving his left flank which may be from a second fall. He is elderly frail and hard of hearing. His CXR identified no PTx or rib fractures    We will hold his methotrexate, prednisone, and Humira to help reduce his immune suppression. Likely CT chest on 6/1/21      Dermatitis around left chest wall wound from moisture maceration  Increase frequency of dressing changes to TID  Desitin TID to destinee-wound skin also        Confusion/sundowning  He has been uncooperative and pulling IV sites this admission  This is improving significantly  Sitter prn            Level of MDM (2/3 elements below)  Number and Complexity of Problems Addressed Amount and/or Complexity of Data to be Reviewed and Analyzed  *Each unique test, order, or document contributes to the combination of 2 or combination of 3 in Category 1 below.  Risk of Complications and/or Morbidity or Mortality of pt Management     30146  42551 SF Minimal  1self-limited or minor problem Minimal or none Minimal risk of morbidity from additional diagnostic testing or Rx   90741  42120 Low Low  2or more self-limited or minor problems;    or  1stable chronic illness;    or  6OGBSN, uncomplicated illness or injury   Limited  (Must meet the requirements of at least 1 of the 2 categories)  Category 1: Tests and documents   Any combination of 2 from the following:  Review of prior external note(s) from each unique source*;  review of the result(s) of each unique test*;   ordering of each unique test*    or   Category 2: Assessment requiring an independent historian(s)  (For the categories of independent interpretation of tests and discussion of management or test interpretation, see moderate or high) Low risk of morbidity from additional diagnostic testing or treatment     29993  66774 Mod Moderate  1or more chronic illnesses with exacerbation, progression, or side effects of treatment;    or  2or more stable chronic illnesses;    or  1undiagnosed new problem with uncertain prognosis;    or  1acute illness with systemic symptoms;    or  3BFJVK complicated injury   Moderate  (Must meet the requirements of at least 1 out of 3 categories)  Category 1: Tests, documents, or independent historian(s)  Any combination of 3 from the following:   Review of prior external note(s) from each unique source*;  Review of the result(s) of each unique test*;  Ordering of each unique test*;  Assessment requiring an independent historian(s)    or  Category 2: Independent interpretation of tests   Independent interpretation of a test performed by another physician/other qualified health care professional (not separately reported);     or  Category 3: Discussion of management or test interpretation  Discussion of management or test interpretation with external physician/other qualified health care professional/appropriate source (not separately reported)   Moderate risk of morbidity from additional diagnostic testing or treatment  Examples only:  Prescription drug management   Decision regarding minor surgery with identified patient or procedure risk factors  Decision regarding elective major surgery without identified patient or procedure risk factors   Diagnosis or treatment significantly limited by social determinants of health       70330  55022 High High  1or more chronic illnesses with severe exacerbation, progression, or side effects of treatment;    or  1 acute or chronic illness or injury that poses a threat to life or bodily function   Extensive  (Must meet the requirements of at least 2 out of 3 categories)  Category 1: Tests, documents, or independent historian(s)  Any combination of 3 from the following:   Review of prior external note(s) from each unique source*;  Review of the result(s) of each unique test*;   Ordering of each unique test*;   Assessment requiring an independent historian(s)    or   Category 2: Independent interpretation of tests   Independent interpretation of a test performed by another physician/other qualified health care professional (not separately reported);     or  Category 3: Discussion of management or test interpretation  Discussion of management or test interpretation with external physician/other qualified health care professional/appropriate source (not separately reported)   High risk of morbidity from additional diagnostic testing or treatment  Examples only:  Drug therapy requiring intensive monitoring for toxicity  Decision regarding elective major surgery with identified patient or procedure risk factors  Decision regarding emergency major surgery  Decision regarding hospitalization  Decision not to resuscitate or to de-escalate care because of poor prognosis             I have personally performed a face-to-face diagnostic evaluation and management  service on this patient. I have independently seen the patient. I have independently obtained the above history from the patient/family. I have independently examined the patient with above findings. I have independently reviewed data/labs for this patient and developed the above plan of care (MDM). Signed: Marium Ayala MD, FACS

## 2021-05-31 NOTE — PROGRESS NOTES
Attempted PT but . Mariano James said he wasn't feeling well today. Sitter states he has been moving about the room and is currently in the chair. Will try tomorrow as time allows.   Vinay Weber, PT

## 2021-05-31 NOTE — PROGRESS NOTES
Pharmacokinetic Consult to Pharmacist    Cesar Suárez. is a 77 y.o. male being treated for SSTI with vancomycin. Height: 5' 11\" (180.3 cm)  Weight: 84.8 kg (187 lb)  Lab Results   Component Value Date/Time    BUN 17 05/31/2021 04:34 AM    Creatinine 0.76 (L) 05/31/2021 04:34 AM    WBC 11.6 (H) 05/31/2021 04:34 AM      Estimated Creatinine Clearance: 90.8 mL/min (A) (based on SCr of 0.76 mg/dL (L)). Lab Results   Component Value Date/Time    Vancomycin,trough 11.9 05/30/2021 10:38 PM       Day 8 of vancomycin. Goal trough is 10-20. Trough yesterday within goal range, continue current dose. Will continue to follow patient and order levels when clinically indicated. Thank you,  Martine Suazo, Pharm. D.   PGY1 Pharmacy Resident

## 2021-05-31 NOTE — PROGRESS NOTES
END OF SHIFT NOTE:    INTAKE/OUTPUT  05/30 0701 - 05/31 0700  In: 2121 [P.O.:720; I.V.:1401]  Out: 2175 [Urine:2175]  Voiding: YES  Catheter: NO  Drain:              Flatus: Patient does have flatus present. Stool:  0 occurrences. Characteristics:    Emesis: 0 occurrences. Characteristics:        VITAL SIGNS  Patient Vitals for the past 12 hrs:   Temp Pulse Resp BP SpO2   05/31/21 0352 -- -- -- (!) 141/80 --   05/31/21 0321 98.1 °F (36.7 °C) 77 17 (!) 170/84 97 %   05/30/21 2312 98.1 °F (36.7 °C) 70 18 135/85 96 %   05/30/21 1954 99.1 °F (37.3 °C) 86 18 (!) 146/71 94 %       Pain Assessment  Pain Intensity 1: 7 (05/31/21 0352)  Pain Location 1: Chest  Pain Intervention(s) 1: Medication (see MAR)  Patient Stated Pain Goal: 0    Ambulating  Yes    Shift report given to oncoming nurse at the bedside.     Steph Andrade, ELIZABETH

## 2021-06-01 LAB
ANION GAP SERPL CALC-SCNC: 4 MMOL/L (ref 7–16)
BUN SERPL-MCNC: 17 MG/DL (ref 8–23)
CALCIUM SERPL-MCNC: 8 MG/DL (ref 8.3–10.4)
CHLORIDE SERPL-SCNC: 108 MMOL/L (ref 98–107)
CO2 SERPL-SCNC: 27 MMOL/L (ref 21–32)
CREAT SERPL-MCNC: 0.75 MG/DL (ref 0.8–1.5)
ERYTHROCYTE [DISTWIDTH] IN BLOOD BY AUTOMATED COUNT: 15.4 % (ref 11.9–14.6)
GLUCOSE SERPL-MCNC: 99 MG/DL (ref 65–100)
HCT VFR BLD AUTO: 39.9 % (ref 41.1–50.3)
HGB BLD-MCNC: 13.4 G/DL (ref 13.6–17.2)
MCH RBC QN AUTO: 32.4 PG (ref 26.1–32.9)
MCHC RBC AUTO-ENTMCNC: 33.6 G/DL (ref 31.4–35)
MCV RBC AUTO: 96.4 FL (ref 79.6–97.8)
NRBC # BLD: 0 K/UL (ref 0–0.2)
PLATELET # BLD AUTO: 264 K/UL (ref 150–450)
PMV BLD AUTO: 9.6 FL (ref 9.4–12.3)
POTASSIUM SERPL-SCNC: 4.2 MMOL/L (ref 3.5–5.1)
RBC # BLD AUTO: 4.14 M/UL (ref 4.23–5.6)
SODIUM SERPL-SCNC: 139 MMOL/L (ref 138–145)
WBC # BLD AUTO: 12.2 K/UL (ref 4.3–11.1)

## 2021-06-01 PROCEDURE — 36415 COLL VENOUS BLD VENIPUNCTURE: CPT

## 2021-06-01 PROCEDURE — 80048 BASIC METABOLIC PNL TOTAL CA: CPT

## 2021-06-01 PROCEDURE — 97110 THERAPEUTIC EXERCISES: CPT

## 2021-06-01 PROCEDURE — 74011250636 HC RX REV CODE- 250/636: Performed by: SURGERY

## 2021-06-01 PROCEDURE — 74011250637 HC RX REV CODE- 250/637: Performed by: SURGERY

## 2021-06-01 PROCEDURE — 99232 SBSQ HOSP IP/OBS MODERATE 35: CPT | Performed by: SURGERY

## 2021-06-01 PROCEDURE — 65270000029 HC RM PRIVATE

## 2021-06-01 PROCEDURE — 2709999900 HC NON-CHARGEABLE SUPPLY

## 2021-06-01 PROCEDURE — 97530 THERAPEUTIC ACTIVITIES: CPT

## 2021-06-01 PROCEDURE — 85027 COMPLETE CBC AUTOMATED: CPT

## 2021-06-01 RX ORDER — FAMOTIDINE 20 MG/1
20 TABLET, FILM COATED ORAL 2 TIMES DAILY
Status: DISCONTINUED | OUTPATIENT
Start: 2021-06-01 | End: 2021-06-18 | Stop reason: HOSPADM

## 2021-06-01 RX ADMIN — TAMSULOSIN HYDROCHLORIDE 0.4 MG: 0.4 CAPSULE ORAL at 09:09

## 2021-06-01 RX ADMIN — Medication 1 AMPULE: at 14:37

## 2021-06-01 RX ADMIN — HYDROCODONE BITARTRATE AND ACETAMINOPHEN 1 TABLET: 5; 325 TABLET ORAL at 14:37

## 2021-06-01 RX ADMIN — Medication: at 21:57

## 2021-06-01 RX ADMIN — ACETAMINOPHEN 1000 MG: 500 TABLET ORAL at 09:09

## 2021-06-01 RX ADMIN — Medication: at 14:58

## 2021-06-01 RX ADMIN — Medication 1 AMPULE: at 21:48

## 2021-06-01 RX ADMIN — HYDROCODONE BITARTRATE AND ACETAMINOPHEN 1 TABLET: 5; 325 TABLET ORAL at 06:16

## 2021-06-01 RX ADMIN — VANCOMYCIN HYDROCHLORIDE 1500 MG: 10 INJECTION, POWDER, LYOPHILIZED, FOR SOLUTION INTRAVENOUS at 12:21

## 2021-06-01 RX ADMIN — ACETAMINOPHEN 1000 MG: 500 TABLET ORAL at 17:05

## 2021-06-01 RX ADMIN — FAMOTIDINE 20 MG: 20 TABLET ORAL at 17:05

## 2021-06-01 RX ADMIN — Medication: at 06:16

## 2021-06-01 RX ADMIN — HYDROCODONE BITARTRATE AND ACETAMINOPHEN 1 TABLET: 5; 325 TABLET ORAL at 21:58

## 2021-06-01 RX ADMIN — FAMOTIDINE 20 MG: 20 TABLET ORAL at 09:09

## 2021-06-01 NOTE — PROGRESS NOTES
Verbal bedside report given to oncoming RN, Maegan Godwin. Patient's situation, background, assessment and recommendations provided. Opportunity for questions provided. Oncoming RN assumed care of patient.

## 2021-06-01 NOTE — PROGRESS NOTES
H&P/Consult Note/Progress Note/Office Note:   Rhea Johnson MRN: [id number]  :1945  Age:75 y.o.    HPI: Rhea Johnson is a 77 y.o. male who was referred in consultation by Corine Crawford PA-C with a left chest wall hematoma and cellulitis   after a fall at home on the floor approx 21. He sustained a blunt injury to his left chest wall. CXR below showed no PTx or rib fractures. He taked Humira for arthritis and is immune suppressed. We saw him for the 1st time on 21 with erythema of the left chest wall and placed him on Bactrim and marked the cellulitis border. He was instructed to call if any fevers or worsening erythema and given f/u 4 days later  He did not call  When he came to the office on 21 he reported high fevers over 101 degrees associate with nausea with his Bactrim. The erythema had extended >20cm  beyond the original inscribed lines of his left chest wall and extended down his left flank and in his left lower quadrant. We was admitted directly from office. He denies any new falls since 21. On 21 he also had a new hematoma involving his right flank which was not present on 21 (likely a second fall)        21 CXR, 2 views  Hx: Left chest wall hematoma with cellulitis; r/o rib fracture or pneumothorax after a fall at home on the floor.     Clear lungs. The cardiac and mediastinal contours and pulmonary vascularity are normal.   The bones and soft tissues are within normal limits.  No evidence of a pneumothorax.     IMPRESSION:  Normal chest.      Additional hx:  21 wound culture growing MRSA; Pharmacy dosing IV Vanc; trough tonight  21  Less Confused; no fever; WBC elevated but better;  on IV Abx  21  Resting in bed; no fever; WBC 12.3;  on IV Abx  21  Sitting on side of bed; no fever; WBC 13.8;  on IV Abx  21 AF; wbc still elevated; left hemithorax , left flank and LLQ edematous and indurated  21 continues IV Vanc; WBC higher at 12.2k         Past Medical History:   Diagnosis Date    Depression     H/O urinary frequency 2/1/2013    History of BPH 2/1/2013    HLD (hyperlipidemia) 2/1/2013    Hx: UTI (urinary tract infection) 2/1/2013    Insomnia     Psoriasis     Psoriatic arthritis ([address] 2/1/2013    Wears hearing aid 2/1/2013     No past surgical history on file. Current Facility-Administered Medications   Medication Dose Route Frequency    famotidine (PEPCID) tablet 20 mg  20 mg Oral BID    alcohol 62% (NOZIN) nasal  1 Ampule  1 Ampule Topical Q8H    vancomycin (VANCOCIN) 1500 mg in  ml infusion  1,500 mg IntraVENous Q18H    zinc oxide-cod liver oil (DESITIN) 40 % paste   Topical Q8H    acetaminophen (TYLENOL) tablet 1,000 mg  1,000 mg Oral Q6H PRN    ondansetron (ZOFRAN) injection 4 mg  4 mg IntraVENous Q4H PRN    HYDROcodone-acetaminophen (NORCO) 5-325 mg per tablet 1 Tablet  1 Tablet Oral Q6H PRN    tamsulosin (FLOMAX) capsule 0.4 mg  0.4 mg Oral DAILY     Patient has no known allergies. Social History     Socioeconomic History    Marital status: SINGLE     Spouse name: Not on file    Number of children: Not on file    Years of education: Not on file    Highest education level: Not on file   Tobacco Use    Smoking status: Former Smoker    Smokeless tobacco: Never Used    Tobacco comment: pt states quit in early 60's late 52's    Vaping Use    Vaping Use: Never used   Substance and Sexual Activity    Alcohol use: No     Alcohol/week: 0.0 standard drinks    Drug use: No    Sexual activity: Yes     Partners: Female     Social Determinants of Health     Financial Resource Strain:     Difficulty of Paying Living Expenses:    Food Insecurity:     Worried About Running Out of Food in the Last Year:     [address] in the Last Year:    Transportation Needs:     Lack of Transportation (Medical):      Lack of Transportation (Non-Medical):    Physical Activity:  Days of Exercise per Week:     Minutes of Exercise per Session:    Stress:     Feeling of Stress :    Social Connections:     Frequency of Communication with Friends and Family:     Frequency of Social Gatherings with Friends and Family:     Attends Sabianism Services:     Active Member of Clubs or Organizations:     Attends Club or Organization Meetings:     Marital Status:      Social History     Tobacco Use   Smoking Status Former Smoker   Smokeless Tobacco Never Used   Tobacco Comment    pt states quit in early 60's late 52's      Family History   Problem Relation Age of Onset    Heart Attack Father     Cancer Father         skin     Other Sister     Cancer Sister     Other Brother         melanoma     ROS: The patient has no difficulty with chest pain or shortness of breath. No fever or chills. Comprehensive review of systems was otherwise unremarkable except as noted above. Physical Exam:   Visit Vitals  /68   Pulse (!) 58   Temp 98.1 °F (36.7 °C)   Resp 17   Ht 5' 11\" (1.803 m)   Wt 166 lb 11.2 oz (75.6 kg)   SpO2 98%   BMI 23.25 kg/m²     Vitals:    06/01/21 0500 06/01/21 0717 06/01/21 1136 06/01/21 1210   BP:  (!) 149/78 119/68    Pulse:  76 (!) 58    Resp:  16 17    Temp:  98.8 °F (37.1 °C) 98.1 °F (36.7 °C)    SpO2:  100% 98%    Weight: 166 lb 11.2 oz (75.6 kg)      Height:    5' 11\" (1.803 m)     [unfilled]  [unfilled]    Constitutional: Alert, oriented, cooperative patient in no acute distress; appears stated age    Eyes:Sclera are clear. EOMs intact  ENMT: no external lesions; he is hard of hearing. no obvious neck masses, no ear or lip lesions, nares normal  CV: RRR. Normal perfusion      Left anterior axillary line open wound approx 2cm with surrounding dermatitis and with purulent drainage. The erythema of the left chest wall improved is improving   There is induration. We evacuated about 20 cc of pus in the office just prior to admission.   The erythema and cellulitis extended from the left chest wall down to the left lower quadrant and left flank and involved an area of >30 cm in length size. Left flank, LLQ and left hemithorax indurated and edematous and with tenderness; erythema subsided; no fluctuant areas palpated    Right flank hematoma stable    Resp: No JVD. Breathing is  non-labored; no audible wheezing. GI: soft and non-distended     Musculoskeletal: unremarkable with normal function. No embolic signs or cyanosis. Neuro:  Intermittent confusion; moves all 4; no focal deficits; slow moving and slow talking. Psychiatric: blunted affect, no memory impairment    Recent vitals (if inpt):  @IPVITALS(24:)@    Amount and/or Complexity of Data Reviewed and Analyzed:  I reviewed and analyzed all of the unique labs and radiologic studies that are shown below as well as any that are in the HPI, and any that are in the expanded problem list below  *Each unique test, order, or document contributes to the combination of 2 or combination of 3 in Category 1 below. For this visit I also reviewed old records and prior notes.       Recent Labs     06/01/21  0340   WBC 12.2*   HGB 13.4*         K 4.2   *   CO2 27   BUN 17   CREA 0.75*   GLU 99     Review of most recent CBC  Lab Results   Component Value Date/Time    WBC 12.2 (H) 06/01/2021 03:40 AM    HGB 13.4 (L) 06/01/2021 03:40 AM    HCT 39.9 (L) 06/01/2021 03:40 AM    PLATELET 487 37/92/5580 03:40 AM    MCV 96.4 06/01/2021 03:40 AM       Review of most recent BMP  Lab Results   Component Value Date/Time    Sodium 139 06/01/2021 03:40 AM    Potassium 4.2 06/01/2021 03:40 AM    Chloride 108 (H) 06/01/2021 03:40 AM    CO2 27 06/01/2021 03:40 AM    Anion gap 4 (L) 06/01/2021 03:40 AM    Glucose 99 06/01/2021 03:40 AM    BUN 17 06/01/2021 03:40 AM    Creatinine 0.75 (L) 06/01/2021 03:40 AM    BUN/Creatinine ratio 19 03/31/2016 11:26 AM    GFR est AA >60 06/01/2021 03:40 AM    GFR est non-AA >60 06/01/2021 03:40 AM    Calcium 8.0 (L) 06/01/2021 03:40 AM       Review of most recent LFTs (and lipase if done)  Lab Results   Component Value Date/Time    ALT (SGPT) 80 (H) 05/24/2021 12:57 PM    AST (SGOT) 53 (H) 05/24/2021 12:57 PM    Alk. phosphatase 124 05/24/2021 12:57 PM    Bilirubin, total 0.9 05/24/2021 12:57 PM     No results found for: LPSE    No results found for: INR, APTT, CBIL, LCAD, NH4, TROPT, TROIQ, INREXT, INREXT    Review of most recent HgbA1c  No results found for: HBA1C, UYX5RRRI, RYP5CDYJ, NZE1MYTU    Nutritional assessment screen for wound healing issues:  Lab Results   Component Value Date/Time    Protein, total 6.5 05/24/2021 12:57 PM    Albumin 2.4 (L) 05/24/2021 12:57 PM       @lastcovr@  XR Results (most recent):  Results from Hospital Encounter encounter on 05/20/21    XR CHEST PA LAT    Narrative  EXAM: XR CHEST PA LAT    INDICATION: Left chest wall hematoma with cellulitis rule out rib fracture or  pneumothorax after a fall at home on the floor. COMPARISON: None. FINDINGS: PA and lateral radiographs of the chest demonstrate clear lungs. The  cardiac and mediastinal contours and pulmonary vascularity are normal. The bones  and soft tissues are within normal limits. No evidence of a pneumothorax. Impression  Normal chest.      CT Results (most recent):  No results found for this or any previous visit. US Results (most recent):  No results found for this or any previous visit.         Admission date (for inpatients): 5/24/2021   * No surgery found *  * No surgery found *        ASSESSMENT/PLAN:  Problem List  Date Reviewed: 5/24/2021        Codes Class Noted    Confusion ICD-10-CM: R41.0  ICD-9-CM: 298.9  5/27/2021        Sundowning ICD-10-CM: F05  ICD-9-CM: Mick Boom  5/27/2021        Leukocytosis ICD-10-CM: Q25.162  ICD-9-CM: 288.60  5/26/2021        Acute renal insufficiency ICD-10-CM: N28.9  ICD-9-CM: 593.9  5/25/2021        Fever ICD-10-CM: R50.9  ICD-9-CM: 780.60 5/24/2021        Suppression of immune system subtherapeutic (HCC) ICD-10-CM: X44.77  ICD-9-CM: 279.8  5/24/2021        Right flank hematoma ICD-10-CM: S30. 1XXA  ICD-9-CM: 922.2  5/24/2021        * (Principal) Chest wall abscess ICD-10-CM: L02.213  ICD-9-CM: 682.2  5/20/2021        Hematoma of left chest wall ICD-10-CM: E00.876G  ICD-9-CM: 922.1  5/20/2021        Cellulitis of chest wall ICD-10-CM: L03.313  ICD-9-CM: 682.2  5/20/2021        Fall ICD-10-CM: V54. Stephanie Balint  ICD-9-CM: E888.9  5/20/2021        Psoriasis ICD-10-CM: L40.9  ICD-9-CM: 696.1  Unknown        Insomnia ICD-10-CM: G47.00  ICD-9-CM: 780.52  Unknown        Depression ICD-10-CM: F32.9  ICD-9-CM: 358  Unknown        Tachycardia ICD-10-CM: R00.0  ICD-9-CM: 785.0  6/13/2016        Murmur ICD-10-CM: R01.1  ICD-9-CM: 785.2  6/13/2016        Psoriatic arthritis ([facility] [address] ICD-10-CM: L40.50  ICD-9-CM: 696.0  2/1/2013        Wears hearing aid ICD-10-CM: Z97.4  ICD-9-CM: V45.89  2/1/2013        HLD (hyperlipidemia) ICD-10-CM: E78.5  ICD-9-CM: 272.4  2/1/2013        H/O urinary frequency ICD-10-CM: P26.672  ICD-9-CM: V13.09  2/1/2013        History of BPH ICD-10-CM: Q65.337  ICD-9-CM: V13.89  2/1/2013            Principal Problem:    Chest wall abscess (5/20/2021)    Active Problems:    Cellulitis of chest wall (5/20/2021)      Fall (5/20/2021)      Fever (5/24/2021)      Suppression of immune system subtherapeutic ([address] (5/24/2021)      Right flank hematoma (5/24/2021)      Acute renal insufficiency (5/25/2021)      Leukocytosis (5/26/2021)      Confusion (5/27/2021)      Sundowning (5/27/2021)           Number and Complexity of Problems addressed and   Risks of complications and/or morbidity of management        MRSA Cellulitis of left chest wall with abscess and extension of cellulitis in the left flank and left lower quadrant associated with fever  This failed outpt Rx with PO Bactrim    Continue inpt admission with IV Vanc/Rocephin for left chest wall hematoma with superimposed cellulitis/abscess. He was having fevers and draining pus from his left chest wall where he spontaneously developed ischemic necrosis of the skin. We evacuated as much fluid as possible though the open wound on 5/24/21, 5/27/21, 5/28/21, and 5/31/21    He has a new hematoma involving his left flank which may be from a second fall. He is elderly frail and hard of hearing. His CXR identified no PTx or rib fractures    We will hold his methotrexate, prednisone, and Humira to help reduce his immune suppression. Likely CT chest on 6/2/21      Dermatitis around left chest wall wound from moisture maceration  Increase frequency of dressing changes to TID  Desitin TID to destinee-wound skin also        Confusion/sundowning  He has been uncooperative and pulling IV sites this admission  This is improving significantly  Sitter prn            Level of MDM (2/3 elements below)  Number and Complexity of Problems Addressed Amount and/or Complexity of Data to be Reviewed and Analyzed  *Each unique test, order, or document contributes to the combination of 2 or combination of 3 in Category 1 below.  Risk of Complications and/or Morbidity or Mortality of pt Management     94570  15640  Minimal  1self-limited or minor problem Minimal or none Minimal risk of morbidity from additional diagnostic testing or Rx   88415  60837 Low Low  2or more self-limited or minor problems;    or  1stable chronic illness;    or  4GVOZB, uncomplicated illness or injury   Limited  (Must meet the requirements of at least 1 of the 2 categories)  Category 1: Tests and documents   Any combination of 2 from the following:  Review of prior external note(s) from each unique source*;  review of the result(s) of each unique test*;   ordering of each unique test*    or   Category 2: Assessment requiring an independent historian(s)  (For the categories of independent interpretation of tests and discussion of management or test interpretation, see moderate or high) Low risk of morbidity from additional diagnostic testing or treatment     13223  95383 Mod Moderate  1or more chronic illnesses with exacerbation, progression, or side effects of treatment;    or  2or more stable chronic illnesses;    or  1undiagnosed new problem with uncertain prognosis;    or  1acute illness with systemic symptoms;    or  8HINKR complicated injury   Moderate  (Must meet the requirements of at least 1 out of 3 categories)  Category 1: Tests, documents, or independent historian(s)  Any combination of 3 from the following:   Review of prior external note(s) from each unique source*;  Review of the result(s) of each unique test*;  Ordering of each unique test*;  Assessment requiring an independent historian(s)    or  Category 2: Independent interpretation of tests   Independent interpretation of a test performed by another physician/other qualified health care professional (not separately reported);     or  Category 3: Discussion of management or test interpretation  Discussion of management or test interpretation with external physician/other qualified health care professional/appropriate source (not separately reported)   Moderate risk of morbidity from additional diagnostic testing or treatment  Examples only:  Prescription drug management   Decision regarding minor surgery with identified patient or procedure risk factors  Decision regarding elective major surgery without identified patient or procedure risk factors   Diagnosis or treatment significantly limited by social determinants of health       58887  35607 High High  1or more chronic illnesses with severe exacerbation, progression, or side effects of treatment;    or  1 acute or chronic illness or injury that poses a threat to life or bodily function   Extensive  (Must meet the requirements of at least 2 out of 3 categories)  Category 1: Tests, documents, or independent historian(s)  Any combination of 3 from the following:   Review of prior external note(s) from each unique source*;  Review of the result(s) of each unique test*;   Ordering of each unique test*;   Assessment requiring an independent historian(s)    or   Category 2: Independent interpretation of tests   Independent interpretation of a test performed by another physician/other qualified health care professional (not separately reported);     or  Category 3: Discussion of management or test interpretation  Discussion of management or test interpretation with external physician/other qualified health care professional/appropriate source (not separately reported)   High risk of morbidity from additional diagnostic testing or treatment  Examples only:  Drug therapy requiring intensive monitoring for toxicity  Decision regarding elective major surgery with identified patient or procedure risk factors  Decision regarding emergency major surgery  Decision regarding hospitalization  Decision not to resuscitate or to de-escalate care because of poor prognosis             I have personally performed a face-to-face diagnostic evaluation and management  service on this patient. I have independently seen the patient. I have independently obtained the above history from the patient/family. I have independently examined the patient with above findings. I have independently reviewed data/labs for this patient and developed the above plan of care (MDM). Signed: Erick Aguilar.  Shubham Anderson MD, FACS

## 2021-06-01 NOTE — PROGRESS NOTES
ACUTE PHYSICAL THERAPY GOALS:  (Developed with and agreed upon by patient and/or caregiver. )  LTG:  (1.)Mr. Coffey will move from supine to sit and sit to supine , scoot up and down and roll side to side in bed with MODIFIED INDEPENDENCE within 7 treatment day(s).  (2.)Mr. Coffey will transfer from bed to chair and chair to bed with SUPERVISION using the least restrictive device within 7 treatment day(s).    (3.)Mr. Coffey will ambulate with SUPERVISION for 250 feet with the least restrictive device within 7 treatment day(s). (4.)Mr. Mariano James will perform seated and standing exercises for 15+ minutes to improve strength and mobility within 7 days. PHYSICAL THERAPY: Daily Note and AM Treatment Day # 3    Diogenes Blakely is a 77 y.o. male   PRIMARY DIAGNOSIS: Chest wall abscess  Cellulitis of chest wall [L03.313]         ASSESSMENT:     REHAB RECOMMENDATIONS: CURRENT LEVEL OF FUNCTION:  (Most Recently Demonstrated)   Recommendation to date pending progress:  Setting:   Short-term Rehab  Equipment:    Rolling Walker Bed Mobility:   Standby Assistance  Sit to Stand:  [email or web address] Assistance  Transfers:   Contact Guard Assistance  Gait/Mobility:   Contact Guard Assistance with RW     ASSESSMENT:  Mr. Mariano James is doing very well today. He had a great session. Great increase in gait. Fairly steady with walker today. Plus exercises. He even admitted that he thought he did pretty good. SUBJECTIVE:   Mr. Mariano James states, \"Well. . its a start\"    SOCIAL HISTORY/ LIVING ENVIRONMENT:   Home Environment: Private residence  One/Two Story Residence: One story  Living Alone: No  Support Systems: Spouse/Significant Other/Partner  OBJECTIVE:     PAIN: VITAL SIGNS: LINES/DRAINS:   Pre Treatment:    Post Treatment: 2/10     Visit Vitals  BP (!) 149/78   Pulse 76   Temp 98.8 °F (37.1 °C)   Resp 16   Ht 5' 11\" (1.803 m)   Wt 75.6 kg (166 lb 11.2 oz)   SpO2 100%   BMI 23.25 kg/m²    IV  O2 Device: None (Room air) MOBILITY: I Mod I S SBA CGA Min Mod Max Total  NT x2 Comments:   Bed Mobility    Rolling [] [x] [] [] [] [] [] [] [] [] []    Supine to Sit [] [x] [] [] [] [] [] [] [] [] []    Scooting [] [x] [] [] [] [] [] [] [] [] []    Sit to Supine [] [x] [] [] [] [] [] [] [] [] []    Transfers    Sit to Stand [] [] [] [x] [] [] [] [] [] [] []    Bed to Chair [] [] [] [] [x] [] [] [] [] [] []    Stand to Sit [] [] [] [x] [] [] [] [] [] [] []    I=Independent, Mod I=Modified Independent, S=Supervision, SBA=Standby Assistance, CGA=Contact Guard Assistance,   Min=Minimal Assistance, Mod=Moderate Assistance, Max=Maximal Assistance, Total=Total Assistance, NT=Not Tested    BALANCE: Good Fair+ Fair Fair- Poor NT Comments   Sitting Static [x] [] [] [] [] []    Sitting Dynamic [x] [] [] [] [] []              Standing Static [] [x] [] [] [] []    Standing Dynamic [] [] [x] [] [] []      GAIT: I Mod I S SBA CGA Min Mod Max Total  NT x2 Comments:   Level of Assistance [] [] [] [] [x] [] [] [] [] [] []    Distance 250    DME Rolling Walker    Gait Quality Steady with RW    Weightbearing  Status N/A     I=Independent, Mod I=Modified Independent, S=Supervision, SBA=Standby Assistance, CGA=Contact Guard Assistance,   Min=Minimal Assistance, Mod=Moderate Assistance, Max=Maximal Assistance, Total=Total Assistance, NT=Not Tested    PLAN:   FREQUENCY/DURATION: PT Plan of Care: 3 times/week for duration of hospital stay or until stated goals are met, whichever comes first.  TREATMENT:     TREATMENT:   ($$ Therapeutic Activity: 8-22 mins  $$ Therapeutic Exercises: 8-22 mins    )  Therapeutic Activity (17 Minutes): Therapeutic activity included Rolling, Supine to Sit, Sit to Supine, Scooting, Ambulation on level ground, Sitting balance  and Standing balance to improve functional Mobility, Strength and Activity tolerance. Therapeutic Exercise (8 Minutes): Therapeutic exercises noted below to improve functional AROM and strength.      TREATMENT GRID:   Date:  5/28/21 Date:  6/1 Date:     Activity/Exercise Parameters Parameters Parameters   Ankle pumps 10 B 15    Seated knee extension 10 B 10    Seated hip flexion/marching 10 B 10    abd/add  10    Standing:   Forward stepping 5 x 2     Backward stepping 5 x 2     Side stepping 5 x 2       AFTER TREATMENT POSITION/PRECAUTIONS:  Bed, Needs within reach, RN notified and sitter at the bedside    INTERDISCIPLINARY COLLABORATION:  RN/PCT    TOTAL TREATMENT DURATION:  PT Patient Time In/Time Out  Time In: 1020  Time Out: [address], [place]

## 2021-06-01 NOTE — PROGRESS NOTES
Bedside and Verbal shift change report given to self (oncoming nurse) by Zeb Carr (offgoing nurse). Report included the following information SBAR, Kardex and Accordion.

## 2021-06-01 NOTE — PROGRESS NOTES
Comprehensive Nutrition Assessment    Type and Reason for Visit: Initial, RD nutrition re-screen/LOS    Nutrition Recommendations/Plan:    Continue current diet     Malnutrition Assessment:  Malnutrition Status: No malnutrition    Nutrition Assessment:   Nutrition History: Patient states eating well prior to admission. He denies any changes to PO intake. Vague historian. Nutrition Background: Patient with PMH significant for HLD. He was referred to surgery for hematoma and cellulitis of left chest wall s/p fall and was given abx. He presented back with fevers and hematoma found to be larger, so was admitted to hospital.   Daily Update:  Patient states that his appetite is good and he has been eating well. He states that he ate all of breakfast and this has been his trend during admission. He denies any needs or questions.     Nutrition Related Findings:   No physical signs of malnutrition      Current Nutrition Therapies:  DIET REGULAR    Current Intake:   Average Meal Intake: % Average Supplement Intake: None ordered      Anthropometric Measures:  Height: 5' 11\" (180.3 cm)  Current Body Wt: 75.6 kg (166 lb 10.7 oz) (6/1 - ? error as other inpt wts 82-83kg), Weight source: Bed scale  BMI: 23.3, Normal weight (BMI 22.0-24.9) age over 72  Admission Body Weight: 182 lb 15.7 oz (5/25 standing scale)  Ideal Body Weight (lbs) (Calculated): 172 lbs (78 kg), 96.9 %  Usual Body Wt: 82 kg (180 lb 12.4 oz) (per review of EMR, range 79-83 kg), Percent weight change: -7.8          Edema: LLE: No Edema (5/31/2021  7:15 AM)  RLE: No Edema (5/31/2021  7:15 AM)     Estimated Daily Nutrient Needs:  Energy (kcal/day): 3908-3574 (Kcal/kg (20-25), Weight Used: Admission (83 kg (5/25 standing scale)))  Protein (g/day):  (1-1.25 g/kg) Weight Used: (Admission)  Fluid (ml/day):   (1 ml/kcal)    Nutrition Diagnosis:   · No nutrition diagnosis at this time       Nutrition Interventions:   Food and/or Nutrient Delivery: Continue current diet     Coordination of Nutrition Care: Continue to monitor while inpatient    Goals:       Active Goal: Continue to meet nutrition needs via PO meals    Nutrition Monitoring and Evaluation:      Food/Nutrient Intake Outcomes: Food and nutrient intake       Discharge Planning:    No discharge needs at this time    [address] [place], LD on 6/1/2021 at 12:23 PM  Contact: [phone]

## 2021-06-01 NOTE — PROGRESS NOTES
END OF SHIFT NOTE:    INTAKE/OUTPUT  05/31 0701 - 06/01 0700  In: 146 [P.O.:840; I.V.:125]  Out: 3350 [Urine:3350]  Voiding: YES  Catheter: NO  Drain:      Flatus: Patient does have flatus present. Stool:  0 occurrences. Emesis: 0 occurrences. VITAL SIGNS  Patient Vitals for the past 12 hrs:   Temp Pulse Resp BP SpO2   06/01/21 0358 97.8 °F (36.6 °C) 76 19 (!) 149/87 97 %   05/31/21 2316 99 °F (37.2 °C) 72 18 (!) 142/79 95 %     Pain Assessment  Pain Intensity 1: 0 (05/31/21 1925)  Pain Location 1: Flank  Pain Intervention(s) 1: Medication (see MAR)  Patient Stated Pain Goal: 0    Ambulating  Yes    Shift report given to oncoming nurse at the bedside.     [address] [facility]

## 2021-06-01 NOTE — PROGRESS NOTES
CM informed patient has been approved by insurance for STR. CM notified NP via perfect serve. 0 = independent

## 2021-06-01 NOTE — PROGRESS NOTES
CM called and followed up with [facility] to see if insurance has approved. CM was informed they are still waiting to hear from insurance and it should be today. Apparently they are waiting on an outlier for one of the medications patient is on as the cost is $200.00 a day. [facility] liaison Latoya stated she would follow up with insurance first thing this morning and let CM know.

## 2021-06-02 ENCOUNTER — APPOINTMENT (OUTPATIENT)
Dept: CT IMAGING | Age: 76
DRG: 264 | End: 2021-06-02
Attending: SURGERY
Payer: MEDICARE

## 2021-06-02 ENCOUNTER — ANESTHESIA (OUTPATIENT)
Dept: SURGERY | Age: 76
DRG: 264 | End: 2021-06-02
Payer: MEDICARE

## 2021-06-02 ENCOUNTER — ANESTHESIA EVENT (OUTPATIENT)
Dept: SURGERY | Age: 76
DRG: 264 | End: 2021-06-02
Payer: MEDICARE

## 2021-06-02 LAB
CREAT SERPL-MCNC: 0.74 MG/DL (ref 0.8–1.5)
VANCOMYCIN TROUGH SERPL-MCNC: 10.2 UG/ML (ref 5–20)

## 2021-06-02 PROCEDURE — 77030019908 HC STETH ESOPH SIMS -A: Performed by: ANESTHESIOLOGY

## 2021-06-02 PROCEDURE — 11043 DBRDMT MUSC&/FSCA 1ST 20/<: CPT | Performed by: SURGERY

## 2021-06-02 PROCEDURE — 74011000272 HC RX REV CODE- 272: Performed by: SURGERY

## 2021-06-02 PROCEDURE — 65270000029 HC RM PRIVATE

## 2021-06-02 PROCEDURE — 2709999900 HC NON-CHARGEABLE SUPPLY: Performed by: SURGERY

## 2021-06-02 PROCEDURE — 97530 THERAPEUTIC ACTIVITIES: CPT

## 2021-06-02 PROCEDURE — 11046 DBRDMT MUSC&/FSCA EA ADDL: CPT | Performed by: SURGERY

## 2021-06-02 PROCEDURE — 76060000035 HC ANESTHESIA 2 TO 2.5 HR: Performed by: SURGERY

## 2021-06-02 PROCEDURE — 77030040361 HC SLV COMPR DVT MDII -B: Performed by: SURGERY

## 2021-06-02 PROCEDURE — 99233 SBSQ HOSP IP/OBS HIGH 50: CPT | Performed by: SURGERY

## 2021-06-02 PROCEDURE — 0JB80ZZ EXCISION OF ABDOMEN SUBCUTANEOUS TISSUE AND FASCIA, OPEN APPROACH: ICD-10-PCS | Performed by: SURGERY

## 2021-06-02 PROCEDURE — 74011000258 HC RX REV CODE- 258: Performed by: SURGERY

## 2021-06-02 PROCEDURE — 76210000006 HC OR PH I REC 0.5 TO 1 HR: Performed by: SURGERY

## 2021-06-02 PROCEDURE — 74011000636 HC RX REV CODE- 636: Performed by: SURGERY

## 2021-06-02 PROCEDURE — 74011250636 HC RX REV CODE- 250/636: Performed by: REGISTERED NURSE

## 2021-06-02 PROCEDURE — 0JBF0ZZ EXCISION OF LEFT UPPER ARM SUBCUTANEOUS TISSUE AND FASCIA, OPEN APPROACH: ICD-10-PCS | Performed by: SURGERY

## 2021-06-02 PROCEDURE — 77030037088 HC TUBE ENDOTRACH ORAL NSL COVD-A: Performed by: ANESTHESIOLOGY

## 2021-06-02 PROCEDURE — 74011250637 HC RX REV CODE- 250/637: Performed by: ANESTHESIOLOGY

## 2021-06-02 PROCEDURE — 82565 ASSAY OF CREATININE: CPT

## 2021-06-02 PROCEDURE — 76010000171 HC OR TIME 2 TO 2.5 HR INTENSV-TIER 1: Performed by: SURGERY

## 2021-06-02 PROCEDURE — 87205 SMEAR GRAM STAIN: CPT

## 2021-06-02 PROCEDURE — 36415 COLL VENOUS BLD VENIPUNCTURE: CPT

## 2021-06-02 PROCEDURE — 77030039425 HC BLD LARYNG TRULITE DISP TELE -A: Performed by: ANESTHESIOLOGY

## 2021-06-02 PROCEDURE — 77030002996 HC SUT SLK J&J -A: Performed by: SURGERY

## 2021-06-02 PROCEDURE — 88305 TISSUE EXAM BY PATHOLOGIST: CPT

## 2021-06-02 PROCEDURE — 74011000250 HC RX REV CODE- 250: Performed by: REGISTERED NURSE

## 2021-06-02 PROCEDURE — 2709999900 HC NON-CHARGEABLE SUPPLY

## 2021-06-02 PROCEDURE — 87075 CULTR BACTERIA EXCEPT BLOOD: CPT

## 2021-06-02 PROCEDURE — 74011250637 HC RX REV CODE- 250/637: Performed by: SURGERY

## 2021-06-02 PROCEDURE — 0JB70ZZ EXCISION OF BACK SUBCUTANEOUS TISSUE AND FASCIA, OPEN APPROACH: ICD-10-PCS | Performed by: SURGERY

## 2021-06-02 PROCEDURE — 80202 ASSAY OF VANCOMYCIN: CPT

## 2021-06-02 PROCEDURE — 74011250636 HC RX REV CODE- 250/636: Performed by: ANESTHESIOLOGY

## 2021-06-02 PROCEDURE — 71260 CT THORAX DX C+: CPT

## 2021-06-02 PROCEDURE — 77030008462 HC STPLR SKN PROX J&J -A: Performed by: SURGERY

## 2021-06-02 PROCEDURE — 87077 CULTURE AEROBIC IDENTIFY: CPT

## 2021-06-02 PROCEDURE — 74011250636 HC RX REV CODE- 250/636: Performed by: SURGERY

## 2021-06-02 PROCEDURE — 0JBM0ZZ EXCISION OF LEFT UPPER LEG SUBCUTANEOUS TISSUE AND FASCIA, OPEN APPROACH: ICD-10-PCS | Performed by: SURGERY

## 2021-06-02 PROCEDURE — 87186 SC STD MICRODIL/AGAR DIL: CPT

## 2021-06-02 PROCEDURE — 0JB60ZZ EXCISION OF CHEST SUBCUTANEOUS TISSUE AND FASCIA, OPEN APPROACH: ICD-10-PCS | Performed by: SURGERY

## 2021-06-02 RX ORDER — MIDAZOLAM HYDROCHLORIDE 1 MG/ML
2 INJECTION, SOLUTION INTRAMUSCULAR; INTRAVENOUS ONCE
Status: DISCONTINUED | OUTPATIENT
Start: 2021-06-02 | End: 2021-06-02 | Stop reason: HOSPADM

## 2021-06-02 RX ORDER — ALBUTEROL SULFATE 0.83 MG/ML
2.5 SOLUTION RESPIRATORY (INHALATION) AS NEEDED
Status: DISCONTINUED | OUTPATIENT
Start: 2021-06-02 | End: 2021-06-02 | Stop reason: HOSPADM

## 2021-06-02 RX ORDER — PROPOFOL 10 MG/ML
INJECTION, EMULSION INTRAVENOUS AS NEEDED
Status: DISCONTINUED | OUTPATIENT
Start: 2021-06-02 | End: 2021-06-02 | Stop reason: HOSPADM

## 2021-06-02 RX ORDER — SODIUM CHLORIDE, SODIUM LACTATE, POTASSIUM CHLORIDE, CALCIUM CHLORIDE 600; 310; 30; 20 MG/100ML; MG/100ML; MG/100ML; MG/100ML
50 INJECTION, SOLUTION INTRAVENOUS CONTINUOUS
Status: DISCONTINUED | OUTPATIENT
Start: 2021-06-02 | End: 2021-06-02 | Stop reason: HOSPADM

## 2021-06-02 RX ORDER — LIDOCAINE HYDROCHLORIDE 10 MG/ML
0.1 INJECTION INFILTRATION; PERINEURAL AS NEEDED
Status: DISCONTINUED | OUTPATIENT
Start: 2021-06-02 | End: 2021-06-02 | Stop reason: HOSPADM

## 2021-06-02 RX ORDER — SODIUM CHLORIDE, SODIUM LACTATE, POTASSIUM CHLORIDE, CALCIUM CHLORIDE 600; 310; 30; 20 MG/100ML; MG/100ML; MG/100ML; MG/100ML
75 INJECTION, SOLUTION INTRAVENOUS CONTINUOUS
Status: DISCONTINUED | OUTPATIENT
Start: 2021-06-02 | End: 2021-06-02 | Stop reason: HOSPADM

## 2021-06-02 RX ORDER — ONDANSETRON 2 MG/ML
INJECTION INTRAMUSCULAR; INTRAVENOUS AS NEEDED
Status: DISCONTINUED | OUTPATIENT
Start: 2021-06-02 | End: 2021-06-02 | Stop reason: HOSPADM

## 2021-06-02 RX ORDER — OXYCODONE HYDROCHLORIDE 5 MG/1
5 TABLET ORAL
Status: DISCONTINUED | OUTPATIENT
Start: 2021-06-02 | End: 2021-06-02 | Stop reason: HOSPADM

## 2021-06-02 RX ORDER — SODIUM CHLORIDE 0.9 % (FLUSH) 0.9 %
10 SYRINGE (ML) INJECTION
Status: COMPLETED | OUTPATIENT
Start: 2021-06-02 | End: 2021-06-02

## 2021-06-02 RX ORDER — DEXAMETHASONE SODIUM PHOSPHATE 4 MG/ML
INJECTION, SOLUTION INTRA-ARTICULAR; INTRALESIONAL; INTRAMUSCULAR; INTRAVENOUS; SOFT TISSUE AS NEEDED
Status: DISCONTINUED | OUTPATIENT
Start: 2021-06-02 | End: 2021-06-02 | Stop reason: HOSPADM

## 2021-06-02 RX ORDER — ACETAMINOPHEN 500 MG
1000 TABLET ORAL ONCE
Status: COMPLETED | OUTPATIENT
Start: 2021-06-02 | End: 2021-06-02

## 2021-06-02 RX ORDER — FENTANYL CITRATE 50 UG/ML
100 INJECTION, SOLUTION INTRAMUSCULAR; INTRAVENOUS ONCE
Status: DISCONTINUED | OUTPATIENT
Start: 2021-06-02 | End: 2021-06-02 | Stop reason: HOSPADM

## 2021-06-02 RX ORDER — MIDAZOLAM HYDROCHLORIDE 1 MG/ML
2 INJECTION, SOLUTION INTRAMUSCULAR; INTRAVENOUS
Status: DISCONTINUED | OUTPATIENT
Start: 2021-06-02 | End: 2021-06-02 | Stop reason: HOSPADM

## 2021-06-02 RX ORDER — ROCURONIUM BROMIDE 10 MG/ML
INJECTION, SOLUTION INTRAVENOUS AS NEEDED
Status: DISCONTINUED | OUTPATIENT
Start: 2021-06-02 | End: 2021-06-02 | Stop reason: HOSPADM

## 2021-06-02 RX ORDER — GLYCOPYRROLATE 0.2 MG/ML
INJECTION INTRAMUSCULAR; INTRAVENOUS AS NEEDED
Status: DISCONTINUED | OUTPATIENT
Start: 2021-06-02 | End: 2021-06-02 | Stop reason: HOSPADM

## 2021-06-02 RX ORDER — LIDOCAINE HYDROCHLORIDE 20 MG/ML
INJECTION, SOLUTION EPIDURAL; INFILTRATION; INTRACAUDAL; PERINEURAL AS NEEDED
Status: DISCONTINUED | OUTPATIENT
Start: 2021-06-02 | End: 2021-06-02 | Stop reason: HOSPADM

## 2021-06-02 RX ORDER — HYDROMORPHONE HYDROCHLORIDE 1 MG/ML
0.5 INJECTION, SOLUTION INTRAMUSCULAR; INTRAVENOUS; SUBCUTANEOUS
Status: COMPLETED | OUTPATIENT
Start: 2021-06-02 | End: 2021-06-02

## 2021-06-02 RX ORDER — FENTANYL CITRATE 50 UG/ML
INJECTION, SOLUTION INTRAMUSCULAR; INTRAVENOUS AS NEEDED
Status: DISCONTINUED | OUTPATIENT
Start: 2021-06-02 | End: 2021-06-02 | Stop reason: HOSPADM

## 2021-06-02 RX ORDER — NEOSTIGMINE METHYLSULFATE 1 MG/ML
INJECTION, SOLUTION INTRAVENOUS AS NEEDED
Status: DISCONTINUED | OUTPATIENT
Start: 2021-06-02 | End: 2021-06-02 | Stop reason: HOSPADM

## 2021-06-02 RX ORDER — SODIUM CHLORIDE, SODIUM LACTATE, POTASSIUM CHLORIDE, CALCIUM CHLORIDE 600; 310; 30; 20 MG/100ML; MG/100ML; MG/100ML; MG/100ML
100 INJECTION, SOLUTION INTRAVENOUS CONTINUOUS
Status: DISCONTINUED | OUTPATIENT
Start: 2021-06-02 | End: 2021-06-04

## 2021-06-02 RX ADMIN — FENTANYL CITRATE 25 MCG: 50 INJECTION INTRAMUSCULAR; INTRAVENOUS at 19:00

## 2021-06-02 RX ADMIN — VANCOMYCIN HYDROCHLORIDE 1500 MG: 10 INJECTION, POWDER, LYOPHILIZED, FOR SOLUTION INTRAVENOUS at 05:21

## 2021-06-02 RX ADMIN — ROCURONIUM BROMIDE 10 MG: 10 INJECTION, SOLUTION INTRAVENOUS at 18:33

## 2021-06-02 RX ADMIN — OXYCODONE HYDROCHLORIDE 5 MG: 5 TABLET ORAL at 20:06

## 2021-06-02 RX ADMIN — ROCURONIUM BROMIDE 10 MG: 10 INJECTION, SOLUTION INTRAVENOUS at 17:55

## 2021-06-02 RX ADMIN — ROCURONIUM BROMIDE 50 MG: 10 INJECTION, SOLUTION INTRAVENOUS at 17:07

## 2021-06-02 RX ADMIN — FENTANYL CITRATE 25 MCG: 50 INJECTION INTRAMUSCULAR; INTRAVENOUS at 19:03

## 2021-06-02 RX ADMIN — PHENYLEPHRINE HYDROCHLORIDE 100 MCG: 10 INJECTION INTRAVENOUS at 17:22

## 2021-06-02 RX ADMIN — Medication: at 06:04

## 2021-06-02 RX ADMIN — HYDROCODONE BITARTRATE AND ACETAMINOPHEN 1 TABLET: 5; 325 TABLET ORAL at 20:44

## 2021-06-02 RX ADMIN — GLYCOPYRROLATE 0.4 MG: 0.2 INJECTION, SOLUTION INTRAMUSCULAR; INTRAVENOUS at 19:05

## 2021-06-02 RX ADMIN — Medication 1 AMPULE: at 05:21

## 2021-06-02 RX ADMIN — FAMOTIDINE 20 MG: 20 TABLET ORAL at 09:49

## 2021-06-02 RX ADMIN — ROCURONIUM BROMIDE 10 MG: 10 INJECTION, SOLUTION INTRAVENOUS at 18:04

## 2021-06-02 RX ADMIN — HYDROMORPHONE HYDROCHLORIDE 0.5 MG: 1 INJECTION, SOLUTION INTRAMUSCULAR; INTRAVENOUS; SUBCUTANEOUS at 19:18

## 2021-06-02 RX ADMIN — IOPAMIDOL 100 ML: 755 INJECTION, SOLUTION INTRAVENOUS at 09:05

## 2021-06-02 RX ADMIN — CEFTRIAXONE 1 G: 1 INJECTION, POWDER, FOR SOLUTION INTRAMUSCULAR; INTRAVENOUS at 20:45

## 2021-06-02 RX ADMIN — FENTANYL CITRATE 50 MCG: 50 INJECTION INTRAMUSCULAR; INTRAVENOUS at 18:13

## 2021-06-02 RX ADMIN — HYDROMORPHONE HYDROCHLORIDE 0.5 MG: 1 INJECTION, SOLUTION INTRAMUSCULAR; INTRAVENOUS; SUBCUTANEOUS at 19:25

## 2021-06-02 RX ADMIN — Medication 1 AMPULE: at 13:06

## 2021-06-02 RX ADMIN — SODIUM CHLORIDE, SODIUM LACTATE, POTASSIUM CHLORIDE, AND CALCIUM CHLORIDE 75 ML/HR: 600; 310; 30; 20 INJECTION, SOLUTION INTRAVENOUS at 16:40

## 2021-06-02 RX ADMIN — HYDROMORPHONE HYDROCHLORIDE 0.5 MG: 1 INJECTION, SOLUTION INTRAMUSCULAR; INTRAVENOUS; SUBCUTANEOUS at 19:39

## 2021-06-02 RX ADMIN — SODIUM CHLORIDE 100 ML: 900 INJECTION, SOLUTION INTRAVENOUS at 09:06

## 2021-06-02 RX ADMIN — TAMSULOSIN HYDROCHLORIDE 0.4 MG: 0.4 CAPSULE ORAL at 09:49

## 2021-06-02 RX ADMIN — PROPOFOL 200 MG: 10 INJECTION, EMULSION INTRAVENOUS at 17:07

## 2021-06-02 RX ADMIN — Medication 1 AMPULE: at 20:45

## 2021-06-02 RX ADMIN — HYDROCODONE BITARTRATE AND ACETAMINOPHEN 1 TABLET: 5; 325 TABLET ORAL at 12:55

## 2021-06-02 RX ADMIN — SODIUM CHLORIDE, SODIUM LACTATE, POTASSIUM CHLORIDE, AND CALCIUM CHLORIDE 75 ML/HR: 600; 310; 30; 20 INJECTION, SOLUTION INTRAVENOUS at 12:55

## 2021-06-02 RX ADMIN — SODIUM CHLORIDE, SODIUM LACTATE, POTASSIUM CHLORIDE, AND CALCIUM CHLORIDE 75 ML/HR: 600; 310; 30; 20 INJECTION, SOLUTION INTRAVENOUS at 20:50

## 2021-06-02 RX ADMIN — LIDOCAINE HYDROCHLORIDE 100 MG: 20 INJECTION, SOLUTION EPIDURAL; INFILTRATION; INTRACAUDAL; PERINEURAL at 17:07

## 2021-06-02 RX ADMIN — HYDROMORPHONE HYDROCHLORIDE 0.5 MG: 1 INJECTION, SOLUTION INTRAMUSCULAR; INTRAVENOUS; SUBCUTANEOUS at 19:32

## 2021-06-02 RX ADMIN — HYDROCODONE BITARTRATE AND ACETAMINOPHEN 1 TABLET: 5; 325 TABLET ORAL at 05:21

## 2021-06-02 RX ADMIN — FENTANYL CITRATE 50 MCG: 50 INJECTION INTRAMUSCULAR; INTRAVENOUS at 18:48

## 2021-06-02 RX ADMIN — FENTANYL CITRATE 25 MCG: 50 INJECTION INTRAMUSCULAR; INTRAVENOUS at 19:15

## 2021-06-02 RX ADMIN — DEXAMETHASONE SODIUM PHOSPHATE 10 MG: 4 INJECTION, SOLUTION INTRAMUSCULAR; INTRAVENOUS at 17:23

## 2021-06-02 RX ADMIN — ACETAMINOPHEN 1000 MG: 500 TABLET ORAL at 16:46

## 2021-06-02 RX ADMIN — Medication 3 MG: at 19:05

## 2021-06-02 RX ADMIN — ROCURONIUM BROMIDE 10 MG: 10 INJECTION, SOLUTION INTRAVENOUS at 18:12

## 2021-06-02 RX ADMIN — Medication 10 ML: at 09:06

## 2021-06-02 RX ADMIN — FENTANYL CITRATE 25 MCG: 50 INJECTION INTRAMUSCULAR; INTRAVENOUS at 19:08

## 2021-06-02 RX ADMIN — ONDANSETRON 4 MG: 2 INJECTION INTRAMUSCULAR; INTRAVENOUS at 17:23

## 2021-06-02 NOTE — PROGRESS NOTES
ACUTE PHYSICAL THERAPY GOALS:  (Developed with and agreed upon by patient and/or caregiver. )  LTG:  (1.)Mr. Coffey will move from supine to sit and sit to supine , scoot up and down and roll side to side in bed with MODIFIED INDEPENDENCE within 7 treatment day(s). Goal met 06/02/21    (2.)Mr. Coffey will transfer from bed to chair and chair to bed with SUPERVISION using the least restrictive device within 7 treatment day(s).    (3.)Mr. Coffey will ambulate with SUPERVISION for 250 feet with the least restrictive device within 7 treatment day(s). Goal met 06/02/21  (4.)Mr. Oz Franco will perform seated and standing exercises for 15+ minutes to improve strength and mobility within 7 days. PHYSICAL THERAPY: Daily Note and AM Treatment Day # 4    Rebecca Moses. is a 77 y.o. male   PRIMARY DIAGNOSIS: Chest wall abscess  Cellulitis of chest wall [L03.313]         ASSESSMENT:     REHAB RECOMMENDATIONS: CURRENT LEVEL OF FUNCTION:  (Most Recently Demonstrated)   Recommendation to date pending progress:  Setting:   Short-term Rehab  Equipment:    Rolling Walker Bed Mobility:   Supervision  Sit to Stand:  [place] Assistance  Transfers:   Standby Assistance  Gait/Mobility:  Ni [place]ava Assistance with RW     ASSESSMENT:  Mr. Oz Franco is doing very well today. Needed encouragement to participate. Bed mobility is with supervision. Sitting and standing balance good. Gait with RW x 250 feet with slow but steady karri. Patient is returned to supine in bed at the end of the treatment session. Encouraged OOB activities to increase tolerance and endurance. He had a great session. Fairly steady with walker. Brother at bedside and very supportive. Patient has sitter. Rehab at discharge. Will continue PT efforts.      SUBJECTIVE:   Mr. Oz Franco states, \"OK\"    SOCIAL HISTORY/ LIVING ENVIRONMENT:   Home Environment: Private residence  One/Two Story Residence: One story  Living Alone: No  Support Systems: Spouse/Significant Other/Partner  OBJECTIVE:     PAIN: VITAL SIGNS: LINES/DRAINS:   Pre Treatment: Pain Screen  Pain Scale 1: Numeric (0 - 10)  Pain Intensity 1: 0  Post Treatment: 0/10     Visit Vitals  BP (!) 142/81   Pulse 73   Temp 98.3 °F (36.8 °C)   Resp 17   Ht 5' 11\" (1.803 m)   Wt 76.2 kg (168 lb)   SpO2 97%   BMI 23.43 kg/m²    none  O2 Device: None (Room air)     MOBILITY: I Mod I S SBA CGA Min Mod Max Total  NT x2 Comments:   Bed Mobility    Rolling [] [x] [x] [] [] [] [] [] [] [] []    Supine to Sit [] [x] [x] [] [] [] [] [] [] [] []    Scooting [] [x] [x] [] [] [] [] [] [] [] []    Sit to Supine [] [x] [x] [] [] [] [] [] [] [] []    Transfers    Sit to Stand [] [] [] [x] [] [] [] [] [] [] []    Bed to Chair [] [] [] [x] [] [] [] [] [] [] []    Stand to Sit [] [] [] [x] [] [] [] [] [] [] []    I=Independent, Mod I=Modified Independent, S=Supervision, SBA=Standby Assistance, CGA=Contact Guard Assistance,   Min=Minimal Assistance, Mod=Moderate Assistance, Max=Maximal Assistance, Total=Total Assistance, NT=Not Tested    BALANCE: Good Fair+ Fair Fair- Poor NT Comments   Sitting Static [x] [] [] [] [] []    Sitting Dynamic [x] [] [] [] [] []              Standing Static [] [x] [] [] [] []    Standing Dynamic [] [x] [] [] [] []      GAIT: I Mod I S SBA CGA Min Mod Max Total  NT x2 Comments:   Level of Assistance [] [] [] [x] [] [] [] [] [] [] []    Distance 250    DME Rolling Walker    Gait Quality Steady with RW    Weightbearing  Status N/A     I=Independent, Mod I=Modified Independent, S=Supervision, SBA=Standby Assistance, CGA=Contact Guard Assistance,   Min=Minimal Assistance, Mod=Moderate Assistance, Max=Maximal Assistance, Total=Total Assistance, NT=Not Tested    PLAN:   FREQUENCY/DURATION: PT Plan of Care: 3 times/week for duration of hospital stay or until stated goals are met, whichever comes first.  TREATMENT:     TREATMENT:   ($$ Therapeutic Activity: 23-37 mins    )  Therapeutic Activity (24 Minutes): Therapeutic activity included Rolling, Supine to Sit, Sit to Supine, Scooting, Transfer Training, Ambulation on level ground, Sitting balance  and Standing balance to improve functional Mobility, Strength and Activity tolerance. Therapeutic Exercise (0 Minutes): Therapeutic exercises noted below to improve functional activity tolerance, AROM, strength and mobility. TREATMENT GRID:   Date:  5/28/21 Date:  6/1 Date:     Activity/Exercise Parameters Parameters Parameters   Ankle pumps 10 B 15    Seated knee extension 10 B 10    Seated hip flexion/marching 10 B 10    abd/add  10    Standing:   Forward stepping 5 x 2     Backward stepping 5 x 2     Side stepping 5 x 2       AFTER TREATMENT POSITION/PRECAUTIONS:  Bed, Needs within reach, RN notified, Visitors at bedside and sitter at the bedside    INTERDISCIPLINARY COLLABORATION:  RN/PCT    TOTAL TREATMENT DURATION:  PT Patient Time In/Time Out  Time In: 1020  Time Out: Angel Gonzales [address] [place], PTA

## 2021-06-02 NOTE — BRIEF OP NOTE
Brief Postoperative Note    Patient: Betsey Boas.   YOB: 1945  MRN: [id number]    Date of Procedure: 6/2/2021     Pre-Op Diagnosis: Large area of cellulitis and abscess left axillae, left chest wall, left flank, LLQ, left iliac region  Post-Op Diagnosis  same      Procedure(s):  IRRIGATION AND DEBRIDEMENT LEFT CHEST WALL, LEFT FLANK ABSCESS  675 sq cm wound surface area debrided  Patient will need to go back to surgery for serial debridements      Surgeon(s):  Mann Crowder MD    Surgical Assistant: None  Anesthesia: General   Estimated Blood Loss (mL): 680   Complications: none  Specimens:   ID Type Source Tests Collected by Time Destination   1 : Left axillary mass Preservative Mass  Mann Crowder MD 6/2/2021 1833 Pathology   1 : Left flank Abscess Body Fluid Fluid CULTURE, ANAEROBIC, AEROBIC CULTURE + Alison Anne MD 6/2/2021 1740 Microbiology        Implants: mult kerlix rolls  Drains: none  Findings: see op note  Electronically Signed by Sury Black MD on 6/2/2021 at 7:29 PM

## 2021-06-02 NOTE — PERIOP NOTES
TRANSFER - IN REPORT:    Verbal report received from ELIZABETH Cannon on [facility].  being received from [phone] for ordered procedure      Report consisted of patients Situation, Background, Assessment and   Recommendations(SBAR). Information from the following report(s) SBAR and MAR was reviewed with the receiving nurse. Opportunity for questions and clarification was provided. Assessment will be completed upon patients arrival to unit and care assumed.

## 2021-06-02 NOTE — PROGRESS NOTES
[facility]  TRANSFER - OUT REPORT:    Verbal report given to RN Dell Scott on [facility].  being transferred to IR  for ordered procedure       Report consisted of patients Situation, Background, Assessment and   Recommendations(SBAR). Information from the following report(s) SBAR, Intake/Output, MAR and Recent Results was reviewed with the receiving nurse. Lines:   Peripheral IV 05/30/21 Left;Posterior Forearm (Active)   Site Assessment Clean, dry, & intact 06/02/21 0710   Phlebitis Assessment 0 06/02/21 0710   Infiltration Assessment 0 06/02/21 0710   Dressing Status Clean, dry, & intact 06/02/21 0710   Dressing Type Transparent;Tape 06/02/21 0710   Hub Color/Line Status Patent; Flushed 06/02/21 0710   Alcohol Cap Used No 05/31/21 1359        Opportunity for questions and clarification was provided. Patient transported via transport.

## 2021-06-02 NOTE — PROGRESS NOTES
Patient was off floor today for CT scan of the chest, abdomen and pelvis. There were significant findings. Patient is now NPO for possible procedure.

## 2021-06-02 NOTE — PROGRESS NOTES
H&P/Consult Note/Progress Note/Office Note:   Sarah Larose MRN: [id number]  :1945  Age:75 y.o.    HPI: Sarah Matamoros. is a 77 y.o. male who was referred in consultation by Gee Rene PA-C with a left chest wall hematoma and cellulitis   after a fall at home on the floor approx 21. He sustained a blunt injury to his left chest wall. CXR below showed no PTx or rib fractures. He taked Humira for arthritis and is immune suppressed. We saw him for the 1st time on 21 with erythema of the left chest wall and placed him on Bactrim and marked the cellulitis border. He was instructed to call if any fevers or worsening erythema and given f/u 4 days later  He did not call  When he came to the office on 21 he reported high fevers over 101 degrees associate with nausea with his Bactrim. The erythema had extended >20cm  beyond the original inscribed lines of his left chest wall and extended down his left flank and in his left lower quadrant. We was admitted directly from office. He denies any new falls since 21. On 21 he also had a new hematoma involving his right flank which was not present on 21 (likely a second fall)        21 CXR, 2 views  Hx: Left chest wall hematoma with cellulitis; r/o rib fracture or pneumothorax after a fall at home on the floor.     Clear lungs. The cardiac and mediastinal contours and pulmonary vascularity are normal.   The bones and soft tissues are within normal limits. No evidence of a pneumothorax.     IMPRESSION:  Normal chest.      21 CT chest/abd/pelvis with oral and IV contrast  Hx: MRSA cellulitis of left chest wall abscess and fever.     CHEST:   -LUNGS: No lobar pneumonia. Linear atelectasis left base. -AIRWAYS: Trachea and proximal bronchi grossly patent. -PLEURA: Small left pleural effusion. -LYMPH NODES: No enlarged axillary, hilar or mediastinal lymph nodes.   -HEART: Normal size.     -SKELETAL/CHEST WALL: Along left chest wall and body wall there is a complex,  peripherally enhancing fluid collection which measures approximately 32 cm  superior inferior, 17 cm wide and 4 cm thick. There are several bubbles of gas  within it. Larger bubbles of gas extend up into the axilla.     ABDOMEN/PELVIS:  -LIVER: Normal in size and appearance. -GALLBLADDER: No gallstones identified  -BILE DUCTS: Not dilated.     -PANCREAS: Normal.  -SPLEEN: Normal.     -ADRENALS: Normal.  -KIDNEYS/URETERS: Kidneys enhance symmetrically. There are multiple bilateral  parapelvic cysts versus bilateral UPJ obstructions. -BLADDER: Normal.  -REPRODUCTIVE ORGANS: Prostate and seminal vesicles unremarkable.     -BOWEL: Normal caliber. No inflammatory changes. Scattered diverticula. -LYMPH NODES: No significant retroperitoneal, mesenteric, or pelvic adenopathy. -BONES: Degenerative changes. No periostitis. -VASCULATURE: Aorta is normal caliber and mildly calcified. -OTHER: No ascites.     IMPRESSION  Large left abscess, 32 cm x 17 cm x 4 cm which extends from the axilla to the iliac crest.                  Additional hx:  5/27/21 wound culture growing MRSA; Pharmacy dosing IV Vanc; trough tonight  5/28/21  Less Confused; no fever; WBC elevated but better;  on IV Abx  5/29/21  Resting in bed; no fever; WBC 12.3;  on IV Abx  5/30/21  Sitting on side of bed; no fever; WBC 13.8;  on IV Abx  5/31/21 AF; wbc still elevated; left hemithorax , left flank and LLQ edematous and indurated  6/1/21 continues IV Vanc; WBC higher at 12.2k   6/2/21 IV Vanc; CT as above with large abscess; I&D planned today          Past Medical History:   Diagnosis Date    Depression     H/O urinary frequency 2/1/2013    History of BPH 2/1/2013    HLD (hyperlipidemia) 2/1/2013    Hx: UTI (urinary tract infection) 2/1/2013    Insomnia     Psoriasis     Psoriatic arthritis ([facility] [address] 2/1/2013    Wears hearing aid 2/1/2013     No past surgical history on file.   Current Facility-Administered Medications   Medication Dose Route Frequency    famotidine (PEPCID) tablet 20 mg  20 mg Oral BID    alcohol 62% (NOZIN) nasal  1 Ampule  1 Ampule Topical Q8H    vancomycin (VANCOCIN) 1500 mg in  ml infusion  1,500 mg IntraVENous Q18H    zinc oxide-cod liver oil (DESITIN) 40 % paste   Topical Q8H    acetaminophen (TYLENOL) tablet 1,000 mg  1,000 mg Oral Q6H PRN    ondansetron (ZOFRAN) injection 4 mg  4 mg IntraVENous Q4H PRN    HYDROcodone-acetaminophen (NORCO) 5-325 mg per tablet 1 Tablet  1 Tablet Oral Q6H PRN    tamsulosin (FLOMAX) capsule 0.4 mg  0.4 mg Oral DAILY     Patient has no known allergies. Social History     Socioeconomic History    Marital status: SINGLE     Spouse name: Not on file    Number of children: Not on file    Years of education: Not on file    Highest education level: Not on file   Tobacco Use    Smoking status: Former Smoker    Smokeless tobacco: Never Used    Tobacco comment: pt states quit in early 60's late 52's    Vaping Use    Vaping Use: Never used   Substance and Sexual Activity    Alcohol use: No     Alcohol/week: 0.0 standard drinks    Drug use: No    Sexual activity: Yes     Partners: Female     Social Determinants of Health     Financial Resource Strain:     Difficulty of Paying Living Expenses:    Food Insecurity:     Worried About Running Out of Food in the Last Year:     [address] in the Last Year:    Transportation Needs:     Lack of Transportation (Medical):      Lack of Transportation (Non-Medical):    Physical Activity:     Days of Exercise per Week:     Minutes of Exercise per Session:    Stress:     Feeling of Stress :    Social Connections:     Frequency of Communication with Friends and Family:     Frequency of Social Gatherings with Friends and Family:     Attends Mosque Services:     Active Member of Clubs or Organizations:     Attends Club or Organization Meetings:     Marital Status: Social History     Tobacco Use   Smoking Status Former Smoker   Smokeless Tobacco Never Used   Tobacco Comment    pt states quit in early 63's late 52's      Family History   Problem Relation Age of Onset    Heart Attack Father     Cancer Father         skin     Other Sister     Cancer Sister     Other Brother         melanoma     ROS: The patient has no difficulty with chest pain or shortness of breath. No fever or chills. Comprehensive review of systems was otherwise unremarkable except as noted above. Physical Exam:   Visit Vitals  /73   Pulse 78   Temp 97.9 °F (36.6 °C)   Resp 17   Ht 5' 11\" (1.803 m)   Wt 168 lb (76.2 kg)   SpO2 95%   BMI 23.43 kg/m²     Vitals:    06/01/21 2014 06/01/21 2355 06/02/21 0328 06/02/21 0552   BP: (!) 144/75 (!) 156/78 134/73    Pulse: 62 63 78    Resp: 17 17 17    Temp: 97.6 °F (36.4 °C) 98.3 °F (36.8 °C) 97.9 °F (36.6 °C)    SpO2: 97% 97% 95%    Weight:    168 lb (76.2 kg)   Height:         [unfilled]  [unfilled]    Constitutional: Alert, oriented, cooperative patient in no acute distress; appears stated age    Eyes:Sclera are clear. EOMs intact  ENMT: no external lesions; he is hard of hearing. no obvious neck masses, no ear or lip lesions, nares normal  CV: RRR. Normal perfusion      Left anterior axillary line open wound approx 2cm with surrounding dermatitis and with purulent drainage. The erythema of the left chest wall improved is improving   There is induration. We evacuated about 20 cc of pus in the office just prior to admission. The erythema and cellulitis extended from the left chest wall down to the left lower quadrant and left flank and involved an area of >30 cm in length size. Left flank, LLQ and left hemithorax indurated and edematous and with tenderness; erythema subsided; no fluctuant areas palpated    Right flank hematoma stable    Resp: No JVD. Breathing is  non-labored; no audible wheezing.     GI: soft and non-distended Musculoskeletal: unremarkable with normal function. No embolic signs or cyanosis. Neuro:  Intermittent confusion; moves all 4; no focal deficits; slow moving and slow talking. Psychiatric: blunted affect, no memory impairment    Recent vitals (if inpt):  @IPVITALS(24:)@    Amount and/or Complexity of Data Reviewed and Analyzed:  I reviewed and analyzed all of the unique labs and radiologic studies that are shown below as well as any that are in the HPI, and any that are in the expanded problem list below  *Each unique test, order, or document contributes to the combination of 2 or combination of 3 in Category 1 below. For this visit I also reviewed old records and prior notes. Recent Labs     06/01/21  0340   WBC 12.2*   HGB 13.4*         K 4.2   *   CO2 27   BUN 17   CREA 0.75*   GLU 99     Review of most recent CBC  Lab Results   Component Value Date/Time    WBC 12.2 (H) 06/01/2021 03:40 AM    HGB 13.4 (L) 06/01/2021 03:40 AM    HCT 39.9 (L) 06/01/2021 03:40 AM    PLATELET 202 40/68/3832 03:40 AM    MCV 96.4 06/01/2021 03:40 AM       Review of most recent BMP  Lab Results   Component Value Date/Time    Sodium 139 06/01/2021 03:40 AM    Potassium 4.2 06/01/2021 03:40 AM    Chloride 108 (H) 06/01/2021 03:40 AM    CO2 27 06/01/2021 03:40 AM    Anion gap 4 (L) 06/01/2021 03:40 AM    Glucose 99 06/01/2021 03:40 AM    BUN 17 06/01/2021 03:40 AM    Creatinine 0.75 (L) 06/01/2021 03:40 AM    BUN/Creatinine ratio 19 03/31/2016 11:26 AM    GFR est AA >60 06/01/2021 03:40 AM    GFR est non-AA >60 06/01/2021 03:40 AM    Calcium 8.0 (L) 06/01/2021 03:40 AM       Review of most recent LFTs (and lipase if done)  Lab Results   Component Value Date/Time    ALT (SGPT) 80 (H) 05/24/2021 12:57 PM    AST (SGOT) 53 (H) 05/24/2021 12:57 PM    Alk.  phosphatase 124 05/24/2021 12:57 PM    Bilirubin, total 0.9 05/24/2021 12:57 PM     No results found for: LPSE    No results found for: INR, APTT, CBIL, LCAD, NH4, TROPT, TROIQ, INREXT, INREXT    Review of most recent HgbA1c  No results found for: HBA1C, MIC6WTFG, ECB1SEON, ESB9IPVB    Nutritional assessment screen for wound healing issues:  Lab Results   Component Value Date/Time    Protein, total 6.5 05/24/2021 12:57 PM    Albumin 2.4 (L) 05/24/2021 12:57 PM       @lastcovr@  XR Results (most recent):  Results from Hospital Encounter encounter on 05/20/21    XR CHEST PA LAT    Narrative  EXAM: XR CHEST PA LAT    INDICATION: Left chest wall hematoma with cellulitis rule out rib fracture or  pneumothorax after a fall at home on the floor. COMPARISON: None. FINDINGS: PA and lateral radiographs of the chest demonstrate clear lungs. The  cardiac and mediastinal contours and pulmonary vascularity are normal. The bones  and soft tissues are within normal limits. No evidence of a pneumothorax. Impression  Normal chest.      CT Results (most recent):  No results found for this or any previous visit. US Results (most recent):  No results found for this or any previous visit. Admission date (for inpatients): 5/24/2021   * No surgery found *  * No surgery found *        ASSESSMENT/PLAN:  Problem List  Date Reviewed: 5/24/2021        Codes Class Noted    Confusion ICD-10-CM: R41.0  ICD-9-CM: 298.9  5/27/2021        Sundowning ICD-10-CM: F05  ICD-9-CM: Deyvi Hove  5/27/2021        Leukocytosis ICD-10-CM: J90.915  ICD-9-CM: 288.60  5/26/2021        Acute renal insufficiency ICD-10-CM: N28.9  ICD-9-CM: 593.9  5/25/2021        Fever ICD-10-CM: R50.9  ICD-9-CM: 780.60  5/24/2021        Suppression of immune system subtherapeutic ([facility] [address] ICD-10-CM: D89.89  ICD-9-CM: 279.8  5/24/2021        Right flank hematoma ICD-10-CM: S30. 1XXA  ICD-9-CM: 922.2  5/24/2021        * (Principal) Chest wall abscess ICD-10-CM: L02.213  ICD-9-CM: 682.2  5/20/2021        Hematoma of left chest wall ICD-10-CM: C98.511U  ICD-9-CM: 922.1  5/20/2021        Cellulitis of chest wall ICD-10-CM: L03.313  ICD-9-CM: 682.2  5/20/2021        Fall ICD-10-CM: W19. Kassidy Marking  ICD-9-CM: E888.9  5/20/2021        Psoriasis ICD-10-CM: L40.9  ICD-9-CM: 696.1  Unknown        Insomnia ICD-10-CM: G47.00  ICD-9-CM: 780.52  Unknown        Depression ICD-10-CM: F32.9  ICD-9-CM: 043  Unknown        Tachycardia ICD-10-CM: R00.0  ICD-9-CM: 785.0  6/13/2016        Murmur ICD-10-CM: R01.1  ICD-9-CM: 785.2  6/13/2016        Psoriatic arthritis ([facility] [address] ICD-10-CM: L40.50  ICD-9-CM: 696.0  2/1/2013        Wears hearing aid ICD-10-CM: Z97.4  ICD-9-CM: V45.89  2/1/2013        HLD (hyperlipidemia) ICD-10-CM: E78.5  ICD-9-CM: 272.4  2/1/2013        H/O urinary frequency ICD-10-CM: I02.250  ICD-9-CM: V13.09  2/1/2013        History of BPH ICD-10-CM: G84.918  ICD-9-CM: V13.89  2/1/2013            Principal Problem:    Chest wall abscess (5/20/2021)    Active Problems:    Cellulitis of chest wall (5/20/2021)      Fall (5/20/2021)      Fever (5/24/2021)      Suppression of immune system subtherapeutic ([facility] [address] (5/24/2021)      Right flank hematoma (5/24/2021)      Acute renal insufficiency (5/25/2021)      Leukocytosis (5/26/2021)      Confusion (5/27/2021)      Sundowning (5/27/2021)           Number and Complexity of Problems addressed and   Risks of complications and/or morbidity of management        MRSA Cellulitis of left chest wall with abscess and extension of cellulitis in the left flank and left lower quadrant associated with fever  This failed outpt Rx with PO Bactrim    Continue inpt admission with IV Vanc for left chest wall hematoma with superimposed cellulitis/abscess. He was having fevers and draining pus from his left chest wall where he spontaneously developed ischemic necrosis of the skin.     We evacuated as much fluid as possible though the open wound on 5/24/21, 5/27/21, 5/28/21, and 5/31/21    Now with interval development of abscess  Informed consent obtained for I&D left chest wall, left flank  Procedural risks discussed  All pt questions answered  He voices understanding  He doesn't want me to call any family members      We decided to proceed with emergent surgery today  OR notified  NPO  Continue to hold his methotrexate, prednisone, and Humira to help reduce his immune suppression. Dermatitis around left chest wall wound from moisture maceration  Increased frequency of dressing changes to TID  Desitin TID to destinee-wound skin also        Confusion/sundowning at night  He has been uncooperative and pulling IV sites this admission  This is improving significantly  Sitter prn            Level of MDM (2/3 elements below)  Number and Complexity of Problems Addressed Amount and/or Complexity of Data to be Reviewed and Analyzed  *Each unique test, order, or document contributes to the combination of 2 or combination of 3 in Category 1 below.  Risk of Complications and/or Morbidity or Mortality of pt Management     18182  19816 SF Minimal  1self-limited or minor problem Minimal or none Minimal risk of morbidity from additional diagnostic testing or Rx   69450  03265 Low Low  2or more self-limited or minor problems;    or  1stable chronic illness;    or  4AGDWP, uncomplicated illness or injury   Limited  (Must meet the requirements of at least 1 of the 2 categories)  Category 1: Tests and documents   Any combination of 2 from the following:  Review of prior external note(s) from each unique source*;  review of the result(s) of each unique test*;   ordering of each unique test*    or   Category 2: Assessment requiring an independent historian(s)  (For the categories of independent interpretation of tests and discussion of management or test interpretation, see moderate or high) Low risk of morbidity from additional diagnostic testing or treatment     76582  49004 Mod Moderate  1or more chronic illnesses with exacerbation, progression, or side effects of treatment;    or  2or more stable chronic illnesses;    or  1undiagnosed new problem with uncertain prognosis;    or  1acute illness with systemic symptoms;    or  1TLRLD complicated injury   Moderate  (Must meet the requirements of at least 1 out of 3 categories)  Category 1: Tests, documents, or independent historian(s)  Any combination of 3 from the following:   Review of prior external note(s) from each unique source*;  Review of the result(s) of each unique test*;  Ordering of each unique test*;  Assessment requiring an independent historian(s)    or  Category 2: Independent interpretation of tests   Independent interpretation of a test performed by another physician/other qualified health care professional (not separately reported);     or  Category 3: Discussion of management or test interpretation  Discussion of management or test interpretation with external physician/other qualified health care professional/appropriate source (not separately reported)   Moderate risk of morbidity from additional diagnostic testing or treatment  Examples only:  Prescription drug management   Decision regarding minor surgery with identified patient or procedure risk factors  Decision regarding elective major surgery without identified patient or procedure risk factors   Diagnosis or treatment significantly limited by social determinants of health       80461  46152 High High  1or more chronic illnesses with severe exacerbation, progression, or side effects of treatment;    or  1 acute or chronic illness or injury that poses a threat to life or bodily function   Extensive  (Must meet the requirements of at least 2 out of 3 categories)  Category 1: Tests, documents, or independent historian(s)  Any combination of 3 from the following:   Review of prior external note(s) from each unique source*;  Review of the result(s) of each unique test*;   Ordering of each unique test*;   Assessment requiring an independent historian(s)    or   Category 2: Independent interpretation of tests   Independent interpretation of a test performed by another physician/other qualified health care professional (not separately reported);     or  Category 3: Discussion of management or test interpretation  Discussion of management or test interpretation with external physician/other qualified health care professional/appropriate source (not separately reported)   High risk of morbidity from additional diagnostic testing or treatment  Examples only:  Drug therapy requiring intensive monitoring for toxicity  Decision regarding elective major surgery with identified patient or procedure risk factors  Decision regarding emergency major surgery  Decision regarding hospitalization  Decision not to resuscitate or to de-escalate care because of poor prognosis             I have personally performed a face-to-face diagnostic evaluation and management  service on this patient. I have independently seen the patient. I have independently obtained the above history from the patient/family. I have independently examined the patient with above findings. I have independently reviewed data/labs for this patient and developed the above plan of care (MDM). Signed: Violeta Reese MD, FACS

## 2021-06-02 NOTE — PROGRESS NOTES
Writer received a call from [facility]. Patient must have a creatine lab result before going to the procedure this AM. Placed a STAT lab and notified [address] [facility] [facility] in the lab to please send someone to draw this blood work.

## 2021-06-02 NOTE — ANESTHESIA PREPROCEDURE EVALUATION
Relevant Problems   No relevant active problems       Anesthetic History   No history of anesthetic complications            Review of Systems / Medical History  Patient summary reviewed and pertinent labs reviewed    Pulmonary  Within defined limits                 Neuro/Psych         Psychiatric history (Depression)     Cardiovascular              Hyperlipidemia    Exercise tolerance: >4 METS     GI/Hepatic/Renal               Comments: BPH Endo/Other        Arthritis     Other Findings   Comments: Psoriatic arthritis--was on methotrexate           Physical Exam    Airway  Mallampati: II  TM Distance: 4 - 6 cm  Neck ROM: decreased range of motion   Mouth opening: Normal     Cardiovascular    Rhythm: regular  Rate: normal         Dental  No notable dental hx       Pulmonary  Breath sounds clear to auscultation               Abdominal  GI exam deferred       Other Findings            Anesthetic Plan    ASA: 2  Anesthesia type: general          Induction: Intravenous  Anesthetic plan and risks discussed with: Patient and Spouse

## 2021-06-02 NOTE — PROGRESS NOTES
END OF SHIFT NOTE: Patient continues to have a sitter in place. Order for  Dressing change TID with packing and Desitin to be used (instructions in the chart). Writer noticed nodules above the incision while changing the dressing this AM. Notified Dr. Monie Joseph in passing and he is going to have a CT done this AM.     INTAKE/OUTPUT  06/01 0701 - 06/02 0700  In: 1320 [P.O.:820; I.V.:500]  Out: 2550 [Urine:2550]  Voiding: YES  Catheter: NO    Flatus: Patient does have flatus present. Stool:  0 occurrences. Emesis: 0 occurrences. VITAL SIGNS  Patient Vitals for the past 12 hrs:   Temp Pulse Resp BP SpO2   06/02/21 0328 97.9 °F (36.6 °C) 78 17 134/73 95 %   06/01/21 2355 98.3 °F (36.8 °C) 63 17 (!) 156/78 97 %   06/01/21 2014 97.6 °F (36.4 °C) 62 17 (!) 144/75 97 %     Pain Assessment  Pain Intensity 1: 6 (06/01/21 2202)  Pain Location 1: Flank  Pain Intervention(s) 1: Medication (see MAR)  Patient Stated Pain Goal: 0    Ambulating  Yes    Shift report given to oncoming nurse at the bedside.     [address]

## 2021-06-03 ENCOUNTER — ANESTHESIA EVENT (OUTPATIENT)
Dept: SURGERY | Age: 76
DRG: 264 | End: 2021-06-03
Payer: MEDICARE

## 2021-06-03 LAB
ANION GAP SERPL CALC-SCNC: 5 MMOL/L (ref 7–16)
BUN SERPL-MCNC: 18 MG/DL (ref 8–23)
CALCIUM SERPL-MCNC: 7.9 MG/DL (ref 8.3–10.4)
CHLORIDE SERPL-SCNC: 103 MMOL/L (ref 98–107)
CO2 SERPL-SCNC: 26 MMOL/L (ref 21–32)
CREAT SERPL-MCNC: 1.08 MG/DL (ref 0.8–1.5)
ERYTHROCYTE [DISTWIDTH] IN BLOOD BY AUTOMATED COUNT: 15.1 % (ref 11.9–14.6)
GLUCOSE SERPL-MCNC: 251 MG/DL (ref 65–100)
HCT VFR BLD AUTO: 37.8 % (ref 41.1–50.3)
HGB BLD-MCNC: 12.3 G/DL (ref 13.6–17.2)
MCH RBC QN AUTO: 32.2 PG (ref 26.1–32.9)
MCHC RBC AUTO-ENTMCNC: 32.5 G/DL (ref 31.4–35)
MCV RBC AUTO: 99 FL (ref 79.6–97.8)
NRBC # BLD: 0 K/UL (ref 0–0.2)
PLATELET # BLD AUTO: 315 K/UL (ref 150–450)
PMV BLD AUTO: 9.5 FL (ref 9.4–12.3)
POTASSIUM SERPL-SCNC: 4.7 MMOL/L (ref 3.5–5.1)
RBC # BLD AUTO: 3.82 M/UL (ref 4.23–5.6)
SODIUM SERPL-SCNC: 134 MMOL/L (ref 138–145)
WBC # BLD AUTO: 10.8 K/UL (ref 4.3–11.1)

## 2021-06-03 PROCEDURE — 2709999900 HC NON-CHARGEABLE SUPPLY

## 2021-06-03 PROCEDURE — 77030027138 HC INCENT SPIROMETER -A

## 2021-06-03 PROCEDURE — 74011250637 HC RX REV CODE- 250/637: Performed by: SURGERY

## 2021-06-03 PROCEDURE — 77010033678 HC OXYGEN DAILY

## 2021-06-03 PROCEDURE — 80048 BASIC METABOLIC PNL TOTAL CA: CPT

## 2021-06-03 PROCEDURE — 65270000029 HC RM PRIVATE

## 2021-06-03 PROCEDURE — 74011000258 HC RX REV CODE- 258: Performed by: SURGERY

## 2021-06-03 PROCEDURE — 74011250636 HC RX REV CODE- 250/636: Performed by: SURGERY

## 2021-06-03 PROCEDURE — 36415 COLL VENOUS BLD VENIPUNCTURE: CPT

## 2021-06-03 PROCEDURE — 74011250636 HC RX REV CODE- 250/636: Performed by: NURSE PRACTITIONER

## 2021-06-03 PROCEDURE — 99232 SBSQ HOSP IP/OBS MODERATE 35: CPT | Performed by: SURGERY

## 2021-06-03 PROCEDURE — 85027 COMPLETE CBC AUTOMATED: CPT

## 2021-06-03 PROCEDURE — 94760 N-INVAS EAR/PLS OXIMETRY 1: CPT

## 2021-06-03 RX ORDER — HYDROMORPHONE HYDROCHLORIDE 1 MG/ML
0.5 INJECTION, SOLUTION INTRAMUSCULAR; INTRAVENOUS; SUBCUTANEOUS
Status: DISCONTINUED | OUTPATIENT
Start: 2021-06-03 | End: 2021-06-04

## 2021-06-03 RX ADMIN — TAMSULOSIN HYDROCHLORIDE 0.4 MG: 0.4 CAPSULE ORAL at 09:37

## 2021-06-03 RX ADMIN — VANCOMYCIN HYDROCHLORIDE 1500 MG: 10 INJECTION, POWDER, LYOPHILIZED, FOR SOLUTION INTRAVENOUS at 00:41

## 2021-06-03 RX ADMIN — Medication 1 AMPULE: at 20:37

## 2021-06-03 RX ADMIN — CEFTRIAXONE 1 G: 1 INJECTION, POWDER, FOR SOLUTION INTRAMUSCULAR; INTRAVENOUS at 20:31

## 2021-06-03 RX ADMIN — ACETAMINOPHEN 1000 MG: 500 TABLET ORAL at 00:43

## 2021-06-03 RX ADMIN — HYDROMORPHONE HYDROCHLORIDE 0.5 MG: 1 INJECTION, SOLUTION INTRAMUSCULAR; INTRAVENOUS; SUBCUTANEOUS at 13:59

## 2021-06-03 RX ADMIN — HYDROCODONE BITARTRATE AND ACETAMINOPHEN 1 TABLET: 5; 325 TABLET ORAL at 10:03

## 2021-06-03 RX ADMIN — HYDROCODONE BITARTRATE AND ACETAMINOPHEN 1 TABLET: 5; 325 TABLET ORAL at 23:46

## 2021-06-03 RX ADMIN — FAMOTIDINE 20 MG: 20 TABLET ORAL at 09:37

## 2021-06-03 RX ADMIN — Medication 1 AMPULE: at 13:59

## 2021-06-03 RX ADMIN — HYDROMORPHONE HYDROCHLORIDE 0.5 MG: 1 INJECTION, SOLUTION INTRAMUSCULAR; INTRAVENOUS; SUBCUTANEOUS at 07:09

## 2021-06-03 RX ADMIN — VANCOMYCIN HYDROCHLORIDE 1500 MG: 10 INJECTION, POWDER, LYOPHILIZED, FOR SOLUTION INTRAVENOUS at 18:04

## 2021-06-03 RX ADMIN — FAMOTIDINE 20 MG: 20 TABLET ORAL at 17:59

## 2021-06-03 RX ADMIN — HYDROCODONE BITARTRATE AND ACETAMINOPHEN 1 TABLET: 5; 325 TABLET ORAL at 19:40

## 2021-06-03 RX ADMIN — HYDROMORPHONE HYDROCHLORIDE 0.5 MG: 1 INJECTION, SOLUTION INTRAMUSCULAR; INTRAVENOUS; SUBCUTANEOUS at 02:48

## 2021-06-03 RX ADMIN — Medication 1 AMPULE: at 05:21

## 2021-06-03 NOTE — ANESTHESIA POSTPROCEDURE EVALUATION
Procedure(s):  IRRIGATION AND DEBRIDEMENT LEFT CHEST WALL, LEFT FLANK ABSCESS.     general    Anesthesia Post Evaluation      Multimodal analgesia: multimodal analgesia used between 6 hours prior to anesthesia start to PACU discharge  Patient location during evaluation: PACU  Patient participation: complete - patient participated  Level of consciousness: awake and alert  Pain management: adequate  Airway patency: patent  Anesthetic complications: no  Cardiovascular status: acceptable  Respiratory status: acceptable  Hydration status: acceptable  Post anesthesia nausea and vomiting:  none  Final Post Anesthesia Temperature Assessment:  Normothermia (36.0-37.5 degrees C)      INITIAL Post-op Vital signs:   Vitals Value Taken Time   /65 06/02/21 2001   Temp 36.7 °C (98 °F) 06/02/21 2001   Pulse 63 06/02/21 2005   Resp 16 06/02/21 2005   SpO2 100 % 06/02/21 2005

## 2021-06-03 NOTE — PROGRESS NOTES
END OF SHIFT NOTE:    INTAKE/OUTPUT  06/02 0701 - 06/03 0700  In: 1466.2 [P.O.:480; I.V.:986.2]  Out: 1600 [Urine:900]  Voiding: YES  Catheter: NO    Flatus: Patient does have flatus present, per patient     Stool:  0 occurrences. Emesis: 0 occurrences. VITAL SIGNS  Patient Vitals for the past 12 hrs:   Temp Pulse Resp BP SpO2   06/03/21 0252 97.9 °F (36.6 °C) 70 18 (!) 143/82 97 %   06/02/21 2346 97.9 °F (36.6 °C) 73 16 129/79 98 %   06/02/21 2019 97.6 °F (36.4 °C) 60 16 137/80 100 %   06/02/21 2005 -- 63 16 -- 100 %   06/02/21 2001 98 °F (36.7 °C) (!) 59 16 130/65 100 %   06/02/21 1956 -- (!) 56 16 (!) 123/59 99 %   06/02/21 1951 -- (!) 59 16 (!) 109/57 99 %   06/1946 -- (!) 59 16 111/62 99 %   06/02/21 1941 -- 64 16 115/60 100 %   06/02/21 1936 -- 66 16 121/61 100 %   06/02/21 1931 -- 69 16 121/61 99 %   06/02/21 1928 -- 73 16 (!) 143/66 99 %   06/02/21 1921 -- 73 16 135/82 100 %   06/02/21 1917 97.6 °F (36.4 °C) 69 16 131/75 98 %   06/02/21 1916 -- 74 16 131/75 99 %       Pain Assessment  Pain Intensity 1: 4 (06/03/21 0315)  Pain Location 1: Flank  Pain Intervention(s) 1: Medication (see MAR)  Patient Stated Pain Goal: 0    Ambulating  Yes    Shift report to be given to oncoming nurse at the bedside.     [address]

## 2021-06-03 NOTE — PROGRESS NOTES
H&P/Consult Note/Progress Note/Office Note:   Pao Hernandez MRN: [id number]  :1945  Age:75 y.o.    HPI: Pao Agee. is a 77 y.o. male who was referred in consultation by Marco Dominguez PA-C with a left chest wall hematoma and cellulitis   after a fall at home on the floor approx 21. He sustained a blunt injury to his left chest wall. CXR below showed no PTx or rib fractures. He taked Humira for arthritis and is immune suppressed. We saw him for the 1st time on 21 with erythema of the left chest wall and placed him on Bactrim and marked the cellulitis border. He was instructed to call if any fevers or worsening erythema and given f/u 4 days later  He did not call  When he came to the office on 21 he reported high fevers over 101 degrees associate with nausea with his Bactrim. The erythema had extended >20cm  beyond the original inscribed lines of his left chest wall and extended down his left flank and in his left lower quadrant. We was admitted directly from office. He denies any new falls since 21. On 21 he also had a new hematoma involving his right flank which was not present on 21 (likely a second fall)        21 CXR, 2 views  Hx: Left chest wall hematoma with cellulitis; r/o rib fracture or pneumothorax after a fall at home on the floor.     Clear lungs. The cardiac and mediastinal contours and pulmonary vascularity are normal.   The bones and soft tissues are within normal limits. No evidence of a pneumothorax.     IMPRESSION:  Normal chest.      21 CT chest/abd/pelvis with oral and IV contrast  Hx: MRSA cellulitis of left chest wall abscess and fever.     CHEST:   -LUNGS: No lobar pneumonia. Linear atelectasis left base. -AIRWAYS: Trachea and proximal bronchi grossly patent. -PLEURA: Small left pleural effusion. -LYMPH NODES: No enlarged axillary, hilar or mediastinal lymph nodes.   -HEART: Normal size.     -SKELETAL/CHEST WALL: Along left chest wall and body wall there is a complex,  peripherally enhancing fluid collection which measures approximately 32 cm  superior inferior, 17 cm wide and 4 cm thick. There are several bubbles of gas  within it. Larger bubbles of gas extend up into the axilla.     ABDOMEN/PELVIS:  -LIVER: Normal in size and appearance. -GALLBLADDER: No gallstones identified  -BILE DUCTS: Not dilated.     -PANCREAS: Normal.  -SPLEEN: Normal.     -ADRENALS: Normal.  -KIDNEYS/URETERS: Kidneys enhance symmetrically. There are multiple bilateral  parapelvic cysts versus bilateral UPJ obstructions. -BLADDER: Normal.  -REPRODUCTIVE ORGANS: Prostate and seminal vesicles unremarkable.     -BOWEL: Normal caliber. No inflammatory changes. Scattered diverticula. -LYMPH NODES: No significant retroperitoneal, mesenteric, or pelvic adenopathy. -BONES: Degenerative changes. No periostitis. -VASCULATURE: Aorta is normal caliber and mildly calcified.   -OTHER: No ascites.     IMPRESSION  Large left abscess, 32 cm x 17 cm x 4 cm which extends from the axilla to the iliac crest.                  Additional hx:  5/27/21 wound culture growing MRSA; Pharmacy dosing IV Vanc; trough tonight  5/28/21  Less Confused; no fever; WBC elevated but better;  on IV Abx  5/29/21  Resting in bed; no fever; WBC 12.3;  on IV Abx  5/30/21  Sitting on side of bed; no fever; WBC 13.8;  on IV Abx  5/31/21 AF; wbc still elevated; left hemithorax , left flank and LLQ edematous and indurated  6/1/21 continues IV Vanc; WBC higher at 12.2k   6/2/21 IV Vanc; CT as above with large abscess; I&D/debridment in OR today  6/3/21 IV Vanc/Rocephin; OR culture pending        Past Medical History:   Diagnosis Date    Depression     H/O urinary frequency 2/1/2013    History of BPH 2/1/2013    HLD (hyperlipidemia) 2/1/2013    Hx: UTI (urinary tract infection) 2/1/2013    Insomnia     Psoriasis     Psoriatic arthritis ([facility] [address] 2/1/2013    Wears hearing aid 2/1/2013     No past surgical history on file. Current Facility-Administered Medications   Medication Dose Route Frequency    HYDROmorphone (DILAUDID) injection 0.5 mg  0.5 mg IntraVENous Q3H PRN    lactated Ringers infusion  75 mL/hr IntraVENous CONTINUOUS    cefTRIAXone (ROCEPHIN) 1 g in 0.9% sodium chloride (MBP/ADV) 50 mL MBP  1 g IntraVENous Q24H    famotidine (PEPCID) tablet 20 mg  20 mg Oral BID    alcohol 62% (NOZIN) nasal  1 Ampule  1 Ampule Topical Q8H    vancomycin (VANCOCIN) 1500 mg in  ml infusion  1,500 mg IntraVENous Q18H    zinc oxide-cod liver oil (DESITIN) 40 % paste   Topical Q8H    acetaminophen (TYLENOL) tablet 1,000 mg  1,000 mg Oral Q6H PRN    ondansetron (ZOFRAN) injection 4 mg  4 mg IntraVENous Q4H PRN    HYDROcodone-acetaminophen (NORCO) 5-325 mg per tablet 1 Tablet  1 Tablet Oral Q6H PRN    tamsulosin (FLOMAX) capsule 0.4 mg  0.4 mg Oral DAILY     Patient has no known allergies. Social History     Socioeconomic History    Marital status: SINGLE     Spouse name: Not on file    Number of children: Not on file    Years of education: Not on file    Highest education level: Not on file   Tobacco Use    Smoking status: Former Smoker    Smokeless tobacco: Never Used    Tobacco comment: pt states quit in early 60's late 52's    Vaping Use    Vaping Use: Never used   Substance and Sexual Activity    Alcohol use: No     Alcohol/week: 0.0 standard drinks    Drug use: No    Sexual activity: Yes     Partners: Female     Social Determinants of Health     Financial Resource Strain:     Difficulty of Paying Living Expenses:    Food Insecurity:     Worried About Running Out of Food in the Last Year:     [address] in the Last Year:    Transportation Needs:     Lack of Transportation (Medical):      Lack of Transportation (Non-Medical):    Physical Activity:     Days of Exercise per Week:     Minutes of Exercise per Session:    Stress:     Feeling of Stress :    Social Connections:     Frequency of Communication with Friends and Family:     Frequency of Social Gatherings with Friends and Family:     Attends Buddhist Services:     Active Member of Clubs or Organizations:     Attends Club or Organization Meetings:     Marital Status:      Social History     Tobacco Use   Smoking Status Former Smoker   Smokeless Tobacco Never Used   Tobacco Comment    pt states quit in early 60's late 52's      Family History   Problem Relation Age of Onset    Heart Attack Father     Cancer Father         skin     Other Sister     Cancer Sister     Other Brother         melanoma     ROS: The patient has no difficulty with chest pain or shortness of breath. No fever or chills. Comprehensive review of systems was otherwise unremarkable except as noted above. Physical Exam:   Visit Vitals  /75   Pulse 84   Temp 97.9 °F (36.6 °C)   Resp 17   Ht 5' 11\" (1.803 m)   Wt 158 lb (71.7 kg)   SpO2 97%   BMI 22.04 kg/m²     Vitals:    06/02/21 2346 06/03/21 0252 06/03/21 0714 06/03/21 1109   BP: 129/79 (!) 143/82 117/69 120/75   Pulse: 73 70 64 84   Resp: 16 18 18 17   Temp: 97.9 °F (36.6 °C) 97.9 °F (36.6 °C) 98.2 °F (36.8 °C) 97.9 °F (36.6 °C)   SpO2: 98% 97% 97% 97%   Weight:  158 lb (71.7 kg)     Height:         [unfilled]  [unfilled]    Constitutional: Alert, oriented, cooperative patient in no acute distress; appears stated age    Eyes:Sclera are clear. EOMs intact  ENMT: no external lesions; he is hard of hearing. no obvious neck masses, no ear or lip lesions, nares normal  CV: RRR. Normal perfusion      Large open incision left axillae to left iliac crest with wound packing  The erythema of the left chest wall improved is improving     Right flank hematoma stable    Resp: No JVD. Breathing is  non-labored; no audible wheezing. GI: soft and non-distended     Musculoskeletal: unremarkable with normal function. No embolic signs or cyanosis.    Neuro:  Intermittent confusion; moves all 4; no focal deficits; slow moving and slow talking. Psychiatric: blunted affect, no memory impairment    Recent vitals (if inpt):  @IPVITALS(24:)@    Amount and/or Complexity of Data Reviewed and Analyzed:  I reviewed and analyzed all of the unique labs and radiologic studies that are shown below as well as any that are in the HPI, and any that are in the expanded problem list below  *Each unique test, order, or document contributes to the combination of 2 or combination of 3 in Category 1 below. For this visit I also reviewed old records and prior notes. Recent Labs     06/03/21 0533   WBC 10.8   HGB 12.3*      *   K 4.7      CO2 26   BUN 18   CREA 1.08   *     Review of most recent CBC  Lab Results   Component Value Date/Time    WBC 10.8 06/03/2021 05:33 AM    HGB 12.3 (L) 06/03/2021 05:33 AM    HCT 37.8 (L) 06/03/2021 05:33 AM    PLATELET 475 16/89/1167 05:33 AM    MCV 99.0 (H) 06/03/2021 05:33 AM       Review of most recent BMP  Lab Results   Component Value Date/Time    Sodium 134 (L) 06/03/2021 05:33 AM    Potassium 4.7 06/03/2021 05:33 AM    Chloride 103 06/03/2021 05:33 AM    CO2 26 06/03/2021 05:33 AM    Anion gap 5 (L) 06/03/2021 05:33 AM    Glucose 251 (H) 06/03/2021 05:33 AM    BUN 18 06/03/2021 05:33 AM    Creatinine 1.08 06/03/2021 05:33 AM    BUN/Creatinine ratio 19 03/31/2016 11:26 AM    GFR est AA >60 06/03/2021 05:33 AM    GFR est non-AA >60 06/03/2021 05:33 AM    Calcium 7.9 (L) 06/03/2021 05:33 AM       Review of most recent LFTs (and lipase if done)  Lab Results   Component Value Date/Time    ALT (SGPT) 80 (H) 05/24/2021 12:57 PM    AST (SGOT) 53 (H) 05/24/2021 12:57 PM    Alk.  phosphatase 124 05/24/2021 12:57 PM    Bilirubin, total 0.9 05/24/2021 12:57 PM     No results found for: LPSE    No results found for: INR, APTT, CBIL, LCAD, NH4, TROPT, TROIQ, INREXT, INREXT    Review of most recent HgbA1c  No results found for: HBA1C, PWX9ILXU, DSW5VVBN, LTB3WUEG    Nutritional assessment screen for wound healing issues:  Lab Results   Component Value Date/Time    Protein, total 6.5 05/24/2021 12:57 PM    Albumin 2.4 (L) 05/24/2021 12:57 PM       @lastcovr@  XR Results (most recent):  Results from Hospital Encounter encounter on 05/20/21    XR CHEST PA LAT    Narrative  EXAM: XR CHEST PA LAT    INDICATION: Left chest wall hematoma with cellulitis rule out rib fracture or  pneumothorax after a fall at home on the floor. COMPARISON: None. FINDINGS: PA and lateral radiographs of the chest demonstrate clear lungs. The  cardiac and mediastinal contours and pulmonary vascularity are normal. The bones  and soft tissues are within normal limits. No evidence of a pneumothorax. Impression  Normal chest.      CT Results (most recent):  Results from Hospital Encounter encounter on 05/24/21    CT CHEST ABD PELV W CONT    Narrative  CT OF THE CHEST ABDOMEN AND PELVIS    INDICATION: MRSA cellulitis of left chest wall abscess and fever. TECHNIQUE:  Multiple axial images were obtained through the chest, abdomen and  pelvis. Oral contrast was used for bowel opacification. 100mL of Isovue 370  intravenous contrast was used for better evaluation of solid organs and vascular  structures. Radiation dose reduction techniques were used for this study. All  CT scans performed at this facility use one or all of the following: Automated  exposure control, adjustment of the mA and/or kVp according to patient's size,  iterative reconstruction. COMPARISON: None    FINDINGS:  CHEST:  -LUNGS: No lobar pneumonia. Linear atelectasis left base. -AIRWAYS: Trachea and proximal bronchi grossly patent. -PLEURA: Small left pleural effusion. -LYMPH NODES: No enlarged axillary, hilar or mediastinal lymph nodes.   -HEART: Normal size.    -SKELETAL/CHEST WALL: Long left chest wall and body wall there is a complex,  peripherally enhancing fluid collection which measures approximately 32 cm  superior inferior, 17 cm wide and 4 cm thick. There are several bubbles of gas  within it. Larger bubbles of gas extend up into the axilla. ABDOMEN/PELVIS:  -LIVER: Normal in size and appearance. -GALLBLADDER: No gallstones identified  -BILE DUCTS: Not dilated. -PANCREAS: Normal.  -SPLEEN: Normal.    -ADRENALS: Normal.  -KIDNEYS/URETERS: Kidneys enhance symmetrically. There are multiple bilateral  parapelvic cysts versus bilateral UPJ obstructions. -BLADDER: Normal.  -REPRODUCTIVE ORGANS: Prostate and seminal vesicles unremarkable. -BOWEL: Normal caliber. No inflammatory changes. Scattered diverticula. -LYMPH NODES: No significant retroperitoneal, mesenteric, or pelvic adenopathy. -BONES: Degenerative changes. No periostitis. -VASCULATURE: Aorta is normal caliber and mildly calcified. -OTHER: No ascites. Impression  Large left abscess, 32 cm x 17 cm x 4 cm which extends from the  axilla to the iliac crest.  689    US Results (most recent):  No results found for this or any previous visit. Admission date (for inpatients): 5/24/2021   * No surgery found *  Procedure(s):  IRRIGATION AND DEBRIDEMENT LEFT CHEST WALL, LEFT FLANK ABSCESS        ASSESSMENT/PLAN:  Problem List  Date Reviewed: 5/24/2021        Codes Class Noted    Confusion ICD-10-CM: R41.0  ICD-9-CM: 298.9  5/27/2021        Sundowning ICD-10-CM: F05  ICD-9-CM: Hershall Rebel  5/27/2021        Leukocytosis ICD-10-CM: Q09.329  ICD-9-CM: 288.60  5/26/2021        Acute renal insufficiency ICD-10-CM: N28.9  ICD-9-CM: 593.9  5/25/2021        Fever ICD-10-CM: R50.9  ICD-9-CM: 780.60  5/24/2021        Suppression of immune system subtherapeutic ([facility] [address] ICD-10-CM: D89.89  ICD-9-CM: 279.8  5/24/2021        Right flank hematoma ICD-10-CM: S30. 1XXA  ICD-9-CM: 922.2  5/24/2021        * (Principal) Chest wall abscess ICD-10-CM: L02.213  ICD-9-CM: 682.2  5/20/2021        Hematoma of left chest wall ICD-10-CM: B37.498M  ICD-9-CM: 922.1 5/20/2021        Cellulitis of chest wall ICD-10-CM: L03.313  ICD-9-CM: 682.2  5/20/2021        Fall ICD-10-CM: W19. Prudencio Reusing  ICD-9-CM: E888.9  5/20/2021        Psoriasis ICD-10-CM: L40.9  ICD-9-CM: 696.1  Unknown        Insomnia ICD-10-CM: G47.00  ICD-9-CM: 780.52  Unknown        Depression ICD-10-CM: F32.9  ICD-9-CM: 951  Unknown        Tachycardia ICD-10-CM: R00.0  ICD-9-CM: 785.0  6/13/2016        Murmur ICD-10-CM: R01.1  ICD-9-CM: 785.2  6/13/2016        Psoriatic arthritis ([facility] [address] ICD-10-CM: L40.50  ICD-9-CM: 696.0  2/1/2013        Wears hearing aid ICD-10-CM: Z97.4  ICD-9-CM: V45.89  2/1/2013        HLD (hyperlipidemia) ICD-10-CM: E78.5  ICD-9-CM: 272.4  2/1/2013        H/O urinary frequency ICD-10-CM: H90.514  ICD-9-CM: V13.09  2/1/2013        History of BPH ICD-10-CM: D00.734  ICD-9-CM: V13.89  2/1/2013            Principal Problem:    Chest wall abscess (5/20/2021)    Active Problems:    Cellulitis of chest wall (5/20/2021)      Fall (5/20/2021)      Fever (5/24/2021)      Suppression of immune system subtherapeutic ([facility] [address] (5/24/2021)      Right flank hematoma (5/24/2021)      Acute renal insufficiency (5/25/2021)      Leukocytosis (5/26/2021)      Confusion (5/27/2021)      Sundowning (5/27/2021)           Number and Complexity of Problems addressed and   Risks of complications and/or morbidity of management        MRSA Cellulitis of left chest wall with abscess and extension of cellulitis in the left flank and left lower quadrant associated with fever  This failed outpt Rx with PO Bactrim    Continue inpt admission with IV Vanc for left chest wall cellulitis/abscess. He was having fevers and draining pus from his left chest wall where he spontaneously developed ischemic necrosis of the skin. We evacuated as much fluid as possible though the open wound on 5/24/21, 5/27/21, 5/28/21, and 5/31/21    He is s/p abscess drainage and sharp excisional debridement in OR on 6/2/21.   Return to OR tomorrow for repeat debridement if no clinical changes overnight    Continue to hold his methotrexate, prednisone, and Humira to help reduce his immune suppression. Dermatitis around left chest wall wound from moisture maceration  Increased frequency of dressing changes to TID  Desitin TID to destinee-wound skin also        Confusion/sundowning at night  He has been uncooperative and pulling IV sites this admission  This is improving significantly  May no longer require sitter            Level of MDM (2/3 elements below)  Number and Complexity of Problems Addressed Amount and/or Complexity of Data to be Reviewed and Analyzed  *Each unique test, order, or document contributes to the combination of 2 or combination of 3 in Category 1 below.  Risk of Complications and/or Morbidity or Mortality of pt Management     84472  19708 SF Minimal  1self-limited or minor problem Minimal or none Minimal risk of morbidity from additional diagnostic testing or Rx   93220  65742 Low Low  2or more self-limited or minor problems;    or  1stable chronic illness;    or  3JRIEG, uncomplicated illness or injury   Limited  (Must meet the requirements of at least 1 of the 2 categories)  Category 1: Tests and documents   Any combination of 2 from the following:  Review of prior external note(s) from each unique source*;  review of the result(s) of each unique test*;   ordering of each unique test*    or   Category 2: Assessment requiring an independent historian(s)  (For the categories of independent interpretation of tests and discussion of management or test interpretation, see moderate or high) Low risk of morbidity from additional diagnostic testing or treatment     35469  29312 Mod Moderate  1or more chronic illnesses with exacerbation, progression, or side effects of treatment;    or  2or more stable chronic illnesses;    or  1undiagnosed new problem with uncertain prognosis;    or  1acute illness with systemic symptoms;    or  1VXLOW complicated injury   Moderate  (Must meet the requirements of at least 1 out of 3 categories)  Category 1: Tests, documents, or independent historian(s)  Any combination of 3 from the following:   Review of prior external note(s) from each unique source*;  Review of the result(s) of each unique test*;  Ordering of each unique test*;  Assessment requiring an independent historian(s)    or  Category 2: Independent interpretation of tests   Independent interpretation of a test performed by another physician/other qualified health care professional (not separately reported);     or  Category 3: Discussion of management or test interpretation  Discussion of management or test interpretation with external physician/other qualified health care professional/appropriate source (not separately reported)   Moderate risk of morbidity from additional diagnostic testing or treatment  Examples only:  Prescription drug management   Decision regarding minor surgery with identified patient or procedure risk factors  Decision regarding elective major surgery without identified patient or procedure risk factors   Diagnosis or treatment significantly limited by social determinants of health       36270  30331 High High  1or more chronic illnesses with severe exacerbation, progression, or side effects of treatment;    or  1 acute or chronic illness or injury that poses a threat to life or bodily function   Extensive  (Must meet the requirements of at least 2 out of 3 categories)  Category 1: Tests, documents, or independent historian(s)  Any combination of 3 from the following:   Review of prior external note(s) from each unique source*;  Review of the result(s) of each unique test*;   Ordering of each unique test*;   Assessment requiring an independent historian(s)    or   Category 2: Independent interpretation of tests   Independent interpretation of a test performed by another physician/other qualified health care professional (not separately reported);     or  Category 3: Discussion of management or test interpretation  Discussion of management or test interpretation with external physician/other qualified health care professional/appropriate source (not separately reported)   High risk of morbidity from additional diagnostic testing or treatment  Examples only:  Drug therapy requiring intensive monitoring for toxicity  Decision regarding elective major surgery with identified patient or procedure risk factors  Decision regarding emergency major surgery  Decision regarding hospitalization  Decision not to resuscitate or to de-escalate care because of poor prognosis             I have personally performed a face-to-face diagnostic evaluation and management  service on this patient. I have independently seen the patient. I have independently obtained the above history from the patient/family. I have independently examined the patient with above findings. I have independently reviewed data/labs for this patient and developed the above plan of care (MDM). Signed: Tucker Hirsch.  Rayshawn Armando MD, FACS

## 2021-06-03 NOTE — PROGRESS NOTES
Patient has been given both Norco and Tylenol for pain. Neither is helping the pain. Surgical on call notified. New orders received. 4938: Patient got up to use the urinal. Writer noticed dressing is bleeding through: serosanguinous. It got on his bed pad (coming from the bottom of the dressing) but has not come all the way through the dressing. Surgical on call notified. New orders received to reinforce dressing and order AM CBC and BMP labs.

## 2021-06-03 NOTE — PERIOP NOTES
TRANSFER - OUT REPORT:    Verbal report given to ELIZABETH Wilkinson on [facility].  being transferred to [facility] for routine post - op       Report consisted of patients Situation, Background, Assessment and   Recommendations(SBAR). Information from the following report(s) SBAR, OR Summary, Procedure Summary, Intake/Output, MAR and Recent Results was reviewed with the receiving nurse. Lines:   Peripheral IV 05/30/21 Left;Posterior Forearm (Active)   Site Assessment Clean, dry, & intact 06/02/21 1916   Phlebitis Assessment 0 06/02/21 1916   Infiltration Assessment 0 06/02/21 1916   Dressing Status Clean, dry, & intact 06/02/21 1916   Dressing Type Tape;Transparent 06/02/21 1916   Hub Color/Line Status Patent 06/02/21 1916   Alcohol Cap Used No 05/31/21 1359       Peripheral IV 06/02/21 Right Hand (Active)   Site Assessment Clean, dry, & intact 06/02/21 1916   Phlebitis Assessment 0 06/02/21 1916   Infiltration Assessment 0 06/02/21 1916   Dressing Status Clean, dry, & intact 06/02/21 1916   Dressing Type Transparent;Tape 06/02/21 1916   Hub Color/Line Status Pink; Infusing 06/02/21 1916        Opportunity for questions and clarification was provided. Patient transported with:   O2 @ 3 liters    VTE prophylaxis orders have been written for [facility]. .    Patient and family given floor number and nurses name. Family updated re: pt status after security code verified.

## 2021-06-03 NOTE — PROGRESS NOTES
06/02/21 2015   Dual Skin Pressure Injury Assessment   Dual Skin Pressure Injury Assessment WDL   Second Care Provider (Based on [address] [place] [address]) ELIZABETH Kiser   Skin Integumentary   Skin Integumentary (WDL) X    Pressure  Injury Documentation No Pressure Injury Noted-Pressure Ulcer Prevention Initiated   Skin Color Appropriate for ethnicity   Skin Condition/Temp Fragile;Dry;Warm   Skin Integrity Incision (comment)   Turgor Epidermis thin w/ loss of subcut tissue   Wound Prevention and Protection Methods   Orientation of Wound Prevention Posterior   Location of Wound Prevention Sacrum/Coccyx   Dressing Present  No   Wound Offloading (Prevention Methods) Bed, pressure reduction mattress

## 2021-06-03 NOTE — PROGRESS NOTES
PT note:  Treatment deferred per RN request as she would like for the MD to see patient before mobility as his incision is draining at lot.   Chinyere Peeling, PTA

## 2021-06-03 NOTE — OP NOTES
[facility]  OPERATIVE REPORT    Name:  Lis Jauregui  MR#:  [id number]  :  1945  ACCOUNT #:  [[id number]]  DATE OF SERVICE:  2021    PREOPERATIVE DIAGNOSES:  1. Left axillary, left chest wall, left flank, left lower quadrant, left hip and left hip abscess with cellulitis refractory to inpatient IV antibiotics. 2.  Positive MRSA culture from axillary wound. POSTOPERATIVE DIAGNOSES:  1. Left axillary, left chest wall, left flank, left lower quadrant, left hip and left hip abscess with cellulitis refractory to inpatient IV antibiotics. 2.  Positive MRSA culture from axillary wound. PROCEDURE PERFORMED:  Incision and drainage of large complex abscesses with sharp excisional debridement of devitalized skin, subcutaneous adipose, and fascia from large left axillary, left chest wall, left lumbar back, ;eft flank, left lower quadrant extending down to iliac; 675 sq. cm sound surface area debrided (CPT 10506,   20266+ - listed 33 times). SURGEON:  Roberto Jarrett MD    ASSISTANT:  None. ANESTHESIA:  General.    COMPLICATIONS:  None. SPECIMENS REMOVED:  Culture sent to Microbiology. IMPLANTS:  Packing. ESTIMATED BLOOD LOSS:  250 mL. PROCEDURE:  The patient was taken to the operating room after CT imaging identified a massive abscess, which was subfascial in its location. This extended from the axilla all the way to the iliac along his left torso. After adequate general endotracheal anesthesia was given, he was placed in the right side down decubitus position on a bean bag. His left torso including the left thorax, left lateral chest, left anterior chest, left axilla, left flank, left lower quadrant and posterior iliac region were all prepped and draped in a sterile fashion with multiple layers of Betadine scrub, Betadine solution. Time-out protocol was followed. He was on standing antibiotics.   An oblique incision was made in the left flank and this was ultimately extended all the way from the iliac to the left axilla. There was a massive abscess present. Cultures were sent to Microbiology. The abscess was drained. The wound cavity was quite large and there was a lot of ischemic tissue present which needed debridement. Tedious dissection was performed to debride the necrotic tissues including skin, subcutaneous adipose, and fascia as well as muscle from the left iliac region, left back, left flank, left lower quadrant, left chest wall, left anterior chest wall and left axilla. 675 sq. cm of wound surface area was debrided using sharp excisional technique. Antibiotic irrigation was used. Hemostasis was confirmed. The wound was packed with multiple Kerlix rolls and ABD pads. Plans were to bring the patient back to surgery for staged debridements. He tolerated the procedure well.         Clari Greene MD      [id number]  D:  06/02/2021 19:09  T:  06/03/2021 2:20  JOB #:  [id number]

## 2021-06-03 NOTE — PROGRESS NOTES
Attempted to confirm pt's PTA meds. Sister at [place]. Pt unsure. Asked if they could contact the person that lives with the pt so she could bring his medications in.   Will contact

## 2021-06-03 NOTE — PROGRESS NOTES
TRANSFER - IN REPORT:    Verbal report received from ELIZABETH Ponce(name) on [facility].  being received from [facility]) for routine progression of care      Report consisted of patients Situation, Background, Assessment and   Recommendations(SBAR). Information from the following report(s) SBAR, OR Summary, Procedure Summary, Intake/Output and MAR was reviewed with the receiving nurse. Opportunity for questions and clarification was provided. Assessment to be completed upon patients arrival to unit and care assumed.

## 2021-06-04 ENCOUNTER — ANESTHESIA (OUTPATIENT)
Dept: SURGERY | Age: 76
DRG: 264 | End: 2021-06-04
Payer: MEDICARE

## 2021-06-04 LAB
GLUCOSE BLD STRIP.AUTO-MCNC: 162 MG/DL (ref 65–100)
SERVICE CMNT-IMP: ABNORMAL

## 2021-06-04 PROCEDURE — 74011000250 HC RX REV CODE- 250: Performed by: STUDENT IN AN ORGANIZED HEALTH CARE EDUCATION/TRAINING PROGRAM

## 2021-06-04 PROCEDURE — 74011250637 HC RX REV CODE- 250/637: Performed by: ANESTHESIOLOGY

## 2021-06-04 PROCEDURE — 2709999900 HC NON-CHARGEABLE SUPPLY: Performed by: SURGERY

## 2021-06-04 PROCEDURE — 77030040830 HC CATH URETH FOL MDII -A: Performed by: SURGERY

## 2021-06-04 PROCEDURE — 65270000029 HC RM PRIVATE

## 2021-06-04 PROCEDURE — 2709999900 HC NON-CHARGEABLE SUPPLY

## 2021-06-04 PROCEDURE — 76060000034 HC ANESTHESIA 1.5 TO 2 HR: Performed by: SURGERY

## 2021-06-04 PROCEDURE — 74011636637 HC RX REV CODE- 636/637: Performed by: SURGERY

## 2021-06-04 PROCEDURE — 11043 DBRDMT MUSC&/FSCA 1ST 20/<: CPT | Performed by: SURGERY

## 2021-06-04 PROCEDURE — 74011250636 HC RX REV CODE- 250/636: Performed by: SURGERY

## 2021-06-04 PROCEDURE — 74011000258 HC RX REV CODE- 258: Performed by: SURGERY

## 2021-06-04 PROCEDURE — 74011250636 HC RX REV CODE- 250/636: Performed by: ANESTHESIOLOGY

## 2021-06-04 PROCEDURE — 77010033678 HC OXYGEN DAILY

## 2021-06-04 PROCEDURE — 99233 SBSQ HOSP IP/OBS HIGH 50: CPT | Performed by: SURGERY

## 2021-06-04 PROCEDURE — 74011250637 HC RX REV CODE- 250/637: Performed by: SURGERY

## 2021-06-04 PROCEDURE — 76010000149 HC OR TIME 1 TO 1.5 HR: Performed by: SURGERY

## 2021-06-04 PROCEDURE — 76210000016 HC OR PH I REC 1 TO 1.5 HR: Performed by: SURGERY

## 2021-06-04 PROCEDURE — 74011250636 HC RX REV CODE- 250/636: Performed by: STUDENT IN AN ORGANIZED HEALTH CARE EDUCATION/TRAINING PROGRAM

## 2021-06-04 PROCEDURE — 82962 GLUCOSE BLOOD TEST: CPT

## 2021-06-04 PROCEDURE — 11046 DBRDMT MUSC&/FSCA EA ADDL: CPT | Performed by: SURGERY

## 2021-06-04 PROCEDURE — 94760 N-INVAS EAR/PLS OXIMETRY 1: CPT

## 2021-06-04 PROCEDURE — 74011250636 HC RX REV CODE- 250/636: Performed by: NURSE PRACTITIONER

## 2021-06-04 RX ORDER — HYDROMORPHONE HYDROCHLORIDE 1 MG/ML
0.5 INJECTION, SOLUTION INTRAMUSCULAR; INTRAVENOUS; SUBCUTANEOUS
Status: DISCONTINUED | OUTPATIENT
Start: 2021-06-04 | End: 2021-06-04 | Stop reason: HOSPADM

## 2021-06-04 RX ORDER — FENTANYL CITRATE 50 UG/ML
INJECTION, SOLUTION INTRAMUSCULAR; INTRAVENOUS AS NEEDED
Status: DISCONTINUED | OUTPATIENT
Start: 2021-06-04 | End: 2021-06-04 | Stop reason: HOSPADM

## 2021-06-04 RX ORDER — FENTANYL CITRATE 50 UG/ML
100 INJECTION, SOLUTION INTRAMUSCULAR; INTRAVENOUS AS NEEDED
Status: DISCONTINUED | OUTPATIENT
Start: 2021-06-04 | End: 2021-06-04 | Stop reason: HOSPADM

## 2021-06-04 RX ORDER — PROPOFOL 10 MG/ML
INJECTION, EMULSION INTRAVENOUS AS NEEDED
Status: DISCONTINUED | OUTPATIENT
Start: 2021-06-04 | End: 2021-06-04 | Stop reason: HOSPADM

## 2021-06-04 RX ORDER — HYDROMORPHONE HYDROCHLORIDE 1 MG/ML
.2-.5 INJECTION, SOLUTION INTRAMUSCULAR; INTRAVENOUS; SUBCUTANEOUS
Status: DISCONTINUED | OUTPATIENT
Start: 2021-06-04 | End: 2021-06-09

## 2021-06-04 RX ORDER — SODIUM CHLORIDE, SODIUM LACTATE, POTASSIUM CHLORIDE, CALCIUM CHLORIDE 600; 310; 30; 20 MG/100ML; MG/100ML; MG/100ML; MG/100ML
40 INJECTION, SOLUTION INTRAVENOUS CONTINUOUS
Status: DISCONTINUED | OUTPATIENT
Start: 2021-06-04 | End: 2021-06-09

## 2021-06-04 RX ORDER — MIDAZOLAM HYDROCHLORIDE 1 MG/ML
2 INJECTION, SOLUTION INTRAMUSCULAR; INTRAVENOUS
Status: DISCONTINUED | OUTPATIENT
Start: 2021-06-04 | End: 2021-06-04 | Stop reason: HOSPADM

## 2021-06-04 RX ORDER — SODIUM CHLORIDE, SODIUM LACTATE, POTASSIUM CHLORIDE, CALCIUM CHLORIDE 600; 310; 30; 20 MG/100ML; MG/100ML; MG/100ML; MG/100ML
75 INJECTION, SOLUTION INTRAVENOUS CONTINUOUS
Status: DISCONTINUED | OUTPATIENT
Start: 2021-06-04 | End: 2021-06-04 | Stop reason: HOSPADM

## 2021-06-04 RX ORDER — EPHEDRINE SULFATE/0.9% NACL/PF 50 MG/5 ML
SYRINGE (ML) INTRAVENOUS AS NEEDED
Status: DISCONTINUED | OUTPATIENT
Start: 2021-06-04 | End: 2021-06-04 | Stop reason: HOSPADM

## 2021-06-04 RX ORDER — ONDANSETRON 2 MG/ML
INJECTION INTRAMUSCULAR; INTRAVENOUS AS NEEDED
Status: DISCONTINUED | OUTPATIENT
Start: 2021-06-04 | End: 2021-06-04 | Stop reason: HOSPADM

## 2021-06-04 RX ORDER — NALOXONE HYDROCHLORIDE 0.4 MG/ML
0.1 INJECTION, SOLUTION INTRAMUSCULAR; INTRAVENOUS; SUBCUTANEOUS AS NEEDED
Status: DISCONTINUED | OUTPATIENT
Start: 2021-06-04 | End: 2021-06-04 | Stop reason: HOSPADM

## 2021-06-04 RX ORDER — LIDOCAINE HYDROCHLORIDE 20 MG/ML
INJECTION, SOLUTION EPIDURAL; INFILTRATION; INTRACAUDAL; PERINEURAL AS NEEDED
Status: DISCONTINUED | OUTPATIENT
Start: 2021-06-04 | End: 2021-06-04 | Stop reason: HOSPADM

## 2021-06-04 RX ORDER — OXYCODONE HYDROCHLORIDE 5 MG/1
5 TABLET ORAL
Status: DISCONTINUED | OUTPATIENT
Start: 2021-06-04 | End: 2021-06-04 | Stop reason: HOSPADM

## 2021-06-04 RX ORDER — OXYCODONE HYDROCHLORIDE 5 MG/1
10 TABLET ORAL
Status: COMPLETED | OUTPATIENT
Start: 2021-06-04 | End: 2021-06-04

## 2021-06-04 RX ORDER — SODIUM CHLORIDE, SODIUM LACTATE, POTASSIUM CHLORIDE, CALCIUM CHLORIDE 600; 310; 30; 20 MG/100ML; MG/100ML; MG/100ML; MG/100ML
1000 INJECTION, SOLUTION INTRAVENOUS CONTINUOUS
Status: DISCONTINUED | OUTPATIENT
Start: 2021-06-04 | End: 2021-06-04 | Stop reason: HOSPADM

## 2021-06-04 RX ORDER — HYDROMORPHONE HYDROCHLORIDE 2 MG/ML
INJECTION, SOLUTION INTRAMUSCULAR; INTRAVENOUS; SUBCUTANEOUS AS NEEDED
Status: DISCONTINUED | OUTPATIENT
Start: 2021-06-04 | End: 2021-06-04 | Stop reason: HOSPADM

## 2021-06-04 RX ORDER — ONDANSETRON 2 MG/ML
4 INJECTION INTRAMUSCULAR; INTRAVENOUS ONCE
Status: DISCONTINUED | OUTPATIENT
Start: 2021-06-04 | End: 2021-06-04 | Stop reason: HOSPADM

## 2021-06-04 RX ORDER — DIPHENHYDRAMINE HYDROCHLORIDE 50 MG/ML
12.5 INJECTION, SOLUTION INTRAMUSCULAR; INTRAVENOUS ONCE
Status: DISCONTINUED | OUTPATIENT
Start: 2021-06-04 | End: 2021-06-04 | Stop reason: HOSPADM

## 2021-06-04 RX ORDER — LIDOCAINE HYDROCHLORIDE 10 MG/ML
0.1 INJECTION INFILTRATION; PERINEURAL AS NEEDED
Status: DISCONTINUED | OUTPATIENT
Start: 2021-06-04 | End: 2021-06-04 | Stop reason: HOSPADM

## 2021-06-04 RX ADMIN — OXYCODONE HYDROCHLORIDE 10 MG: 5 TABLET ORAL at 14:05

## 2021-06-04 RX ADMIN — HYDROMORPHONE HYDROCHLORIDE 0.5 MG: 1 INJECTION, SOLUTION INTRAMUSCULAR; INTRAVENOUS; SUBCUTANEOUS at 13:24

## 2021-06-04 RX ADMIN — FENTANYL CITRATE 50 MCG: 50 INJECTION INTRAMUSCULAR; INTRAVENOUS at 12:08

## 2021-06-04 RX ADMIN — HYDROMORPHONE HYDROCHLORIDE 0.5 MG: 1 INJECTION, SOLUTION INTRAMUSCULAR; INTRAVENOUS; SUBCUTANEOUS at 03:45

## 2021-06-04 RX ADMIN — HYDROMORPHONE HYDROCHLORIDE 0.4 MG: 2 INJECTION INTRAMUSCULAR; INTRAVENOUS; SUBCUTANEOUS at 13:11

## 2021-06-04 RX ADMIN — VANCOMYCIN HYDROCHLORIDE 1500 MG: 10 INJECTION, POWDER, LYOPHILIZED, FOR SOLUTION INTRAVENOUS at 18:47

## 2021-06-04 RX ADMIN — PROPOFOL 150 MG: 10 INJECTION, EMULSION INTRAVENOUS at 11:50

## 2021-06-04 RX ADMIN — PHENYLEPHRINE HYDROCHLORIDE 180 MCG: 10 INJECTION INTRAVENOUS at 12:54

## 2021-06-04 RX ADMIN — HYDROCODONE BITARTRATE AND ACETAMINOPHEN 1 TABLET: 5; 325 TABLET ORAL at 15:16

## 2021-06-04 RX ADMIN — PHENYLEPHRINE HYDROCHLORIDE 120 MCG: 10 INJECTION INTRAVENOUS at 11:55

## 2021-06-04 RX ADMIN — FAMOTIDINE 20 MG: 20 TABLET ORAL at 09:14

## 2021-06-04 RX ADMIN — SODIUM CHLORIDE, SODIUM LACTATE, POTASSIUM CHLORIDE, AND CALCIUM CHLORIDE 40 ML/HR: 600; 310; 30; 20 INJECTION, SOLUTION INTRAVENOUS at 19:00

## 2021-06-04 RX ADMIN — HYDROMORPHONE HYDROCHLORIDE 0.4 MG: 2 INJECTION INTRAMUSCULAR; INTRAVENOUS; SUBCUTANEOUS at 12:36

## 2021-06-04 RX ADMIN — HUMAN INSULIN 2 UNITS: 100 INJECTION, SOLUTION SUBCUTANEOUS at 19:00

## 2021-06-04 RX ADMIN — HYDROMORPHONE HYDROCHLORIDE 0.4 MG: 2 INJECTION INTRAMUSCULAR; INTRAVENOUS; SUBCUTANEOUS at 13:10

## 2021-06-04 RX ADMIN — LIDOCAINE HYDROCHLORIDE 40 MG: 20 INJECTION, SOLUTION EPIDURAL; INFILTRATION; INTRACAUDAL; PERINEURAL at 11:50

## 2021-06-04 RX ADMIN — FENTANYL CITRATE 25 MCG: 50 INJECTION INTRAMUSCULAR; INTRAVENOUS at 12:22

## 2021-06-04 RX ADMIN — SODIUM CHLORIDE, SODIUM LACTATE, POTASSIUM CHLORIDE, AND CALCIUM CHLORIDE 1000 ML: 600; 310; 30; 20 INJECTION, SOLUTION INTRAVENOUS at 11:23

## 2021-06-04 RX ADMIN — PHENYLEPHRINE HYDROCHLORIDE 120 MCG: 10 INJECTION INTRAVENOUS at 12:19

## 2021-06-04 RX ADMIN — FAMOTIDINE 20 MG: 20 TABLET ORAL at 18:39

## 2021-06-04 RX ADMIN — ONDANSETRON 4 MG: 2 INJECTION INTRAMUSCULAR; INTRAVENOUS at 12:42

## 2021-06-04 RX ADMIN — FENTANYL CITRATE 25 MCG: 50 INJECTION INTRAMUSCULAR; INTRAVENOUS at 12:19

## 2021-06-04 RX ADMIN — HYDROMORPHONE HYDROCHLORIDE 0.5 MG: 1 INJECTION, SOLUTION INTRAMUSCULAR; INTRAVENOUS; SUBCUTANEOUS at 21:39

## 2021-06-04 RX ADMIN — HYDROMORPHONE HYDROCHLORIDE 0.5 MG: 1 INJECTION, SOLUTION INTRAMUSCULAR; INTRAVENOUS; SUBCUTANEOUS at 13:34

## 2021-06-04 RX ADMIN — HYDROMORPHONE HYDROCHLORIDE 0.5 MG: 1 INJECTION, SOLUTION INTRAMUSCULAR; INTRAVENOUS; SUBCUTANEOUS at 13:44

## 2021-06-04 RX ADMIN — Medication 1 AMPULE: at 21:41

## 2021-06-04 RX ADMIN — CEFTRIAXONE 1 G: 1 INJECTION, POWDER, FOR SOLUTION INTRAMUSCULAR; INTRAVENOUS at 21:40

## 2021-06-04 RX ADMIN — PHENYLEPHRINE HYDROCHLORIDE 100 MCG: 10 INJECTION INTRAVENOUS at 12:28

## 2021-06-04 RX ADMIN — PHENYLEPHRINE HYDROCHLORIDE 240 MCG: 10 INJECTION INTRAVENOUS at 11:57

## 2021-06-04 RX ADMIN — Medication 1 AMPULE: at 05:07

## 2021-06-04 RX ADMIN — Medication 1 AMPULE: at 14:00

## 2021-06-04 RX ADMIN — TAMSULOSIN HYDROCHLORIDE 0.4 MG: 0.4 CAPSULE ORAL at 09:14

## 2021-06-04 RX ADMIN — Medication: at 22:00

## 2021-06-04 RX ADMIN — Medication 5 MG: at 11:57

## 2021-06-04 RX ADMIN — HYDROMORPHONE HYDROCHLORIDE 0.4 MG: 2 INJECTION INTRAMUSCULAR; INTRAVENOUS; SUBCUTANEOUS at 12:39

## 2021-06-04 RX ADMIN — HYDROCODONE BITARTRATE AND ACETAMINOPHEN 1 TABLET: 5; 325 TABLET ORAL at 07:22

## 2021-06-04 RX ADMIN — HYDROMORPHONE HYDROCHLORIDE 0.2 MG: 2 INJECTION INTRAMUSCULAR; INTRAVENOUS; SUBCUTANEOUS at 12:32

## 2021-06-04 RX ADMIN — PHENYLEPHRINE HYDROCHLORIDE 100 MCG: 10 INJECTION INTRAVENOUS at 11:53

## 2021-06-04 RX ADMIN — HYDROMORPHONE HYDROCHLORIDE 0.2 MG: 2 INJECTION INTRAMUSCULAR; INTRAVENOUS; SUBCUTANEOUS at 13:14

## 2021-06-04 NOTE — ANESTHESIA PREPROCEDURE EVALUATION
Anesthetic History   No history of anesthetic complications            Review of Systems / Medical History  Patient summary reviewed and pertinent labs reviewed    Pulmonary  Within defined limits            Pertinent negatives: Smoker: ex.     Neuro/Psych         Psychiatric history (Depression)     Cardiovascular              Hyperlipidemia    Exercise tolerance: >4 METS  Comments: EF 62%, LAE   GI/Hepatic/Renal  Within defined limits             Comments: BPH Endo/Other        Arthritis     Other Findings   Comments: Psoriatic arthritis--was on methotrexate    presbycusis           Physical Exam    Airway  Mallampati: II  TM Distance: 4 - 6 cm  Neck ROM: decreased range of motion   Mouth opening: Normal     Cardiovascular    Rhythm: regular  Rate: normal         Dental  No notable dental hx       Pulmonary  Breath sounds clear to auscultation               Abdominal  GI exam deferred       Other Findings            Anesthetic Plan    ASA: 2  Anesthesia type: general          Induction: Intravenous  Anesthetic plan and risks discussed with: Patient      Pt has sitter. Seems to understand and answer questions appropriately, but RN reports some confusion. Pt's daughter, Phoenix Patel, will be here tomorrow. She would like to speak with Dr. Lino Topete after the surgery (states they missed talking last time).

## 2021-06-04 NOTE — PROGRESS NOTES
H&P/Consult Note/Progress Note/Office Note:   Shadia August MRN: [id number]  :1945  Age:75 y.o.    HPI: Shadia August is a 77 y.o. male who was initially referred in consultation by Son Yee PA-C with a left chest wall hematoma and cellulitis   after a fall at home on the floor approx 21. He sustained a blunt injury to his left chest wall. CXR below showed no PTx or rib fractures. He taked Humira for arthritis and is immune suppressed. We saw him for the 1st time on 21 with erythema of the left chest wall and placed him on Bactrim and marked the cellulitis border. He was instructed to call if any fevers or worsening erythema and given f/u 4 days later  He did not call. When he came to the office on 21 he reported high fevers over 101 degrees associate with nausea with his Bactrim. The erythema had extended >20cm  beyond the original inscribed lines of his left chest wall and extended down his left flank and in his left lower quadrant. We was admitted directly from office. He denies any new falls since 21. On 21 he also had a new hematoma involving his right flank which was not present on 21 (likely a second fall)      21 CXR, 2 views  Clear lungs. The cardiac and mediastinal contours and pulmonary vascularity are normal.   The bones and soft tissues are within normal limits. No evidence of a pneumothorax. IMPRESSION:  Normal chest.      21 CT chest/abd/pelvis with oral and IV contrast  Hx: MRSA cellulitis of left chest wall abscess and fever.     CHEST:   -LUNGS: No lobar pneumonia. Linear atelectasis left base. -AIRWAYS: Trachea and proximal bronchi grossly patent. -PLEURA: Small left pleural effusion. -LYMPH NODES: No enlarged axillary, hilar or mediastinal lymph nodes.   -HEART: Normal size.     -SKELETAL/CHEST WALL: Along left chest wall and body wall there is a complex,  peripherally enhancing fluid collection which measures approximately 32 cm  superior inferior, 17 cm wide and 4 cm thick. There are several bubbles of gas  within it. Larger bubbles of gas extend up into the axilla.     ABDOMEN/PELVIS:  -LIVER: Normal in size and appearance. -GALLBLADDER: No gallstones identified  -BILE DUCTS: Not dilated.     -PANCREAS: Normal.  -SPLEEN: Normal.     -ADRENALS: Normal.  -KIDNEYS/URETERS: Kidneys enhance symmetrically. There are multiple bilateral  parapelvic cysts versus bilateral UPJ obstructions. -BLADDER: Normal.  -REPRODUCTIVE ORGANS: Prostate and seminal vesicles unremarkable.     -BOWEL: Normal caliber. No inflammatory changes. Scattered diverticula. -LYMPH NODES: No significant retroperitoneal, mesenteric, or pelvic adenopathy. -BONES: Degenerative changes. No periostitis. -VASCULATURE: Aorta is normal caliber and mildly calcified.   -OTHER: No ascites.     IMPRESSION  Large left abscess, 32 cm x 17 cm x 4 cm which extends from the axilla to the iliac crest.            Additional hx:  5/27/21 wound culture growing MRSA; Pharmacy dosing IV Vanc; trough tonight  5/28/21  Less Confused; no fever; WBC elevated but better;  on IV Abx  5/29/21  Resting in bed; no fever; WBC 12.3;  on IV Abx  5/30/21  Sitting on side of bed; no fever; WBC 13.8;  on IV Abx  5/31/21 AF; wbc still elevated; left hemithorax , left flank and LLQ edematous and indurated  6/1/21 continues IV Vanc; WBC higher at 12.2k   6/2/21 IV Vanc; CT as above with large abscess; I&D/debridment #1 in OR today  6/3/21 IV Vanc/Rocephin; OR culture pending  6/4/21 plan return to OR today for debridement #2; unless pt signs out AMA          Past Medical History:   Diagnosis Date    Depression     H/O urinary frequency 2/1/2013    History of BPH 2/1/2013    HLD (hyperlipidemia) 2/1/2013    Hx: UTI (urinary tract infection) 2/1/2013    Insomnia     Psoriasis     Psoriatic arthritis ([facility] [address] 2/1/2013    Wears hearing aid 2/1/2013     No past surgical history on file. Current Facility-Administered Medications   Medication Dose Route Frequency    HYDROmorphone (DILAUDID) injection 0.5 mg  0.5 mg IntraVENous Q3H PRN    lactated Ringers infusion  100 mL/hr IntraVENous CONTINUOUS    cefTRIAXone (ROCEPHIN) 1 g in 0.9% sodium chloride (MBP/ADV) 50 mL MBP  1 g IntraVENous Q24H    famotidine (PEPCID) tablet 20 mg  20 mg Oral BID    alcohol 62% (NOZIN) nasal  1 Ampule  1 Ampule Topical Q8H    vancomycin (VANCOCIN) 1500 mg in  ml infusion  1,500 mg IntraVENous Q18H    zinc oxide-cod liver oil (DESITIN) 40 % paste   Topical Q8H    acetaminophen (TYLENOL) tablet 1,000 mg  1,000 mg Oral Q6H PRN    ondansetron (ZOFRAN) injection 4 mg  4 mg IntraVENous Q4H PRN    HYDROcodone-acetaminophen (NORCO) 5-325 mg per tablet 1 Tablet  1 Tablet Oral Q6H PRN    tamsulosin (FLOMAX) capsule 0.4 mg  0.4 mg Oral DAILY     Patient has no known allergies. Social History     Socioeconomic History    Marital status: SINGLE     Spouse name: Not on file    Number of children: Not on file    Years of education: Not on file    Highest education level: Not on file   Tobacco Use    Smoking status: Former Smoker    Smokeless tobacco: Never Used    Tobacco comment: pt states quit in early 60's late 52's    Vaping Use    Vaping Use: Never used   Substance and Sexual Activity    Alcohol use: No     Alcohol/week: 0.0 standard drinks    Drug use: No    Sexual activity: Yes     Partners: Female     Social Determinants of Health     Financial Resource Strain:     Difficulty of Paying Living Expenses:    Food Insecurity:     Worried About Running Out of Food in the Last Year:     [address] in the Last Year:    Transportation Needs:     Lack of Transportation (Medical):      Lack of Transportation (Non-Medical):    Physical Activity:     Days of Exercise per Week:     Minutes of Exercise per Session:    Stress:     Feeling of Stress :    Social Connections:     Frequency of Communication with Friends and Family:     Frequency of Social Gatherings with Friends and Family:     Attends [employer] Services:     Active Member of Clubs or Organizations:     Attends Club or Organization Meetings:     Marital Status:      Social History     Tobacco Use   Smoking Status Former Smoker   Smokeless Tobacco Never Used   Tobacco Comment    pt states quit in early 60's late 52's      Family History   Problem Relation Age of Onset    Heart Attack Father     Cancer Father         skin     Other Sister     Cancer Sister     Other Brother         melanoma     ROS: The patient has no difficulty with chest pain or shortness of breath. No fever or chills. Comprehensive review of systems was otherwise unremarkable except as noted above. Physical Exam:   Visit Vitals  BP (!) 139/91   Pulse 72   Temp 98.5 °F (36.9 °C)   Resp 18   Ht 5' 11\" (1.803 m)   Wt 158 lb (71.7 kg)   SpO2 94%   BMI 22.04 kg/m²     Vitals:    06/03/21 1548 06/03/21 1944 06/03/21 2256 06/04/21 0333   BP: 139/74 (!) 144/67 (!) 142/75 (!) 139/91   Pulse: 69 74 69 72   Resp: 19 19 18 18   Temp: 98.1 °F (36.7 °C) 98.2 °F (36.8 °C) 98.2 °F (36.8 °C) 98.5 °F (36.9 °C)   SpO2: 99% 98% 99% 94%   Weight:       Height:         [unfilled]  [unfilled]    Constitutional: Alert, oriented, cooperative patient in no acute distress; appears stated age    Eyes:Sclera are clear. EOMs intact  ENMT: no external lesions; he is hard of hearing. no obvious neck masses, no ear or lip lesions, nares normal  CV: RRR. Normal perfusion      Large open incision from left axillae to left iliac crest with wound packing and drainage  The erythema and cellulitic changes of the left chest wall and left flank are improving     Right flank hematoma stable    Resp: No JVD. Breathing is  non-labored; no audible wheezing. GI: soft and non-distended     Musculoskeletal: unremarkable with normal function. No embolic signs or cyanosis.    Neuro: Oriented to person, place and time;  moves all 4; no focal deficits; slow moving and slow talking. Psychiatric: blunted affect, no memory impairment    Recent vitals (if inpt):  @IPVITALS(24:)@    Amount and/or Complexity of Data Reviewed and Analyzed:  I reviewed and analyzed all of the unique labs and radiologic studies that are shown below as well as any that are in the HPI, and any that are in the expanded problem list below  *Each unique test, order, or document contributes to the combination of 2 or combination of 3 in Category 1 below. For this visit I also reviewed old records and prior notes. Recent Labs     06/03/21 0533   WBC 10.8   HGB 12.3*      *   K 4.7      CO2 26   BUN 18   CREA 1.08   *     Review of most recent CBC  Lab Results   Component Value Date/Time    WBC 10.8 06/03/2021 05:33 AM    HGB 12.3 (L) 06/03/2021 05:33 AM    HCT 37.8 (L) 06/03/2021 05:33 AM    PLATELET 608 65/54/2739 05:33 AM    MCV 99.0 (H) 06/03/2021 05:33 AM       Review of most recent BMP  Lab Results   Component Value Date/Time    Sodium 134 (L) 06/03/2021 05:33 AM    Potassium 4.7 06/03/2021 05:33 AM    Chloride 103 06/03/2021 05:33 AM    CO2 26 06/03/2021 05:33 AM    Anion gap 5 (L) 06/03/2021 05:33 AM    Glucose 251 (H) 06/03/2021 05:33 AM    BUN 18 06/03/2021 05:33 AM    Creatinine 1.08 06/03/2021 05:33 AM    BUN/Creatinine ratio 19 03/31/2016 11:26 AM    GFR est AA >60 06/03/2021 05:33 AM    GFR est non-AA >60 06/03/2021 05:33 AM    Calcium 7.9 (L) 06/03/2021 05:33 AM       Review of most recent LFTs (and lipase if done)  Lab Results   Component Value Date/Time    ALT (SGPT) 80 (H) 05/24/2021 12:57 PM    AST (SGOT) 53 (H) 05/24/2021 12:57 PM    Alk.  phosphatase 124 05/24/2021 12:57 PM    Bilirubin, total 0.9 05/24/2021 12:57 PM     No results found for: LPSE    No results found for: INR, APTT, CBIL, LCAD, NH4, TROPT, TROIQ, INREXT, INREXT    Review of most recent HgbA1c  No results found for: HBA1C, IEA3BNQZ, GDJ4UVVJ, WMP7JVQT    Nutritional assessment screen for wound healing issues:  Lab Results   Component Value Date/Time    Protein, total 6.5 05/24/2021 12:57 PM    Albumin 2.4 (L) 05/24/2021 12:57 PM       @lastcovr@  XR Results (most recent):  Results from Hospital Encounter encounter on 05/20/21    XR CHEST PA LAT    Narrative  EXAM: XR CHEST PA LAT    INDICATION: Left chest wall hematoma with cellulitis rule out rib fracture or  pneumothorax after a fall at home on the floor. COMPARISON: None. FINDINGS: PA and lateral radiographs of the chest demonstrate clear lungs. The  cardiac and mediastinal contours and pulmonary vascularity are normal. The bones  and soft tissues are within normal limits. No evidence of a pneumothorax. Impression  Normal chest.      CT Results (most recent):  Results from Hospital Encounter encounter on 05/24/21    CT CHEST ABD PELV W CONT    Narrative  CT OF THE CHEST ABDOMEN AND PELVIS    INDICATION: MRSA cellulitis of left chest wall abscess and fever. TECHNIQUE:  Multiple axial images were obtained through the chest, abdomen and  pelvis. Oral contrast was used for bowel opacification. 100mL of Isovue 370  intravenous contrast was used for better evaluation of solid organs and vascular  structures. Radiation dose reduction techniques were used for this study. All  CT scans performed at this facility use one or all of the following: Automated  exposure control, adjustment of the mA and/or kVp according to patient's size,  iterative reconstruction. COMPARISON: None    FINDINGS:  CHEST:  -LUNGS: No lobar pneumonia. Linear atelectasis left base. -AIRWAYS: Trachea and proximal bronchi grossly patent. -PLEURA: Small left pleural effusion. -LYMPH NODES: No enlarged axillary, hilar or mediastinal lymph nodes.   -HEART: Normal size.    -SKELETAL/CHEST WALL: Long left chest wall and body wall there is a complex,  peripherally enhancing fluid collection which measures approximately 32 cm  superior inferior, 17 cm wide and 4 cm thick. There are several bubbles of gas  within it. Larger bubbles of gas extend up into the axilla. ABDOMEN/PELVIS:  -LIVER: Normal in size and appearance. -GALLBLADDER: No gallstones identified  -BILE DUCTS: Not dilated. -PANCREAS: Normal.  -SPLEEN: Normal.    -ADRENALS: Normal.  -KIDNEYS/URETERS: Kidneys enhance symmetrically. There are multiple bilateral  parapelvic cysts versus bilateral UPJ obstructions. -BLADDER: Normal.  -REPRODUCTIVE ORGANS: Prostate and seminal vesicles unremarkable. -BOWEL: Normal caliber. No inflammatory changes. Scattered diverticula. -LYMPH NODES: No significant retroperitoneal, mesenteric, or pelvic adenopathy. -BONES: Degenerative changes. No periostitis. -VASCULATURE: Aorta is normal caliber and mildly calcified. -OTHER: No ascites. Impression  Large left abscess, 32 cm x 17 cm x 4 cm which extends from the  axilla to the iliac crest.  689    US Results (most recent):  No results found for this or any previous visit. Admission date (for inpatients): 5/24/2021   * No surgery found *  Procedure(s):  LEFT CHEST WALL AND FLANK WOULD DEBRIDEMENT/ POSITION ON BEAN BAG W/ RIGHT SIDE DOWN DECUBITUS ROOM 214        ASSESSMENT/PLAN:  Problem List  Date Reviewed: 5/24/2021        Codes Class Noted    Confusion ICD-10-CM: R41.0  ICD-9-CM: 298.9  5/27/2021        Sundowning ICD-10-CM: F05  ICD-9-CM: August Grise  5/27/2021        Leukocytosis ICD-10-CM: J47.576  ICD-9-CM: 288.60  5/26/2021        Acute renal insufficiency ICD-10-CM: N28.9  ICD-9-CM: 593.9  5/25/2021        Fever ICD-10-CM: R50.9  ICD-9-CM: 780.60  5/24/2021        Suppression of immune system subtherapeutic (HCC) ICD-10-CM: D89.89  ICD-9-CM: 279.8  5/24/2021        Right flank hematoma ICD-10-CM: S30. 1XXA  ICD-9-CM: 922.2  5/24/2021        * (Principal) Chest wall abscess ICD-10-CM: L02.213  ICD-9-CM: 682.2  5/20/2021        Hematoma of left chest wall ICD-10-CM: E93.877R  ICD-9-CM: 922.1  5/20/2021        Cellulitis of chest wall ICD-10-CM: L03.313  ICD-9-CM: 682.2  5/20/2021        Fall ICD-10-CM: W19. Flores Sis  ICD-9-CM: E888.9  5/20/2021        Psoriasis ICD-10-CM: L40.9  ICD-9-CM: 696.1  Unknown        Insomnia ICD-10-CM: G47.00  ICD-9-CM: 780.52  Unknown        Depression ICD-10-CM: F32.9  ICD-9-CM: 420  Unknown        Tachycardia ICD-10-CM: R00.0  ICD-9-CM: 785.0  6/13/2016        Murmur ICD-10-CM: R01.1  ICD-9-CM: 785.2  6/13/2016        Psoriatic arthritis ([facility] [address] ICD-10-CM: L40.50  ICD-9-CM: 696.0  2/1/2013        Wears hearing aid ICD-10-CM: Z97.4  ICD-9-CM: V45.89  2/1/2013        HLD (hyperlipidemia) ICD-10-CM: E78.5  ICD-9-CM: 272.4  2/1/2013        H/O urinary frequency ICD-10-CM: T71.970  ICD-9-CM: V13.09  2/1/2013        History of BPH ICD-10-CM: B28.130  ICD-9-CM: V13.89  2/1/2013            Principal Problem:    Chest wall abscess (5/20/2021)    Active Problems:    Cellulitis of chest wall (5/20/2021)      Fall (5/20/2021)      Fever (5/24/2021)      Suppression of immune system subtherapeutic ([facility] [address] (5/24/2021)      Right flank hematoma (5/24/2021)      Acute renal insufficiency (5/25/2021)      Leukocytosis (5/26/2021)      Confusion (5/27/2021)      Sundowning (5/27/2021)           Number and Complexity of Problems addressed and   Risks of complications and/or morbidity of management        Massive MRSA Infection of soft tissues of left chest wall with abscess and extension, cellulitis, and large open wound with necrosis of the left axillae, left chest wall, left flank, LLQ, and left iliac region  This failed outpt Rx with PO Bactrim and also failed initial inpt Rx with IV Vanc and Rocpehin     Continue inpt admission for IV Vanc and inpt complex dressing changes for a massive left chest wall, left axillary, left flank, LLQ wound abscess cavity.       We initially decided to proceed to OR today on 6/4/21 for debridement #2, washout, and dressing change   As of 7:53am he is he wants to sign out AMA. I told him if he signs out AMA he could die from sepsis. He said he is still leaving AMA and to call his sister which I did   She and pt's live-in wife are trying to talk patient into allowing more intpt treatment and not leaving AMA    Update 6/4/21  At 11AM he was oriented and after he discussed situation with his sister and girlfriend he decided to proceed with surgery. Risks/benefits and advantages/disadvantages of surgery vs no surgery was discussed with him once again. He voices udnerstanding  He consents to proceed with surgery. Plan serial debridements in the OR.  6/2/21 OR for debridement #1  6/4/21 OR for debridement/washout/dressing change #2          Chronic immune suppression  Continue to hold his methotrexate, prednisone, and Humira to help reduce his immune suppression during this acute phase of infection. Dermatitis around left chest wall wound from moisture maceration  Desitin ordered to destinee-wound skin    Confusion/sundowning at night  For many days he was uncooperative and pulling IV sites   Sitters were required  This has improving significantly  May no longer require sitter              Level of MDM (2/3 elements below)  Number and Complexity of Problems Addressed Amount and/or Complexity of Data to be Reviewed and Analyzed  *Each unique test, order, or document contributes to the combination of 2 or combination of 3 in Category 1 below.  Risk of Complications and/or Morbidity or Mortality of pt Management     15772  85503 SF Minimal  1self-limited or minor problem Minimal or none Minimal risk of morbidity from additional diagnostic testing or Rx   91101  28149 Low Low  2or more self-limited or minor problems;    or  1stable chronic illness;    or  7ZQQJO, uncomplicated illness or injury   Limited  (Must meet the requirements of at least 1 of the 2 categories)  Category 1: Tests and documents   Any combination of 2 from the following:  Review of prior external note(s) from each unique source*;  review of the result(s) of each unique test*;   ordering of each unique test*    or   Category 2: Assessment requiring an independent historian(s)  (For the categories of independent interpretation of tests and discussion of management or test interpretation, see moderate or high) Low risk of morbidity from additional diagnostic testing or treatment     25944  14152 Mod Moderate  1or more chronic illnesses with exacerbation, progression, or side effects of treatment;    or  2or more stable chronic illnesses;    or  1undiagnosed new problem with uncertain prognosis;    or  1acute illness with systemic symptoms;    or  3NXNQO complicated injury   Moderate  (Must meet the requirements of at least 1 out of 3 categories)  Category 1: Tests, documents, or independent historian(s)  Any combination of 3 from the following:   Review of prior external note(s) from each unique source*;  Review of the result(s) of each unique test*;  Ordering of each unique test*;  Assessment requiring an independent historian(s)    or  Category 2: Independent interpretation of tests   Independent interpretation of a test performed by another physician/other qualified health care professional (not separately reported);     or  Category 3: Discussion of management or test interpretation  Discussion of management or test interpretation with external physician/other qualified health care professional/appropriate source (not separately reported)   Moderate risk of morbidity from additional diagnostic testing or treatment  Examples only:  Prescription drug management   Decision regarding minor surgery with identified patient or procedure risk factors  Decision regarding elective major surgery without identified patient or procedure risk factors   Diagnosis or treatment significantly limited by social determinants of health       77612  53480 High High  1or more chronic illnesses with severe exacerbation, progression, or side effects of treatment;    or  1 acute or chronic illness or injury that poses a threat to life or bodily function   Extensive  (Must meet the requirements of at least 2 out of 3 categories)  Category 1: Tests, documents, or independent historian(s)  Any combination of 3 from the following:   Review of prior external note(s) from each unique source*;  Review of the result(s) of each unique test*;   Ordering of each unique test*;   Assessment requiring an independent historian(s)    or   Category 2: Independent interpretation of tests   Independent interpretation of a test performed by another physician/other qualified health care professional (not separately reported);     or  Category 3: Discussion of management or test interpretation  Discussion of management or test interpretation with external physician/other qualified health care professional/appropriate source (not separately reported)   High risk of morbidity from additional diagnostic testing or treatment  Examples only:  Drug therapy requiring intensive monitoring for toxicity  Decision regarding elective major surgery with identified patient or procedure risk factors  Decision regarding emergency major surgery  Decision regarding hospitalization  Decision not to resuscitate or to de-escalate care because of poor prognosis             I have personally performed a face-to-face diagnostic evaluation and management  service on this patient. I have independently seen the patient. I have independently obtained the above history from the patient/family. I have independently examined the patient with above findings. I have independently reviewed data/labs for this patient and developed the above plan of care (MDM). Signed: Jina Corral.  Trish Soto MD, FACS

## 2021-06-04 NOTE — PROGRESS NOTES
END OF SHIFT NOTE:     INTAKE/OUTPUT  06/03 0701 - 06/04 0700  In: 1380 [P.O.:1380]  Out: 7848 [Urine:4275]  Voiding: YES  Catheter: NO    Flatus: Patient does have flatus present. Stool:  0 occurrences. Emesis: 0 occurrences. VITAL SIGNS  Patient Vitals for the past 12 hrs:   Temp Pulse Resp BP SpO2   06/04/21 0333 98.5 °F (36.9 °C) 72 18 (!) 139/91 94 %   06/03/21 2256 98.2 °F (36.8 °C) 69 18 (!) 142/75 99 %   06/03/21 1944 98.2 °F (36.8 °C) 74 19 (!) 144/67 98 %       Pain Assessment  Pain Intensity 1: 7 (06/04/21 0345)  Pain Location 1: Flank  Pain Intervention(s) 1: Medication (see MAR)  Patient Stated Pain Goal: 0    Ambulating  Yes    Shift report given to oncoming nurse at the bedside.     [address]

## 2021-06-04 NOTE — PERIOP NOTES
TRANSFER - IN REPORT:    Verbal report received from Evelin Ortiz RN(name) on [facility].  being received from 214(unit) for ordered procedure      Report consisted of patients Situation, Background, Assessment and   Recommendations(SBAR). Information from the following report(s) SBAR was reviewed with the receiving nurse. Opportunity for questions and clarification was provided.

## 2021-06-04 NOTE — PROGRESS NOTES
Patient called police complaining that hospital is holding him against his will. States. Brain Billy got me running around here naked. \"  Nurse, Sitter and Transport are both present during phone call to police. Wife,Hillary just arrived at bedside and is trying to convince patient to have the  reccommended procedure. Wife,Hillary and sister, Mitchell Reaves tell the nurse that he is confused and needs the surgery.

## 2021-06-04 NOTE — PROGRESS NOTES
Chart screened by  for discharge planning. Patient is refusing 2nd surgical debridement at this time. Per nurse patient is more confused today. Patient has sitter at bedside. Patient will be unable to discharge to facility until he no longer needs a sitter. Patient family has been at bedside. Patient has made comments to leave AMA. CM will continue to follow patient during hospitalization for discharge planning and needs. Please consult  if any new issues arise.

## 2021-06-04 NOTE — BRIEF OP NOTE
BRIEF OPERATIVE NOTE    Date of Procedure:  6/4/2021    Preoperative Diagnosis: LEFT CHEST WALL AND FLANK WOUND INFECTION  Postoperative Diagnosis: same    Procedure:  Procedure(s):  LEFT CHEST WALL AND FLANK WOUND DEBRIDEMENT  Surgeon(s) and Role:     * Sharonda Chicas MD - Primary  Anesthesia: General  Estimated Blood Loss: <75cc  Specimens: none  Findings: see op note   Complications: none  Implants: kerlix roll packing

## 2021-06-04 NOTE — PROGRESS NOTES
Physical therapy note: Attempted PT this AM. Held initially at RN request. Pt later went for surgery. Will continue to treat as pt is able and time/schedule permit.     Rosaura Herrera, THEODORE

## 2021-06-04 NOTE — PROGRESS NOTES
END OF SHIFT NOTE:    INTAKE/OUTPUT  06/02 0701 - 06/03 0700  In: 1466.2 [P.O.:480; I.V.:986.2]  Out: 1600 [Urine:900]  Voiding: YES  Catheter: NO  Drain:        Flatus: Patient does not have flatus present. Stool:  0 occurrences. Characteristics:  Stool Assessment  Stool Color: Brown  Stool Appearance: Loose (per pt)  Stool Amount: Medium  Stool Source/Status: Rectum    Emesis: 0 occurrences. Characteristics:        VITAL SIGNS  Patient Vitals for the past 12 hrs:   Temp Pulse Resp BP SpO2   06/03/21 1944 98.2 °F (36.8 °C) 74 19 (!) 144/67 98 %   06/03/21 1548 98.1 °F (36.7 °C) 69 19 139/74 99 %   06/03/21 1109 97.9 °F (36.6 °C) 84 17 120/75 97 %       Pain Assessment  Pain Intensity 1: 7 (06/03/21 1940)  Pain Location 1: Flank  Pain Intervention(s) 1: Medication (see MAR)  Patient Stated Pain Goal: 0    Ambulating  Yes    Shift report given to oncoming nurse at the bedside.     Misty Helton, RN

## 2021-06-04 NOTE — PERIOP NOTES
TRANSFER - OUT REPORT:    Verbal report given to [facility], RN(name) on [address].  being transferred to [facility](unit) for routine post - op       Report consisted of patients Situation, Background, Assessment and   Recommendations(SBAR). Information from the following report(s) SBAR, Kardex, OR Summary, MAR and Recent Results was reviewed with the receiving nurse. Lines:   Peripheral IV 05/30/21 Left;Posterior Forearm (Active)   Site Assessment Clean, dry, & intact 06/04/21 1312   Phlebitis Assessment 0 06/04/21 1312   Infiltration Assessment 0 06/04/21 1312   Dressing Status Clean, dry, & intact 06/04/21 1312   Dressing Type Tape;Transparent 06/04/21 1312   Hub Color/Line Status Patent 06/04/21 1312   Alcohol Cap Used No 05/31/21 1359       Peripheral IV 06/02/21 Right Hand (Active)   Site Assessment Clean, dry, & intact 06/04/21 1312   Phlebitis Assessment 0 06/04/21 1312   Infiltration Assessment 0 06/04/21 1312   Dressing Status Clean, dry, & intact 06/04/21 1312   Dressing Type Tape;Transparent 06/04/21 1312   Hub Color/Line Status Patent 06/04/21 1312        Opportunity for questions and clarification was provided.

## 2021-06-04 NOTE — PROGRESS NOTES
2145: Sitter called writer to the room. Patient attempting to unscrew the IV stating he was ready to leave and did not want to have anymore help. He fells like he is \"ready to die and there is nothing else can be done. \" Writer convinced patient to stay and talk with the MD in the morning. Writer noticed the sitter was on her laptop. Writer asked the sitter to put her ;aptop away and to be sure to monitor the patient. 2200: Sharon Manning concerning patient bleeding from surgical site. Placed an order to wrap with elastic bandage to add more compression to the pressure dressing in place. O/rdered morning labs as well. 2230: Patient refused and was adamant that he would not take his hibiclens bath. Writer did not want to cause the patient to become more agitated therefore we did not do the bath tonight.

## 2021-06-04 NOTE — ANESTHESIA POSTPROCEDURE EVALUATION
Procedure(s):  LEFT CHEST WALL AND FLANK WOUND DEBRIDEMENT. general    Anesthesia Post Evaluation      Multimodal analgesia: multimodal analgesia used between 6 hours prior to anesthesia start to PACU discharge  Patient location during evaluation: bedside  Patient participation: complete - patient participated  Level of consciousness: responsive to verbal stimuli  Pain management: adequate  Airway patency: patent  Anesthetic complications: no  Cardiovascular status: hemodynamically stable  Respiratory status: spontaneous ventilation  Hydration status: stable    Final Post Anesthesia Temperature Assessment:  Normothermia (36.0-37.5 degrees C)      INITIAL Post-op Vital signs:   Vitals Value Taken Time   /60 06/04/21 1336   Temp 36.2 °C (97.1 °F) 06/04/21 1320   Pulse 95 06/04/21 1339   Resp 12 06/04/21 1330   SpO2 92 % 06/04/21 1339   Vitals shown include unvalidated device data.

## 2021-06-05 LAB
BACTERIA SPEC CULT: ABNORMAL
BACTERIA SPEC CULT: ABNORMAL
EST. AVERAGE GLUCOSE BLD GHB EST-MCNC: NORMAL MG/DL
GLUCOSE BLD STRIP.AUTO-MCNC: 107 MG/DL (ref 65–100)
GLUCOSE BLD STRIP.AUTO-MCNC: 113 MG/DL (ref 65–100)
GLUCOSE BLD STRIP.AUTO-MCNC: 114 MG/DL (ref 65–100)
GLUCOSE BLD STRIP.AUTO-MCNC: 142 MG/DL (ref 65–100)
GLUCOSE BLD STRIP.AUTO-MCNC: 176 MG/DL (ref 65–100)
GRAM STN SPEC: ABNORMAL
GRAM STN SPEC: ABNORMAL
HBA1C MFR BLD: 4.9 % (ref 4.2–6.3)
SERVICE CMNT-IMP: ABNORMAL

## 2021-06-05 PROCEDURE — 74011250637 HC RX REV CODE- 250/637: Performed by: SURGERY

## 2021-06-05 PROCEDURE — 2709999900 HC NON-CHARGEABLE SUPPLY

## 2021-06-05 PROCEDURE — 94760 N-INVAS EAR/PLS OXIMETRY 1: CPT

## 2021-06-05 PROCEDURE — 82962 GLUCOSE BLOOD TEST: CPT

## 2021-06-05 PROCEDURE — 74011000258 HC RX REV CODE- 258: Performed by: SURGERY

## 2021-06-05 PROCEDURE — 77010033678 HC OXYGEN DAILY

## 2021-06-05 PROCEDURE — 74011250636 HC RX REV CODE- 250/636: Performed by: SURGERY

## 2021-06-05 PROCEDURE — 74011636637 HC RX REV CODE- 636/637: Performed by: SURGERY

## 2021-06-05 PROCEDURE — 83036 HEMOGLOBIN GLYCOSYLATED A1C: CPT

## 2021-06-05 PROCEDURE — 36415 COLL VENOUS BLD VENIPUNCTURE: CPT

## 2021-06-05 PROCEDURE — 65270000029 HC RM PRIVATE

## 2021-06-05 RX ADMIN — Medication 1 AMPULE: at 06:29

## 2021-06-05 RX ADMIN — HYDROCODONE BITARTRATE AND ACETAMINOPHEN 1 TABLET: 5; 325 TABLET ORAL at 20:46

## 2021-06-05 RX ADMIN — Medication: at 13:25

## 2021-06-05 RX ADMIN — Medication: at 06:00

## 2021-06-05 RX ADMIN — HUMAN INSULIN 1 UNITS: 100 INJECTION, SOLUTION SUBCUTANEOUS at 18:00

## 2021-06-05 RX ADMIN — Medication 1 AMPULE: at 14:28

## 2021-06-05 RX ADMIN — CEFTRIAXONE 1 G: 1 INJECTION, POWDER, FOR SOLUTION INTRAMUSCULAR; INTRAVENOUS at 20:50

## 2021-06-05 RX ADMIN — FAMOTIDINE 20 MG: 20 TABLET ORAL at 17:44

## 2021-06-05 RX ADMIN — TAMSULOSIN HYDROCHLORIDE 0.4 MG: 0.4 CAPSULE ORAL at 08:33

## 2021-06-05 RX ADMIN — Medication 1 AMPULE: at 21:00

## 2021-06-05 RX ADMIN — Medication: at 20:51

## 2021-06-05 RX ADMIN — FAMOTIDINE 20 MG: 20 TABLET ORAL at 08:33

## 2021-06-05 RX ADMIN — VANCOMYCIN HYDROCHLORIDE 1500 MG: 10 INJECTION, POWDER, LYOPHILIZED, FOR SOLUTION INTRAVENOUS at 12:22

## 2021-06-05 RX ADMIN — SODIUM CHLORIDE, SODIUM LACTATE, POTASSIUM CHLORIDE, AND CALCIUM CHLORIDE 40 ML/HR: 600; 310; 30; 20 INJECTION, SOLUTION INTRAVENOUS at 12:26

## 2021-06-05 RX ADMIN — HYDROCODONE BITARTRATE AND ACETAMINOPHEN 1 TABLET: 5; 325 TABLET ORAL at 16:36

## 2021-06-05 RX ADMIN — HYDROCODONE BITARTRATE AND ACETAMINOPHEN 1 TABLET: 5; 325 TABLET ORAL at 03:07

## 2021-06-05 RX ADMIN — HYDROMORPHONE HYDROCHLORIDE 0.5 MG: 1 INJECTION, SOLUTION INTRAMUSCULAR; INTRAVENOUS; SUBCUTANEOUS at 13:05

## 2021-06-05 NOTE — PROGRESS NOTES
H&P/Consult Note/Progress Note/Office Note:   Livan Monte MRN: [id number]  :1945  Age:75 y.o.    HPI: Livan Hernandez. is a 77 y.o. male who was initially referred in consultation by Rima Collado PA-C with a left chest wall hematoma and cellulitis   after a fall at home on the floor approx 21. He sustained a blunt injury to his left chest wall. CXR below showed no PTx or rib fractures. He taked Humira for arthritis and is immune suppressed. We saw him for the 1st time on 21 with erythema of the left chest wall and placed him on Bactrim and marked the cellulitis border. He was instructed to call if any fevers or worsening erythema and given f/u 4 days later  He did not call. When he came to the office on 21 he reported high fevers over 101 degrees associate with nausea with his Bactrim. The erythema had extended >20cm  beyond the original inscribed lines of his left chest wall and extended down his left flank and in his left lower quadrant. We was admitted directly from office. He denies any new falls since 21. On 21 he also had a new hematoma involving his right flank which was not present on 21 (likely a second fall)      21 CXR, 2 views  Clear lungs. The cardiac and mediastinal contours and pulmonary vascularity are normal.   The bones and soft tissues are within normal limits. No evidence of a pneumothorax. IMPRESSION:  Normal chest.      21 CT chest/abd/pelvis with oral and IV contrast  Hx: MRSA cellulitis of left chest wall abscess and fever.     CHEST:   -LUNGS: No lobar pneumonia. Linear atelectasis left base. -AIRWAYS: Trachea and proximal bronchi grossly patent. -PLEURA: Small left pleural effusion. -LYMPH NODES: No enlarged axillary, hilar or mediastinal lymph nodes.   -HEART: Normal size.     -SKELETAL/CHEST WALL: Along left chest wall and body wall there is a complex,  peripherally enhancing fluid collection which measures approximately 32 cm  superior inferior, 17 cm wide and 4 cm thick. There are several bubbles of gas  within it. Larger bubbles of gas extend up into the axilla.     ABDOMEN/PELVIS:  -LIVER: Normal in size and appearance. -GALLBLADDER: No gallstones identified  -BILE DUCTS: Not dilated.     -PANCREAS: Normal.  -SPLEEN: Normal.     -ADRENALS: Normal.  -KIDNEYS/URETERS: Kidneys enhance symmetrically. There are multiple bilateral  parapelvic cysts versus bilateral UPJ obstructions. -BLADDER: Normal.  -REPRODUCTIVE ORGANS: Prostate and seminal vesicles unremarkable.     -BOWEL: Normal caliber. No inflammatory changes. Scattered diverticula. -LYMPH NODES: No significant retroperitoneal, mesenteric, or pelvic adenopathy. -BONES: Degenerative changes. No periostitis. -VASCULATURE: Aorta is normal caliber and mildly calcified. -OTHER: No ascites.     IMPRESSION  Large left abscess, 32 cm x 17 cm x 4 cm which extends from the axilla to the iliac crest.            Additional hx:  5/27/21 wound culture growing MRSA; Pharmacy dosing IV Vanc; trough tonight  5/28/21  Less Confused; no fever; WBC elevated but better;  on IV Abx  5/29/21  Resting in bed; no fever; WBC 12.3;  on IV Abx  5/30/21  Sitting on side of bed; no fever; WBC 13.8;  on IV Abx  5/31/21 AF; wbc still elevated; left hemithorax , left flank and LLQ edematous and indurated  6/1/21 continues IV Vanc; WBC higher at 12.2k   6/2/21 IV Vanc; CT as above with large abscess; I&D/debridment #1 in OR today  6/3/21 IV Vanc/Rocephin; OR culture pending  6/4/21 plan return to OR today for debridement #2; unless pt signs out AMA  6/5/21 POD 3/1; Pt resting in bed. No complaints voiced. Appears comfortable. Postop dressing c/d/i.          Past Medical History:   Diagnosis Date    Depression     H/O urinary frequency 2/1/2013    History of BPH 2/1/2013    HLD (hyperlipidemia) 2/1/2013    Hx: UTI (urinary tract infection) 2/1/2013    Insomnia     Psoriasis     Psoriatic arthritis ([facility] [address] 2/1/2013    Wears hearing aid 2/1/2013     No past surgical history on file. Current Facility-Administered Medications   Medication Dose Route Frequency    HYDROmorphone (DILAUDID) injection 0.2-0.5 mg  0.2-0.5 mg IntraVENous Q3H PRN    lactated Ringers infusion  40 mL/hr IntraVENous CONTINUOUS    insulin regular (NOVOLIN R, HUMULIN R) injection   SubCUTAneous Q6H    cefTRIAXone (ROCEPHIN) 1 g in 0.9% sodium chloride (MBP/ADV) 50 mL MBP  1 g IntraVENous Q24H    famotidine (PEPCID) tablet 20 mg  20 mg Oral BID    alcohol 62% (NOZIN) nasal  1 Ampule  1 Ampule Topical Q8H    vancomycin (VANCOCIN) 1500 mg in  ml infusion  1,500 mg IntraVENous Q18H    zinc oxide-cod liver oil (DESITIN) 40 % paste   Topical Q8H    acetaminophen (TYLENOL) tablet 1,000 mg  1,000 mg Oral Q6H PRN    ondansetron (ZOFRAN) injection 4 mg  4 mg IntraVENous Q4H PRN    HYDROcodone-acetaminophen (NORCO) 5-325 mg per tablet 1 Tablet  1 Tablet Oral Q6H PRN    tamsulosin (FLOMAX) capsule 0.4 mg  0.4 mg Oral DAILY     Patient has no known allergies. Social History     Socioeconomic History    Marital status: SINGLE     Spouse name: Not on file    Number of children: Not on file    Years of education: Not on file    Highest education level: Not on file   Tobacco Use    Smoking status: Former Smoker    Smokeless tobacco: Never Used    Tobacco comment: pt states quit in early 60's late 52's    Vaping Use    Vaping Use: Never used   Substance and Sexual Activity    Alcohol use: No     Alcohol/week: 0.0 standard drinks    Drug use: No    Sexual activity: Yes     Partners: Female     Social Determinants of Health     Financial Resource Strain:     Difficulty of Paying Living Expenses:    Food Insecurity:     Worried About Running Out of Food in the Last Year:     [address] in the Last Year:    Transportation Needs:     Lack of Transportation (Medical):      Lack of Transportation (Non-Medical):    Physical Activity:     Days of Exercise per Week:     Minutes of Exercise per Session:    Stress:     Feeling of Stress :    Social Connections:     Frequency of Communication with Friends and Family:     Frequency of Social Gatherings with Friends and Family:     Attends [employer] Services:     Active Member of Clubs or Organizations:     Attends Club or Organization Meetings:     Marital Status:      Social History     Tobacco Use   Smoking Status Former Smoker   Smokeless Tobacco Never Used   Tobacco Comment    pt states quit in early 60's late 52's      Family History   Problem Relation Age of Onset    Heart Attack Father     Cancer Father         skin     Other Sister     Cancer Sister     Other Brother         melanoma     ROS: The patient has no difficulty with chest pain or shortness of breath. No fever or chills. Comprehensive review of systems was otherwise unremarkable except as noted above. Physical Exam:   Visit Vitals  BP (!) 143/61 (BP 1 Location: Right arm, BP Patient Position: At rest)   Pulse 92   Temp 98.4 °F (36.9 °C)   Resp 20   Ht 5' 11\" (1.803 m)   Wt 158 lb (71.7 kg)   SpO2 100%   BMI 22.04 kg/m²     Vitals:    06/05/21 0352 06/05/21 0715 06/05/21 1114 06/05/21 1401   BP: (!) 95/51 (!) 153/79 137/76 (!) 143/61   Pulse: 91 81 77 92   Resp: 18 16 20 20   Temp: 98.4 °F (36.9 °C) 98.8 °F (37.1 °C) 99 °F (37.2 °C) 98.4 °F (36.9 °C)   SpO2: 96% 97% 98% 100%   Weight:       Height:         [unfilled]  [unfilled]    Constitutional: Alert, oriented, cooperative patient in no acute distress; appears stated age    Eyes:Sclera are clear. EOMs intact  ENMT: no external lesions; he is hard of hearing. no obvious neck masses, no ear or lip lesions, nares normal  CV: RRR.  Normal perfusion      Large open incision from left axillae to left iliac crest with wound packing and drainage  The erythema and cellulitic changes of the left chest wall and left flank are improving     Right flank hematoma stable    Resp: No JVD. Breathing is  non-labored; no audible wheezing. GI: soft and non-distended     Musculoskeletal: unremarkable with normal function. No embolic signs or cyanosis. Neuro:  Oriented to person, place and time;  moves all 4; no focal deficits; slow moving and slow talking. Psychiatric: blunted affect, no memory impairment    Recent vitals (if inpt):  @IPVITALS(24:)@    Amount and/or Complexity of Data Reviewed and Analyzed:  I reviewed and analyzed all of the unique labs and radiologic studies that are shown below as well as any that are in the HPI, and any that are in the expanded problem list below  *Each unique test, order, or document contributes to the combination of 2 or combination of 3 in Category 1 below. For this visit I also reviewed old records and prior notes.       Recent Labs     06/03/21  0533   WBC 10.8   HGB 12.3*      *   K 4.7      CO2 26   BUN 18   CREA 1.08   *     Review of most recent CBC  Lab Results   Component Value Date/Time    WBC 10.8 06/03/2021 05:33 AM    HGB 12.3 (L) 06/03/2021 05:33 AM    HCT 37.8 (L) 06/03/2021 05:33 AM    PLATELET 046 62/53/5646 05:33 AM    MCV 99.0 (H) 06/03/2021 05:33 AM       Review of most recent BMP  Lab Results   Component Value Date/Time    Sodium 134 (L) 06/03/2021 05:33 AM    Potassium 4.7 06/03/2021 05:33 AM    Chloride 103 06/03/2021 05:33 AM    CO2 26 06/03/2021 05:33 AM    Anion gap 5 (L) 06/03/2021 05:33 AM    Glucose 251 (H) 06/03/2021 05:33 AM    BUN 18 06/03/2021 05:33 AM    Creatinine 1.08 06/03/2021 05:33 AM    BUN/Creatinine ratio 19 03/31/2016 11:26 AM    GFR est AA >60 06/03/2021 05:33 AM    GFR est non-AA >60 06/03/2021 05:33 AM    Calcium 7.9 (L) 06/03/2021 05:33 AM       Review of most recent LFTs (and lipase if done)  Lab Results   Component Value Date/Time    ALT (SGPT) 80 (H) 05/24/2021 12:57 PM    AST (SGOT) 53 (H) 05/24/2021 12:57 PM Alk. phosphatase 124 05/24/2021 12:57 PM    Bilirubin, total 0.9 05/24/2021 12:57 PM     No results found for: LPSE    No results found for: INR, APTT, CBIL, LCAD, NH4, TROPT, TROIQ, INREXT, INREXT    Review of most recent HgbA1c  No results found for: HBA1C, BAN8ZTEV, GUJ9TCEM, KZS2FCQD    Nutritional assessment screen for wound healing issues:  Lab Results   Component Value Date/Time    Protein, total 6.5 05/24/2021 12:57 PM    Albumin 2.4 (L) 05/24/2021 12:57 PM       @lastcovr@  XR Results (most recent):  Results from Hospital Encounter encounter on 05/20/21    XR CHEST PA LAT    Narrative  EXAM: XR CHEST PA LAT    INDICATION: Left chest wall hematoma with cellulitis rule out rib fracture or  pneumothorax after a fall at home on the floor. COMPARISON: None. FINDINGS: PA and lateral radiographs of the chest demonstrate clear lungs. The  cardiac and mediastinal contours and pulmonary vascularity are normal. The bones  and soft tissues are within normal limits. No evidence of a pneumothorax. Impression  Normal chest.      CT Results (most recent):  Results from Hospital Encounter encounter on 05/24/21    CT CHEST ABD PELV W CONT    Narrative  CT OF THE CHEST ABDOMEN AND PELVIS    INDICATION: MRSA cellulitis of left chest wall abscess and fever. TECHNIQUE:  Multiple axial images were obtained through the chest, abdomen and  pelvis. Oral contrast was used for bowel opacification. 100mL of Isovue 370  intravenous contrast was used for better evaluation of solid organs and vascular  structures. Radiation dose reduction techniques were used for this study. All  CT scans performed at this facility use one or all of the following: Automated  exposure control, adjustment of the mA and/or kVp according to patient's size,  iterative reconstruction. COMPARISON: None    FINDINGS:  CHEST:  -LUNGS: No lobar pneumonia. Linear atelectasis left base.   -AIRWAYS: Trachea and proximal bronchi grossly patent. -PLEURA: Small left pleural effusion. -LYMPH NODES: No enlarged axillary, hilar or mediastinal lymph nodes. -HEART: Normal size.    -SKELETAL/CHEST WALL: Long left chest wall and body wall there is a complex,  peripherally enhancing fluid collection which measures approximately 32 cm  superior inferior, 17 cm wide and 4 cm thick. There are several bubbles of gas  within it. Larger bubbles of gas extend up into the axilla. ABDOMEN/PELVIS:  -LIVER: Normal in size and appearance. -GALLBLADDER: No gallstones identified  -BILE DUCTS: Not dilated. -PANCREAS: Normal.  -SPLEEN: Normal.    -ADRENALS: Normal.  -KIDNEYS/URETERS: Kidneys enhance symmetrically. There are multiple bilateral  parapelvic cysts versus bilateral UPJ obstructions. -BLADDER: Normal.  -REPRODUCTIVE ORGANS: Prostate and seminal vesicles unremarkable. -BOWEL: Normal caliber. No inflammatory changes. Scattered diverticula. -LYMPH NODES: No significant retroperitoneal, mesenteric, or pelvic adenopathy. -BONES: Degenerative changes. No periostitis. -VASCULATURE: Aorta is normal caliber and mildly calcified. -OTHER: No ascites. Impression  Large left abscess, 32 cm x 17 cm x 4 cm which extends from the  axilla to the iliac crest.  689    US Results (most recent):  No results found for this or any previous visit.         Admission date (for inpatients): 5/24/2021   * No surgery found *  Procedure(s):  LEFT CHEST WALL AND FLANK WOUND DEBRIDEMENT        ASSESSMENT/PLAN:  Problem List  Date Reviewed: 5/24/2021        Codes Class Noted    Confusion ICD-10-CM: R41.0  ICD-9-CM: 298.9  5/27/2021        Sundowning ICD-10-CM: F05  ICD-9-CM: Ashby Flatter  5/27/2021        Leukocytosis ICD-10-CM: O18.400  ICD-9-CM: 288.60  5/26/2021        Acute renal insufficiency ICD-10-CM: N28.9  ICD-9-CM: 593.9  5/25/2021        Fever ICD-10-CM: R50.9  ICD-9-CM: 780.60  5/24/2021        Suppression of immune system subtherapeutic ([facility] [address] ICD-10-CM: D89.89  ICD-9-CM: 279.8  5/24/2021        Right flank hematoma ICD-10-CM: S30. 1XXA  ICD-9-CM: 922.2  5/24/2021        * (Principal) Chest wall abscess ICD-10-CM: L02.213  ICD-9-CM: 682.2  5/20/2021        Hematoma of left chest wall ICD-10-CM: L18.151E  ICD-9-CM: 922.1  5/20/2021        Cellulitis of chest wall ICD-10-CM: L03.313  ICD-9-CM: 682.2  5/20/2021        Fall ICD-10-CM: Y88. Marielaquita Dalia  ICD-9-CM: E888.9  5/20/2021        Psoriasis ICD-10-CM: L40.9  ICD-9-CM: 696.1  Unknown        Insomnia ICD-10-CM: G47.00  ICD-9-CM: 780.52  Unknown        Depression ICD-10-CM: F32.9  ICD-9-CM: 590  Unknown        Tachycardia ICD-10-CM: R00.0  ICD-9-CM: 785.0  6/13/2016        Murmur ICD-10-CM: R01.1  ICD-9-CM: 785.2  6/13/2016        Psoriatic arthritis ([facility] [address] ICD-10-CM: L40.50  ICD-9-CM: 696.0  2/1/2013        Wears hearing aid ICD-10-CM: Z97.4  ICD-9-CM: V45.89  2/1/2013        HLD (hyperlipidemia) ICD-10-CM: E78.5  ICD-9-CM: 272.4  2/1/2013        H/O urinary frequency ICD-10-CM: L50.426  ICD-9-CM: V13.09  2/1/2013        History of BPH ICD-10-CM: R32.948  ICD-9-CM: V13.89  2/1/2013            Principal Problem:    Chest wall abscess (5/20/2021)    Active Problems:    Cellulitis of chest wall (5/20/2021)      Fall (5/20/2021)      Fever (5/24/2021)      Suppression of immune system subtherapeutic ([facility] [address] (5/24/2021)      Right flank hematoma (5/24/2021)      Acute renal insufficiency (5/25/2021)      Leukocytosis (5/26/2021)      Confusion (5/27/2021)      Sundowning (5/27/2021)           Number and Complexity of Problems addressed and   Risks of complications and/or morbidity of management        Massive MRSA Infection of soft tissues of left chest wall with abscess and extension, cellulitis, and large open wound with necrosis of the left axillae, left chest wall, left flank, LLQ, and left iliac region  This failed outpt Rx with PO Bactrim and also failed initial inpt Rx with IV Vanc and Rocpehin     Continue inpt admission for IV Vanc and inpt complex dressing changes for a massive left chest wall, left axillary, left flank, LLQ wound abscess cavity. We initially decided to proceed to OR on 6/4/21 for debridement #2, washout, and dressing change   As of 7:53am he is he wants to sign out AMA. I told him if he signs out AMA he could die from sepsis. He said he is still leaving AMA and to call his sister which I did   She and pt's live-in wife are trying to talk patient into allowing more intpt treatment and not leaving AMA    Update 6/4/21  At 11AM he was oriented and after he discussed situation with his sister and girlfriend he decided to proceed with surgery. Risks/benefits and advantages/disadvantages of surgery vs no surgery was discussed with him once again. He voices udnerstanding  He consents to proceed with surgery. Plan serial debridements in the OR.  6/2/21 OR for debridement #1  6/4/21 OR for debridement/washout/dressing change #2          Chronic immune suppression  Continue to hold his methotrexate, prednisone, and Humira to help reduce his immune suppression during this acute phase of infection.       Dermatitis around left chest wall wound from moisture maceration  Desitin ordered to destinee-wound skin    Confusion/sundowning at night  For many days he was uncooperative and pulling IV sites   Sitters were required  This has improving significantly  May no longer require sitter              Hershal Nicely, NP

## 2021-06-05 NOTE — ROUTINE PROCESS
END OF SHIFT NOTE:    INTAKE/OUTPUT  06/03 0701 - 06/04 0700  In: 2409.2 [P.O.:1380; I.V.:1029.2]  Out: 4275 [Urine:4275]  Voiding: NO  Catheter: YES  Drain:      Flatus: Patient does not have flatus present. Stool:  0 occurrences. Characteristics:  Stool Assessment  Stool Color: Brown  Stool Appearance: Loose (per pt)  Stool Amount: Medium  Stool Source/Status: Rectum    Emesis: 0 occurrences. Characteristics:        VITAL SIGNS  Patient Vitals for the past 12 hrs:   Temp Pulse Resp BP SpO2   06/04/21 1951 97.3 °F (36.3 °C) 78 18 106/68 100 %   06/04/21 1610 97.7 °F (36.5 °C) 84 17 (!) 90/55 97 %   06/04/21 1413 -- 82 -- -- 96 %   06/04/21 1410 -- 100 -- (!) 108/57 --   06/04/21 1405 -- (!) 110 -- 108/60 --   06/04/21 1400 -- (!) 105 -- 103/65 --   06/04/21 1356 -- 100 -- (!) 115/56 --   06/04/21 1350 97.5 °F (36.4 °C) 88 16 (!) 114/59 91 %   06/04/21 1345 -- 83 16 (!) 120/56 92 %   06/04/21 1340 -- 79 15 110/60 92 %   06/04/21 1336 -- (!) 107 15 119/60 92 %   06/04/21 1330 -- 86 12 107/69 93 %   06/04/21 1325 -- 100 12 129/73 91 %   06/04/21 1321 -- 99 12 128/74 93 %   06/04/21 1320 97.1 °F (36.2 °C) (!) 101 11 (!) 167/79 96 %   06/04/21 1312 -- 94 12 (!) 167/79 95 %   06/04/21 1115 98.9 °F (37.2 °C) 70 16 (!) 158/83 96 %       Pain Assessment  Pain Intensity 1: 5 (06/04/21 1546)  Pain Location 1: Flank  Pain Intervention(s) 1: Medication (see MAR)  Patient Stated Pain Goal: 0    Ambulating  Yes; to bathroom with assist.    Shift report given to oncoming nurse at the bedside.     Betty Nevarez RN

## 2021-06-05 NOTE — PROGRESS NOTES
END OF SHIFT NOTE:    INTAKE/OUTPUT  06/04 0701 - 06/05 0700  In: 2365 [P.O.:240; I.V.:2125]  Out: 1900 [Urine:1800]  Voiding: YES  Catheter: YES  Drain:              Flatus: Patient does not have flatus present. Stool:  0 occurrences. Characteristics:  Stool Assessment  Stool Color: Brown  Stool Appearance: Loose (per pt)  Stool Amount: Medium  Stool Source/Status: Rectum    Emesis: 0 occurrences. Characteristics:        VITAL SIGNS  Patient Vitals for the past 12 hrs:   Temp Pulse Resp BP SpO2   06/05/21 0352 98.4 °F (36.9 °C) 91 18 (!) 95/51 96 %   06/04/21 2248 98 °F (36.7 °C) 76 18 109/69 98 %   06/04/21 1951 97.3 °F (36.3 °C) 78 18 106/68 100 %       Pain Assessment  Pain Intensity 1: 2 (06/05/21 0407)  Pain Location 1: Back  Pain Intervention(s) 1: Rest  Patient Stated Pain Goal: 0    Ambulating  Yes    Shift report given to oncoming nurse at the bedside.     Claudeen Chant, RN

## 2021-06-06 LAB
CREAT SERPL-MCNC: 0.64 MG/DL (ref 0.8–1.5)
GLUCOSE BLD STRIP.AUTO-MCNC: 122 MG/DL (ref 65–100)
GLUCOSE BLD STRIP.AUTO-MCNC: 123 MG/DL (ref 65–100)
GLUCOSE BLD STRIP.AUTO-MCNC: 126 MG/DL (ref 65–100)
GLUCOSE BLD STRIP.AUTO-MCNC: 130 MG/DL (ref 65–100)
GLUCOSE BLD STRIP.AUTO-MCNC: 141 MG/DL (ref 65–100)
SERVICE CMNT-IMP: ABNORMAL

## 2021-06-06 PROCEDURE — 74011250636 HC RX REV CODE- 250/636: Performed by: SURGERY

## 2021-06-06 PROCEDURE — 82565 ASSAY OF CREATININE: CPT

## 2021-06-06 PROCEDURE — 94760 N-INVAS EAR/PLS OXIMETRY 1: CPT

## 2021-06-06 PROCEDURE — 77010033678 HC OXYGEN DAILY

## 2021-06-06 PROCEDURE — 74011000258 HC RX REV CODE- 258: Performed by: SURGERY

## 2021-06-06 PROCEDURE — 2709999900 HC NON-CHARGEABLE SUPPLY

## 2021-06-06 PROCEDURE — 65270000029 HC RM PRIVATE

## 2021-06-06 PROCEDURE — 74011250636 HC RX REV CODE- 250/636: Performed by: NURSE PRACTITIONER

## 2021-06-06 PROCEDURE — 77030018836 HC SOL IRR NACL ICUM -A

## 2021-06-06 PROCEDURE — 36415 COLL VENOUS BLD VENIPUNCTURE: CPT

## 2021-06-06 PROCEDURE — 74011250637 HC RX REV CODE- 250/637: Performed by: SURGERY

## 2021-06-06 RX ORDER — DIPHENHYDRAMINE HYDROCHLORIDE 50 MG/ML
25 INJECTION, SOLUTION INTRAMUSCULAR; INTRAVENOUS
Status: DISCONTINUED | OUTPATIENT
Start: 2021-06-06 | End: 2021-06-18 | Stop reason: HOSPADM

## 2021-06-06 RX ADMIN — TAMSULOSIN HYDROCHLORIDE 0.4 MG: 0.4 CAPSULE ORAL at 09:32

## 2021-06-06 RX ADMIN — VANCOMYCIN HYDROCHLORIDE 1500 MG: 10 INJECTION, POWDER, LYOPHILIZED, FOR SOLUTION INTRAVENOUS at 06:02

## 2021-06-06 RX ADMIN — DIPHENHYDRAMINE HYDROCHLORIDE 25 MG: 50 INJECTION, SOLUTION INTRAMUSCULAR; INTRAVENOUS at 22:17

## 2021-06-06 RX ADMIN — CEFTRIAXONE 1 G: 1 INJECTION, POWDER, FOR SOLUTION INTRAMUSCULAR; INTRAVENOUS at 20:00

## 2021-06-06 RX ADMIN — Medication: at 06:00

## 2021-06-06 RX ADMIN — HYDROCODONE BITARTRATE AND ACETAMINOPHEN 1 TABLET: 5; 325 TABLET ORAL at 03:27

## 2021-06-06 RX ADMIN — Medication 1 AMPULE: at 13:12

## 2021-06-06 RX ADMIN — Medication 1 AMPULE: at 21:03

## 2021-06-06 RX ADMIN — HYDROCODONE BITARTRATE AND ACETAMINOPHEN 1 TABLET: 5; 325 TABLET ORAL at 19:57

## 2021-06-06 RX ADMIN — FAMOTIDINE 20 MG: 20 TABLET ORAL at 18:19

## 2021-06-06 RX ADMIN — Medication: at 21:04

## 2021-06-06 RX ADMIN — FAMOTIDINE 20 MG: 20 TABLET ORAL at 09:32

## 2021-06-06 RX ADMIN — Medication 1 AMPULE: at 05:26

## 2021-06-06 RX ADMIN — HYDROCODONE BITARTRATE AND ACETAMINOPHEN 1 TABLET: 5; 325 TABLET ORAL at 13:13

## 2021-06-06 NOTE — PROGRESS NOTES
H&P/Consult Note/Progress Note/Office Note:   Asael Toure MRN: [id number]  :1945  Age:75 y.o.    HPI: Asael Toure is a 77 y.o. male who was initially referred in consultation by Augie Bella PA-C with a left chest wall hematoma and cellulitis   after a fall at home on the floor approx 21. He sustained a blunt injury to his left chest wall. CXR below showed no PTx or rib fractures. He taked Humira for arthritis and is immune suppressed. We saw him for the 1st time on 21 with erythema of the left chest wall and placed him on Bactrim and marked the cellulitis border. He was instructed to call if any fevers or worsening erythema and given f/u 4 days later  He did not call. When he came to the office on 21 he reported high fevers over 101 degrees associate with nausea with his Bactrim. The erythema had extended >20cm  beyond the original inscribed lines of his left chest wall and extended down his left flank and in his left lower quadrant. We was admitted directly from office. He denies any new falls since 21. On 21 he also had a new hematoma involving his right flank which was not present on 21 (likely a second fall)      21 CXR, 2 views  Clear lungs. The cardiac and mediastinal contours and pulmonary vascularity are normal.   The bones and soft tissues are within normal limits. No evidence of a pneumothorax. IMPRESSION:  Normal chest.      21 CT chest/abd/pelvis with oral and IV contrast  Hx: MRSA cellulitis of left chest wall abscess and fever.     CHEST:   -LUNGS: No lobar pneumonia. Linear atelectasis left base. -AIRWAYS: Trachea and proximal bronchi grossly patent. -PLEURA: Small left pleural effusion. -LYMPH NODES: No enlarged axillary, hilar or mediastinal lymph nodes.   -HEART: Normal size.     -SKELETAL/CHEST WALL: Along left chest wall and body wall there is a complex,  peripherally enhancing fluid collection which measures approximately 32 cm  superior inferior, 17 cm wide and 4 cm thick. There are several bubbles of gas  within it. Larger bubbles of gas extend up into the axilla.     ABDOMEN/PELVIS:  -LIVER: Normal in size and appearance. -GALLBLADDER: No gallstones identified  -BILE DUCTS: Not dilated.     -PANCREAS: Normal.  -SPLEEN: Normal.     -ADRENALS: Normal.  -KIDNEYS/URETERS: Kidneys enhance symmetrically. There are multiple bilateral  parapelvic cysts versus bilateral UPJ obstructions. -BLADDER: Normal.  -REPRODUCTIVE ORGANS: Prostate and seminal vesicles unremarkable.     -BOWEL: Normal caliber. No inflammatory changes. Scattered diverticula. -LYMPH NODES: No significant retroperitoneal, mesenteric, or pelvic adenopathy. -BONES: Degenerative changes. No periostitis. -VASCULATURE: Aorta is normal caliber and mildly calcified. -OTHER: No ascites.     IMPRESSION  Large left abscess, 32 cm x 17 cm x 4 cm which extends from the axilla to the iliac crest.            Additional hx:  5/27/21 wound culture growing MRSA; Pharmacy dosing IV Vanc; trough tonight  5/28/21  Less Confused; no fever; WBC elevated but better;  on IV Abx  5/29/21  Resting in bed; no fever; WBC 12.3;  on IV Abx  5/30/21  Sitting on side of bed; no fever; WBC 13.8;  on IV Abx  5/31/21 AF; wbc still elevated; left hemithorax , left flank and LLQ edematous and indurated  6/1/21 continues IV Vanc; WBC higher at 12.2k   6/2/21 IV Vanc; CT as above with large abscess; I&D/debridment #1 in OR today  6/3/21 IV Vanc/Rocephin; OR culture pending  6/4/21 plan return to OR today for debridement #2; unless pt signs out AMA  6/5/21 POD 3/1; Pt resting in bed. No complaints voiced. Appears comfortable. Postop dressing c/d/i.   6/6/21 POD 4/2; Pt sitting on side of bed. No complaints voiced. Appears comfortable. Dressing c/d/i.        Past Medical History:   Diagnosis Date    Depression     H/O urinary frequency 2/1/2013    History of BPH 2/1/2013    HLD (hyperlipidemia) 2/1/2013    Hx: UTI (urinary tract infection) 2/1/2013    Insomnia     Psoriasis     Psoriatic arthritis ([facility] [address] 2/1/2013    Wears hearing aid 2/1/2013     No past surgical history on file. Current Facility-Administered Medications   Medication Dose Route Frequency    HYDROmorphone (DILAUDID) injection 0.2-0.5 mg  0.2-0.5 mg IntraVENous Q3H PRN    lactated Ringers infusion  40 mL/hr IntraVENous CONTINUOUS    insulin regular (NOVOLIN R, HUMULIN R) injection   SubCUTAneous Q6H    cefTRIAXone (ROCEPHIN) 1 g in 0.9% sodium chloride (MBP/ADV) 50 mL MBP  1 g IntraVENous Q24H    famotidine (PEPCID) tablet 20 mg  20 mg Oral BID    alcohol 62% (NOZIN) nasal  1 Ampule  1 Ampule Topical Q8H    zinc oxide-cod liver oil (DESITIN) 40 % paste   Topical Q8H    acetaminophen (TYLENOL) tablet 1,000 mg  1,000 mg Oral Q6H PRN    ondansetron (ZOFRAN) injection 4 mg  4 mg IntraVENous Q4H PRN    HYDROcodone-acetaminophen (NORCO) 5-325 mg per tablet 1 Tablet  1 Tablet Oral Q6H PRN    tamsulosin (FLOMAX) capsule 0.4 mg  0.4 mg Oral DAILY     Patient has no known allergies.   Social History     Socioeconomic History    Marital status: SINGLE     Spouse name: Not on file    Number of children: Not on file    Years of education: Not on file    Highest education level: Not on file   Tobacco Use    Smoking status: Former Smoker    Smokeless tobacco: Never Used    Tobacco comment: pt states quit in early 60's late 52's    Vaping Use    Vaping Use: Never used   Substance and Sexual Activity    Alcohol use: No     Alcohol/week: 0.0 standard drinks    Drug use: No    Sexual activity: Yes     Partners: Female     Social Determinants of Health     Financial Resource Strain:     Difficulty of Paying Living Expenses:    Food Insecurity:     Worried About Running Out of Food in the Last Year:     [address] in the Last Year:    Transportation Needs:     Lack of Transportation (Medical):    Paras Lack of Transportation (Non-Medical):    Physical Activity:     Days of Exercise per Week:     Minutes of Exercise per Session:    Stress:     Feeling of Stress :    Social Connections:     Frequency of Communication with Friends and Family:     Frequency of Social Gatherings with Friends and Family:     Attends Mosque Services:     Active Member of Clubs or Organizations:     Attends Club or Organization Meetings:     Marital Status:      Social History     Tobacco Use   Smoking Status Former Smoker   Smokeless Tobacco Never Used   Tobacco Comment    pt states quit in early 60's late 52's      Family History   Problem Relation Age of Onset    Heart Attack Father     Cancer Father         skin     Other Sister     Cancer Sister     Other Brother         melanoma     ROS: The patient has no difficulty with chest pain or shortness of breath. No fever or chills. Comprehensive review of systems was otherwise unremarkable except as noted above. Physical Exam:   Visit Vitals  /79 (BP 1 Location: Right upper arm, BP Patient Position: Supine)   Pulse 68   Temp 99.1 °F (37.3 °C)   Resp 17   Ht 5' 11\" (1.803 m)   Wt 158 lb (71.7 kg)   SpO2 96%   BMI 22.04 kg/m²     Vitals:    06/05/21 2322 06/06/21 0440 06/06/21 0646 06/06/21 0720   BP: (!) 146/82 138/88  138/79   Pulse: 72 76  68   Resp: 18 18  17   Temp: 99.4 °F (37.4 °C) 99 °F (37.2 °C)  99.1 °F (37.3 °C)   SpO2: 100% 98% 96%    Weight:       Height:         [unfilled]  [unfilled]    Constitutional: Alert, oriented, cooperative patient in no acute distress; appears stated age    Eyes:Sclera are clear. EOMs intact  ENMT: no external lesions; he is hard of hearing. no obvious neck masses, no ear or lip lesions, nares normal  CV: RRR.  Normal perfusion      Large open incision from left axillae to left iliac crest with wound packing and drainage  The erythema and cellulitic changes of the left chest wall and left flank are improving     Right flank hematoma stable    Resp: No JVD. Breathing is  non-labored; no audible wheezing. GI: soft and non-distended     Musculoskeletal: unremarkable with normal function. No embolic signs or cyanosis. Neuro:  Oriented to person, place and time;  moves all 4; no focal deficits; slow moving and slow talking. Psychiatric: blunted affect, no memory impairment    Recent vitals (if inpt):  @IPVITALS(24:)@    Amount and/or Complexity of Data Reviewed and Analyzed:  I reviewed and analyzed all of the unique labs and radiologic studies that are shown below as well as any that are in the HPI, and any that are in the expanded problem list below  *Each unique test, order, or document contributes to the combination of 2 or combination of 3 in Category 1 below. For this visit I also reviewed old records and prior notes. Recent Labs     06/06/21  0535   CREA 0.64*     Review of most recent CBC  Lab Results   Component Value Date/Time    WBC 10.8 06/03/2021 05:33 AM    HGB 12.3 (L) 06/03/2021 05:33 AM    HCT 37.8 (L) 06/03/2021 05:33 AM    PLATELET 771 20/34/3508 05:33 AM    MCV 99.0 (H) 06/03/2021 05:33 AM       Review of most recent BMP  Lab Results   Component Value Date/Time    Sodium 134 (L) 06/03/2021 05:33 AM    Potassium 4.7 06/03/2021 05:33 AM    Chloride 103 06/03/2021 05:33 AM    CO2 26 06/03/2021 05:33 AM    Anion gap 5 (L) 06/03/2021 05:33 AM    Glucose 251 (H) 06/03/2021 05:33 AM    BUN 18 06/03/2021 05:33 AM    Creatinine 0.64 (L) 06/06/2021 05:35 AM    BUN/Creatinine ratio 19 03/31/2016 11:26 AM    GFR est AA >60 06/03/2021 05:33 AM    GFR est non-AA >60 06/03/2021 05:33 AM    Calcium 7.9 (L) 06/03/2021 05:33 AM       Review of most recent LFTs (and lipase if done)  Lab Results   Component Value Date/Time    ALT (SGPT) 80 (H) 05/24/2021 12:57 PM    AST (SGOT) 53 (H) 05/24/2021 12:57 PM    Alk.  phosphatase 124 05/24/2021 12:57 PM    Bilirubin, total 0.9 05/24/2021 12:57 PM     No results found for: LPSE    No results found for: INR, APTT, CBIL, LCAD, NH4, TROPT, TROIQ, INREXT, INREXT    Review of most recent HgbA1c  Lab Results   Component Value Date/Time    Hemoglobin A1c 4.9 06/05/2021 02:21 PM       Nutritional assessment screen for wound healing issues:  Lab Results   Component Value Date/Time    Protein, total 6.5 05/24/2021 12:57 PM    Albumin 2.4 (L) 05/24/2021 12:57 PM       @lastcovr@  XR Results (most recent):  Results from Hospital Encounter encounter on 05/20/21    XR CHEST PA LAT    Narrative  EXAM: XR CHEST PA LAT    INDICATION: Left chest wall hematoma with cellulitis rule out rib fracture or  pneumothorax after a fall at home on the floor. COMPARISON: None. FINDINGS: PA and lateral radiographs of the chest demonstrate clear lungs. The  cardiac and mediastinal contours and pulmonary vascularity are normal. The bones  and soft tissues are within normal limits. No evidence of a pneumothorax. Impression  Normal chest.      CT Results (most recent):  Results from Hospital Encounter encounter on 05/24/21    CT CHEST ABD PELV W CONT    Narrative  CT OF THE CHEST ABDOMEN AND PELVIS    INDICATION: MRSA cellulitis of left chest wall abscess and fever. TECHNIQUE:  Multiple axial images were obtained through the chest, abdomen and  pelvis. Oral contrast was used for bowel opacification. 100mL of Isovue 370  intravenous contrast was used for better evaluation of solid organs and vascular  structures. Radiation dose reduction techniques were used for this study. All  CT scans performed at this facility use one or all of the following: Automated  exposure control, adjustment of the mA and/or kVp according to patient's size,  iterative reconstruction. COMPARISON: None    FINDINGS:  CHEST:  -LUNGS: No lobar pneumonia. Linear atelectasis left base. -AIRWAYS: Trachea and proximal bronchi grossly patent. -PLEURA: Small left pleural effusion.   -LYMPH NODES: No enlarged axillary, hilar or mediastinal lymph nodes. -HEART: Normal size.    -SKELETAL/CHEST WALL: Long left chest wall and body wall there is a complex,  peripherally enhancing fluid collection which measures approximately 32 cm  superior inferior, 17 cm wide and 4 cm thick. There are several bubbles of gas  within it. Larger bubbles of gas extend up into the axilla. ABDOMEN/PELVIS:  -LIVER: Normal in size and appearance. -GALLBLADDER: No gallstones identified  -BILE DUCTS: Not dilated. -PANCREAS: Normal.  -SPLEEN: Normal.    -ADRENALS: Normal.  -KIDNEYS/URETERS: Kidneys enhance symmetrically. There are multiple bilateral  parapelvic cysts versus bilateral UPJ obstructions. -BLADDER: Normal.  -REPRODUCTIVE ORGANS: Prostate and seminal vesicles unremarkable. -BOWEL: Normal caliber. No inflammatory changes. Scattered diverticula. -LYMPH NODES: No significant retroperitoneal, mesenteric, or pelvic adenopathy. -BONES: Degenerative changes. No periostitis. -VASCULATURE: Aorta is normal caliber and mildly calcified. -OTHER: No ascites. Impression  Large left abscess, 32 cm x 17 cm x 4 cm which extends from the  axilla to the iliac crest.  689    US Results (most recent):  No results found for this or any previous visit. Admission date (for inpatients): 5/24/2021   * No surgery found *  Procedure(s):  LEFT CHEST WALL AND FLANK WOUND DEBRIDEMENT        ASSESSMENT/PLAN:  Problem List  Date Reviewed: 5/24/2021        Codes Class Noted    Confusion ICD-10-CM: R41.0  ICD-9-CM: 298.9  5/27/2021        Sundowning ICD-10-CM: F05  ICD-9-CM: Anahielvira Han  5/27/2021        Leukocytosis ICD-10-CM: P51.954  ICD-9-CM: 288.60  5/26/2021        Acute renal insufficiency ICD-10-CM: N28.9  ICD-9-CM: 593.9  5/25/2021        Fever ICD-10-CM: R50.9  ICD-9-CM: 780.60  5/24/2021        Suppression of immune system subtherapeutic (HCC) ICD-10-CM: D89.89  ICD-9-CM: 279.8  5/24/2021        Right flank hematoma ICD-10-CM: S30. 1XXA  ICD-9-CM: 922.2 5/24/2021        * (Principal) Chest wall abscess ICD-10-CM: L02.213  ICD-9-CM: 682.2  5/20/2021        Hematoma of left chest wall ICD-10-CM: W20.240S  ICD-9-CM: 922.1  5/20/2021        Cellulitis of chest wall ICD-10-CM: L03.313  ICD-9-CM: 682.2  5/20/2021        Fall ICD-10-CM: D82. Robert Balls  ICD-9-CM: E888.9  5/20/2021        Psoriasis ICD-10-CM: L40.9  ICD-9-CM: 696.1  Unknown        Insomnia ICD-10-CM: G47.00  ICD-9-CM: 780.52  Unknown        Depression ICD-10-CM: F32.9  ICD-9-CM: 775  Unknown        Tachycardia ICD-10-CM: R00.0  ICD-9-CM: 785.0  6/13/2016        Murmur ICD-10-CM: R01.1  ICD-9-CM: 785.2  6/13/2016        Psoriatic arthritis ([facility] [address] ICD-10-CM: L40.50  ICD-9-CM: 696.0  2/1/2013        Wears hearing aid ICD-10-CM: Z97.4  ICD-9-CM: V45.89  2/1/2013        HLD (hyperlipidemia) ICD-10-CM: E78.5  ICD-9-CM: 272.4  2/1/2013        H/O urinary frequency ICD-10-CM: X12.773  ICD-9-CM: V13.09  2/1/2013        History of BPH ICD-10-CM: A00.348  ICD-9-CM: V13.89  2/1/2013            Principal Problem:    Chest wall abscess (5/20/2021)    Active Problems:    Cellulitis of chest wall (5/20/2021)      Fall (5/20/2021)      Fever (5/24/2021)      Suppression of immune system subtherapeutic ([facility] [address] (5/24/2021)      Right flank hematoma (5/24/2021)      Acute renal insufficiency (5/25/2021)      Leukocytosis (5/26/2021)      Confusion (5/27/2021)      Sundowning (5/27/2021)           Number and Complexity of Problems addressed and   Risks of complications and/or morbidity of management        Massive MRSA Infection of soft tissues of left chest wall with abscess and extension, cellulitis, and large open wound with necrosis of the left axillae, left chest wall, left flank, LLQ, and left iliac region  This failed outpt Rx with PO Bactrim and also failed initial inpt Rx with IV Vanc and Rocpehin     Continue inpt admission for IV Vanc and inpt complex dressing changes for a massive left chest wall, left axillary, left flank, LLQ wound abscess cavity. We initially decided to proceed to OR on 6/4/21 for debridement #2, washout, and dressing change   As of 7:53am he is he wants to sign out AMA. I told him if he signs out AMA he could die from sepsis. He said he is still leaving AMA and to call his sister which I did   She and pt's live-in wife are trying to talk patient into allowing more intpt treatment and not leaving AMA    Update 6/4/21  At 11AM he was oriented and after he discussed situation with his sister and girlfriend he decided to proceed with surgery. Risks/benefits and advantages/disadvantages of surgery vs no surgery was discussed with him once again. He voices udnerstanding  He consents to proceed with surgery. Plan serial debridements in the OR.  6/2/21 OR for debridement #1  6/4/21 OR for debridement/washout/dressing change #2          Chronic immune suppression  Continue to hold his methotrexate, prednisone, and Humira to help reduce his immune suppression during this acute phase of infection.       Dermatitis around left chest wall wound from moisture maceration  Desitin ordered to destinee-wound skin    Confusion/sundowning at night  For many days he was uncooperative and pulling IV sites   Sitters were required  This has improving significantly  May no longer require sitter              Nehemias Hodge NP

## 2021-06-06 NOTE — PROGRESS NOTES
END OF SHIFT NOTE:    INTAKE/OUTPUT  06/05 0701 - 06/06 0700  In: 2165 [I.V.:1305]  Out: 6661 [Urine:4175]  Voiding: YES  Catheter: YES  Drain:              Flatus: Patient does have flatus present. Stool:  0 occurrences. Characteristics:  Stool Assessment  Stool Color: Brown  Stool Appearance:  (have not observed)  Stool Amount: Medium  Stool Source/Status: Rectum    Emesis: 0 occurrences. Characteristics:        VITAL SIGNS  Patient Vitals for the past 12 hrs:   Temp Pulse Resp BP SpO2   06/06/21 0440 99 °F (37.2 °C) 76 18 138/88 98 %   06/05/21 2322 99.4 °F (37.4 °C) 72 18 (!) 146/82 100 %   06/05/21 1956 99.3 °F (37.4 °C) 74 19 127/77 96 %       Pain Assessment  Pain Intensity 1: 6 (06/06/21 0327)  Pain Location 1: Back  Pain Intervention(s) 1: Medication (see MAR)  Patient Stated Pain Goal: 0    Ambulating  No    Shift report given to oncoming nurse at the bedside.     Balbina Dumont RN

## 2021-06-06 NOTE — PROGRESS NOTES
Flank and axilla dressing saturated with drainage. Pt stated \"it's all wet and feels terrible. Please put a dry one on. \"  Pt premedicated and dressing to be changed

## 2021-06-07 ENCOUNTER — APPOINTMENT (OUTPATIENT)
Dept: NON INVASIVE DIAGNOSTICS | Age: 76
DRG: 264 | End: 2021-06-07
Attending: NURSE PRACTITIONER
Payer: MEDICARE

## 2021-06-07 LAB
ECHO AO ROOT DIAM: 3.5 CM
ECHO AV PEAK GRADIENT: 6 MMHG
ECHO AV PEAK VELOCITY: 118 CM/S
ECHO LA AREA 2C: 19.3 CM2
ECHO LA AREA 4C: 20.6 CM2
ECHO LA MAJOR AXIS: 6.18 CM
ECHO LA MINOR AXIS: 3.24 CM
ECHO LV E' LATERAL VELOCITY: 12 CM/S
ECHO LV E' SEPTAL VELOCITY: 12 CM/S
ECHO LV E' SEPTAL VELOCITY: 7.6 CM/S
ECHO LV E' SEPTAL VELOCITY: 7.6 CM/S
ECHO LV EDV A2C: 90.4 CM3
ECHO LV EDV A4C: 146 CM3
ECHO LV ESV A2C: 51.6 CM3
ECHO LV ESV A4C: 59.1 CM3
ECHO LV INTERNAL DIMENSION DIASTOLIC: 4.85 CM (ref 4.2–5.9)
ECHO LV INTERNAL DIMENSION SYSTOLIC: 3.66 CM
ECHO LV IVSD: 0.92 CM (ref 0.6–1)
ECHO LV MASS 2D: 154.8 G (ref 88–224)
ECHO LV MASS INDEX 2D: 81 G/M2 (ref 49–115)
ECHO LV POSTERIOR WALL DIASTOLIC: 0.92 CM (ref 0.6–1)
ECHO LVOT PEAK GRADIENT: 5 MMHG
ECHO MV A VELOCITY: 57.8 CM/S
ECHO MV E VELOCITY: 60.6 CM/S
ECHO MV E/A RATIO: 1.05
ECHO MV E/E' LATERAL: 5.05
ECHO PV REGURGITANT MAX VELOCITY: 109 CM/S
ECHO RV INTERNAL DIMENSION: 2.34 CM
ECHO RV TAPSE: 2.82 CM (ref 1.5–2)
GLUCOSE BLD STRIP.AUTO-MCNC: 116 MG/DL (ref 65–100)
SERVICE CMNT-IMP: ABNORMAL

## 2021-06-07 PROCEDURE — 97606 NEG PRS WND THER DME>50 SQCM: CPT

## 2021-06-07 PROCEDURE — 74011250637 HC RX REV CODE- 250/637: Performed by: SURGERY

## 2021-06-07 PROCEDURE — 2709999900 HC NON-CHARGEABLE SUPPLY

## 2021-06-07 PROCEDURE — 97605 NEG PRS WND THER DME<=50SQCM: CPT

## 2021-06-07 PROCEDURE — 74011250636 HC RX REV CODE- 250/636: Performed by: SURGERY

## 2021-06-07 PROCEDURE — 74011250636 HC RX REV CODE- 250/636: Performed by: NURSE PRACTITIONER

## 2021-06-07 PROCEDURE — 74011000250 HC RX REV CODE- 250: Performed by: SURGERY

## 2021-06-07 PROCEDURE — 77030019934 HC DRSG VAC ASST KCON -B

## 2021-06-07 PROCEDURE — 99232 SBSQ HOSP IP/OBS MODERATE 35: CPT | Performed by: SURGERY

## 2021-06-07 PROCEDURE — 77030019952 HC CANSTR VAC ASST KCON -B

## 2021-06-07 PROCEDURE — C8929 TTE W OR WO FOL WCON,DOPPLER: HCPCS

## 2021-06-07 PROCEDURE — 74011250637 HC RX REV CODE- 250/637: Performed by: NURSE PRACTITIONER

## 2021-06-07 PROCEDURE — 97530 THERAPEUTIC ACTIVITIES: CPT

## 2021-06-07 PROCEDURE — 82962 GLUCOSE BLOOD TEST: CPT

## 2021-06-07 PROCEDURE — 65270000029 HC RM PRIVATE

## 2021-06-07 RX ORDER — LORATADINE 10 MG/1
10 TABLET ORAL DAILY
Status: DISCONTINUED | OUTPATIENT
Start: 2021-06-07 | End: 2021-06-18 | Stop reason: HOSPADM

## 2021-06-07 RX ORDER — VANCOMYCIN/0.9 % SOD CHLORIDE 1.5G/250ML
1500 PLASTIC BAG, INJECTION (ML) INTRAVENOUS
Status: DISCONTINUED | OUTPATIENT
Start: 2021-06-07 | End: 2021-06-08

## 2021-06-07 RX ADMIN — HYDROCODONE BITARTRATE AND ACETAMINOPHEN 1 TABLET: 5; 325 TABLET ORAL at 04:18

## 2021-06-07 RX ADMIN — Medication 1 AMPULE: at 05:19

## 2021-06-07 RX ADMIN — DIPHENHYDRAMINE HYDROCHLORIDE 25 MG: 50 INJECTION, SOLUTION INTRAMUSCULAR; INTRAVENOUS at 17:21

## 2021-06-07 RX ADMIN — Medication 1 AMPULE: at 15:47

## 2021-06-07 RX ADMIN — HYDROCODONE BITARTRATE AND ACETAMINOPHEN 1 TABLET: 5; 325 TABLET ORAL at 20:37

## 2021-06-07 RX ADMIN — FAMOTIDINE 20 MG: 20 TABLET ORAL at 17:19

## 2021-06-07 RX ADMIN — HYDROMORPHONE HYDROCHLORIDE 0.5 MG: 1 INJECTION, SOLUTION INTRAMUSCULAR; INTRAVENOUS; SUBCUTANEOUS at 10:59

## 2021-06-07 RX ADMIN — TAMSULOSIN HYDROCHLORIDE 0.4 MG: 0.4 CAPSULE ORAL at 09:13

## 2021-06-07 RX ADMIN — Medication 1 AMPULE: at 20:41

## 2021-06-07 RX ADMIN — HYDROCODONE BITARTRATE AND ACETAMINOPHEN 1 TABLET: 5; 325 TABLET ORAL at 10:28

## 2021-06-07 RX ADMIN — Medication: at 05:20

## 2021-06-07 RX ADMIN — HYDROMORPHONE HYDROCHLORIDE 0.5 MG: 1 INJECTION, SOLUTION INTRAMUSCULAR; INTRAVENOUS; SUBCUTANEOUS at 15:46

## 2021-06-07 RX ADMIN — VANCOMYCIN HYDROCHLORIDE 1500 MG: 10 INJECTION, POWDER, LYOPHILIZED, FOR SOLUTION INTRAVENOUS at 10:28

## 2021-06-07 RX ADMIN — FAMOTIDINE 20 MG: 20 TABLET ORAL at 09:13

## 2021-06-07 RX ADMIN — SODIUM CHLORIDE, SODIUM LACTATE, POTASSIUM CHLORIDE, AND CALCIUM CHLORIDE 40 ML/HR: 600; 310; 30; 20 INJECTION, SOLUTION INTRAVENOUS at 04:10

## 2021-06-07 RX ADMIN — PERFLUTREN 1 ML: 6.52 INJECTION, SUSPENSION INTRAVENOUS at 15:25

## 2021-06-07 RX ADMIN — LORATADINE 10 MG: 10 TABLET ORAL at 17:19

## 2021-06-07 NOTE — WOUND CARE
Left axilla and chest with 2 open wounds upper is [id number] granular base with muscle exposed and undermining of 2cm, Lower is [id number]. 5cm with undermining on 8-10 o'clock of 2cm, undermining on 2-6 o'clock of 2cm, wound vac applied to each with skin prep to destinee-skin, bridged upper to lower tegether to 1 machine, with drape protected skin between. Will monitor.

## 2021-06-07 NOTE — PROGRESS NOTES
END OF SHIFT NOTE:    INTAKE/OUTPUT  06/06 0701 - 06/07 0700  In: 1865 [P.O.:660; I.V.:1205]  Out: 3723 [Urine:3750]  Voiding: NO  Catheter: YES  Drain:              Flatus: Patient does have flatus present. Stool:  0 occurrences. Characteristics:  Stool Assessment  Stool Color: Brown  Stool Appearance:  (have not observed)  Stool Amount: Medium  Stool Source/Status: Rectum    Emesis: 0 occurrences. Characteristics:        VITAL SIGNS  Patient Vitals for the past 12 hrs:   Temp Pulse Resp BP SpO2   06/07/21 1552 -- -- -- (!) 148/81 --   06/07/21 1544 99.8 °F (37.7 °C) 83 16 136/81 96 %   06/07/21 1215 99.3 °F (37.4 °C) 75 16 (!) 161/84 --   06/07/21 0743 97.7 °F (36.5 °C) 73 16 (!) 148/81 96 %       Pain Assessment  Pain Intensity 1: 7 (06/07/21 1546)  Pain Location 1: Flank  Pain Intervention(s) 1: Medication (see MAR)  Patient Stated Pain Goal: 0    Ambulating  No    Shift report given to oncoming nurse at the bedside.     Obdulia Almazan

## 2021-06-07 NOTE — PROGRESS NOTES
END OF SHIFT NOTE:    INTAKE/OUTPUT  06/06 0701 - 06/07 0700  In: 1865 [P.O.:660; I.V.:1205]  Out: 5044 [Urine:3750]  Voiding: YES  Catheter: YES  Drain:              Flatus: Patient does have flatus present. Stool:  0 occurrences. Characteristics:  Stool Assessment  Stool Color: Brown  Stool Appearance:  (have not observed)  Stool Amount: Medium  Stool Source/Status: Rectum    Emesis: 0 occurrences. Characteristics:        VITAL SIGNS  Patient Vitals for the past 12 hrs:   Temp Pulse Resp BP SpO2   06/07/21 0352 98.1 °F (36.7 °C) 73 18 (!) 144/77 98 %   06/06/21 2320 98.4 °F (36.9 °C) 68 16 133/74 97 %       Pain Assessment  Pain Intensity 1: 4 (06/07/21 0505)  Pain Location 1: Back, Flank  Pain Intervention(s) 1: Rest  Patient Stated Pain Goal: 0    Ambulating  No    Shift report given to oncoming nurse at the bedside.     Brianda German RN

## 2021-06-07 NOTE — CONSULTS
Infectious Disease Consult    Today's Date: 6/7/2021   Admit Date: 5/24/2021    Impression:   · MRSA left axillary, chest wall, and flank abscess; wound cx (5/25) MRSA; s/p I&D 6/2- op cx MRSA; repeat debridement 6/4  · Recent fall at home 5/16  · Hx of R armpit abscess 12/2020- bedside I&D and given Doxy  · Frequent ED presentations for fatigue/malaise   · Psoriatic arthritis on Humira    Plan:   · Continue IV Vancomycin- duration likely 2 more weeks given extensive involvement and complex abscess. Infection has likely been present for an extended duration. · Will obtain TTE today. Anti-infectives:   · Vanc (5/24  · CTX (5/24-5/30) (6/2-6/6)    Subjective:   Date of Consultation:  June 7, 2021  Referring Physician: Dr. Abhinav Kumar    Patient is a 77 y.o. male who sustained a fall at home on 5/16 and developed subsequent left chest wall hematoma and cellulitis. He was referred to General Surgery outpatient and was seen on 5/20 and reported fever. He was started on Bactrim and then returned to Surgery office on 5/24 with reported fevers of over 101 and nausea associated with Bactrim. Erythema had worsened from it had been previously demarkated so he was direct admitted. He was started on IV abx on admission and became confused and delirious and required a sitter. He developed worsening leukocytosis. Wound cx grew MRSA. Left hemithorax, flank, and LLQ continued to be edematous and indurated. He was taken to the OR on 6/2 for I&D of large complex abscesses with sharp excisional debridement of skin, fat, and fascia. He was taken back to OR on 6/4 for repeat debridement. General surgery plans to discharge him to SNF on PO antibiotic regimen. ID was consulted for assistance. Today the patient reports he has been feeling poor for a while now. Per chart review he has had multiple presentations in 4/2021 at [facility] ED with various complaints. Today he reports ongoing left sided pain.      Patient Active Problem List   Diagnosis Code    Psoriatic arthritis ([facility] [address] L40.50    Wears hearing aid Z97.4    HLD (hyperlipidemia) E78.5    H/O urinary frequency Z87.898    History of BPH Z87.438    Tachycardia R00.0    Murmur R01.1    Depression F32.9    Psoriasis L40.9    Insomnia G47.00    Chest wall abscess L02.213    Hematoma of left chest wall S20.212A    Cellulitis of chest wall L03.313    Fall W19. 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 1XXA    Acute renal insufficiency N28.9    Leukocytosis D72.829    Confusion R41.0    Sundowning F05     Past Medical History:   Diagnosis Date    Depression     H/O urinary frequency 2/1/2013    History of BPH 2/1/2013    HLD (hyperlipidemia) 2/1/2013    Hx: UTI (urinary tract infection) 2/1/2013    Insomnia     Psoriasis     [facility] 2/1/2013    Wears hearing aid 2/1/2013      Family History   Problem Relation Age of Onset    Heart Attack Father     Cancer Father         skin     Other Sister     Cancer Sister     Other Brother         melanoma      Social History     Tobacco Use    Smoking status: Former Smoker    Smokeless tobacco: Never Used    Tobacco comment: pt states quit in early 60's late 50's    Substance Use Topics    Alcohol use: No     Alcohol/week: 0.0 standard drinks     No past surgical history on file. Prior to Admission medications    Medication Sig Start Date End Date Taking? Authorizing Provider   clonazePAM (KlonoPIN) 1 mg tablet Take 1 Tab by mouth three (3) times daily. Max Daily Amount: 3 mg. Cancel Xanax 5/13/21  Yes Nkechi Best MD   predniSONE (DELTASONE) 10 mg tablet 3 po daily x 5 days, 2 po daily x 5 days, 1 po daily x 5 days, 1/2 po daily x 5 days and stop 5/13/21  Yes Nkechi Best MD   finasteride (PROSCAR) 5 mg tablet Take 1 Tab by mouth daily.  Stop dutasteride 0.5 mg cap 0.5 mg, tamsulosin 0.4 mg cap 0.4 mg [ 10/26/20  Yes Nkechi Best MD tamsulosin (FLOMAX) 0.4 mg capsule Take 2 Caps by mouth daily. Stop dutasteride 0.5 mg cap 0.5 mg, tamsulosin 0.4 mg cap 0.4 mg [ 10/26/20  Yes Glenda Richardson MD   methotrexate (RHEUMATREX) 2.5 mg tablet 8 tablets po weekly 20  Yes Glenda Richardson MD   adalimumab (Humira Pen) 40 mg/0.8 mL injection pen 40 mg by SubCUTAneous route Once every 2 weeks. Provider, Historical   meloxicam (MOBIC) 15 mg tablet Take 1 Tab by mouth daily. 20   Glenda Richardson MD   fluocinoNIDE (LIDEX) 0.05 % topical cream APPLY  CREAM TOPICALLY TO AFFECTED AREA ON SCALP TWICE DAILY  Patient not taking: Reported on 2021   Glenda Richardson MD   hydrocortisone valerate (WESTCORT) 0.2 % topical cream APPLY  A THIN LAYER TOPICALLY TO AFFECTED AREA ON FACE TWICE DAILY 20   Glenda Richardson MD   DULoxetine (CYMBALTA) 60 mg capsule Take 1 Cap by mouth daily. Patient not taking: Reported on 2021   Glenda Richardson MD       No Known Allergies     Review of Systems:  A comprehensive review of systems was negative except for that written in the History of Present Illness. Objective:     Visit Vitals  BP (!) 148/81 (BP 1 Location: Right arm)   Pulse 73   Temp 97.7 °F (36.5 °C)   Resp 16   Ht 5' 11\" (1.803 m)   Wt 71.7 kg (158 lb)   SpO2 96%   BMI 22.04 kg/m²     Temp (24hrs), Av.5 °F (36.9 °C), Min:97.7 °F (36.5 °C), Max:99.6 °F (37.6 °C)       Lines:  Peripheral IV:       Physical Exam:    General:  Alert, cooperative, well noursished, well developed, appears stated age; [place]   Eyes:  Sclera anicteric. Pupils equally round and reactive to light. Mouth/Throat: Mucous membranes normal, oral pharynx clear   Neck: Supple   Lungs:   Clear to auscultation bilaterally, good effort   CV:  Regular rate and rhythm,no murmur, click, rub or gallop   Abdomen:   Soft, non-tender.  bowel sounds normal. non-distended   Extremities: No cyanosis or edema   Skin: Large wet-to-dry dressing under left axilla extending down left chest   Lymph nodes: Cervical and supraclavicular normal   Musculoskeletal: No swelling or deformity   Lines/Devices:  Intact, no erythema, drainage or tenderness   Psych: Alert and oriented, normal mood affect given the setting       Data Review:     CBC:  No results for input(s): WBC, GRANS, MONOS, EOS, ANEU, ABL, HGB, HCT, PLT, HGBEXT, HCTEXT, PLTEXT in the last 72 hours. No lab exists for component: LYMPHS,  ANNEMARIE    BMP:  Recent Labs     06/06/21  0535   CREA 0.64*       LFTS:  No results for input(s): TBILI, ALT, AP, TP, ALB in the last 72 hours. No lab exists for component: SGOT    Microbiology:     All Micro Results     Procedure Component Value Units Date/Time    CULTURE, Jamal Lacy STAIN [[id number]]  (Abnormal)  (Susceptibility) Collected: 06/02/21 1740    Order Status: Completed Specimen: Abscess Updated: 06/05/21 0810     Special Requests: NO SPECIAL REQUESTS        GRAM STAIN 0 TO 10 WBCS SEEN PER OIF      FEW GRAM POSITIVE COCCI        Culture result:       MODERATE * METHICILLIN RESISTANT STAPHYLOCOCCUS AUREUS *            PATIENT IS A KNOWN MRSA       CULTURE, ANAEROBIC [[id number]] Collected: 06/02/21 1740    Order Status: Completed Specimen: Abscess Updated: 06/04/21 1010     Special Requests: NO SPECIAL REQUESTS        Culture result:       SUBCULTURE IS NECESSARY TO DETERMINE PRESENCE OR ABSENCE OF ANAEROBIC BACTERIA IN THIS CULTURE. FURTHER REPORT TO FOLLOW AFTER INCUBATION OF SUBCULTURE. CULTURE, BODY FLUID Lawrence Callejas [[id number]] Collected: 06/02/21 1830    Order Status: Canceled Specimen:  Body Fluid from Abdominal Fluid     CULTURE, Jamal Lacy STAIN [[id number]]  (Abnormal)  (Susceptibility) Collected: 05/25/21 0917    Order Status: Completed Specimen: Wound from Abscess Updated: 05/28/21 0838     Special Requests: NO SPECIAL REQUESTS        GRAM STAIN 20 TO 40 WBCS/OIF            MODERATE GRAM POSITIVE COCCI           Culture result:       HEAVY * METHICILLIN RESISTANT STAPHYLOCOCCUS AUREUS *                  RESULTS VERIFIED, PHONED TO AND READ BACK BY  ISAURO SHAVER RN @ 9592 ON 21 AK. SARS-COV-2, PCR [[id number]] Collected: 21 1229    Order Status: Completed Specimen: Nasopharyngeal Updated: 21 06     Specimen source Nasopharyngeal        SARS-CoV-2 Not detected        Comment:      The specimen is NEGATIVE for SARS-CoV-2, the novel coronavirus associated with COVID-19. This test has been authorized by the FDA under an Emergency Use Authorization (EUA) for use by authorized laboratories.         Fact sheet for Healthcare Providers: ConventionUpdate.co.nz       Fact sheet for Patients: ConventionUpdate.co.nz       Methodology: RT-PCR               Imagin/1 CT Chest abd pelv  IMPRESSION  Large left abscess, 32 cm x 17 cm x 4 cm which extends from the  axilla to the iliac crest.    Signed By: NADIRA Wyatt     2021

## 2021-06-07 NOTE — PROGRESS NOTES
ACUTE PHYSICAL THERAPY GOALS:  (Developed with and agreed upon by patient and/or caregiver. )  LTG:  (1.)Mr. Coffey will move from supine to sit and sit to supine , scoot up and down and roll side to side in bed with MODIFIED INDEPENDENCE within 7 treatment day(s). Goal met 06/02/21    (2.)Mr. Coffey will transfer from bed to chair and chair to bed with SUPERVISION using the least restrictive device within 7 treatment day(s).    (3.)Mr. Coffey will ambulate with SUPERVISION for 250 feet with the least restrictive device within 7 treatment day(s). Goal met 06/02/21  (4.)Mr. To Wilson will perform seated and standing exercises for 15+ minutes to improve strength and mobility within 7 days. PHYSICAL THERAPY: Daily Note and PM Treatment Day # 124 [facility] Jaskaran Hooks. is a 77 y.o. male   PRIMARY DIAGNOSIS: Chest wall abscess  Cellulitis of chest wall [L03.313]    3 Days Post-Op    ASSESSMENT:     REHAB RECOMMENDATIONS: CURRENT LEVEL OF FUNCTION:  (Most Recently Demonstrated)   Recommendation to date pending progress:  Setting:   Short-term Rehab  Equipment:    Rolling Walker Bed Mobility:   Supervision  Sit to Stand:  [employer] Assistance  Transfers:   Standby Assistance  Gait/Mobility:   Not tested      ASSESSMENT:  Mr. To Wilson is doing better today as he is sitting edge of bed finishing his lunch. Needed encouragement to participate. Sitting balance is good. Sit to stand witih stand by assist as the patient does not want to bed pulled on. Two attempted required to get to standing. Standing balance is fair+ amd the patient does stand for several minutes. Patient is returned to sitting and then to supine in bed, he was able to positioned himself given enough time. Left left with needs within reach and alarm intact. Patient not really wanting to mobilize which this writer reviewed the importance of mobility and participation in therapy to increase tolerance and endurance.   Rehab at discharge as the patient lives alone and can not care for himself at this time. Will continue PT efforts.      SUBJECTIVE:   Mr. Shannen Mcmanus states, \"You can't do too much\"    SOCIAL HISTORY/ LIVING ENVIRONMENT:   Home Environment: Private residence  One/Two Story Residence: One story  Living Alone: No  Support Systems: Spouse/Significant Other/Partner  OBJECTIVE:     PAIN: VITAL SIGNS: LINES/DRAINS:   Pre Treatment: 0/10  No number given  Post Treatment: 0/10    IV and Wound Vac  O2 Device: None (Room air)     MOBILITY: I Mod I S SBA CGA Min Mod Max Total  NT x2 Comments:   Bed Mobility    Rolling [] [] [x] [] [] [] [] [] [] [] []    Supine to Sit [] [] [] [] [] [] [] [] [] [] []    Scooting [] [] [x] [] [] [] [] [] [] [] []    Sit to Supine [] [] [x] [] [] [] [] [] [] [] []    Transfers    Sit to Stand [] [] [] [x] [] [] [] [] [] [] []    Bed to Chair [] [] [] [] [] [] [] [] [] [x] []    Stand to Sit [] [] [] [x] [] [] [] [] [] [] []    I=Independent, Mod I=Modified Independent, S=Supervision, SBA=Standby Assistance, CGA=Contact Guard Assistance,   Min=Minimal Assistance, Mod=Moderate Assistance, Max=Maximal Assistance, Total=Total Assistance, NT=Not Tested    BALANCE: Good Fair+ Fair Fair- Poor NT Comments   Sitting Static [x] [] [] [] [] []    Sitting Dynamic [x] [] [] [] [] []              Standing Static [] [x] [] [] [] []    Standing Dynamic [] [x] [] [] [] []      GAIT: I Mod I S SBA CGA Min Mod Max Total  NT x2 Comments:   Level of Assistance [] [] [] [x] [] [] [] [] [] [x] []    Distance n/a    DME N/A    Gait Quality n/a    Weightbearing  Status N/A     I=Independent, Mod I=Modified Independent, S=Supervision, SBA=Standby Assistance, CGA=Contact Guard Assistance,   Min=Minimal Assistance, Mod=Moderate Assistance, Max=Maximal Assistance, Total=Total Assistance, NT=Not Tested    PLAN:   FREQUENCY/DURATION: PT Plan of Care: 3 times/week for duration of hospital stay or until stated goals are met, whichever comes first.  TREATMENT:     TREATMENT:   ($$ Therapeutic Activity: 23-37 mins    )  Therapeutic Activity (24 Minutes): Therapeutic activity included Rolling, Sit to Supine, Scooting, Transfer Training, Ambulation on level ground, Sitting balance  and Standing balance to improve functional Mobility, Strength and Activity tolerance. TREATMENT GRID:   Date:  5/28/21 Date:  6/1 Date:     Activity/Exercise Parameters Parameters Parameters   Ankle pumps 10 B 15    Seated knee extension 10 B 10    Seated hip flexion/marching 10 B 10    abd/add  10    Standing:   Forward stepping 5 x 2     Backward stepping 5 x 2     Side stepping 5 x 2       AFTER TREATMENT POSITION/PRECAUTIONS:  Alarm Activated, Bed, Needs within reach and RN notified    INTERDISCIPLINARY COLLABORATION:  RN/PCT and PT/PTA    TOTAL TREATMENT DURATION:  PT Patient Time In/Time Out  Time In: 1020  Time Out: Angel Gonzales [facility] Karissa-Jennifer, PTA

## 2021-06-07 NOTE — PROGRESS NOTES
H&P/Consult Note/Progress Note/Office Note:   Bradly Yee MRN: [id number]  :1945  Age:75 y.o.    HPI: Bradly Yee is a 77 y.o. male who was initially referred in consultation by Jennifer Wan PA-C with a left chest wall hematoma and cellulitis   after a fall at home on the floor approx 21. He sustained a blunt injury to his left chest wall. CXR below showed no PTx or rib fractures. He taked Humira for arthritis and is immune suppressed. We saw him for the 1st time on 21 with erythema of the left chest wall and placed him on Bactrim and marked the cellulitis border. He was instructed to call if any fevers or worsening erythema and given f/u 4 days later  He did not call. When he came to the office on 21 he reported high fevers over 101 degrees associate with nausea with his Bactrim. The erythema had extended >20cm  beyond the original inscribed lines of his left chest wall and extended down his left flank and in his left lower quadrant. We was admitted directly from office. He denies any new falls since 21. On 21 he also had a new hematoma involving his right flank which was not present on 21 (likely a second fall)      21 CXR, 2 views  Clear lungs. The cardiac and mediastinal contours and pulmonary vascularity are normal.   The bones and soft tissues are within normal limits. No evidence of a pneumothorax. IMPRESSION:  Normal chest.      21 CT chest/abd/pelvis with oral and IV contrast  Hx: MRSA cellulitis of left chest wall abscess and fever.     CHEST:   -LUNGS: No lobar pneumonia. Linear atelectasis left base. -AIRWAYS: Trachea and proximal bronchi grossly patent. -PLEURA: Small left pleural effusion. -LYMPH NODES: No enlarged axillary, hilar or mediastinal lymph nodes.   -HEART: Normal size.     -SKELETAL/CHEST WALL: Along left chest wall and body wall there is a complex,  peripherally enhancing fluid collection which measures approximately 32 cm  superior inferior, 17 cm wide and 4 cm thick. There are several bubbles of gas  within it. Larger bubbles of gas extend up into the axilla.     ABDOMEN/PELVIS:  -LIVER: Normal in size and appearance. -GALLBLADDER: No gallstones identified  -BILE DUCTS: Not dilated.     -PANCREAS: Normal.  -SPLEEN: Normal.     -ADRENALS: Normal.  -KIDNEYS/URETERS: Kidneys enhance symmetrically. There are multiple bilateral  parapelvic cysts versus bilateral UPJ obstructions. -BLADDER: Normal.  -REPRODUCTIVE ORGANS: Prostate and seminal vesicles unremarkable.     -BOWEL: Normal caliber. No inflammatory changes. Scattered diverticula. -LYMPH NODES: No significant retroperitoneal, mesenteric, or pelvic adenopathy. -BONES: Degenerative changes. No periostitis. -VASCULATURE: Aorta is normal caliber and mildly calcified. -OTHER: No ascites.     IMPRESSION  Large left abscess, 32 cm x 17 cm x 4 cm which extends from the axilla to the iliac crest.            Additional hx:  5/27/21 wound culture growing MRSA; Pharmacy dosing IV Vanc; trough tonight  5/28/21  Less Confused; no fever; WBC elevated but better;  on IV Abx  5/29/21  Resting in bed; no fever; WBC 12.3;  on IV Abx  5/30/21  Sitting on side of bed; no fever; WBC 13.8;  on IV Abx  5/31/21 AF; wbc still elevated; left hemithorax , left flank and LLQ edematous and indurated  6/1/21 continues IV Vanc; WBC higher at 12.2k   6/2/21 IV Vanc; CT as above with large abscess; I&D/debridment #1 in OR today  6/3/21 IV Vanc/Rocephin; OR culture pending  6/4/21 plan return to OR today for debridement #2; unless pt signs out AMA    6/5/21 POD 3/1; Pt resting in bed. No complaints voiced. Appears comfortable. Postop dressing c/d/i.   6/6/21 POD 4/2; Pt sitting on side of bed. No complaints voiced. Appears comfortable.  Dressing c/di  6/7/21 POD 5/3;  Comfortable; dressing dry; wound vac requested today; IV Vanc; Likely SNF soon          Past Medical History: Diagnosis Date    Depression     H/O urinary frequency 2/1/2013    History of BPH 2/1/2013    HLD (hyperlipidemia) 2/1/2013    Hx: UTI (urinary tract infection) 2/1/2013    Insomnia     Psoriasis     Psoriatic arthritis ([facility] [address] 2/1/2013    Wears hearing aid 2/1/2013     No past surgical history on file. Current Facility-Administered Medications   Medication Dose Route Frequency    diphenhydrAMINE (BENADRYL) injection 25 mg  25 mg IntraVENous Q8H PRN    HYDROmorphone (DILAUDID) injection 0.2-0.5 mg  0.2-0.5 mg IntraVENous Q3H PRN    lactated Ringers infusion  40 mL/hr IntraVENous CONTINUOUS    insulin regular (NOVOLIN R, HUMULIN R) injection   SubCUTAneous Q6H    cefTRIAXone (ROCEPHIN) 1 g in 0.9% sodium chloride (MBP/ADV) 50 mL MBP  1 g IntraVENous Q24H    famotidine (PEPCID) tablet 20 mg  20 mg Oral BID    alcohol 62% (NOZIN) nasal  1 Ampule  1 Ampule Topical Q8H    zinc oxide-cod liver oil (DESITIN) 40 % paste   Topical Q8H    acetaminophen (TYLENOL) tablet 1,000 mg  1,000 mg Oral Q6H PRN    ondansetron (ZOFRAN) injection 4 mg  4 mg IntraVENous Q4H PRN    HYDROcodone-acetaminophen (NORCO) 5-325 mg per tablet 1 Tablet  1 Tablet Oral Q6H PRN    tamsulosin (FLOMAX) capsule 0.4 mg  0.4 mg Oral DAILY     Patient has no known allergies.   Social History     Socioeconomic History    Marital status: SINGLE     Spouse name: Not on file    Number of children: Not on file    Years of education: Not on file    Highest education level: Not on file   Tobacco Use    Smoking status: Former Smoker    Smokeless tobacco: Never Used    Tobacco comment: pt states quit in early 60's late 52's    Vaping Use    Vaping Use: Never used   Substance and Sexual Activity    Alcohol use: No     Alcohol/week: 0.0 standard drinks    Drug use: No    Sexual activity: Yes     Partners: Female     Social Determinants of Health     Financial Resource Strain:     Difficulty of Paying Living Expenses:    Food Insecurity:     Worried About Running Out of Food in the Last Year:     [address] in the Last Year:    Transportation Needs:     Lack of Transportation (Medical):  Lack of Transportation (Non-Medical):    Physical Activity:     Days of Exercise per Week:     Minutes of Exercise per Session:    Stress:     Feeling of Stress :    Social Connections:     Frequency of Communication with Friends and Family:     Frequency of Social Gatherings with Friends and Family:     Attends Alevism Services:     Active Member of Clubs or Organizations:     Attends Club or Organization Meetings:     Marital Status:      Social History     Tobacco Use   Smoking Status Former Smoker   Smokeless Tobacco Never Used   Tobacco Comment    pt states quit in early 60's late 52's      Family History   Problem Relation Age of Onset    Heart Attack Father     Cancer Father         skin     Other Sister     Cancer Sister     Other Brother         melanoma     ROS: The patient has no difficulty with chest pain or shortness of breath. No fever or chills. Comprehensive review of systems was otherwise unremarkable except as noted above. Physical Exam:   Visit Vitals  BP (!) 144/77 (BP 1 Location: Right arm, BP Patient Position: At rest)   Pulse 73   Temp 98.1 °F (36.7 °C)   Resp 18   Ht 5' 11\" (1.803 m)   Wt 158 lb (71.7 kg)   SpO2 98%   BMI 22.04 kg/m²     Vitals:    06/06/21 1600 06/06/21 1910 06/06/21 2320 06/07/21 0352   BP: 136/72 (!) 155/79 133/74 (!) 144/77   Pulse: 80 79 68 73   Resp: 17 18 16 18   Temp: 99.6 °F (37.6 °C) 97.9 °F (36.6 °C) 98.4 °F (36.9 °C) 98.1 °F (36.7 °C)   SpO2: 96% 99% 97% 98%   Weight:       Height:         [unfilled]  [unfilled]    Constitutional: Alert, oriented, cooperative patient in no acute distress; appears stated age    Eyes:Sclera are clear. EOMs intact  ENMT: no external lesions; he is hard of hearing. no obvious neck masses, no ear or lip lesions, nares normal  CV: RRR. Normal perfusion      Large open incision from left axillae to left iliac crest with wound packing and drainage  The erythema and cellulitic changes of the left chest wall and left flank look resolved     Right flank hematoma stable    Resp: No JVD. Breathing is  non-labored; no audible wheezing. GI: soft and non-distended     Musculoskeletal: unremarkable with normal function. No embolic signs or cyanosis. Neuro:  Oriented to person, place and time;  moves all 4; no focal deficits; slow moving and slow talking. Psychiatric: blunted affect, no memory impairment    Recent vitals (if inpt):  @IPVITALS(24:)@    Amount and/or Complexity of Data Reviewed and Analyzed:  I reviewed and analyzed all of the unique labs and radiologic studies that are shown below as well as any that are in the HPI, and any that are in the expanded problem list below  *Each unique test, order, or document contributes to the combination of 2 or combination of 3 in Category 1 below. For this visit I also reviewed old records and prior notes.       Recent Labs     06/06/21  0535   CREA 0.64*     Review of most recent CBC  Lab Results   Component Value Date/Time    WBC 10.8 06/03/2021 05:33 AM    HGB 12.3 (L) 06/03/2021 05:33 AM    HCT 37.8 (L) 06/03/2021 05:33 AM    PLATELET 987 85/17/1229 05:33 AM    MCV 99.0 (H) 06/03/2021 05:33 AM       Review of most recent BMP  Lab Results   Component Value Date/Time    Sodium 134 (L) 06/03/2021 05:33 AM    Potassium 4.7 06/03/2021 05:33 AM    Chloride 103 06/03/2021 05:33 AM    CO2 26 06/03/2021 05:33 AM    Anion gap 5 (L) 06/03/2021 05:33 AM    Glucose 251 (H) 06/03/2021 05:33 AM    BUN 18 06/03/2021 05:33 AM    Creatinine 0.64 (L) 06/06/2021 05:35 AM    BUN/Creatinine ratio 19 03/31/2016 11:26 AM    GFR est AA >60 06/03/2021 05:33 AM    GFR est non-AA >60 06/03/2021 05:33 AM    Calcium 7.9 (L) 06/03/2021 05:33 AM       Review of most recent LFTs (and lipase if done)  Lab Results   Component Value Date/Time    ALT (SGPT) 80 (H) 05/24/2021 12:57 PM    AST (SGOT) 53 (H) 05/24/2021 12:57 PM    Alk. phosphatase 124 05/24/2021 12:57 PM    Bilirubin, total 0.9 05/24/2021 12:57 PM     No results found for: LPSE    No results found for: INR, APTT, CBIL, LCAD, NH4, TROPT, TROIQ, INREXT, INREXT    Review of most recent HgbA1c  Lab Results   Component Value Date/Time    Hemoglobin A1c 4.9 06/05/2021 02:21 PM       Nutritional assessment screen for wound healing issues:  Lab Results   Component Value Date/Time    Protein, total 6.5 05/24/2021 12:57 PM    Albumin 2.4 (L) 05/24/2021 12:57 PM       @lastcovr@  XR Results (most recent):  Results from Hospital Encounter encounter on 05/20/21    XR CHEST PA LAT    Narrative  EXAM: XR CHEST PA LAT    INDICATION: Left chest wall hematoma with cellulitis rule out rib fracture or  pneumothorax after a fall at home on the floor. COMPARISON: None. FINDINGS: PA and lateral radiographs of the chest demonstrate clear lungs. The  cardiac and mediastinal contours and pulmonary vascularity are normal. The bones  and soft tissues are within normal limits. No evidence of a pneumothorax. Impression  Normal chest.      CT Results (most recent):  Results from Hospital Encounter encounter on 05/24/21    CT CHEST ABD PELV W CONT    Narrative  CT OF THE CHEST ABDOMEN AND PELVIS    INDICATION: MRSA cellulitis of left chest wall abscess and fever. TECHNIQUE:  Multiple axial images were obtained through the chest, abdomen and  pelvis. Oral contrast was used for bowel opacification. 100mL of Isovue 370  intravenous contrast was used for better evaluation of solid organs and vascular  structures. Radiation dose reduction techniques were used for this study. All  CT scans performed at this facility use one or all of the following: Automated  exposure control, adjustment of the mA and/or kVp according to patient's size,  iterative reconstruction.     COMPARISON: None    FINDINGS:  CHEST:  -LUNGS: No lobar pneumonia. Linear atelectasis left base. -AIRWAYS: Trachea and proximal bronchi grossly patent. -PLEURA: Small left pleural effusion. -LYMPH NODES: No enlarged axillary, hilar or mediastinal lymph nodes. -HEART: Normal size.    -SKELETAL/CHEST WALL: Long left chest wall and body wall there is a complex,  peripherally enhancing fluid collection which measures approximately 32 cm  superior inferior, 17 cm wide and 4 cm thick. There are several bubbles of gas  within it. Larger bubbles of gas extend up into the axilla. ABDOMEN/PELVIS:  -LIVER: Normal in size and appearance. -GALLBLADDER: No gallstones identified  -BILE DUCTS: Not dilated. -PANCREAS: Normal.  -SPLEEN: Normal.    -ADRENALS: Normal.  -KIDNEYS/URETERS: Kidneys enhance symmetrically. There are multiple bilateral  parapelvic cysts versus bilateral UPJ obstructions. -BLADDER: Normal.  -REPRODUCTIVE ORGANS: Prostate and seminal vesicles unremarkable. -BOWEL: Normal caliber. No inflammatory changes. Scattered diverticula. -LYMPH NODES: No significant retroperitoneal, mesenteric, or pelvic adenopathy. -BONES: Degenerative changes. No periostitis. -VASCULATURE: Aorta is normal caliber and mildly calcified. -OTHER: No ascites. Impression  Large left abscess, 32 cm x 17 cm x 4 cm which extends from the  axilla to the iliac crest.  689    US Results (most recent):  No results found for this or any previous visit.         Admission date (for inpatients): 5/24/2021   * No surgery found *  Procedure(s):  LEFT CHEST WALL AND FLANK WOUND DEBRIDEMENT        ASSESSMENT/PLAN:  Problem List  Date Reviewed: 5/24/2021        Codes Class Noted    Confusion ICD-10-CM: R41.0  ICD-9-CM: 298.9  5/27/2021        Sundowning ICD-10-CM: F05  ICD-9-CM: Mahamed Yolanda  5/27/2021        Leukocytosis ICD-10-CM: I33.078  ICD-9-CM: 288.60  5/26/2021        Acute renal insufficiency ICD-10-CM: N28.9  ICD-9-CM: 593.9  5/25/2021 Fever ICD-10-CM: R50.9  ICD-9-CM: 780.60  5/24/2021        Suppression of immune system subtherapeutic (HCC) ICD-10-CM: R50.36  ICD-9-CM: 279.8  5/24/2021        Right flank hematoma ICD-10-CM: S30. 1XXA  ICD-9-CM: 922.2  5/24/2021        * (Principal) Chest wall abscess ICD-10-CM: L02.213  ICD-9-CM: 682.2  5/20/2021        Hematoma of left chest wall ICD-10-CM: Q36.272T  ICD-9-CM: 922.1  5/20/2021        Cellulitis of chest wall ICD-10-CM: L03.313  ICD-9-CM: 682.2  5/20/2021        Fall ICD-10-CM: Z54. Royden Distel  ICD-9-CM: E888.9  5/20/2021        Psoriasis ICD-10-CM: L40.9  ICD-9-CM: 696.1  Unknown        Insomnia ICD-10-CM: G47.00  ICD-9-CM: 780.52  Unknown        Depression ICD-10-CM: F32.9  ICD-9-CM: 204  Unknown        Tachycardia ICD-10-CM: R00.0  ICD-9-CM: 785.0  6/13/2016        Murmur ICD-10-CM: R01.1  ICD-9-CM: 785.2  6/13/2016        Psoriatic arthritis ([facility] ICD-10-CM: L40.50  ICD-9-CM: 696.0  2/1/2013        Wears hearing aid ICD-10-CM: Z97.4  ICD-9-CM: V45.89  2/1/2013        HLD (hyperlipidemia) ICD-10-CM: E78.5  ICD-9-CM: 272.4  2/1/2013        H/O urinary frequency ICD-10-CM: V09.903  ICD-9-CM: V13.09  2/1/2013        History of BPH ICD-10-CM: I58.845  ICD-9-CM: V13.89  2/1/2013            Principal Problem:    Chest wall abscess (5/20/2021)    Active Problems:    Cellulitis of chest wall (5/20/2021)      Fall (5/20/2021)      Fever (5/24/2021)      Suppression of immune system subtherapeutic ([address] (5/24/2021)      Right flank hematoma (5/24/2021)      Acute renal insufficiency (5/25/2021)      Leukocytosis (5/26/2021)      Confusion (5/27/2021)      Sundowning (5/27/2021)           Number and Complexity of Problems addressed and   Risks of complications and/or morbidity of management        Massive MRSA Infection of soft tissues of left chest wall with abscess and extension, cellulitis, and large open wound with necrosis of the left axillae, left chest wall, left flank, LLQ, and left iliac region  This failed outpt Rx with PO Bactrim and also failed initial inpt Rx with IV Vanc and Rocpehin     Black foam wound avc requested today  ID consult for outpt Rx options  Likely to SNF soon    Continue inpt admission for now for IV Vanc and inpt complex dressing changes for a massive left chest wall, left axillary, left flank, LLQ wound abscess cavity. He went to OR for serial debridements  6/2/21 OR for debridement #1  6/4/21 OR for debridement/washout/dressing change #2          Chronic immune suppression  Continue to hold his methotrexate, prednisone, and Humira to help reduce his immune suppression during this acute phase of infection. Dermatitis around left chest wall wound from moisture maceration  Desitin ordered to destinee-wound skin    Confusion/sundowning at night  For many days he was uncooperative and pulling IV sites   Sitters were required  This has improving significantly  May no longer require sitter              Level of MDM (2/3 elements below)  Number and Complexity of Problems Addressed Amount and/or Complexity of Data to be Reviewed and Analyzed  *Each unique test, order, or document contributes to the combination of 2 or combination of 3 in Category 1 below.  Risk of Complications and/or Morbidity or Mortality of pt Management     33120  [id number]  Minimal  1self-limited or minor problem Minimal or none Minimal risk of morbidity from additional diagnostic testing or Rx   25568  45313 Low Low  2or more self-limited or minor problems;    or  1stable chronic illness;    or  1BRJEW, uncomplicated illness or injury   Limited  (Must meet the requirements of at least 1 of the 2 categories)  Category 1: Tests and documents   Any combination of 2 from the following:  Review of prior external note(s) from each unique source*;  review of the result(s) of each unique test*;   ordering of each unique test*    or   Category 2: Assessment requiring an independent historian(s)  (For the categories of independent interpretation of tests and discussion of management or test interpretation, see moderate or high) Low risk of morbidity from additional diagnostic testing or treatment     84883  85217 Mod Moderate  1or more chronic illnesses with exacerbation, progression, or side effects of treatment;    or  2or more stable chronic illnesses;    or  1undiagnosed new problem with uncertain prognosis;    or  1acute illness with systemic symptoms;    or  2RJPZW complicated injury   Moderate  (Must meet the requirements of at least 1 out of 3 categories)  Category 1: Tests, documents, or independent historian(s)  Any combination of 3 from the following:   Review of prior external note(s) from each unique source*;  Review of the result(s) of each unique test*;  Ordering of each unique test*;  Assessment requiring an independent historian(s)    or  Category 2: Independent interpretation of tests   Independent interpretation of a test performed by another physician/other qualified health care professional (not separately reported);     or  Category 3: Discussion of management or test interpretation  Discussion of management or test interpretation with external physician/other qualified health care professional/appropriate source (not separately reported)   Moderate risk of morbidity from additional diagnostic testing or treatment  Examples only:  Prescription drug management   Decision regarding minor surgery with identified patient or procedure risk factors  Decision regarding elective major surgery without identified patient or procedure risk factors   Diagnosis or treatment significantly limited by social determinants of health       [id number] High High  1or more chronic illnesses with severe exacerbation, progression, or side effects of treatment;    or  1 acute or chronic illness or injury that poses a threat to life or bodily function   Extensive  (Must meet the requirements of at least 2 out of 3 categories)  Category 1: Tests, documents, or independent historian(s)  Any combination of 3 from the following:   Review of prior external note(s) from each unique source*;  Review of the result(s) of each unique test*;   Ordering of each unique test*;   Assessment requiring an independent historian(s)    or   Category 2: Independent interpretation of tests   Independent interpretation of a test performed by another physician/other qualified health care professional (not separately reported);     or  Category 3: Discussion of management or test interpretation  Discussion of management or test interpretation with external physician/other qualified health care professional/appropriate source (not separately reported)   High risk of morbidity from additional diagnostic testing or treatment  Examples only:  Drug therapy requiring intensive monitoring for toxicity  Decision regarding elective major surgery with identified patient or procedure risk factors  Decision regarding emergency major surgery  Decision regarding hospitalization  Decision not to resuscitate or to de-escalate care because of poor prognosis             I have personally performed a face-to-face diagnostic evaluation and management  service on this patient. I have independently seen the patient. I have independently obtained the above history from the patient/family. I have independently examined the patient with above findings. I have independently reviewed data/labs for this patient and developed the above plan of care (MDM). Signed: Amber Ulrich.  Liudmila Davis MD, FACS

## 2021-06-07 NOTE — PROGRESS NOTES
END OF SHIFT NOTE:    INTAKE/OUTPUT  06/05 0701 - 06/06 0700  In: 8096 [I.V.:1305]  Out: 1338 [Urine:4175]  Voiding: NO  Catheter: YES  Drain:      Flatus: Patient does have flatus present. Stool:  0 occurrences. Characteristics:  Stool Assessment  Stool Color: Brown  Stool Appearance:  (have not observed)  Stool Amount: Medium  Stool Source/Status: Rectum    Emesis: 0 occurrences. Characteristics:        VITAL SIGNS  Patient Vitals for the past 12 hrs:   Temp Pulse Resp BP SpO2   06/06/21 1910 97.9 °F (36.6 °C) 79 18 (!) 155/79 99 %   06/06/21 1600 99.6 °F (37.6 °C) 80 17 136/72 96 %   06/06/21 1158 99 °F (37.2 °C) 72 17 (!) 149/79 98 %       Pain Assessment  Pain Intensity 1: 6 (06/06/21 1957)  Pain Location 1: Back  Pain Intervention(s) 1: Medication (see MAR)  Patient Stated Pain Goal: 0    Ambulating  Yes; to bathroom    Shift report given to oncoming nurse at the bedside.     Cash Rodriguez RN

## 2021-06-07 NOTE — PROGRESS NOTES
Chart screened by  for discharge planning. Patient now ready for discharge but will have a wound vac. CM notified [facility] rehab who is having to restart authorization as patient was no medically ready to discharge last week. CM will continue to follow patient during hospitalization for discharge planning and needs. Please consult  if any new issues arise.

## 2021-06-07 NOTE — PROGRESS NOTES
Pt c/o itching to bilateral abdomen/chest area. has had rash but appears more prominent now. Pt requests something for itching.  Donnell Stratton NP notified, orders received

## 2021-06-08 LAB
ALBUMIN SERPL-MCNC: 2.4 G/DL (ref 3.2–4.6)
ALBUMIN/GLOB SERPL: 0.6 {RATIO} (ref 1.2–3.5)
ALP SERPL-CCNC: 89 U/L (ref 50–136)
ALT SERPL-CCNC: 64 U/L (ref 12–65)
ANION GAP SERPL CALC-SCNC: 4 MMOL/L (ref 7–16)
AST SERPL-CCNC: 27 U/L (ref 15–37)
BILIRUB SERPL-MCNC: 0.2 MG/DL (ref 0.2–1.1)
BUN SERPL-MCNC: 11 MG/DL (ref 8–23)
CALCIUM SERPL-MCNC: 8.3 MG/DL (ref 8.3–10.4)
CHLORIDE SERPL-SCNC: 106 MMOL/L (ref 98–107)
CO2 SERPL-SCNC: 27 MMOL/L (ref 21–32)
CREAT SERPL-MCNC: 0.83 MG/DL (ref 0.8–1.5)
ERYTHROCYTE [DISTWIDTH] IN BLOOD BY AUTOMATED COUNT: 14.3 % (ref 11.9–14.6)
GLOBULIN SER CALC-MCNC: 4.2 G/DL (ref 2.3–3.5)
GLUCOSE SERPL-MCNC: 174 MG/DL (ref 65–100)
HCT VFR BLD AUTO: 37.2 % (ref 41.1–50.3)
HGB BLD-MCNC: 12.2 G/DL (ref 13.6–17.2)
MCH RBC QN AUTO: 32.3 PG (ref 26.1–32.9)
MCHC RBC AUTO-ENTMCNC: 32.8 G/DL (ref 31.4–35)
MCV RBC AUTO: 98.4 FL (ref 79.6–97.8)
NRBC # BLD: 0 K/UL (ref 0–0.2)
PLATELET # BLD AUTO: 254 K/UL (ref 150–450)
PMV BLD AUTO: 9.6 FL (ref 9.4–12.3)
POTASSIUM SERPL-SCNC: 4 MMOL/L (ref 3.5–5.1)
PROT SERPL-MCNC: 6.6 G/DL (ref 6.3–8.2)
RBC # BLD AUTO: 3.78 M/UL (ref 4.23–5.6)
SODIUM SERPL-SCNC: 137 MMOL/L (ref 138–145)
VANCOMYCIN TROUGH SERPL-MCNC: 11.2 UG/ML (ref 5–20)
WBC # BLD AUTO: 7.3 K/UL (ref 4.3–11.1)

## 2021-06-08 PROCEDURE — 0JB60ZZ EXCISION OF CHEST SUBCUTANEOUS TISSUE AND FASCIA, OPEN APPROACH: ICD-10-PCS | Performed by: SURGERY

## 2021-06-08 PROCEDURE — 0JBF0ZZ EXCISION OF LEFT UPPER ARM SUBCUTANEOUS TISSUE AND FASCIA, OPEN APPROACH: ICD-10-PCS | Performed by: SURGERY

## 2021-06-08 PROCEDURE — 77030019952 HC CANSTR VAC ASST KCON -B

## 2021-06-08 PROCEDURE — 2709999900 HC NON-CHARGEABLE SUPPLY

## 2021-06-08 PROCEDURE — 74011000250 HC RX REV CODE- 250: Performed by: NURSE PRACTITIONER

## 2021-06-08 PROCEDURE — 80053 COMPREHEN METABOLIC PANEL: CPT

## 2021-06-08 PROCEDURE — 99232 SBSQ HOSP IP/OBS MODERATE 35: CPT | Performed by: SURGERY

## 2021-06-08 PROCEDURE — 74011250637 HC RX REV CODE- 250/637: Performed by: NURSE PRACTITIONER

## 2021-06-08 PROCEDURE — 0JBM0ZZ EXCISION OF LEFT UPPER LEG SUBCUTANEOUS TISSUE AND FASCIA, OPEN APPROACH: ICD-10-PCS | Performed by: SURGERY

## 2021-06-08 PROCEDURE — 0JB80ZZ EXCISION OF ABDOMEN SUBCUTANEOUS TISSUE AND FASCIA, OPEN APPROACH: ICD-10-PCS | Performed by: SURGERY

## 2021-06-08 PROCEDURE — 74011250636 HC RX REV CODE- 250/636: Performed by: SURGERY

## 2021-06-08 PROCEDURE — 74011250637 HC RX REV CODE- 250/637: Performed by: SURGERY

## 2021-06-08 PROCEDURE — 97530 THERAPEUTIC ACTIVITIES: CPT

## 2021-06-08 PROCEDURE — 74011250636 HC RX REV CODE- 250/636: Performed by: NURSE PRACTITIONER

## 2021-06-08 PROCEDURE — 85027 COMPLETE CBC AUTOMATED: CPT

## 2021-06-08 PROCEDURE — 80202 ASSAY OF VANCOMYCIN: CPT

## 2021-06-08 PROCEDURE — 65270000029 HC RM PRIVATE

## 2021-06-08 PROCEDURE — 0JB70ZZ EXCISION OF BACK SUBCUTANEOUS TISSUE AND FASCIA, OPEN APPROACH: ICD-10-PCS | Performed by: SURGERY

## 2021-06-08 PROCEDURE — 36415 COLL VENOUS BLD VENIPUNCTURE: CPT

## 2021-06-08 RX ADMIN — TAMSULOSIN HYDROCHLORIDE 0.4 MG: 0.4 CAPSULE ORAL at 08:09

## 2021-06-08 RX ADMIN — HYDROCODONE BITARTRATE AND ACETAMINOPHEN 1 TABLET: 5; 325 TABLET ORAL at 15:40

## 2021-06-08 RX ADMIN — Medication 1 AMPULE: at 05:14

## 2021-06-08 RX ADMIN — HYDROCODONE BITARTRATE AND ACETAMINOPHEN 1 TABLET: 5; 325 TABLET ORAL at 04:41

## 2021-06-08 RX ADMIN — VANCOMYCIN HYDROCHLORIDE 1500 MG: 10 INJECTION, POWDER, LYOPHILIZED, FOR SOLUTION INTRAVENOUS at 02:42

## 2021-06-08 RX ADMIN — LORATADINE 10 MG: 10 TABLET ORAL at 08:09

## 2021-06-08 RX ADMIN — SODIUM CHLORIDE 600 MG: 9 INJECTION, SOLUTION INTRAMUSCULAR; INTRAVENOUS; SUBCUTANEOUS at 17:04

## 2021-06-08 RX ADMIN — Medication 1 AMPULE: at 21:11

## 2021-06-08 RX ADMIN — FAMOTIDINE 20 MG: 20 TABLET ORAL at 08:09

## 2021-06-08 RX ADMIN — ONDANSETRON 4 MG: 2 INJECTION INTRAMUSCULAR; INTRAVENOUS at 15:40

## 2021-06-08 RX ADMIN — Medication 1 AMPULE: at 14:23

## 2021-06-08 RX ADMIN — SODIUM CHLORIDE, SODIUM LACTATE, POTASSIUM CHLORIDE, AND CALCIUM CHLORIDE 40 ML/HR: 600; 310; 30; 20 INJECTION, SOLUTION INTRAVENOUS at 14:23

## 2021-06-08 RX ADMIN — FAMOTIDINE 20 MG: 20 TABLET ORAL at 17:04

## 2021-06-08 RX ADMIN — HYDROCODONE BITARTRATE AND ACETAMINOPHEN 1 TABLET: 5; 325 TABLET ORAL at 22:30

## 2021-06-08 NOTE — PROGRESS NOTES
Comprehensive Nutrition Assessment    Type and Reason for Visit: Reassess    Nutrition Recommendations/Plan:   Meals and Snacks:  Continue current diet. Nutrition Supplement Therapy:   Medical food supplement therapy:  Initiate Ensure High Protein three times per day (this provides 160 kcal and 16 grams protein per bottle)     Malnutrition Assessment:  Malnutrition Status: At risk for malnutrition (specify) (advanced age, ? wt loss, open wounds)    Nutrition Assessment:   Nutrition History: Patient states eating well prior to admission. He denies any changes to PO intake. Vague historian. Nutrition Background: Patient with PMH significant for HLD. He was referred to surgery for hematoma and cellulitis of left chest wall s/p fall and was given abx. He presented back with fevers and hematoma found to be larger, so was admitted to hospital. Now s/p debridement of chest wall and flank 6/4. Wound vac placed 6/7. Daily Update:  Patient seen. He states he has no trouble eating. When RD discussing increased protein needs now s/p debridement and wound vac and recommending nutrition supplement, patient agrees.      Nutrition Related Findings:   No physical signs of malnutrition Wound Type: Open wounds (2 wounds: 3x10x2 cm and 96k42q3.5 cm)    Current Nutrition Therapies:  ADULT DIET Regular; 4 carb choices (60 gm/meal)    Current Intake:   Average Meal Intake: % Average Supplement Intake: None ordered      Anthropometric Measures:  Height: 5' 11\" (180.3 cm)  Current Body Wt: 71.7 kg (158 lb 1.1 oz) (6/7), Weight source: Bed scale  BMI: 22.1, Normal weight (BMI 22.0-24.9) age over 73  Admission Body Weight: 182 lb 15.7 oz (5/25 standing scale)  Ideal Body Weight (lbs) (Calculated): 172 lbs (78 kg), 96.9 %  Usual Body Wt: 82 kg (180 lb 12.4 oz) (per review of EMR, range 79-83 kg), Percent weight change: -7.8          Edema: LLE: 1+ (6/8/2021  8:12 AM)  RLE: 1+ (6/8/2021  8:12 AM)     Estimated Daily Nutrient Needs:  Energy (kcal/day): 7916-4812 (Kcal/kg (20-25), Weight Used: Admission (83 kg (5/25 standing scale)))  Protein (g/day): 108-125 (1.3-1.5) Weight Used: (Admission)  Fluid (ml/day):   (1 ml/kcal)    Nutrition Diagnosis:   · Increased nutrient needs (protein) related to  (wound healing) as evidenced by  (s/p debridement with wound vac, wounds as above)    Nutrition Interventions:   Food and/or Nutrient Delivery: Continue current diet, Start oral nutrition supplement     Coordination of Nutrition Care: Continue to monitor while inpatient  Plan of Care discussed with [place] Roe RN    Goals:   Previous Goal Met: Progressing toward goal(s)  Active Goal: Meet protein needs with PO diet and ONS by next RD follow-up    Nutrition Monitoring and Evaluation:      Food/Nutrient Intake Outcomes: Food and nutrient intake, Supplement intake       Discharge Planning:    Continue oral nutrition supplement    [address] [facility], [address], LD on 6/8/2021 at 10:26 AM  Contact: [phone]

## 2021-06-08 NOTE — PROGRESS NOTES
Infectious Disease Progress Note    Today's Date: 2021   Admit Date: 2021    Impression:   · MRSA left axillary, chest wall, and flank abscess; wound cx () MRSA; s/p I&D - op cx MRSA; repeat debridement ; TTE no evidence of vegetation, but not adequate study  · Recent fall at home   · Hx of R armpit abscess 2020- bedside I&D and given Doxy  · Frequent ED presentations for fatigue/malaise   · Psoriatic arthritis on Humira  · Rash on Vancomycin    Plan:   · Rash noted across abdomen today- concern for drug related. Will stop IV Vanc and start Daptomycin 8 mg/kg today and follow. Goal is to treat for at least 2 more weeks with IV therapy. · TTE without evidence of vegetation, but not adequate study to rule out endocarditis. Anti-infectives:   · Dapto (-  · Vanc (-)  · CTX (-) (-)    Subjective: Interval History:  Seen sitting up in recliner with no complaints today. Rash noted to abdomen. Denies any n/v/d/f/c/s. Reported pain after wound vac application yesterday. No Known Allergies     Review of Systems:  A comprehensive review of systems was negative except for that written in the History of Present Illness. Objective:     Visit Vitals  /79   Pulse 73   Temp 98.1 °F (36.7 °C)   Resp 17   Ht 5' 11\" (1.803 m)   Wt 71.7 kg (158 lb)   SpO2 98%   BMI 22.04 kg/m²     Temp (24hrs), Av.2 °F (36.8 °C), Min:97.5 °F (36.4 °C), Max:99.8 °F (37.7 °C)     Patient was seen and examined on 21 and physical exam remains unchanged since yesterday, unless noted below:    Lines:  Peripheral IV:       Physical Exam:    General:  Alert, cooperative, well noursished, well developed, appears stated age; [place]   Eyes:  Sclera anicteric. Pupils equally round and reactive to light.    Mouth/Throat: Mucous membranes normal, oral pharynx clear   Neck: Supple   Lungs:   Clear to auscultation bilaterally, good effort   CV:  Regular rate and rhythm,no murmur, click, rub or gallop   Abdomen:   Soft, non-tender. bowel sounds normal. non-distended   Extremities: No cyanosis or edema   Skin: Wound vac to left chest wound; morbilliform rash noted across abdomen and down into groin   Lymph nodes: Cervical and supraclavicular normal   Musculoskeletal: No swelling or deformity   Lines/Devices:  Intact, no erythema, drainage or tenderness   Psych: Alert and oriented, normal mood affect given the setting       Data Review:     CBC:  Recent Labs     06/08/21 0934   WBC 7.3   HGB 12.2*   HCT 37.2*          BMP:  Recent Labs     06/08/21 0934 06/06/21  0535   CREA 0.83 0.64*   BUN 11  --    *  --    K 4.0  --      --    CO2 27  --    AGAP 4*  --    *  --        LFTS:  Recent Labs     06/08/21 0934   TBILI 0.2   ALT 64   AP 89   TP 6.6   ALB 2.4*       Microbiology:     All Micro Results     Procedure Component Value Units Date/Time    CULTURE, ANAEROBIC [[id number]] Collected: 06/02/21 1740    Order Status: Completed Specimen: Abscess Updated: 06/07/21 1150     Special Requests: NO SPECIAL REQUESTS        Culture result:       NO ANAEROBES ISOLATED 5 DAYS          CULTURE, WOUND Parvez Orris STAIN [[id number]]  (Abnormal)  (Susceptibility) Collected: 06/02/21 1740    Order Status: Completed Specimen: Abscess Updated: 06/05/21 0810     Special Requests: NO SPECIAL REQUESTS        GRAM STAIN 0 TO 10 WBCS SEEN PER OIF      FEW GRAM POSITIVE COCCI        Culture result:       MODERATE * METHICILLIN RESISTANT STAPHYLOCOCCUS AUREUS *            PATIENT IS A KNOWN MRSA       CULTURE, BODY FLUID W Georgie Dyana [[id number]] Collected: 06/02/21 1830    Order Status: Canceled Specimen:  Body Fluid from Abdominal Fluid     CULTURE, Vanessa Duncanh STAIN [[id number]]  (Abnormal)  (Susceptibility) Collected: 05/25/21 0917    Order Status: Completed Specimen: Wound from Abscess Updated: 05/28/21 0838     Special Requests: NO SPECIAL REQUESTS        GRAM STAIN 20 TO 40 WBCS/OIF            MODERATE GRAM POSITIVE COCCI           Culture result:       HEAVY * METHICILLIN RESISTANT STAPHYLOCOCCUS AUREUS *                  RESULTS VERIFIED, PHONED TO AND READ BACK BY  ISAURO SHAVER RN @ 5722 ON 21 AK. SARS-COV-2, PCR [[id number]] Collected: 21 1229    Order Status: Completed Specimen: Nasopharyngeal Updated: 21 0629     Specimen source Nasopharyngeal        SARS-CoV-2 Not detected        Comment:      The specimen is NEGATIVE for SARS-CoV-2, the novel coronavirus associated with COVID-19. This test has been authorized by the FDA under an Emergency Use Authorization (EUA) for use by authorized laboratories.         Fact sheet for Healthcare Providers: ConventionUpdate.co.nz       Fact sheet for Patients: ConventionUpdate.co.nz       Methodology: RT-PCR               Imagin/1 CT Chest abd pelv  IMPRESSION  Large left abscess, 32 cm x 17 cm x 4 cm which extends from the  axilla to the iliac crest.    Signed By: NADIRA Cheema     2021

## 2021-06-08 NOTE — PROGRESS NOTES
END OF SHIFT NOTE:    INTAKE/OUTPUT  06/07 0701 - 06/08 0700  In: 2632.4 [P.O.:840; I.V.:1792.4]  Out: 4066 [Urine:4500; Drains:350]  Voiding: no  Catheter: YES  Drain:              Flatus: Patient does have flatus present. Stool:  1 occurrences. Characteristics:  Stool Assessment  Stool Color: Brown  Stool Appearance:  (have not observed)  Stool Amount: Medium  Stool Source/Status: Rectum    Emesis: 0 occurrences. Characteristics:        VITAL SIGNS  Patient Vitals for the past 12 hrs:   Temp Pulse Resp BP SpO2   06/08/21 1526 98.6 °F (37 °C) 86 17 126/79 99 %   06/08/21 1159 98.1 °F (36.7 °C) 73 17 125/79 98 %       Pain Assessment  Pain Intensity 1: 5 (06/08/21 1540)  Pain Location 1: Flank  Pain Intervention(s) 1: Medication (see MAR)  Patient Stated Pain Goal: 3    Ambulating  Yes    Shift report given to oncoming nurse at the bedside.     Esme Isbell

## 2021-06-08 NOTE — PROGRESS NOTES
H&P/Consult Note/Progress Note/Office Note:   Diogenes Blakely MRN: [id number]  :1945  Age:75 y.o.    HPI: Diogenes Griggs. is a 77 y.o. male who was initially referred in consultation by Olegario Bedolla PA-C with a left chest wall hematoma and cellulitis   after a fall at home on the floor approx 21. He sustained a blunt injury to his left chest wall. CXR below showed no PTx or rib fractures. He taked Humira for arthritis and is immune suppressed. We saw him for the 1st time on 21 with erythema of the left chest wall and placed him on Bactrim and marked the cellulitis border. He was instructed to call if any fevers or worsening erythema and given f/u 4 days later  He did not call. When he came to the office on 21 he reported high fevers over 101 degrees associate with nausea with his Bactrim. The erythema had extended >20cm  beyond the original inscribed lines of his left chest wall and extended down his left flank and in his left lower quadrant. We was admitted directly from office. He denies any new falls since 21. On 21 he also had a new hematoma involving his right flank which was not present on 21 (likely a second fall)      21 CXR, 2 views  Clear lungs. The cardiac and mediastinal contours and pulmonary vascularity are normal.   The bones and soft tissues are within normal limits. No evidence of a pneumothorax. IMPRESSION:  Normal chest.      21 CT chest/abd/pelvis with oral and IV contrast  Hx: MRSA cellulitis of left chest wall abscess and fever.     CHEST:   -LUNGS: No lobar pneumonia. Linear atelectasis left base. -AIRWAYS: Trachea and proximal bronchi grossly patent. -PLEURA: Small left pleural effusion. -LYMPH NODES: No enlarged axillary, hilar or mediastinal lymph nodes.   -HEART: Normal size.     -SKELETAL/CHEST WALL: Along left chest wall and body wall there is a complex,  peripherally enhancing fluid collection which measures approximately 32 cm  superior inferior, 17 cm wide and 4 cm thick. There are several bubbles of gas  within it. Larger bubbles of gas extend up into the axilla.     ABDOMEN/PELVIS:  -LIVER: Normal in size and appearance. -GALLBLADDER: No gallstones identified  -BILE DUCTS: Not dilated.     -PANCREAS: Normal.  -SPLEEN: Normal.     -ADRENALS: Normal.  -KIDNEYS/URETERS: Kidneys enhance symmetrically. There are multiple bilateral  parapelvic cysts versus bilateral UPJ obstructions. -BLADDER: Normal.  -REPRODUCTIVE ORGANS: Prostate and seminal vesicles unremarkable.     -BOWEL: Normal caliber. No inflammatory changes. Scattered diverticula. -LYMPH NODES: No significant retroperitoneal, mesenteric, or pelvic adenopathy. -BONES: Degenerative changes. No periostitis. -VASCULATURE: Aorta is normal caliber and mildly calcified. -OTHER: No ascites.     IMPRESSION  Large left abscess, 32 cm x 17 cm x 4 cm which extends from the axilla to the iliac crest.            Additional hx:  5/27/21 wound culture growing MRSA; Pharmacy dosing IV Vanc; trough tonight  5/28/21  Less Confused; no fever; WBC elevated but better;  on IV Abx  5/29/21  Resting in bed; no fever; WBC 12.3;  on IV Abx  5/30/21  Sitting on side of bed; no fever; WBC 13.8;  on IV Abx  5/31/21 AF; wbc still elevated; left hemithorax , left flank and LLQ edematous and indurated  6/1/21 continues IV Vanc; WBC higher at 12.2k   6/2/21 IV Vanc; CT as above with large abscess; I&D/debridment #1 in OR today  6/3/21 IV Vanc/Rocephin; OR culture pending  6/4/21 plan return to OR today for debridement #2; unless pt signs out AMA    6/5/21 POD 3/1; Pt resting in bed. No complaints voiced. Appears comfortable. Postop dressing c/d/i.   6/6/21 POD 4/2; Pt sitting on side of bed. No complaints voiced. Appears comfortable.  Dressing c/di  6/7/21 POD 5/3;  Comfortable; dressing dry; wound vac started today; IV Vanc; Likely SNF soon  6/8/21 POD 6/4 Feels OK; ID rec IV Vanc until 6/21/21; wound vac will be changed 3x/week                  Past Medical History:   Diagnosis Date    Depression     H/O urinary frequency 2/1/2013    History of BPH 2/1/2013    HLD (hyperlipidemia) 2/1/2013    Hx: UTI (urinary tract infection) 2/1/2013    Insomnia     Psoriasis     Psoriatic arthritis ([facility] [address] 2/1/2013    Wears hearing aid 2/1/2013     No past surgical history on file. Current Facility-Administered Medications   Medication Dose Route Frequency    vancomycin (VANCOCIN) 1500 mg in  ml infusion  1,500 mg IntraVENous Q18H    loratadine (CLARITIN) tablet 10 mg  10 mg Oral DAILY    diphenhydrAMINE (BENADRYL) injection 25 mg  25 mg IntraVENous Q8H PRN    HYDROmorphone (DILAUDID) injection 0.2-0.5 mg  0.2-0.5 mg IntraVENous Q3H PRN    lactated Ringers infusion  40 mL/hr IntraVENous CONTINUOUS    famotidine (PEPCID) tablet 20 mg  20 mg Oral BID    alcohol 62% (NOZIN) nasal  1 Ampule  1 Ampule Topical Q8H    zinc oxide-cod liver oil (DESITIN) 40 % paste   Topical Q8H    acetaminophen (TYLENOL) tablet 1,000 mg  1,000 mg Oral Q6H PRN    ondansetron (ZOFRAN) injection 4 mg  4 mg IntraVENous Q4H PRN    HYDROcodone-acetaminophen (NORCO) 5-325 mg per tablet 1 Tablet  1 Tablet Oral Q6H PRN    tamsulosin (FLOMAX) capsule 0.4 mg  0.4 mg Oral DAILY     Patient has no known allergies.   Social History     Socioeconomic History    Marital status: SINGLE     Spouse name: Not on file    Number of children: Not on file    Years of education: Not on file    Highest education level: Not on file   Tobacco Use    Smoking status: Former Smoker    Smokeless tobacco: Never Used    Tobacco comment: pt states quit in early 60's late 52's    Vaping Use    Vaping Use: Never used   Substance and Sexual Activity    Alcohol use: No     Alcohol/week: 0.0 standard drinks    Drug use: No    Sexual activity: Yes     Partners: Female     Social Determinants of Health     Financial Resource Strain:     Difficulty of Paying Living Expenses:    Food Insecurity:     Worried About Running Out of Food in the Last Year:     [address] in the Last Year:    Transportation Needs:     Lack of Transportation (Medical):  Lack of Transportation (Non-Medical):    Physical Activity:     Days of Exercise per Week:     Minutes of Exercise per Session:    Stress:     Feeling of Stress :    Social Connections:     Frequency of Communication with Friends and Family:     Frequency of Social Gatherings with Friends and Family:     Attends Restoration Services:     Active Member of Clubs or Organizations:     Attends Club or Organization Meetings:     Marital Status:      Social History     Tobacco Use   Smoking Status Former Smoker   Smokeless Tobacco Never Used   Tobacco Comment    pt states quit in early 60's late 52's      Family History   Problem Relation Age of Onset    Heart Attack Father     Cancer Father         skin     Other Sister     Cancer Sister     Other Brother         melanoma     ROS: The patient has no difficulty with chest pain or shortness of breath. No fever or chills. Comprehensive review of systems was otherwise unremarkable except as noted above. Physical Exam:   Visit Vitals  /87   Pulse 92   Temp 98.1 °F (36.7 °C)   Resp 15   Ht 5' 11\" (1.803 m)   Wt 158 lb (71.7 kg)   SpO2 99%   BMI 22.04 kg/m²     Vitals:    06/07/21 1552 06/07/21 1908 06/07/21 2324 06/08/21 0340   BP: (!) 148/81 (!) 144/85 135/79 133/87   Pulse:  89 82 92   Resp:  16 16 15   Temp:  97.5 °F (36.4 °C) 97.8 °F (36.6 °C) 98.1 °F (36.7 °C)   SpO2:  96% 96% 99%   Weight: 158 lb (71.7 kg)      Height: 5' 11\" (1.803 m)        [unfilled]  [unfilled]    Constitutional: Alert, oriented, cooperative patient in no acute distress; appears stated age    Eyes:Sclera are clear. EOMs intact  ENMT: no external lesions; he is hard of hearing.  no obvious neck masses, no ear or lip lesions, nares normal  CV: RRR. Normal perfusion      Large open incision from left axillae to left iliac crest with wound vac in place  No surrounding cellulitis    Right flank hematoma stable    Resp: No JVD. Breathing is  non-labored; no audible wheezing. GI: soft and non-distended     Musculoskeletal: unremarkable with normal function. No embolic signs or cyanosis. Neuro:  Oriented to person, place and time;  moves all 4; no focal deficits; slow moving and slow talking. Psychiatric: blunted affect, no memory impairment    Recent vitals (if inpt):  @IPVITALS(24:)@    Amount and/or Complexity of Data Reviewed and Analyzed:  I reviewed and analyzed all of the unique labs and radiologic studies that are shown below as well as any that are in the HPI, and any that are in the expanded problem list below  *Each unique test, order, or document contributes to the combination of 2 or combination of 3 in Category 1 below. For this visit I also reviewed old records and prior notes.       Recent Labs     06/06/21  0535   CREA 0.64*     Review of most recent CBC  Lab Results   Component Value Date/Time    WBC 10.8 06/03/2021 05:33 AM    HGB 12.3 (L) 06/03/2021 05:33 AM    HCT 37.8 (L) 06/03/2021 05:33 AM    PLATELET 657 54/69/7982 05:33 AM    MCV 99.0 (H) 06/03/2021 05:33 AM       Review of most recent BMP  Lab Results   Component Value Date/Time    Sodium 134 (L) 06/03/2021 05:33 AM    Potassium 4.7 06/03/2021 05:33 AM    Chloride 103 06/03/2021 05:33 AM    CO2 26 06/03/2021 05:33 AM    Anion gap 5 (L) 06/03/2021 05:33 AM    Glucose 251 (H) 06/03/2021 05:33 AM    BUN 18 06/03/2021 05:33 AM    Creatinine 0.64 (L) 06/06/2021 05:35 AM    BUN/Creatinine ratio 19 03/31/2016 11:26 AM    GFR est AA >60 06/03/2021 05:33 AM    GFR est non-AA >60 06/03/2021 05:33 AM    Calcium 7.9 (L) 06/03/2021 05:33 AM       Review of most recent LFTs (and lipase if done)  Lab Results   Component Value Date/Time    ALT (SGPT) 80 (H) 05/24/2021 12:57 PM AST (SGOT) 53 (H) 05/24/2021 12:57 PM    Alk. phosphatase 124 05/24/2021 12:57 PM    Bilirubin, total 0.9 05/24/2021 12:57 PM     No results found for: LPSE    No results found for: INR, APTT, CBIL, LCAD, NH4, TROPT, TROIQ, INREXT, INREXT    Review of most recent HgbA1c  Lab Results   Component Value Date/Time    Hemoglobin A1c 4.9 06/05/2021 02:21 PM       Nutritional assessment screen for wound healing issues:  Lab Results   Component Value Date/Time    Protein, total 6.5 05/24/2021 12:57 PM    Albumin 2.4 (L) 05/24/2021 12:57 PM       @lastcovr@  XR Results (most recent):  Results from Hospital Encounter encounter on 05/20/21    XR CHEST PA LAT    Narrative  EXAM: XR CHEST PA LAT    INDICATION: Left chest wall hematoma with cellulitis rule out rib fracture or  pneumothorax after a fall at home on the floor. COMPARISON: None. FINDINGS: PA and lateral radiographs of the chest demonstrate clear lungs. The  cardiac and mediastinal contours and pulmonary vascularity are normal. The bones  and soft tissues are within normal limits. No evidence of a pneumothorax. Impression  Normal chest.      CT Results (most recent):  Results from Hospital Encounter encounter on 05/24/21    CT CHEST ABD PELV W CONT    Narrative  CT OF THE CHEST ABDOMEN AND PELVIS    INDICATION: MRSA cellulitis of left chest wall abscess and fever. TECHNIQUE:  Multiple axial images were obtained through the chest, abdomen and  pelvis. Oral contrast was used for bowel opacification. 100mL of Isovue 370  intravenous contrast was used for better evaluation of solid organs and vascular  structures. Radiation dose reduction techniques were used for this study. All  CT scans performed at this facility use one or all of the following: Automated  exposure control, adjustment of the mA and/or kVp according to patient's size,  iterative reconstruction. COMPARISON: None    FINDINGS:  CHEST:  -LUNGS: No lobar pneumonia.   Linear atelectasis left base. -AIRWAYS: Trachea and proximal bronchi grossly patent. -PLEURA: Small left pleural effusion. -LYMPH NODES: No enlarged axillary, hilar or mediastinal lymph nodes. -HEART: Normal size.    -SKELETAL/CHEST WALL: Long left chest wall and body wall there is a complex,  peripherally enhancing fluid collection which measures approximately 32 cm  superior inferior, 17 cm wide and 4 cm thick. There are several bubbles of gas  within it. Larger bubbles of gas extend up into the axilla. ABDOMEN/PELVIS:  -LIVER: Normal in size and appearance. -GALLBLADDER: No gallstones identified  -BILE DUCTS: Not dilated. -PANCREAS: Normal.  -SPLEEN: Normal.    -ADRENALS: Normal.  -KIDNEYS/URETERS: Kidneys enhance symmetrically. There are multiple bilateral  parapelvic cysts versus bilateral UPJ obstructions. -BLADDER: Normal.  -REPRODUCTIVE ORGANS: Prostate and seminal vesicles unremarkable. -BOWEL: Normal caliber. No inflammatory changes. Scattered diverticula. -LYMPH NODES: No significant retroperitoneal, mesenteric, or pelvic adenopathy. -BONES: Degenerative changes. No periostitis. -VASCULATURE: Aorta is normal caliber and mildly calcified. -OTHER: No ascites. Impression  Large left abscess, 32 cm x 17 cm x 4 cm which extends from the  axilla to the iliac crest.  689    US Results (most recent):  No results found for this or any previous visit.         Admission date (for inpatients): 5/24/2021   * No surgery found *  Procedure(s):  LEFT CHEST WALL AND FLANK WOUND DEBRIDEMENT        ASSESSMENT/PLAN:  Problem List  Date Reviewed: 5/24/2021        Codes Class Noted    Confusion ICD-10-CM: R41.0  ICD-9-CM: 298.9  5/27/2021        Sundowning ICD-10-CM: F05  ICD-9-CM: Mahamed Yolanda  5/27/2021        Leukocytosis ICD-10-CM: H75.721  ICD-9-CM: 288.60  5/26/2021        Acute renal insufficiency ICD-10-CM: N28.9  ICD-9-CM: 593.9  5/25/2021        Fever ICD-10-CM: R50.9  ICD-9-CM: 780.60  5/24/2021        Suppression of immune system subtherapeutic ([facility] ICD-10-CM: S89.87  ICD-9-CM: 279.8  5/24/2021        Right flank hematoma ICD-10-CM: S30. 1XXA  ICD-9-CM: 922.2  5/24/2021        * (Principal) Chest wall abscess ICD-10-CM: L02.213  ICD-9-CM: 682.2  5/20/2021        Hematoma of left chest wall ICD-10-CM: F76.765Z  ICD-9-CM: 922.1  5/20/2021        Cellulitis of chest wall ICD-10-CM: L03.313  ICD-9-CM: 682.2  5/20/2021        Fall ICD-10-CM: A42. Collie Dice  ICD-9-CM: E888.9  5/20/2021        Psoriasis ICD-10-CM: L40.9  ICD-9-CM: 696.1  Unknown        Insomnia ICD-10-CM: G47.00  ICD-9-CM: 780.52  Unknown        Depression ICD-10-CM: F32.9  ICD-9-CM: 887  Unknown        Tachycardia ICD-10-CM: R00.0  ICD-9-CM: 785.0  6/13/2016        Murmur ICD-10-CM: R01.1  ICD-9-CM: 785.2  6/13/2016        Psoriatic arthritis ([facility] ICD-10-CM: L40.50  ICD-9-CM: 696.0  2/1/2013        Wears hearing aid ICD-10-CM: Z97.4  ICD-9-CM: V45.89  2/1/2013        HLD (hyperlipidemia) ICD-10-CM: E78.5  ICD-9-CM: 272.4  2/1/2013        H/O urinary frequency ICD-10-CM: I37.269  ICD-9-CM: V13.09  2/1/2013        History of BPH ICD-10-CM: D56.612  ICD-9-CM: V13.89  2/1/2013            Principal Problem:    Chest wall abscess (5/20/2021)    Active Problems:    Cellulitis of chest wall (5/20/2021)      Fall (5/20/2021)      Fever (5/24/2021)      Suppression of immune system subtherapeutic ([address] (5/24/2021)      Right flank hematoma (5/24/2021)      Acute renal insufficiency (5/25/2021)      Leukocytosis (5/26/2021)      Confusion (5/27/2021)      Sundowning (5/27/2021)           Number and Complexity of Problems addressed and   Risks of complications and/or morbidity of management        Massive MRSA Infection of soft tissues of left chest wall with abscess and extension, cellulitis, and large open wound with necrosis of the left axillae, left chest wall, left flank, LLQ, and left iliac region  This failed outpt Rx with PO Bactrim and also failed initial inpt Rx with IV Vanc and Rocpehin     Black foam wound vac TID started 6/7/21  ID rec IV Vanc until 6/21/21    Continue inpt admission for now for IV Vanc and inpt complex dressing changes for a massive left chest wall, left axillary, left flank, LLQ wound abscess cavity. He went to OR for serial debridements  6/2/21 OR for debridement #1  6/4/21 OR for debridement/washout/dressing change #2          Chronic immune suppression  Continue to hold his methotrexate, prednisone, and Humira to help reduce his immune suppression during this acute phase of infection. Dermatitis around left chest wall wound from moisture maceration  Desitin ordered to destinee-wound skin    Confusion/sundowning at night  For many days he was uncooperative and pulling IV sites   Sitters were required  This has improving significantly  May no longer require sitter              Level of MDM (2/3 elements below)  Number and Complexity of Problems Addressed Amount and/or Complexity of Data to be Reviewed and Analyzed  *Each unique test, order, or document contributes to the combination of 2 or combination of 3 in Category 1 below.  Risk of Complications and/or Morbidity or Mortality of pt Management     07588  85892  Minimal  1self-limited or minor problem Minimal or none Minimal risk of morbidity from additional diagnostic testing or Rx   40032  28711 Low Low  2or more self-limited or minor problems;    or  1stable chronic illness;    or  6QZYAT, uncomplicated illness or injury   Limited  (Must meet the requirements of at least 1 of the 2 categories)  Category 1: Tests and documents   Any combination of 2 from the following:  Review of prior external note(s) from each unique source*;  review of the result(s) of each unique test*;   ordering of each unique test*    or   Category 2: Assessment requiring an independent historian(s)  (For the categories of independent interpretation of tests and discussion of management or test interpretation, see moderate or high) Low risk of morbidity from additional diagnostic testing or treatment     91489  55178 Mod Moderate  1or more chronic illnesses with exacerbation, progression, or side effects of treatment;    or  2or more stable chronic illnesses;    or  1undiagnosed new problem with uncertain prognosis;    or  1acute illness with systemic symptoms;    or  4GNUNE complicated injury   Moderate  (Must meet the requirements of at least 1 out of 3 categories)  Category 1: Tests, documents, or independent historian(s)  Any combination of 3 from the following:   Review of prior external note(s) from each unique source*;  Review of the result(s) of each unique test*;  Ordering of each unique test*;  Assessment requiring an independent historian(s)    or  Category 2: Independent interpretation of tests   Independent interpretation of a test performed by another physician/other qualified health care professional (not separately reported);     or  Category 3: Discussion of management or test interpretation  Discussion of management or test interpretation with external physician/other qualified health care professional/appropriate source (not separately reported)   Moderate risk of morbidity from additional diagnostic testing or treatment  Examples only:  Prescription drug management   Decision regarding minor surgery with identified patient or procedure risk factors  Decision regarding elective major surgery without identified patient or procedure risk factors   Diagnosis or treatment significantly limited by social determinants of health       99188  32207 High High  1or more chronic illnesses with severe exacerbation, progression, or side effects of treatment;    or  1 acute or chronic illness or injury that poses a threat to life or bodily function   Extensive  (Must meet the requirements of at least 2 out of 3 categories)  Category 1: Tests, documents, or independent historian(s)  Any combination of 3 from the following:   Review of prior external note(s) from each unique source*;  Review of the result(s) of each unique test*;   Ordering of each unique test*;   Assessment requiring an independent historian(s)    or   Category 2: Independent interpretation of tests   Independent interpretation of a test performed by another physician/other qualified health care professional (not separately reported);     or  Category 3: Discussion of management or test interpretation  Discussion of management or test interpretation with external physician/other qualified health care professional/appropriate source (not separately reported)   High risk of morbidity from additional diagnostic testing or treatment  Examples only:  Drug therapy requiring intensive monitoring for toxicity  Decision regarding elective major surgery with identified patient or procedure risk factors  Decision regarding emergency major surgery  Decision regarding hospitalization  Decision not to resuscitate or to de-escalate care because of poor prognosis             I have personally performed a face-to-face diagnostic evaluation and management  service on this patient. I have independently seen the patient. I have independently obtained the above history from the patient/family. I have independently examined the patient with above findings. I have independently reviewed data/labs for this patient and developed the above plan of care (MDM). Signed: Marium Ayala MD, FACS

## 2021-06-08 NOTE — OP NOTES
[address] [place] [address]  OPERATIVE REPORT    Name:  Georgie Joya  MR#:  [id number]  :  1945  ACCOUNT #:  [de-identified]  DATE OF SERVICE:  2021    PREOPERATIVE DIAGNOSIS:  Massive left axillary/left chest/left back/left lower quadrant/left flank/left iliac region open wound with MRSA infection. POSTOPERATIVE DIAGNOSIS:  Massive left axillary/left chest/left back/left lower quadrant/left flank/left iliac region open wound with MRSA infection. PROCEDURES PERFORMED:  Sharp excisional debridement of large 420 sq. cm wound involving left axilla, left chest, left back, left lower quadrant, left flank, and left iliac region to remove devitalized skin, subcutaneous adipose, and fascia (CPT 11944, 17294+ listed 20 times). SURGEON:  Delonte Callahan. Nathan López MD    ASSISTANT:  None. ANESTHESIA:  General.    COMPLICATIONS:  None. SPECIMENS REMOVED:  None. IMPLANTS:  None. ESTIMATED BLOOD LOSS:  Less than 100 mL. PROCEDURE:  The patient was taken to the operating room, placed in supine position. After adequate anesthesia was given, he was repositioned with his left side up in the decubitus position on a bean bag. His entire left axilla, left chest, left back, left lower quadrant, left iliac region, and left flank were all prepped and draped in a sterile fashion with multiple layers of Betadine scrub and Betadine solution. Sharp excisional debridement was performed with a #15 scalpel and curved owusu scissors to remove devitalized skin, subcutaneous adipose, and fascia from the large wound with MRSA infection. The wound was irrigated. Hemostasis was confirmed. 420 sq. cm of wound surface area was debrided in all. The wound was then packed with multiple Kerlix rolls covered with ABD pads and tape. The patient tolerated the procedure well. There were no immediate complications.         Richard Núñez MD [email or web address]  D:  2021 15:51  T:  2021 19:42  JOB #:  [id number]

## 2021-06-08 NOTE — PROGRESS NOTES
ACUTE PHYSICAL THERAPY GOALS:  (Developed with and agreed upon by patient and/or caregiver. )  LTG:  (1.)Mr. Coffey will move from supine to sit and sit to supine , scoot up and down and roll side to side in bed with MODIFIED INDEPENDENCE within 7 treatment day(s). Goal met 06/02/21    (2.)Mr. Coffey will transfer from bed to chair and chair to bed with SUPERVISION using the least restrictive device within 7 treatment day(s).    (3.)Mr. Coffey will ambulate with SUPERVISION for 250 feet with the least restrictive device within 7 treatment day(s). Goal met 06/02/21  (4.)Mr. Trini Gar will perform seated and standing exercises for 15+ minutes to improve strength and mobility within 7 days. PHYSICAL THERAPY: Daily Note and AM Treatment Day # 6    Danny Hardin is a 77 y.o. male   PRIMARY DIAGNOSIS: Chest wall abscess  Cellulitis of chest wall [L03.313]    4 Days Post-Op    ASSESSMENT:     REHAB RECOMMENDATIONS: CURRENT LEVEL OF FUNCTION:  (Most Recently Demonstrated)   Recommendation to date pending progress:  Setting:   Short-term Rehab  Equipment:    Rolling Walker Bed Mobility:   Modified Independent  Sit to Stand:  [employer] Assistance  Transfers:   Standby Assistance  Gait/Mobility:   Standby Assistance with RW     ASSESSMENT:  Mr. Trini Gar is doing very well today. Needed encouragement to participate. Bed mobility is modified independent. Sitting and standing balance good. Gait with RW x 5 feet to the recliner. Patient is left sitting in the recliner with needs within reach. Patient has wound vac, IV and gonsalez. Better session today. Little progress demonstrated. Encouraged OOB activities to increase tolerance and endurance. Fairly steady with walker with cues. Rehab at discharge as the patient lives alone . Will continue PT efforts.      SUBJECTIVE:   Mr. Trini Gar states, \"OK\"    SOCIAL HISTORY/ LIVING ENVIRONMENT: see eval  Home Environment: Private residence  One/Two Story Residence: One story  Living Alone: No  Support Systems: Spouse/Significant Other/Partner  OBJECTIVE:     PAIN: VITAL SIGNS: LINES/DRAINS:   Pre Treatment: Pain Screen  Pain Scale 1: Numeric (0 - 10)  Pain Intensity 1: 0  Post Treatment: 0/10     Visit Vitals  /79   Pulse 73   Temp 98.1 °F (36.7 °C)   Resp 17   Ht 5' 11\" (1.803 m)   Wt 71.7 kg (158 lb)   SpO2 98%   BMI 22.04 kg/m²    none  O2 Device: None (Room air)     MOBILITY: I Mod I S SBA CGA Min Mod Max Total  NT x2 Comments:   Bed Mobility    Rolling [] [x] [] [] [] [] [] [] [] [] []    Supine to Sit [] [x] [] [] [] [] [] [] [] [] []    Scooting [] [x] [] [] [] [] [] [] [] [] []    Sit to Supine [] [x] [] [] [] [] [] [] [] [] []    Transfers    Sit to Stand [] [] [] [x] [] [] [] [] [] [] []    Bed to Chair [] [] [] [x] [] [] [] [] [] [] []    Stand to Sit [] [] [] [x] [] [] [] [] [] [] []    I=Independent, Mod I=Modified Independent, S=Supervision, SBA=Standby Assistance, CGA=Contact Guard Assistance,   Min=Minimal Assistance, Mod=Moderate Assistance, Max=Maximal Assistance, Total=Total Assistance, NT=Not Tested    BALANCE: Good Fair+ Fair Fair- Poor NT Comments   Sitting Static [x] [] [] [] [] []    Sitting Dynamic [x] [] [] [] [] []              Standing Static [] [x] [] [] [] []    Standing Dynamic [] [x] [] [] [] []      GAIT: I Mod I S SBA CGA Min Mod Max Total  NT x2 Comments:   Level of Assistance [] [] [] [x] [] [] [] [] [] [] []    Distance 5 feet    DME Rolling Walker    Gait Quality Steady with RW    Weightbearing  Status N/A     I=Independent, Mod I=Modified Independent, S=Supervision, SBA=Standby Assistance, CGA=Contact Guard Assistance,   Min=Minimal Assistance, Mod=Moderate Assistance, Max=Maximal Assistance, Total=Total Assistance, NT=Not Tested    PLAN:   FREQUENCY/DURATION: PT Plan of Care: 3 times/week for duration of hospital stay or until stated goals are met, whichever comes first.  TREATMENT:     TREATMENT:   ($$ Therapeutic Activity: 23-37 mins    )  Therapeutic Activity (23 Minutes): Therapeutic activity included Rolling, Supine to Sit, Sit to Supine, Scooting, Transfer Training, Ambulation on level ground, Sitting balance  and Standing balance to improve functional Mobility, Strength and Activity tolerance. TREATMENT GRID:   Date:  5/28/21 Date:  6/1 Date:     Activity/Exercise Parameters Parameters Parameters   Ankle pumps 10 B 15    Seated knee extension 10 B 10    Seated hip flexion/marching 10 B 10    abd/add  10    Standing:   Forward stepping 5 x 2     Backward stepping 5 x 2     Side stepping 5 x 2       AFTER TREATMENT POSITION/PRECAUTIONS:  Alarm Activated, Chair, Needs within reach and RN notified    INTERDISCIPLINARY COLLABORATION:  RN/PCT and PT/PTA    TOTAL TREATMENT DURATION:  PT Patient Time In/Time Out  Time In: 1020  Time Out: Angel Hancock-Jennifer, PTA

## 2021-06-09 LAB
BACTERIA SPEC CULT: NORMAL
CK SERPL-CCNC: 24 U/L (ref 21–215)
COVID-19 RAPID TEST, COVR: NOT DETECTED
SERVICE CMNT-IMP: NORMAL
SOURCE, COVRS: NORMAL

## 2021-06-09 PROCEDURE — 99232 SBSQ HOSP IP/OBS MODERATE 35: CPT | Performed by: SURGERY

## 2021-06-09 PROCEDURE — 2709999900 HC NON-CHARGEABLE SUPPLY

## 2021-06-09 PROCEDURE — 87635 SARS-COV-2 COVID-19 AMP PRB: CPT

## 2021-06-09 PROCEDURE — 97606 NEG PRS WND THER DME>50 SQCM: CPT

## 2021-06-09 PROCEDURE — 36415 COLL VENOUS BLD VENIPUNCTURE: CPT

## 2021-06-09 PROCEDURE — 74011250636 HC RX REV CODE- 250/636: Performed by: NURSE PRACTITIONER

## 2021-06-09 PROCEDURE — 82550 ASSAY OF CK (CPK): CPT

## 2021-06-09 PROCEDURE — 74011250637 HC RX REV CODE- 250/637: Performed by: SURGERY

## 2021-06-09 PROCEDURE — 74011250637 HC RX REV CODE- 250/637: Performed by: NURSE PRACTITIONER

## 2021-06-09 PROCEDURE — 97164 PT RE-EVAL EST PLAN CARE: CPT

## 2021-06-09 PROCEDURE — 97530 THERAPEUTIC ACTIVITIES: CPT

## 2021-06-09 PROCEDURE — 77030018717 HC DRSG GRNUFM KCON -B

## 2021-06-09 PROCEDURE — 74011000250 HC RX REV CODE- 250: Performed by: NURSE PRACTITIONER

## 2021-06-09 PROCEDURE — 74011250636 HC RX REV CODE- 250/636: Performed by: SURGERY

## 2021-06-09 PROCEDURE — 97605 NEG PRS WND THER DME<=50SQCM: CPT

## 2021-06-09 PROCEDURE — 65270000029 HC RM PRIVATE

## 2021-06-09 RX ORDER — HYDROMORPHONE HYDROCHLORIDE 1 MG/ML
.2-.4 INJECTION, SOLUTION INTRAMUSCULAR; INTRAVENOUS; SUBCUTANEOUS
Status: DISCONTINUED | OUTPATIENT
Start: 2021-06-09 | End: 2021-06-18 | Stop reason: HOSPADM

## 2021-06-09 RX ADMIN — HYDROCODONE BITARTRATE AND ACETAMINOPHEN 1 TABLET: 5; 325 TABLET ORAL at 06:33

## 2021-06-09 RX ADMIN — Medication 1 AMPULE: at 13:55

## 2021-06-09 RX ADMIN — FAMOTIDINE 20 MG: 20 TABLET ORAL at 09:09

## 2021-06-09 RX ADMIN — DIPHENHYDRAMINE HYDROCHLORIDE 25 MG: 50 INJECTION, SOLUTION INTRAMUSCULAR; INTRAVENOUS at 01:40

## 2021-06-09 RX ADMIN — Medication 1 AMPULE: at 21:09

## 2021-06-09 RX ADMIN — HYDROCODONE BITARTRATE AND ACETAMINOPHEN 1 TABLET: 5; 325 TABLET ORAL at 21:09

## 2021-06-09 RX ADMIN — HYDROCODONE BITARTRATE AND ACETAMINOPHEN 1 TABLET: 5; 325 TABLET ORAL at 13:53

## 2021-06-09 RX ADMIN — Medication: at 14:21

## 2021-06-09 RX ADMIN — FAMOTIDINE 20 MG: 20 TABLET ORAL at 16:50

## 2021-06-09 RX ADMIN — HYDROMORPHONE HYDROCHLORIDE 0.2 MG: 1 INJECTION, SOLUTION INTRAMUSCULAR; INTRAVENOUS; SUBCUTANEOUS at 16:47

## 2021-06-09 RX ADMIN — SODIUM CHLORIDE 600 MG: 9 INJECTION, SOLUTION INTRAMUSCULAR; INTRAVENOUS; SUBCUTANEOUS at 16:37

## 2021-06-09 RX ADMIN — HYDROMORPHONE HYDROCHLORIDE 0.4 MG: 1 INJECTION, SOLUTION INTRAMUSCULAR; INTRAVENOUS; SUBCUTANEOUS at 10:08

## 2021-06-09 RX ADMIN — TAMSULOSIN HYDROCHLORIDE 0.4 MG: 0.4 CAPSULE ORAL at 09:09

## 2021-06-09 RX ADMIN — LORATADINE 10 MG: 10 TABLET ORAL at 09:09

## 2021-06-09 RX ADMIN — Medication 1 AMPULE: at 06:33

## 2021-06-09 NOTE — PROGRESS NOTES
H&P/Consult Note/Progress Note/Office Note:   Delaney Gamez MRN: [id number]  :1945  Age:75 y.o.    HPI: Delaney Gamez is a 77 y.o. male who was initially referred in consultation by Maurisio Wayne PA-C with a left chest wall hematoma and cellulitis   after a fall at home on the floor approx 21. He sustained a blunt injury to his left chest wall. CXR below showed no PTx or rib fractures. He taked Humira for arthritis and is immune suppressed. We saw him for the 1st time on 21 with erythema of the left chest wall and placed him on Bactrim and marked the cellulitis border. He was instructed to call if any fevers or worsening erythema and given f/u 4 days later  He did not call. When he came to the office on 21 he reported high fevers over 101 degrees associate with nausea with his Bactrim. The erythema had extended >20cm  beyond the original inscribed lines of his left chest wall and extended down his left flank and in his left lower quadrant. We was admitted directly from office. He denies any new falls since 21. On 21 he also had a new hematoma involving his right flank which was not present on 21 (likely a second fall)      21 CXR, 2 views  Clear lungs. The cardiac and mediastinal contours and pulmonary vascularity are normal.   The bones and soft tissues are within normal limits. No evidence of a pneumothorax. IMPRESSION:  Normal chest.      21 CT chest/abd/pelvis with oral and IV contrast  Hx: MRSA cellulitis of left chest wall abscess and fever.     CHEST:   -LUNGS: No lobar pneumonia. Linear atelectasis left base. -AIRWAYS: Trachea and proximal bronchi grossly patent. -PLEURA: Small left pleural effusion. -LYMPH NODES: No enlarged axillary, hilar or mediastinal lymph nodes.   -HEART: Normal size.     -SKELETAL/CHEST WALL: Along left chest wall and body wall there is a complex,  peripherally enhancing fluid collection which measures approximately 32 cm  superior inferior, 17 cm wide and 4 cm thick. There are several bubbles of gas  within it. Larger bubbles of gas extend up into the axilla.     ABDOMEN/PELVIS:  -LIVER: Normal in size and appearance. -GALLBLADDER: No gallstones identified  -BILE DUCTS: Not dilated.     -PANCREAS: Normal.  -SPLEEN: Normal.     -ADRENALS: Normal.  -KIDNEYS/URETERS: Kidneys enhance symmetrically. There are multiple bilateral  parapelvic cysts versus bilateral UPJ obstructions. -BLADDER: Normal.  -REPRODUCTIVE ORGANS: Prostate and seminal vesicles unremarkable.     -BOWEL: Normal caliber. No inflammatory changes. Scattered diverticula. -LYMPH NODES: No significant retroperitoneal, mesenteric, or pelvic adenopathy. -BONES: Degenerative changes. No periostitis. -VASCULATURE: Aorta is normal caliber and mildly calcified. -OTHER: No ascites.     IMPRESSION  Large left abscess, 32 cm x 17 cm x 4 cm which extends from the axilla to the iliac crest.            Additional hx:  5/27/21 wound culture growing MRSA; Pharmacy dosing IV Vanc; trough tonight  5/28/21  Less Confused; no fever; WBC elevated but better;  on IV Abx  5/29/21  Resting in bed; no fever; WBC 12.3;  on IV Abx  5/30/21  Sitting on side of bed; no fever; WBC 13.8;  on IV Abx  5/31/21 AF; wbc still elevated; left hemithorax , left flank and LLQ edematous and indurated  6/1/21 continues IV Vanc; WBC higher at 12.2k   6/2/21 IV Vanc; CT as above with large abscess; I&D/debridment #1 in OR today  6/3/21 IV Vanc/Rocephin; OR culture pending  6/4/21 plan return to OR today for debridement #2; unless pt signs out AMA    6/5/21 POD 3/1; Pt resting in bed. No complaints voiced. Appears comfortable. Postop dressing c/d/i.   6/6/21 POD 4/2; Pt sitting on side of bed. No complaints voiced. Appears comfortable.  Dressing c/di  6/7/21 POD 5/3;  Comfortable; dressing dry; wound vac started today; IV Vanc; Likely SNF soon  6/8/21 POD 6/4 Feels OK; ID rec IV Vanc until 6/21/21; wound vac will be changed 3x/week  6/9/21 POD 7/5; Cont IV Vancomycin; wound vac 3 times a week; consult PT; OOB daily                Past Medical History:   Diagnosis Date    Depression     H/O urinary frequency 2/1/2013    History of BPH 2/1/2013    HLD (hyperlipidemia) 2/1/2013    Hx: UTI (urinary tract infection) 2/1/2013    Insomnia     Psoriasis     Psoriatic arthritis ([facility] [address] 2/1/2013    Wears hearing aid 2/1/2013     No past surgical history on file. Current Facility-Administered Medications   Medication Dose Route Frequency    HYDROmorphone (DILAUDID) injection 0.2-0.4 mg  0.2-0.4 mg IntraVENous Q3H PRN    DAPTOmycin (CUBICIN) 600 mg in 0.9% sodium chloride 12 mL IV Syringe  600 mg IntraVENous Q24H    loratadine (CLARITIN) tablet 10 mg  10 mg Oral DAILY    diphenhydrAMINE (BENADRYL) injection 25 mg  25 mg IntraVENous Q8H PRN    famotidine (PEPCID) tablet 20 mg  20 mg Oral BID    alcohol 62% (NOZIN) nasal  1 Ampule  1 Ampule Topical Q8H    zinc oxide-cod liver oil (DESITIN) 40 % paste   Topical Q8H    acetaminophen (TYLENOL) tablet 1,000 mg  1,000 mg Oral Q6H PRN    ondansetron (ZOFRAN) injection 4 mg  4 mg IntraVENous Q4H PRN    HYDROcodone-acetaminophen (NORCO) 5-325 mg per tablet 1 Tablet  1 Tablet Oral Q6H PRN    tamsulosin (FLOMAX) capsule 0.4 mg  0.4 mg Oral DAILY     Patient has no known allergies.   Social History     Socioeconomic History    Marital status: SINGLE     Spouse name: Not on file    Number of children: Not on file    Years of education: Not on file    Highest education level: Not on file   Tobacco Use    Smoking status: Former Smoker    Smokeless tobacco: Never Used    Tobacco comment: pt states quit in early 60's late 52's    Vaping Use    Vaping Use: Never used   Substance and Sexual Activity    Alcohol use: No     Alcohol/week: 0.0 standard drinks    Drug use: No    Sexual activity: Yes     Partners: Female     Social Determinants of Health     Financial Resource Strain:     Difficulty of Paying Living Expenses:    Food Insecurity:     Worried About Running Out of Food in the Last Year:     [address] in the Last Year:    Transportation Needs:     Lack of Transportation (Medical):  Lack of Transportation (Non-Medical):    Physical Activity:     Days of Exercise per Week:     Minutes of Exercise per Session:    Stress:     Feeling of Stress :    Social Connections:     Frequency of Communication with Friends and Family:     Frequency of Social Gatherings with Friends and Family:     Attends [employer] Services:     Active Member of Clubs or Organizations:     Attends Club or Organization Meetings:     Marital Status:      Social History     Tobacco Use   Smoking Status Former Smoker   Smokeless Tobacco Never Used   Tobacco Comment    pt states quit in early 60's late 52's      Family History   Problem Relation Age of Onset    Heart Attack Father     Cancer Father         skin     Other Sister     Cancer Sister     Other Brother         melanoma     ROS: The patient has no difficulty with chest pain or shortness of breath. No fever or chills. Comprehensive review of systems was otherwise unremarkable except as noted above. Physical Exam:   Visit Vitals  /75   Pulse 82   Temp 98.5 °F (36.9 °C)   Resp 16   Ht 5' 11\" (1.803 m)   Wt 158 lb (71.7 kg)   SpO2 95%   BMI 22.04 kg/m²     Vitals:    06/08/21 1526 06/08/21 2036 06/08/21 2244 06/09/21 0358   BP: 126/79 117/66 120/80 123/75   Pulse: 86 95 88 82   Resp: 17 16 16 16   Temp: 98.6 °F (37 °C) 99.5 °F (37.5 °C) 99.1 °F (37.3 °C) 98.5 °F (36.9 °C)   SpO2: 99% 96% 97% 95%   Weight:       Height:         [unfilled]  [unfilled]    Constitutional: Alert, oriented, cooperative patient in no acute distress; appears stated age    Eyes:Sclera are clear. EOMs intact  ENMT: no external lesions; he is hard of hearing.  no obvious neck masses, no ear or lip lesions, nares normal  CV: RRR. Normal perfusion      Large open incision from left axillae to left iliac crest with wound vac in place  No surrounding cellulitis    Right flank hematoma stable    Resp: No JVD. Breathing is  non-labored; no audible wheezing. GI: soft and non-distended     Musculoskeletal: unremarkable with normal function. No embolic signs or cyanosis. Neuro:  Oriented to person, place and time;  moves all 4; no focal deficits; slow moving and slow talking. Psychiatric: blunted affect, no memory impairment    Recent vitals (if inpt):  @IPVITALS(24:)@    Amount and/or Complexity of Data Reviewed and Analyzed:  I reviewed and analyzed all of the unique labs and radiologic studies that are shown below as well as any that are in the HPI, and any that are in the expanded problem list below  *Each unique test, order, or document contributes to the combination of 2 or combination of 3 in Category 1 below. For this visit I also reviewed old records and prior notes.       Recent Labs     06/08/21  0934   WBC 7.3   HGB 12.2*      *   K 4.0      CO2 27   BUN 11   CREA 0.83   *   TBILI 0.2   ALT 64   AP 89     Review of most recent CBC  Lab Results   Component Value Date/Time    WBC 7.3 06/08/2021 09:34 AM    HGB 12.2 (L) 06/08/2021 09:34 AM    HCT 37.2 (L) 06/08/2021 09:34 AM    PLATELET 652 98/41/0758 09:34 AM    MCV 98.4 (H) 06/08/2021 09:34 AM       Review of most recent BMP  Lab Results   Component Value Date/Time    Sodium 137 (L) 06/08/2021 09:34 AM    Potassium 4.0 06/08/2021 09:34 AM    Chloride 106 06/08/2021 09:34 AM    CO2 27 06/08/2021 09:34 AM    Anion gap 4 (L) 06/08/2021 09:34 AM    Glucose 174 (H) 06/08/2021 09:34 AM    BUN 11 06/08/2021 09:34 AM    Creatinine 0.83 06/08/2021 09:34 AM    BUN/Creatinine ratio 19 03/31/2016 11:26 AM    GFR est AA >60 06/08/2021 09:34 AM    GFR est non-AA >60 06/08/2021 09:34 AM    Calcium 8.3 06/08/2021 09:34 AM       Review of most recent LFTs (and lipase if done)  Lab Results   Component Value Date/Time    ALT (SGPT) 64 06/08/2021 09:34 AM    AST (SGOT) 27 06/08/2021 09:34 AM    Alk. phosphatase 89 06/08/2021 09:34 AM    Bilirubin, total 0.2 06/08/2021 09:34 AM     No results found for: LPSE    No results found for: INR, APTT, CBIL, LCAD, NH4, TROPT, TROIQ, INREXT, INREXT    Review of most recent HgbA1c  Lab Results   Component Value Date/Time    Hemoglobin A1c 4.9 06/05/2021 02:21 PM       Nutritional assessment screen for wound healing issues:  Lab Results   Component Value Date/Time    Protein, total 6.6 06/08/2021 09:34 AM    Albumin 2.4 (L) 06/08/2021 09:34 AM       @lastcovr@  XR Results (most recent):  Results from Hospital Encounter encounter on 05/20/21    XR CHEST PA LAT    Narrative  EXAM: XR CHEST PA LAT    INDICATION: Left chest wall hematoma with cellulitis rule out rib fracture or  pneumothorax after a fall at home on the floor. COMPARISON: None. FINDINGS: PA and lateral radiographs of the chest demonstrate clear lungs. The  cardiac and mediastinal contours and pulmonary vascularity are normal. The bones  and soft tissues are within normal limits. No evidence of a pneumothorax. Impression  Normal chest.      CT Results (most recent):  Results from Hospital Encounter encounter on 05/24/21    CT CHEST ABD PELV W CONT    Narrative  CT OF THE CHEST ABDOMEN AND PELVIS    INDICATION: MRSA cellulitis of left chest wall abscess and fever. TECHNIQUE:  Multiple axial images were obtained through the chest, abdomen and  pelvis. Oral contrast was used for bowel opacification. 100mL of Isovue 370  intravenous contrast was used for better evaluation of solid organs and vascular  structures. Radiation dose reduction techniques were used for this study.   All  CT scans performed at this facility use one or all of the following: Automated  exposure control, adjustment of the mA and/or kVp according to patient's size,  iterative reconstruction. COMPARISON: None    FINDINGS:  CHEST:  -LUNGS: No lobar pneumonia. Linear atelectasis left base. -AIRWAYS: Trachea and proximal bronchi grossly patent. -PLEURA: Small left pleural effusion. -LYMPH NODES: No enlarged axillary, hilar or mediastinal lymph nodes. -HEART: Normal size.    -SKELETAL/CHEST WALL: Long left chest wall and body wall there is a complex,  peripherally enhancing fluid collection which measures approximately 32 cm  superior inferior, 17 cm wide and 4 cm thick. There are several bubbles of gas  within it. Larger bubbles of gas extend up into the axilla. ABDOMEN/PELVIS:  -LIVER: Normal in size and appearance. -GALLBLADDER: No gallstones identified  -BILE DUCTS: Not dilated. -PANCREAS: Normal.  -SPLEEN: Normal.    -ADRENALS: Normal.  -KIDNEYS/URETERS: Kidneys enhance symmetrically. There are multiple bilateral  parapelvic cysts versus bilateral UPJ obstructions. -BLADDER: Normal.  -REPRODUCTIVE ORGANS: Prostate and seminal vesicles unremarkable. -BOWEL: Normal caliber. No inflammatory changes. Scattered diverticula. -LYMPH NODES: No significant retroperitoneal, mesenteric, or pelvic adenopathy. -BONES: Degenerative changes. No periostitis. -VASCULATURE: Aorta is normal caliber and mildly calcified. -OTHER: No ascites. Impression  Large left abscess, 32 cm x 17 cm x 4 cm which extends from the  axilla to the iliac crest.  689    US Results (most recent):  No results found for this or any previous visit.         Admission date (for inpatients): 5/24/2021   * No surgery found *  Procedure(s):  LEFT CHEST WALL AND FLANK WOUND DEBRIDEMENT        ASSESSMENT/PLAN:  Problem List  Date Reviewed: 5/24/2021        Codes Class Noted    Confusion ICD-10-CM: R41.0  ICD-9-CM: 298.9  5/27/2021        Sundowning ICD-10-CM: F05  ICD-9-CM: Jerel Go  5/27/2021        Leukocytosis ICD-10-CM: Z99.968  ICD-9-CM: 288.60  5/26/2021        Acute renal insufficiency ICD-10-CM: N28.9  ICD-9-CM: 593.9  5/25/2021        Fever ICD-10-CM: R50.9  ICD-9-CM: 780.60  5/24/2021        Suppression of immune system subtherapeutic (HCC) ICD-10-CM: B36.80  ICD-9-CM: 279.8  5/24/2021        Right flank hematoma ICD-10-CM: S30. 1XXA  ICD-9-CM: 922.2  5/24/2021        * (Principal) Chest wall abscess ICD-10-CM: L02.213  ICD-9-CM: 682.2  5/20/2021        Hematoma of left chest wall ICD-10-CM: Y75.965C  ICD-9-CM: 922.1  5/20/2021        Cellulitis of chest wall ICD-10-CM: L03.313  ICD-9-CM: 682.2  5/20/2021        Fall ICD-10-CM: J99. Royden Distel  ICD-9-CM: E888.9  5/20/2021        Psoriasis ICD-10-CM: L40.9  ICD-9-CM: 696.1  Unknown        Insomnia ICD-10-CM: G47.00  ICD-9-CM: 780.52  Unknown        Depression ICD-10-CM: F32.9  ICD-9-CM: 225  Unknown        Tachycardia ICD-10-CM: R00.0  ICD-9-CM: 785.0  6/13/2016        Murmur ICD-10-CM: R01.1  ICD-9-CM: 785.2  6/13/2016        Psoriatic arthritis ([facility] [address] ICD-10-CM: L40.50  ICD-9-CM: 696.0  2/1/2013        Wears hearing aid ICD-10-CM: Z97.4  ICD-9-CM: V45.89  2/1/2013        HLD (hyperlipidemia) ICD-10-CM: E78.5  ICD-9-CM: 272.4  2/1/2013        H/O urinary frequency ICD-10-CM: Z83.778  ICD-9-CM: V13.09  2/1/2013        History of BPH ICD-10-CM: M91.289  ICD-9-CM: V13.89  2/1/2013            Principal Problem:    Chest wall abscess (5/20/2021)    Active Problems:    Cellulitis of chest wall (5/20/2021)      Fall (5/20/2021)      Fever (5/24/2021)      Suppression of immune system subtherapeutic ([address] (5/24/2021)      Right flank hematoma (5/24/2021)      Acute renal insufficiency (5/25/2021)      Leukocytosis (5/26/2021)      Confusion (5/27/2021)      Sundowning (5/27/2021)           Number and Complexity of Problems addressed and   Risks of complications and/or morbidity of management        Massive MRSA Infection of soft tissues of left chest wall with abscess and extension, cellulitis, and large open wound with necrosis of the left axillae, left chest wall, left flank, LLQ, and left iliac region  This failed outpt Rx with PO Bactrim and also failed initial inpt Rx with IV Vanc and Rocpehin     Black foam wound vac TID started 6/7/21  ID rec IV Vanc until 6/21/21    Continue inpt admission for now for IV Vanc and inpt complex dressing changes for a massive left chest wall, left axillary, left flank, LLQ wound abscess cavity. He went to OR for serial debridements  6/2/21 OR for debridement #1  6/4/21 OR for debridement/washout/dressing change #2          Chronic immune suppression  Continue to hold his methotrexate, prednisone, and Humira to help reduce his immune suppression during this acute phase of infection. Dermatitis around left chest wall wound from moisture maceration  Desitin ordered to destinee-wound skin    Confusion/sundowning at night  For many days he was uncooperative and pulling IV sites   Sitters were required  This has improving significantly  May no longer require sitter              Level of MDM (2/3 elements below)  Number and Complexity of Problems Addressed Amount and/or Complexity of Data to be Reviewed and Analyzed  *Each unique test, order, or document contributes to the combination of 2 or combination of 3 in Category 1 below.  Risk of Complications and/or Morbidity or Mortality of pt Management     67382  30814 SF Minimal  1self-limited or minor problem Minimal or none Minimal risk of morbidity from additional diagnostic testing or Rx   52508  06046 Low Low  2or more self-limited or minor problems;    or  1stable chronic illness;    or  2YNDQP, uncomplicated illness or injury   Limited  (Must meet the requirements of at least 1 of the 2 categories)  Category 1: Tests and documents   Any combination of 2 from the following:  Review of prior external note(s) from each unique source*;  review of the result(s) of each unique test*;   ordering of each unique test*    or   Category 2: Assessment requiring an independent historian(s)  (For the categories of independent interpretation of tests and discussion of management or test interpretation, see moderate or high) Low risk of morbidity from additional diagnostic testing or treatment     99677  79024 Mod Moderate  1or more chronic illnesses with exacerbation, progression, or side effects of treatment;    or  2or more stable chronic illnesses;    or  1undiagnosed new problem with uncertain prognosis;    or  1acute illness with systemic symptoms;    or  1CEPFD complicated injury   Moderate  (Must meet the requirements of at least 1 out of 3 categories)  Category 1: Tests, documents, or independent historian(s)  Any combination of 3 from the following:   Review of prior external note(s) from each unique source*;  Review of the result(s) of each unique test*;  Ordering of each unique test*;  Assessment requiring an independent historian(s)    or  Category 2: Independent interpretation of tests   Independent interpretation of a test performed by another physician/other qualified health care professional (not separately reported);     or  Category 3: Discussion of management or test interpretation  Discussion of management or test interpretation with external physician/other qualified health care professional/appropriate source (not separately reported)   Moderate risk of morbidity from additional diagnostic testing or treatment  Examples only:  Prescription drug management   Decision regarding minor surgery with identified patient or procedure risk factors  Decision regarding elective major surgery without identified patient or procedure risk factors   Diagnosis or treatment significantly limited by social determinants of health       77014  57077 High High  1or more chronic illnesses with severe exacerbation, progression, or side effects of treatment;    or  1 acute or chronic illness or injury that poses a threat to life or bodily function   Extensive  (Must meet the requirements of at least 2 out of 3 categories)  Category 1: Tests, documents, or independent historian(s)  Any combination of 3 from the following:   Review of prior external note(s) from each unique source*;  Review of the result(s) of each unique test*;   Ordering of each unique test*;   Assessment requiring an independent historian(s)    or   Category 2: Independent interpretation of tests   Independent interpretation of a test performed by another physician/other qualified health care professional (not separately reported);     or  Category 3: Discussion of management or test interpretation  Discussion of management or test interpretation with external physician/other qualified health care professional/appropriate source (not separately reported)   High risk of morbidity from additional diagnostic testing or treatment  Examples only:  Drug therapy requiring intensive monitoring for toxicity  Decision regarding elective major surgery with identified patient or procedure risk factors  Decision regarding emergency major surgery  Decision regarding hospitalization  Decision not to resuscitate or to de-escalate care because of poor prognosis             I have personally performed a face-to-face diagnostic evaluation and management  service on this patient. I have independently seen the patient. I have independently obtained the above history from the patient/family. I have independently examined the patient with above findings. I have independently reviewed data/labs for this patient and developed the above plan of care (MDM). Signed: Marium Ayala MD, FACS

## 2021-06-09 NOTE — PROGRESS NOTES
Chart screened by  for discharge planning. Pending insurance authorization for patient to discharge to [facility]. No needs identified at this time. Please consult  if any new issues arise.

## 2021-06-09 NOTE — PROGRESS NOTES
ACUTE PHYSICAL THERAPY GOALS:  (Developed with and agreed upon by patient and/or caregiver. )    LTG:  (1.)Mr. Rodrigo Diane will move from supine to sit and sit to supine , scoot up and down and roll side to side in bed with MODIFIED INDEPENDENCE within 7 treatment day(s). (2.)Mr. Rodrigo Diane will transfer from bed to chair and chair to bed with SUPERVISION using the least restrictive device within 7 treatment day(s). (3.)Mr. Rodrigo Diane will ambulate with SUPERVISION for 250 feet with the least restrictive device within 7 treatment day(s). (4.)Mr. Rodrigo Diane will perform seated and standing exercises for 15+ minutes to improve strength and mobility within 7 days.   ________________________________________________________________________________________________      PHYSICAL THERAPY ASSESSMENT: Re-evaluation and AM PT Treatment Day # 1      Cheri Jacques is a 77 y.o. male   PRIMARY DIAGNOSIS: Chest wall abscess  Cellulitis of chest wall [L03.313]  Procedure(s) (LRB):  LEFT CHEST WALL AND FLANK WOUND DEBRIDEMENT (N/A)  5 Days Post-Op  Reason for Referral:  Weakness, fall  ICD-10: Treatment Diagnosis: Generalized Muscle Weakness (M62.81)  Difficulty in walking, Not elsewhere classified (R26.2)  Other abnormalities of gait and mobility (R26.89)  INPATIENT: Payor: [facility] MEDICARE / Plan: [address] / Product Type: Managed Care Medicare /     ASSESSMENT:     REHAB RECOMMENDATIONS:   Recommendation to date pending progress:  Setting:   Short-term Rehab  Equipment:    To Be Determined     PRIOR LEVEL OF FUNCTION:  (Prior to Hospitalization) INITIAL/CURRENT LEVEL OF FUNCTION:  (Most Recently Demonstrated)   Bed Mobility:   Independent  Sit to Stand:   Independent  Transfers:   Independent  Gait/Mobility:   Modified Independent Bed Mobility:   Minimal Assistance  Sit to Stand:   Contact Guard Assistance  Transfers:   Contact Guard Assistance  Gait/Mobility:   Contact Guard Assistance     ASSESSMENT:  Mr. Gerda Suarez was sitting in chair at arrival. Pt c./o pain on L side in general vicinity of wound. Sit -stand with SBA followed by 200ft with RW CGA. Pt gets distracted by lilnes and has slight tip that pt can recover using RW, but probably not with SPC. Pt safer with RW rather than SPC at this time. Gait pattern was slow, distracted and slightly unbalanced. Positioned comfortably in sitting after activity with needs in reach, bed alarm on. P Will continue with therapy efforts. STR  pending progress. SUBJECTIVE:   Mr. Gerda Suarez states, \"I'm a little deaf. \"    SOCIAL HISTORY/LIVING ENVIRONMENT: Pt is poor historian; he reports living with wife, however per case management note he lives alone? Home Environment: Private residence  One/Two Story Residence: One story  Living Alone: No  Support Systems: Spouse/Significant Other/Partner  OBJECTIVE:     PAIN: VITAL SIGNS: LINES/DRAINS:   Pre Treatment: Pain Screen  Pain Scale 1: Numeric (0 - 10)  Pain Intensity 1: 7  Pain Location 1: Flank;Back; Incisional  Post Treatment: 0/10   none  O2 Device: None (Room air)     GROSS EVALUATION:   Within Functional Limits Abnormal/ Functional Abnormal/ Non-Functional (see comments) Not Tested Comments:   AROM [x] [] [] []    PROM [] [] [] []    Strength [x] [] [] []    Balance [] [x] [] []    Posture [x] [] [] []    Sensation [] [] [] []    Coordination [] [] [] []    Tone [] [] [] []    Edema [] [] [] []    Activity Tolerance [x] [] [] []     [] [] [] []      COGNITION/  PERCEPTION: Intact Impaired   (see comments) Comments:   Orientation [x] []    Vision [x] []    Hearing [] [x]    Command Following [x] []    Safety Awareness [] [x]     [] []      MOBILITY: I Mod I S SBA CGA Min Mod Max Total  NT x2 Comments:   Bed Mobility    Rolling [] [] [] [] [] [] [] [] [] [x] []    Supine to Sit [] [] [] [] [] [] [] [] [] [x] []    Scooting [] [] [] [] [] [] [] [] [] [x] []    Sit to Supine [] [] [] [] [] [] [] [] [] [x] []    Transfers    Sit to Stand [] [] [] [x] [] [] [] [] [] [] []    Bed to Chair [] [] [] [] [] [] [] [] [] [x] []    Stand to Sit [] [] [] [x] [] [] [] [] [] [] []    I=Independent, Mod I=Modified Independent, S=Supervision, SBA=Standby Assistance, CGA=Contact Guard Assistance,   Min=Minimal Assistance, Mod=Moderate Assistance, Max=Maximal Assistance, Total=Total Assistance, NT=Not Tested  GAIT: I Mod I S SBA CGA Min Mod Max Total  NT x2 Comments:   Level of Assistance [] [] [] [] [x] [] [] [] [] [] []    Distance 200    DME Rolling Walker    Gait Quality Flexed, unsteady    Weightbearing Status N/A     I=Independent, Mod I=Modified Independent, S=Supervision, SBA=Standby Assistance, CGA=Contact Guard Assistance,   Min=Minimal Assistance, Mod=Moderate Assistance, Max=Maximal Assistance, Total=Total Assistance, NT=Not Tested    [facility] [employer] [facility] Form       How much difficulty does the patient currently have. .. Unable A Lot A Little None   1. Turning over in bed (including adjusting bedclothes, sheets and blankets)? [] 1   [] 2   [x] 3   [] 4   2. Sitting down on and standing up from a chair with arms ( e.g., wheelchair, bedside commode, etc.)   [] 1   [] 2   [] 3   [x] 4   3. Moving from lying on back to sitting on the side of the bed? [] 1   [] 2   [x] 3   [] 4   How much help from another person does the patient currently need. .. Total A Lot A Little None   4. Moving to and from a bed to a chair (including a wheelchair)? [] 1   [] 2   [x] 3   [] 4   5. Need to walk in hospital room? [] 1   [] 2   [x] 3   [] 4   6. Climbing 3-5 steps with a railing? [] 1   [] 2   [x] 3   [] 4   © 2007, Trustees of [facility] [employer], under license to [employer].  All rights reserved     Score:  Initial: 17 Most Recent: 19 (Date: 6/8/21 )    Interpretation of Tool:  Represents activities that are increasingly more difficult (i.e. Bed mobility, Transfers, Gait).    PLAN:   FREQUENCY/DURATION: PT Plan of Care: 3 times/week for duration of hospital stay or until stated goals are met, whichever comes first.    PROBLEM LIST:   (Skilled intervention is medically necessary to address:)  1. Decreased Activity Tolerance  2. Decreased Cognition  3. Decreased Coordination  4. Decreased Gait Ability  5. Decreased Strength  6. Decreased Transfer Abilities   INTERVENTIONS PLANNED:   (Benefits and precautions of physical therapy have been discussed with the patient.)  1. Therapeutic Activity  2. Therapeutic Exercise/HEP  3. Neuromuscular Re-education  4. Gait Training  5. Manual Therapy  6. Education     TREATMENT:     EVALUATION: Low Complexity : (Untimed Charge)    TREATMENT:   ($$ Therapeutic Activity: 8-22 mins    )  Therapeutic Activity (8 Minutes): Therapeutic activity included Rolling, Supine to Sit, Sit to Supine, Scooting, Transfer Training and Sitting balance  to improve functional Mobility, Strength, Activity tolerance and balance.     TREATMENT GRID:  N/A    AFTER TREATMENT POSITION/PRECAUTIONS:  Alarm Activated, Chair, Needs within reach and RN notified    INTERDISCIPLINARY COLLABORATION:  RN/PCT and PT/PTA    TOTAL TREATMENT DURATION:  PT Patient Time In/Time Out  Time In: 3730  Time Out: [address] [facility]

## 2021-06-09 NOTE — PROGRESS NOTES
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 · TTE without evidence of vegetation, but not adequate study to rule out endocarditis. Anti-infectives:   · Dapto (-  · Vanc (-)  · CTX (-) (-)    Subjective: Interval History:  Seen resting in bed talking on telephone. Wound care at bedside to change dressing. Denies any complaints. Rash appears to be improving. No Known Allergies     Review of Systems:  A comprehensive review of systems was negative except for that written in the History of Present Illness. Objective:     Visit Vitals  /73   Pulse 91   Temp 99.3 °F (37.4 °C)   Resp 16   Ht 5' 11\" (1.803 m)   Wt 71.7 kg (158 lb)   SpO2 96%   BMI 22.04 kg/m²     Temp (24hrs), Av.9 °F (37.2 °C), Min:98.1 °F (36.7 °C), Max:99.5 °F (37.5 °C)     Patient was seen and examined on 21 and physical exam remains unchanged since yesterday, unless noted below:    Lines:  Peripheral IV:       Physical Exam:    General:  Alert, cooperative, well noursished, well developed, appears stated age; Crow   Eyes:  Sclera anicteric. Pupils equally round and reactive to light. Mouth/Throat: Mucous membranes normal, oral pharynx clear   Neck: Supple   Lungs:   Clear to auscultation bilaterally, good effort   CV:  Regular rate and rhythm,no murmur, click, rub or gallop   Abdomen:   Soft, non-tender.  bowel sounds normal. non-distended   Extremities: No cyanosis or edema   Skin: Wound vac to left chest wound; morbilliform rash noted across abdomen and down into groin- improving    Lymph nodes: Cervical and supraclavicular normal   Musculoskeletal: No swelling or deformity   Lines/Devices:  Intact, no erythema, drainage or tenderness   Psych: Alert and oriented, normal mood affect given the setting       Data Review:     CBC:  Recent Labs     06/08/21 0934   WBC 7.3   HGB 12.2*   HCT 37.2*          BMP:  Recent Labs     06/08/21 0934   CREA 0.83   BUN 11   *   K 4.0      CO2 27   AGAP 4*   *       LFTS:  Recent Labs     06/08/21 0934   TBILI 0.2   ALT 64   AP 89   TP 6.6   ALB 2.4*       Microbiology:     All Micro Results     Procedure Component Value Units Date/Time    CULTURE, ANAEROBIC [[id number]] Collected: 06/02/21 1740    Order Status: Completed Specimen: Abscess Updated: 06/09/21 0904     Special Requests: NO SPECIAL REQUESTS        Culture result:       NO ANAEROBES ISOLATED 7 DAYS          CULTURE, WOUND Josepha Shearing STAIN [[id number]]  (Abnormal)  (Susceptibility) Collected: 06/02/21 1740    Order Status: Completed Specimen: Abscess Updated: 06/05/21 0810     Special Requests: NO SPECIAL REQUESTS        GRAM STAIN 0 TO 10 WBCS SEEN PER OIF      FEW GRAM POSITIVE COCCI        Culture result:       MODERATE * METHICILLIN RESISTANT STAPHYLOCOCCUS AUREUS *            PATIENT IS A KNOWN MRSA       CULTURE, BODY FLUID W Argentina Hernandez [[id number]] Collected: 06/02/21 1830    Order Status: Canceled Specimen:  Body Fluid from Abdominal Fluid     CULTURE, Deng Vieira STAIN [[id number]]  (Abnormal)  (Susceptibility) Collected: 05/25/21 0917    Order Status: Completed Specimen: Wound from Abscess Updated: 05/28/21 0838     Special Requests: NO SPECIAL REQUESTS        GRAM STAIN 20 TO 40 WBCS/OIF            MODERATE GRAM POSITIVE COCCI           Culture result:       HEAVY * METHICILLIN RESISTANT STAPHYLOCOCCUS AUREUS *                  RESULTS VERIFIED, PHONED TO AND READ BACK BY  ISAURO SHAVER RN @ 4880 ON 21 AK. SARS-COV-2, PCR [[id number]] Collected: 21 1229    Order Status: Completed Specimen: Nasopharyngeal Updated: 21     Specimen source Nasopharyngeal        SARS-CoV-2 Not detected        Comment:      The specimen is NEGATIVE for SARS-CoV-2, the novel coronavirus associated with COVID-19. This test has been authorized by the FDA under an Emergency Use Authorization (EUA) for use by authorized laboratories.         Fact sheet for Healthcare Providers: ConventionUpdate.co.nz       Fact sheet for Patients: ConventionUpdate.co.nz       Methodology: RT-PCR               Imagin/1 CT Chest abd pelv  IMPRESSION  Large left abscess, 32 cm x 17 cm x 4 cm which extends from the  axilla to the iliac crest.    Signed By: NADIRA Patel     2021

## 2021-06-09 NOTE — PROGRESS NOTES
Please finish patients PTA med list with family. Patient has not slept during the night shift for several days and c/o becoming anxious. Writer is not sure how the patient is resting through the day. Patient informed writer tonight that he does take something for anxiety that helps him to sleep as well. Clonopin is listed on his list. Please confirm. Patient is extremely restless.

## 2021-06-09 NOTE — PROGRESS NOTES
END OF SHIFT NOTE: Patient has not slept well on night shift. Patient w/ history of benzo use for anxiety and insomnia. Asked the day RN if PTA could be completed w/ patient or family so patient can get meds for disorders. Patient used the call light through the night to go to the restroom of move around as needed. Pain medication given twice this shift. INTAKE/OUTPUT  06/08 0701 - 06/09 0700  In: 1545.9 [P.O.:600]  Out: 4300 [Urine:3900; Drains:400]  Voiding: NO  Catheter: YES    Flatus: Patient does have flatus present, per patient    Stool:  1 occurrences, patient flushed the toilet before stool characteristics were verified. Patient instructed not to flush the toilet in the future. Characteristics:  Stool Assessment  Stool Color: Brown  Stool Appearance:  (have not observed)  Stool Amount: Medium  Stool Source/Status: Rectum    Emesis: 0 occurrences. VITAL SIGNS  Patient Vitals for the past 12 hrs:   Temp Pulse Resp BP SpO2   06/09/21 0723 99.3 °F (37.4 °C) 91 16 111/73 96 %   06/09/21 0358 98.5 °F (36.9 °C) 82 16 123/75 95 %   06/08/21 2244 99.1 °F (37.3 °C) 88 16 120/80 97 %   06/08/21 2036 99.5 °F (37.5 °C) 95 16 117/66 96 %       Pain Assessment  Pain Intensity 1: 7 (unable to reassess, end of shift) (06/09/21 8898)  Pain Location 1: Back, Flank  Pain Intervention(s) 1: Medication (see MAR)  Patient Stated Pain Goal: 3    Ambulating  Yes    Shift report given to oncoming nurse at the bedside.     [address] [facility]

## 2021-06-09 NOTE — WOUND CARE
Wound vac dressing changed, upper wound at axilla is granular and well healing. Lower wound is pink with scattered granulation. Wounds bridged to 1 machine, seal achieved machine working well. Will monitor.

## 2021-06-10 PROCEDURE — 74011250637 HC RX REV CODE- 250/637: Performed by: NURSE PRACTITIONER

## 2021-06-10 PROCEDURE — 74011250637 HC RX REV CODE- 250/637: Performed by: SURGERY

## 2021-06-10 PROCEDURE — 65270000029 HC RM PRIVATE

## 2021-06-10 PROCEDURE — 65660000000 HC RM CCU STEPDOWN

## 2021-06-10 PROCEDURE — 74011250636 HC RX REV CODE- 250/636: Performed by: NURSE PRACTITIONER

## 2021-06-10 PROCEDURE — 97530 THERAPEUTIC ACTIVITIES: CPT

## 2021-06-10 PROCEDURE — 74011000250 HC RX REV CODE- 250: Performed by: NURSE PRACTITIONER

## 2021-06-10 PROCEDURE — 2709999900 HC NON-CHARGEABLE SUPPLY

## 2021-06-10 PROCEDURE — 77030019952 HC CANSTR VAC ASST KCON -B

## 2021-06-10 PROCEDURE — 99232 SBSQ HOSP IP/OBS MODERATE 35: CPT | Performed by: SURGERY

## 2021-06-10 RX ADMIN — HYDROCODONE BITARTRATE AND ACETAMINOPHEN 1 TABLET: 5; 325 TABLET ORAL at 20:40

## 2021-06-10 RX ADMIN — LORATADINE 10 MG: 10 TABLET ORAL at 09:02

## 2021-06-10 RX ADMIN — Medication 1 AMPULE: at 05:00

## 2021-06-10 RX ADMIN — FAMOTIDINE 20 MG: 20 TABLET ORAL at 09:02

## 2021-06-10 RX ADMIN — TAMSULOSIN HYDROCHLORIDE 0.4 MG: 0.4 CAPSULE ORAL at 09:02

## 2021-06-10 RX ADMIN — HYDROCODONE BITARTRATE AND ACETAMINOPHEN 1 TABLET: 5; 325 TABLET ORAL at 04:59

## 2021-06-10 RX ADMIN — Medication 1 AMPULE: at 14:10

## 2021-06-10 RX ADMIN — HYDROCODONE BITARTRATE AND ACETAMINOPHEN 1 TABLET: 5; 325 TABLET ORAL at 11:25

## 2021-06-10 RX ADMIN — Medication 1 AMPULE: at 21:19

## 2021-06-10 RX ADMIN — FAMOTIDINE 20 MG: 20 TABLET ORAL at 16:35

## 2021-06-10 RX ADMIN — SODIUM CHLORIDE 600 MG: 9 INJECTION, SOLUTION INTRAMUSCULAR; INTRAVENOUS; SUBCUTANEOUS at 16:35

## 2021-06-10 NOTE — PROGRESS NOTES
H&P/Consult Note/Progress Note/Office Note:   Colt Melissa. MRN: [id number]  :1945  Age:75 y.o.    HPI: Colt Melissa. is a 77 y.o. male who was initially referred in consultation by Cindi Anderson PA-C with a left chest wall hematoma and cellulitis   after a fall at home on the floor approx 21. He sustained a blunt injury to his left chest wall. CXR below showed no PTx or rib fractures. He taked Humira for arthritis and is immune suppressed. We saw him for the 1st time on 21 with erythema of the left chest wall and placed him on Bactrim and marked the cellulitis border. He was instructed to call if any fevers or worsening erythema and given f/u 4 days later  He did not call. When he came to the office on 21 he reported high fevers over 101 degrees associate with nausea with his Bactrim. The erythema had extended >20cm  beyond the original inscribed lines of his left chest wall and extended down his left flank and in his left lower quadrant. We was admitted directly from office. He denies any new falls since 21. On 21 he also had a new hematoma involving his right flank which was not present on 21 (likely a second fall)      21 CXR, 2 views  Clear lungs. The cardiac and mediastinal contours and pulmonary vascularity are normal.   The bones and soft tissues are within normal limits. No evidence of a pneumothorax. IMPRESSION:  Normal chest.      21 CT chest/abd/pelvis with oral and IV contrast  Hx: MRSA cellulitis of left chest wall abscess and fever.     CHEST:   -LUNGS: No lobar pneumonia. Linear atelectasis left base. -AIRWAYS: Trachea and proximal bronchi grossly patent. -PLEURA: Small left pleural effusion. -LYMPH NODES: No enlarged axillary, hilar or mediastinal lymph nodes.   -HEART: Normal size.     -SKELETAL/CHEST WALL: Along left chest wall and body wall there is a complex,  peripherally enhancing fluid collection which measures approximately 32 cm  superior inferior, 17 cm wide and 4 cm thick. There are several bubbles of gas  within it. Larger bubbles of gas extend up into the axilla.     ABDOMEN/PELVIS:  -LIVER: Normal in size and appearance. -GALLBLADDER: No gallstones identified  -BILE DUCTS: Not dilated.     -PANCREAS: Normal.  -SPLEEN: Normal.     -ADRENALS: Normal.  -KIDNEYS/URETERS: Kidneys enhance symmetrically. There are multiple bilateral  parapelvic cysts versus bilateral UPJ obstructions. -BLADDER: Normal.  -REPRODUCTIVE ORGANS: Prostate and seminal vesicles unremarkable.     -BOWEL: Normal caliber. No inflammatory changes. Scattered diverticula. -LYMPH NODES: No significant retroperitoneal, mesenteric, or pelvic adenopathy. -BONES: Degenerative changes. No periostitis. -VASCULATURE: Aorta is normal caliber and mildly calcified. -OTHER: No ascites.     IMPRESSION  Large left abscess, 32 cm x 17 cm x 4 cm which extends from the axilla to the iliac crest.            Additional hx:  5/27/21 wound culture growing MRSA; Pharmacy dosing IV Vanc; trough tonight  5/28/21  Less Confused; no fever; WBC elevated but better;  on IV Abx  5/29/21  Resting in bed; no fever; WBC 12.3;  on IV Abx  5/30/21  Sitting on side of bed; no fever; WBC 13.8;  on IV Abx  5/31/21 AF; wbc still elevated; left hemithorax , left flank and LLQ edematous and indurated  6/1/21 continues IV Vanc; WBC higher at 12.2k   6/2/21 IV Vanc; CT as above with large abscess; I&D/debridment #1 in OR today  6/3/21 IV Vanc/Rocephin; OR culture pending  6/4/21 plan return to OR today for debridement #2; unless pt signs out AMA    6/5/21 POD 3/1; Pt resting in bed. No complaints voiced. Appears comfortable. Postop dressing c/d/i.   6/6/21 POD 4/2; Pt sitting on side of bed. No complaints voiced. Appears comfortable.  Dressing c/di  6/7/21 POD 5/3;  Comfortable; dressing dry; wound vac started today; IV Vanc; Likely SNF soon  6/8/21 POD 6/4 Feels OK; ID rec IV Vanc until 6/21/21; wound vac will be changed 3x/week; Vanc changed to IV Dapto by ID  6/9/21 POD 7/5; Cont IV Daptomycin; wound vac 3 times a week; consult PT; OOB daily  6/10/21 POD8/6 Cont IV Dapto; wound vac change 3 x a week; PT; OOB daily; feeling better                Past Medical History:   Diagnosis Date    Depression     H/O urinary frequency 2/1/2013    History of BPH 2/1/2013    HLD (hyperlipidemia) 2/1/2013    Hx: UTI (urinary tract infection) 2/1/2013    Insomnia     Psoriasis     Psoriatic arthritis ([facility] [address] 2/1/2013    Wears hearing aid 2/1/2013     No past surgical history on file. Current Facility-Administered Medications   Medication Dose Route Frequency    HYDROmorphone (DILAUDID) injection 0.2-0.4 mg  0.2-0.4 mg IntraVENous Q3H PRN    DAPTOmycin (CUBICIN) 600 mg in 0.9% sodium chloride 12 mL IV Syringe  600 mg IntraVENous Q24H    loratadine (CLARITIN) tablet 10 mg  10 mg Oral DAILY    diphenhydrAMINE (BENADRYL) injection 25 mg  25 mg IntraVENous Q8H PRN    famotidine (PEPCID) tablet 20 mg  20 mg Oral BID    alcohol 62% (NOZIN) nasal  1 Ampule  1 Ampule Topical Q8H    zinc oxide-cod liver oil (DESITIN) 40 % paste   Topical Q8H    acetaminophen (TYLENOL) tablet 1,000 mg  1,000 mg Oral Q6H PRN    ondansetron (ZOFRAN) injection 4 mg  4 mg IntraVENous Q4H PRN    HYDROcodone-acetaminophen (NORCO) 5-325 mg per tablet 1 Tablet  1 Tablet Oral Q6H PRN    tamsulosin (FLOMAX) capsule 0.4 mg  0.4 mg Oral DAILY     Patient has no known allergies.   Social History     Socioeconomic History    Marital status: SINGLE     Spouse name: Not on file    Number of children: Not on file    Years of education: Not on file    Highest education level: Not on file   Tobacco Use    Smoking status: Former Smoker    Smokeless tobacco: Never Used    Tobacco comment: pt states quit in early 60's late 52's    Vaping Use    Vaping Use: Never used   Substance and Sexual Activity    Alcohol use: No     Alcohol/week: 0.0 standard drinks    Drug use: No    Sexual activity: Yes     Partners: Female     Social Determinants of Health     Financial Resource Strain:     Difficulty of Paying Living Expenses:    Food Insecurity:     Worried About Running Out of Food in the Last Year:     [address] in the Last Year:    Transportation Needs:     Lack of Transportation (Medical):  Lack of Transportation (Non-Medical):    Physical Activity:     Days of Exercise per Week:     Minutes of Exercise per Session:    Stress:     Feeling of Stress :    Social Connections:     Frequency of Communication with Friends and Family:     Frequency of Social Gatherings with Friends and Family:     Attends Yazidi Services:     Active Member of Clubs or Organizations:     Attends Club or Organization Meetings:     Marital Status:      Social History     Tobacco Use   Smoking Status Former Smoker   Smokeless Tobacco Never Used   Tobacco Comment    pt states quit in early 60's late 52's      Family History   Problem Relation Age of Onset    Heart Attack Father     Cancer Father         skin     Other Sister     Cancer Sister     Other Brother         melanoma     ROS: The patient has no difficulty with chest pain or shortness of breath. No fever or chills. Comprehensive review of systems was otherwise unremarkable except as noted above.     Physical Exam:   Visit Vitals  /72   Pulse 70   Temp 97.4 °F (36.3 °C)   Resp 18   Ht 5' 11\" (1.803 m)   Wt 158 lb (71.7 kg)   SpO2 94%   BMI 22.04 kg/m²     Vitals:    06/09/21 2308 06/10/21 0400 06/10/21 0745 06/10/21 1145   BP: 126/66 136/82 129/79 136/72   Pulse: 70 80 83 70   Resp: 16 16 18 18   Temp: 98.3 °F (36.8 °C) 98.5 °F (36.9 °C) 98.6 °F (37 °C) 97.4 °F (36.3 °C)   SpO2: 95% 95% 97% 94%   Weight:       Height:         [unfilled]  [unfilled]    Constitutional: Alert, oriented, cooperative patient in no acute distress; appears stated age Eyes:Sclera are clear. EOMs intact  ENMT: no external lesions; he is hard of hearing. no obvious neck masses, no ear or lip lesions, nares normal  CV: RRR. Normal perfusion      Large open incision from left axillae to left iliac crest with wound vac in place  No surrounding cellulitis    Right flank hematoma stable    Resp: No JVD. Breathing is  non-labored; no audible wheezing. GI: soft and non-distended     Musculoskeletal: unremarkable with normal function. No embolic signs or cyanosis. Neuro:  Oriented to person, place and time;  moves all 4; no focal deficits; slow moving and slow talking. Psychiatric: blunted affect, no memory impairment    Recent vitals (if inpt):  @IPVITALS(24:)@    Amount and/or Complexity of Data Reviewed and Analyzed:  I reviewed and analyzed all of the unique labs and radiologic studies that are shown below as well as any that are in the HPI, and any that are in the expanded problem list below  *Each unique test, order, or document contributes to the combination of 2 or combination of 3 in Category 1 below. For this visit I also reviewed old records and prior notes.       Recent Labs     06/08/21 0934   WBC 7.3   HGB 12.2*      *   K 4.0      CO2 27   BUN 11   CREA 0.83   *   TBILI 0.2   ALT 64   AP 89     Review of most recent CBC  Lab Results   Component Value Date/Time    WBC 7.3 06/08/2021 09:34 AM    HGB 12.2 (L) 06/08/2021 09:34 AM    HCT 37.2 (L) 06/08/2021 09:34 AM    PLATELET 597 62/28/4787 09:34 AM    MCV 98.4 (H) 06/08/2021 09:34 AM       Review of most recent BMP  Lab Results   Component Value Date/Time    Sodium 137 (L) 06/08/2021 09:34 AM    Potassium 4.0 06/08/2021 09:34 AM    Chloride 106 06/08/2021 09:34 AM    CO2 27 06/08/2021 09:34 AM    Anion gap 4 (L) 06/08/2021 09:34 AM    Glucose 174 (H) 06/08/2021 09:34 AM    BUN 11 06/08/2021 09:34 AM    Creatinine 0.83 06/08/2021 09:34 AM    BUN/Creatinine ratio 19 03/31/2016 11:26 AM    GFR est AA >60 06/08/2021 09:34 AM    GFR est non-AA >60 06/08/2021 09:34 AM    Calcium 8.3 06/08/2021 09:34 AM       Review of most recent LFTs (and lipase if done)  Lab Results   Component Value Date/Time    ALT (SGPT) 64 06/08/2021 09:34 AM    AST (SGOT) 27 06/08/2021 09:34 AM    Alk. phosphatase 89 06/08/2021 09:34 AM    Bilirubin, total 0.2 06/08/2021 09:34 AM     No results found for: LPSE    No results found for: INR, APTT, CBIL, LCAD, NH4, TROPT, TROIQ, INREXT, INREXT    Review of most recent HgbA1c  Lab Results   Component Value Date/Time    Hemoglobin A1c 4.9 06/05/2021 02:21 PM       Nutritional assessment screen for wound healing issues:  Lab Results   Component Value Date/Time    Protein, total 6.6 06/08/2021 09:34 AM    Albumin 2.4 (L) 06/08/2021 09:34 AM       @lastcovr@  XR Results (most recent):  Results from Hospital Encounter encounter on 05/20/21    XR CHEST PA LAT    Narrative  EXAM: XR CHEST PA LAT    INDICATION: Left chest wall hematoma with cellulitis rule out rib fracture or  pneumothorax after a fall at home on the floor. COMPARISON: None. FINDINGS: PA and lateral radiographs of the chest demonstrate clear lungs. The  cardiac and mediastinal contours and pulmonary vascularity are normal. The bones  and soft tissues are within normal limits. No evidence of a pneumothorax. Impression  Normal chest.      CT Results (most recent):  Results from Hospital Encounter encounter on 05/24/21    CT CHEST ABD PELV W CONT    Narrative  CT OF THE CHEST ABDOMEN AND PELVIS    INDICATION: MRSA cellulitis of left chest wall abscess and fever. TECHNIQUE:  Multiple axial images were obtained through the chest, abdomen and  pelvis. Oral contrast was used for bowel opacification. 100mL of Isovue 370  intravenous contrast was used for better evaluation of solid organs and vascular  structures. Radiation dose reduction techniques were used for this study.   All  CT scans performed at this facility use one or all of the following: Automated  exposure control, adjustment of the mA and/or kVp according to patient's size,  iterative reconstruction. COMPARISON: None    FINDINGS:  CHEST:  -LUNGS: No lobar pneumonia. Linear atelectasis left base. -AIRWAYS: Trachea and proximal bronchi grossly patent. -PLEURA: Small left pleural effusion. -LYMPH NODES: No enlarged axillary, hilar or mediastinal lymph nodes. -HEART: Normal size.    -SKELETAL/CHEST WALL: Long left chest wall and body wall there is a complex,  peripherally enhancing fluid collection which measures approximately 32 cm  superior inferior, 17 cm wide and 4 cm thick. There are several bubbles of gas  within it. Larger bubbles of gas extend up into the axilla. ABDOMEN/PELVIS:  -LIVER: Normal in size and appearance. -GALLBLADDER: No gallstones identified  -BILE DUCTS: Not dilated. -PANCREAS: Normal.  -SPLEEN: Normal.    -ADRENALS: Normal.  -KIDNEYS/URETERS: Kidneys enhance symmetrically. There are multiple bilateral  parapelvic cysts versus bilateral UPJ obstructions. -BLADDER: Normal.  -REPRODUCTIVE ORGANS: Prostate and seminal vesicles unremarkable. -BOWEL: Normal caliber. No inflammatory changes. Scattered diverticula. -LYMPH NODES: No significant retroperitoneal, mesenteric, or pelvic adenopathy. -BONES: Degenerative changes. No periostitis. -VASCULATURE: Aorta is normal caliber and mildly calcified. -OTHER: No ascites. Impression  Large left abscess, 32 cm x 17 cm x 4 cm which extends from the  axilla to the iliac crest.  689    US Results (most recent):  No results found for this or any previous visit.         Admission date (for inpatients): 5/24/2021   * No surgery found *  Procedure(s):  LEFT CHEST WALL AND FLANK WOUND DEBRIDEMENT        ASSESSMENT/PLAN:  Problem List  Date Reviewed: 5/24/2021        Codes Class Noted    Confusion ICD-10-CM: R41.0  ICD-9-CM: 298.9  5/27/2021        Sundowning ICD-10-CM: A91  ICD-9-CM: [id number] 5/27/2021        Leukocytosis ICD-10-CM: B11.385  ICD-9-CM: 288.60  5/26/2021        Acute renal insufficiency ICD-10-CM: N28.9  ICD-9-CM: 593.9  5/25/2021        Fever ICD-10-CM: R50.9  ICD-9-CM: 780.60  5/24/2021        Suppression of immune system subtherapeutic (HCC) ICD-10-CM: J19.45  ICD-9-CM: 279.8  5/24/2021        Right flank hematoma ICD-10-CM: S30. 1XXA  ICD-9-CM: 922.2  5/24/2021        * (Principal) Chest wall abscess ICD-10-CM: L02.213  ICD-9-CM: 682.2  5/20/2021        Hematoma of left chest wall ICD-10-CM: A48.610S  ICD-9-CM: 922.1  5/20/2021        Cellulitis of chest wall ICD-10-CM: L03.313  ICD-9-CM: 682.2  5/20/2021        Fall ICD-10-CM: L40. Roy[place] Distel  ICD-9-CM: E888.9  5/20/2021        Psoriasis ICD-10-CM: L40.9  ICD-9-CM: 696.1  Unknown        Insomnia ICD-10-CM: G47.00  ICD-9-CM: 780.52  Unknown        Depression ICD-10-CM: F32.9  ICD-9-CM: 469  Unknown        Tachycardia ICD-10-CM: R00.0  ICD-9-CM: 785.0  6/13/2016        Murmur ICD-10-CM: R01.1  ICD-9-CM: 785.2  6/13/2016        Psoriatic [facility] ([facility] ICD-10-CM: L40.50  ICD-9-CM: 696.0  2/1/2013        Wears hearing aid ICD-10-CM: Z97.4  ICD-9-CM: V45.89  2/1/2013        HLD (hyperlipidemia) ICD-10-CM: E78.5  ICD-9-CM: 272.4  2/1/2013        H/O urinary frequency ICD-10-CM: U72.842  ICD-9-CM: V13.09  2/1/2013        History of BPH ICD-10-CM: Z87.438  ICD-9-CM: V13.89  2/1/2013            Principal Problem:    Chest wall abscess (5/20/2021)    Active Problems:    Cellulitis of chest wall (5/20/2021)      Fall (5/20/2021)      Fever (5/24/2021)      Suppression of immune system subtherapeutic ([address] (5/24/2021)      Right flank hematoma (5/24/2021)      Acute renal insufficiency (5/25/2021)      Leukocytosis (5/26/2021)      Confusion (5/27/2021)      Sundowning (5/27/2021)           Number and Complexity of Problems addressed and   Risks of complications and/or morbidity of management        Massive MRSA Infection of soft tissues of left chest wall with abscess and extension, cellulitis, and large open wound with necrosis of the left axillae, left chest wall, left flank, LLQ, and left iliac region  This failed outpt Rx with PO Bactrim and also failed initial inpt Rx with IV Vanc and Rocpehin     Black foam wound vac TID started 6/7/21  ID rec IV Vanc -->IV Daptomycin until 6/18/21    Continue inpt admission for now for IV Daptomycin and inpt complex dressing changes for a massive left chest wall, left axillary, left flank, LLQ wound abscess cavity. He went to OR for serial debridements  6/2/21 OR for debridement #1  6/4/21 OR for debridement/washout/dressing change #2          Chronic immune suppression  Continue to hold his methotrexate, prednisone, and Humira to help reduce his immune suppression during this acute phase of infection. Dermatitis around left chest wall wound from moisture maceration  Desitin ordered to destinee-wound skin    Confusion/sundowning at night-->resolved  For many days he was uncooperative and pulling IV sites   Sitters were required  This has improving significantly  May no longer require sitter              Level of MDM (2/3 elements below)  Number and Complexity of Problems Addressed Amount and/or Complexity of Data to be Reviewed and Analyzed  *Each unique test, order, or document contributes to the combination of 2 or combination of 3 in Category 1 below.  Risk of Complications and/or Morbidity or Mortality of pt Management     51199  62626 SF Minimal  1self-limited or minor problem Minimal or none Minimal risk of morbidity from additional diagnostic testing or Rx   24574  46094 Low Low  2or more self-limited or minor problems;    or  1stable chronic illness;    or  1WETKW, uncomplicated illness or injury   Limited  (Must meet the requirements of at least 1 of the 2 categories)  Category 1: Tests and documents   Any combination of 2 from the following:  Review of prior external note(s) from each unique source*;  review of the result(s) of each unique test*;   ordering of each unique test*    or   Category 2: Assessment requiring an independent historian(s)  (For the categories of independent interpretation of tests and discussion of management or test interpretation, see moderate or high) Low risk of morbidity from additional diagnostic testing or treatment     10073  49448 Mod Moderate  1or more chronic illnesses with exacerbation, progression, or side effects of treatment;    or  2or more stable chronic illnesses;    or  1undiagnosed new problem with uncertain prognosis;    or  1acute illness with systemic symptoms;    or  2AOVVF complicated injury   Moderate  (Must meet the requirements of at least 1 out of 3 categories)  Category 1: Tests, documents, or independent historian(s)  Any combination of 3 from the following:   Review of prior external note(s) from each unique source*;  Review of the result(s) of each unique test*;  Ordering of each unique test*;  Assessment requiring an independent historian(s)    or  Category 2: Independent interpretation of tests   Independent interpretation of a test performed by another physician/other qualified health care professional (not separately reported);     or  Category 3: Discussion of management or test interpretation  Discussion of management or test interpretation with external physician/other qualified health care professional/appropriate source (not separately reported)   Moderate risk of morbidity from additional diagnostic testing or treatment  Examples only:  Prescription drug management   Decision regarding minor surgery with identified patient or procedure risk factors  Decision regarding elective major surgery without identified patient or procedure risk factors   Diagnosis or treatment significantly limited by social determinants of health       29066  12311 High High  1or more chronic illnesses with severe exacerbation, progression, or side effects of treatment;    or  1 acute or chronic illness or injury that poses a threat to life or bodily function   Extensive  (Must meet the requirements of at least 2 out of 3 categories)  Category 1: Tests, documents, or independent historian(s)  Any combination of 3 from the following:   Review of prior external note(s) from each unique source*;  Review of the result(s) of each unique test*;   Ordering of each unique test*;   Assessment requiring an independent historian(s)    or   Category 2: Independent interpretation of tests   Independent interpretation of a test performed by another physician/other qualified health care professional (not separately reported);     or  Category 3: Discussion of management or test interpretation  Discussion of management or test interpretation with external physician/other qualified health care professional/appropriate source (not separately reported)   High risk of morbidity from additional diagnostic testing or treatment  Examples only:  Drug therapy requiring intensive monitoring for toxicity  Decision regarding elective major surgery with identified patient or procedure risk factors  Decision regarding emergency major surgery  Decision regarding hospitalization  Decision not to resuscitate or to de-escalate care because of poor prognosis             I have personally performed a face-to-face diagnostic evaluation and management  service on this patient. I have independently seen the patient. I have independently obtained the above history from the patient/family. I have independently examined the patient with above findings. I have independently reviewed data/labs for this patient and developed the above plan of care (MDM). Signed: Ziggy Morataya.  Sofía Flores MD, FACS

## 2021-06-10 NOTE — PROGRESS NOTES
ACUTE PHYSICAL THERAPY GOALS:  (Developed with and agreed upon by patient and/or caregiver. )  LTG:  (1.)Mr. Dontrell Fenton will move from supine to sit and sit to supine , scoot up and down and roll side to side in bed with MODIFIED INDEPENDENCE within 7 treatment day(s). (2.)Mr. Dontrell Fenton will transfer from bed to chair and chair to bed with SUPERVISION using the least restrictive device within 7 treatment day(s). (3.)Mr. Dontrell Fenton will ambulate with SUPERVISION for 250 feet with the least restrictive device within 7 treatment day(s). (4.)Mr. Dontrell Fenton will perform seated and standing exercises for 15+ minutes to improve strength and mobility within 7 days. ________________________________________________________________________________________________      PHYSICAL THERAPY: Daily Note and PM Treatment Day # 2    Asael Toure is a 77 y.o. male   PRIMARY DIAGNOSIS: Chest wall abscess  Cellulitis of chest wall [L03.313]  Procedure(s) (LRB):  LEFT CHEST WALL AND FLANK WOUND DEBRIDEMENT (N/A)  6 Days Post-Op    ASSESSMENT:     REHAB RECOMMENDATIONS: CURRENT LEVEL OF FUNCTION:  (Most Recently Demonstrated)   Recommendation to date pending progress:  Setting:   Short-term Rehab  Equipment:    Rolling Walker Bed Mobility:   Modified Independent  Sit to Stand:  [facility] [employer] Assistance  Transfers:   Standby Assistance  Gait/Mobility:   Standby Assistance     ASSESSMENT:  Mr. Dontrell Fenton is supine in bed and agreeable to getting up. Patient states that he has been up in the chair for several hours. Bed mobility is modified independent. Sitting and standing balance is good. Gait training with rolling walker x 500 feet with slow but good karri. Patient is returned to supine in bed with bed alarm intact. Made really good progress today. Still needs rehab as the patient does live alone. Will continue PT efforts. SUBJECTIVE:   Mr. Dontrell Fenton states, \"You want me to walk? .\"    SOCIAL HISTORY/ LIVING ENVIRONMENT: see eval  Home Environment: Private residence  One/Two Story Residence: One story  Living Alone: No  Support Systems: Spouse/Significant Other/Partner  OBJECTIVE:     PAIN: VITAL SIGNS: LINES/DRAINS:   Pre Treatment: Pain Screen  Pain Scale 1: Numeric (0 - 10)  Pain Intensity 1: 0  Post Treatment: 0/10   Babb Catheter and Wound Vac  O2 Device: None (Room air)     MOBILITY: I Mod I S SBA CGA Min Mod Max Total  NT x2 Comments:   Bed Mobility    Rolling [] [x] [] [] [] [] [] [] [] [] []    Supine to Sit [] [x] [] [] [] [] [] [] [] [] []    Scooting [] [x] [] [] [] [] [] [] [] [] []    Sit to Supine [] [x] [] [] [] [] [] [] [] [] []    Transfers    Sit to Stand [] [] [] [x] [] [] [] [] [] [] []    Bed to Chair [] [] [] [] [] [] [] [] [] [x] []    Stand to Sit [] [] [] [x] [] [] [] [] [] [] []    I=Independent, Mod I=Modified Independent, S=Supervision, SBA=Standby Assistance, CGA=Contact Guard Assistance,   Min=Minimal Assistance, Mod=Moderate Assistance, Max=Maximal Assistance, Total=Total Assistance, NT=Not Tested    BALANCE: Good Fair+ Fair Fair- Poor NT Comments   Sitting Static [x] [] [] [] [] []    Sitting Dynamic [x] [] [] [] [] []              Standing Static [x] [] [] [] [] []    Standing Dynamic [] [x] [] [] [] []      GAIT: I Mod I S SBA CGA Min Mod Max Total  NT x2 Comments:   Level of Assistance [] [] [] [x] [] [] [] [] [] [] []    Distance 500 feet     DME Rolling Walker    Gait Quality slow    Weightbearing  Status N/A     I=Independent, Mod I=Modified Independent, S=Supervision, SBA=Standby Assistance, CGA=Contact Guard Assistance,   Min=Minimal Assistance, Mod=Moderate Assistance, Max=Maximal Assistance, Total=Total Assistance, NT=Not Tested    PLAN:   FREQUENCY/DURATION: PT Plan of Care: 3 times/week for duration of hospital stay or until stated goals are met, whichever comes first.  TREATMENT:     TREATMENT:   ($$ Therapeutic Activity: 23-37 mins    )  Therapeutic Activity (25 Minutes):  Therapeutic activity included Rolling, Supine to Sit, Sit to Supine, Scooting, Transfer Training, Ambulation on level ground, Sitting balance  and Standing balance to improve functional Mobility, Strength and Activity tolerance.     TREATMENT GRID:  N/A    AFTER TREATMENT POSITION/PRECAUTIONS:  Alarm Activated, Bed, Needs within reach and RN notified    INTERDISCIPLINARY COLLABORATION:  RN/PCT and PT/PTA    TOTAL TREATMENT DURATION:  PT Patient Time In/Time Out  Time In: 1447  Time Out: [address] Karissa-Choice, PTA

## 2021-06-10 NOTE — PROGRESS NOTES
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 · Monitor weekly CBC, Cr, LFTs, and CK while on therapy. · Will schedule ID follow up around 21. · ID will sign off today. Please call with any questions or concerns. Anti-infectives:   · Dapto (-  · Vanc (-)  · CTX (-) (-)    Subjective: Interval History:  Seen sitting up in recliner denies any complaints or concerns. He reports his rash is improving. No Known Allergies     Review of Systems:  A comprehensive review of systems was negative except for that written in the History of Present Illness. Objective:     Visit Vitals  /72   Pulse 70   Temp 97.4 °F (36.3 °C)   Resp 18   Ht 5' 11\" (1.803 m)   Wt 71.7 kg (158 lb)   SpO2 94%   BMI 22.04 kg/m²     Temp (24hrs), Av.2 °F (36.8 °C), Min:97.4 °F (36.3 °C), Max:98.9 °F (37.2 °C)     Patient was seen and examined on 6/10/21 and physical exam remains unchanged since yesterday, unless noted below:    Lines:  Peripheral IV:       Physical Exam:    General:  Alert, cooperative, well noursished, well developed, appears stated age; [place]   Eyes:  Sclera anicteric. Pupils equally round and reactive to light.    Mouth/Throat: Mucous membranes normal, oral pharynx clear   Neck: Supple   Lungs:   Clear to auscultation bilaterally, good effort   CV:  Regular rate and rhythm,no murmur, click, rub or gallop   Abdomen: Soft, non-tender. bowel sounds normal. non-distended   Extremities: No cyanosis or edema   Skin: Wound vac to left chest wound; morbilliform rash noted across abdomen and down into groin- improving    Lymph nodes: Cervical and supraclavicular normal   Musculoskeletal: No swelling or deformity   Lines/Devices:  Intact, no erythema, drainage or tenderness   Psych: Alert and oriented, normal mood affect given the setting       Data Review:     CBC:  Recent Labs     06/08/21 0934   WBC 7.3   HGB 12.2*   HCT 37.2*          BMP:  Recent Labs     06/08/21 0934   CREA 0.83   BUN 11   *   K 4.0      CO2 27   AGAP 4*   *       LFTS:  Recent Labs     06/08/21 0934   TBILI 0.2   ALT 64   AP 89   TP 6.6   ALB 2.4*       Microbiology:     All Micro Results     Procedure Component Value Units Date/Time    COVID-19 RAPID TEST [[id number]] Collected: 06/09/21 1445    Order Status: Completed Specimen: Nasopharyngeal Updated: 06/09/21 1539     Specimen source NASAL        COVID-19 rapid test Not detected        Comment:      The specimen is NEGATIVE for SARS-CoV-2, the novel coronavirus associated with COVID-19. A negative result does not rule out COVID-19. This test has been authorized by the FDA under an Emergency Use Authorization (EUA) for use by authorized laboratories.         Fact sheet for Healthcare Providers: kstattoo.com  Fact sheet for Patients: kstattoo.com       Methodology: Isothermal Nucleic Acid Amplification         CULTURE, ANAEROBIC [[id number]] Collected: 06/02/21 1740    Order Status: Completed Specimen: Abscess Updated: 06/09/21 0904     Special Requests: NO SPECIAL REQUESTS        Culture result:       NO ANAEROBES ISOLATED 7 DAYS          CULTURE, WOUND Venkata Lull STAIN [[id number]]  (Abnormal)  (Susceptibility) Collected: 06/02/21 1740    Order Status: Completed Specimen: Abscess Updated: 06/05/21 0810     Special Requests: NO SPECIAL REQUESTS        GRAM STAIN 0 TO 10 WBCS SEEN PER OIF      FEW GRAM POSITIVE COCCI        Culture result:       MODERATE * METHICILLIN RESISTANT STAPHYLOCOCCUS AUREUS *            PATIENT IS A KNOWN MRSA       CULTURE, BODY FLUID W Sandra Qureshi [[id number]] Collected: 21 1830    Order Status: Canceled Specimen: Body Fluid from Abdominal Fluid     CULTURE, Jamal Lacy STAIN [[id number]]  (Abnormal)  (Susceptibility) Collected: 21 0917    Order Status: Completed Specimen: Wound from Abscess Updated: 21 0838     Special Requests: NO SPECIAL REQUESTS        GRAM STAIN 20 TO 40 WBCS/OIF            MODERATE GRAM POSITIVE COCCI           Culture result:       HEAVY * METHICILLIN RESISTANT STAPHYLOCOCCUS AUREUS *                  RESULTS VERIFIED, PHONED TO AND READ BACK BY  ISAURO SHAVER RN @ 1623 ON 21 AK. SARS-COV-2, PCR [[id number]] Collected: 21 1229    Order Status: Completed Specimen: Nasopharyngeal Updated: 21 0629     Specimen source Nasopharyngeal        SARS-CoV-2 Not detected        Comment:      The specimen is NEGATIVE for SARS-CoV-2, the novel coronavirus associated with COVID-19. This test has been authorized by the FDA under an Emergency Use Authorization (EUA) for use by authorized laboratories.         Fact sheet for Healthcare Providers: ConventionUpdate.co.nz       Fact sheet for Patients: ConventionUpdate.co.nz       Methodology: RT-PCR               Imagin/1 CT Chest abd pelv  IMPRESSION  Large left abscess, 32 cm x 17 cm x 4 cm which extends from the  axilla to the iliac crest.    Signed By: NADIRA Mack     Nicolette 10, 2021

## 2021-06-10 NOTE — PROGRESS NOTES
Spiritual Care Visit, follow up visit. Visited with patient at bedside. Prayed for patient's healing and health. Visit by Jordan Garcia, Staff .  Rigo, [email or web address]., B.A.

## 2021-06-10 NOTE — PROGRESS NOTES
TRINIDAD followed up with the liaison of [employer] again regarding insurance authorization. Patient has been approved recently but a new authorization was required as he did not discharge the last time. CM requested another number to call to follow up with insurance.

## 2021-06-10 NOTE — PROGRESS NOTES
END OF SHIFT NOTE:    INTAKE/OUTPUT  06/09 0701 - 06/10 0700  In: 600 [P.O.:600]  Out: 3300 [Urine:3200; Drains:100]  Voiding: NO  Catheter: YES  Drain:              Flatus: Patient does have flatus present. Stool:  1 occurrences. Characteristics:  Stool Assessment  Stool Color: Brown  Stool Appearance:  (have not observed)  Stool Amount: Medium  Stool Source/Status: Rectum    Emesis: 0 occurrences. Characteristics:        VITAL SIGNS  Patient Vitals for the past 12 hrs:   Temp Pulse Resp BP SpO2   06/10/21 1555 97.9 °F (36.6 °C) 81 18 118/74 97 %   06/10/21 1145 97.4 °F (36.3 °C) 70 18 136/72 94 %   06/10/21 0745 98.6 °F (37 °C) 83 18 129/79 97 %       Pain Assessment  Pain Intensity 1: 0 (06/10/21 1555)  Pain Location 1: Flank  Pain Intervention(s) 1: Rest  Patient Stated Pain Goal: 3    Ambulating  Yes    Shift report given to oncoming nurse at the bedside.     Gopi Stevens

## 2021-06-11 PROCEDURE — 74011000250 HC RX REV CODE- 250: Performed by: NURSE PRACTITIONER

## 2021-06-11 PROCEDURE — 2709999900 HC NON-CHARGEABLE SUPPLY

## 2021-06-11 PROCEDURE — 74011250637 HC RX REV CODE- 250/637: Performed by: SURGERY

## 2021-06-11 PROCEDURE — 74011250636 HC RX REV CODE- 250/636: Performed by: SURGERY

## 2021-06-11 PROCEDURE — 77030018717 HC DRSG GRNUFM KCON -B

## 2021-06-11 PROCEDURE — 65660000000 HC RM CCU STEPDOWN

## 2021-06-11 PROCEDURE — 99232 SBSQ HOSP IP/OBS MODERATE 35: CPT | Performed by: SURGERY

## 2021-06-11 PROCEDURE — 74011250636 HC RX REV CODE- 250/636: Performed by: NURSE PRACTITIONER

## 2021-06-11 PROCEDURE — 74011250637 HC RX REV CODE- 250/637: Performed by: NURSE PRACTITIONER

## 2021-06-11 PROCEDURE — 65270000029 HC RM PRIVATE

## 2021-06-11 RX ADMIN — HYDROCODONE BITARTRATE AND ACETAMINOPHEN 1 TABLET: 5; 325 TABLET ORAL at 21:33

## 2021-06-11 RX ADMIN — HYDROCODONE BITARTRATE AND ACETAMINOPHEN 1 TABLET: 5; 325 TABLET ORAL at 13:18

## 2021-06-11 RX ADMIN — LORATADINE 10 MG: 10 TABLET ORAL at 08:55

## 2021-06-11 RX ADMIN — FAMOTIDINE 20 MG: 20 TABLET ORAL at 08:55

## 2021-06-11 RX ADMIN — Medication: at 18:42

## 2021-06-11 RX ADMIN — TAMSULOSIN HYDROCHLORIDE 0.4 MG: 0.4 CAPSULE ORAL at 08:55

## 2021-06-11 RX ADMIN — Medication 1 AMPULE: at 13:18

## 2021-06-11 RX ADMIN — SODIUM CHLORIDE 600 MG: 9 INJECTION, SOLUTION INTRAMUSCULAR; INTRAVENOUS; SUBCUTANEOUS at 18:42

## 2021-06-11 RX ADMIN — HYDROCODONE BITARTRATE AND ACETAMINOPHEN 1 TABLET: 5; 325 TABLET ORAL at 08:55

## 2021-06-11 RX ADMIN — Medication 1 AMPULE: at 05:03

## 2021-06-11 RX ADMIN — HYDROMORPHONE HYDROCHLORIDE 0.4 MG: 1 INJECTION, SOLUTION INTRAMUSCULAR; INTRAVENOUS; SUBCUTANEOUS at 14:46

## 2021-06-11 RX ADMIN — Medication 1 AMPULE: at 21:32

## 2021-06-11 RX ADMIN — FAMOTIDINE 20 MG: 20 TABLET ORAL at 18:42

## 2021-06-11 NOTE — PROGRESS NOTES
Chart screened by  for discharge planning. Patient is now on telemetry, patient being followed by [facility]. They will start authorization to see if insurance will cover the stay. If not patient will be here until 6/18/2021 to receive IV antibiotics. CM will continue to follow patient during hospitalization for discharge planning and needs. Please consult  if any new issues arise.

## 2021-06-11 NOTE — PROGRESS NOTES
PT note:  Treatment deferred at this time as the patient is with wound care at this time. Will return as time permits.   Gabbychalino Cornejo, PTA

## 2021-06-11 NOTE — WOUND CARE
Left flank wound vac changed, wound in axilla improving, granular 2.8x10x1.6, undermining is improving upper most area is still undermined about 2 cm. Lower is [id number] with undermining of 8-10 o'clcock of 2cm and undermining 2-6 o'clock of 8cm today large wound vac dressing and portion of medium dressing used, with 3 piences total in this wound. Patient had Ernesta Brennan however did ask for additional medication after dressing, Nurse notified. Will monitor.

## 2021-06-11 NOTE — PROGRESS NOTES
H&P/Consult Note/Progress Note/Office Note:   Alley Sanford MRN: [id number]  :1945  Age:75 y.o.    HPI: Alley Sanford is a 77 y.o. male who was initially referred in consultation by Lennox Moser PA-C with a left chest wall hematoma and cellulitis   after a fall at home on the floor approx 21. He sustained a blunt injury to his left chest wall. CXR below showed no PTx or rib fractures. He taked Humira for arthritis and is immune suppressed. We saw him for the 1st time on 21 with erythema of the left chest wall and placed him on Bactrim and marked the cellulitis border. He was instructed to call if any fevers or worsening erythema and given f/u 4 days later  He did not call. When he came to the office on 21 he reported high fevers over 101 degrees associate with nausea with his Bactrim. The erythema had extended >20cm  beyond the original inscribed lines of his left chest wall and extended down his left flank and in his left lower quadrant. We was admitted directly from office. He denies any new falls since 21. On 21 he also had a new hematoma involving his right flank which was not present on 21 (likely a second fall)      21 CXR, 2 views  Clear lungs. The cardiac and mediastinal contours and pulmonary vascularity are normal.   The bones and soft tissues are within normal limits. No evidence of a pneumothorax. IMPRESSION:  Normal chest.      21 CT chest/abd/pelvis with oral and IV contrast  Hx: MRSA cellulitis of left chest wall abscess and fever.     CHEST:   -LUNGS: No lobar pneumonia. Linear atelectasis left base. -AIRWAYS: Trachea and proximal bronchi grossly patent. -PLEURA: Small left pleural effusion. -LYMPH NODES: No enlarged axillary, hilar or mediastinal lymph nodes.   -HEART: Normal size.     -SKELETAL/CHEST WALL: Along left chest wall and body wall there is a complex,  peripherally enhancing fluid collection which measures approximately 32 cm  superior inferior, 17 cm wide and 4 cm thick. There are several bubbles of gas  within it. Larger bubbles of gas extend up into the axilla.     ABDOMEN/PELVIS:  -LIVER: Normal in size and appearance. -GALLBLADDER: No gallstones identified  -BILE DUCTS: Not dilated.     -PANCREAS: Normal.  -SPLEEN: Normal.     -ADRENALS: Normal.  -KIDNEYS/URETERS: Kidneys enhance symmetrically. There are multiple bilateral  parapelvic cysts versus bilateral UPJ obstructions. -BLADDER: Normal.  -REPRODUCTIVE ORGANS: Prostate and seminal vesicles unremarkable.     -BOWEL: Normal caliber. No inflammatory changes. Scattered diverticula. -LYMPH NODES: No significant retroperitoneal, mesenteric, or pelvic adenopathy. -BONES: Degenerative changes. No periostitis. -VASCULATURE: Aorta is normal caliber and mildly calcified. -OTHER: No ascites.     IMPRESSION  Large left abscess, 32 cm x 17 cm x 4 cm which extends from the axilla to the iliac crest.            Additional hx:  5/27/21 wound culture growing MRSA; Pharmacy dosing IV Vanc; trough tonight  5/28/21  Less Confused; no fever; WBC elevated but better;  on IV Abx  5/29/21  Resting in bed; no fever; WBC 12.3;  on IV Abx  5/30/21  Sitting on side of bed; no fever; WBC 13.8;  on IV Abx  5/31/21 AF; wbc still elevated; left hemithorax , left flank and LLQ edematous and indurated  6/1/21 continues IV Vanc; WBC higher at 12.2k   6/2/21 IV Vanc; CT as above with large abscess; I&D/debridment #1 in OR today  6/3/21 IV Vanc/Rocephin; OR culture pending  6/4/21 plan return to OR today for debridement #2; unless pt signs out AMA    6/5/21 POD 3/1; Pt resting in bed. No complaints voiced. Appears comfortable. Postop dressing c/d/i.   6/6/21 POD 4/2; Pt sitting on side of bed. No complaints voiced. Appears comfortable.  Dressing c/di  6/7/21 POD 5/3;  Comfortable; dressing dry; wound vac started today; IV Vanc; Likely SNF soon  6/8/21 POD 6/4 Feels OK; ID rec IV Vanc until 6/21/21; wound vac will be changed 3x/week; Vanc changed to IV Dapto by ID  6/9/21 POD 7/5; Cont IV Daptomycin; wound vac 3 times a week; consult PT; OOB daily  6/10/21 POD8/6 Cont IV Dapto; wound vac change 3 x a week; PT; OOB daily; feeling better  6/11/21 POD 9/7; Cont IV Dapto; wound vac changed today; PT following                  Past Medical History:   Diagnosis Date    Depression     H/O urinary frequency 2/1/2013    History of BPH 2/1/2013    HLD (hyperlipidemia) 2/1/2013    Hx: UTI (urinary tract infection) 2/1/2013    Insomnia     Psoriasis     Psoriatic arthritis ([facility] [address] 2/1/2013    Wears hearing aid 2/1/2013     No past surgical history on file.   Current Facility-Administered Medications   Medication Dose Route Frequency    HYDROmorphone (DILAUDID) injection 0.2-0.4 mg  0.2-0.4 mg IntraVENous Q3H PRN    DAPTOmycin (CUBICIN) 600 mg in 0.9% sodium chloride 12 mL IV Syringe  600 mg IntraVENous Q24H    loratadine (CLARITIN) tablet 10 mg  10 mg Oral DAILY    diphenhydrAMINE (BENADRYL) injection 25 mg  25 mg IntraVENous Q8H PRN    famotidine (PEPCID) tablet 20 mg  20 mg Oral BID    alcohol 62% (NOZIN) nasal  1 Ampule  1 Ampule Topical Q8H    zinc oxide-cod liver oil (DESITIN) 40 % paste   Topical Q8H    acetaminophen (TYLENOL) tablet 1,000 mg  1,000 mg Oral Q6H PRN    ondansetron (ZOFRAN) injection 4 mg  4 mg IntraVENous Q4H PRN    HYDROcodone-acetaminophen (NORCO) 5-325 mg per tablet 1 Tablet  1 Tablet Oral Q6H PRN    tamsulosin (FLOMAX) capsule 0.4 mg  0.4 mg Oral DAILY     Vancomycin  Social History     Socioeconomic History    Marital status: SINGLE     Spouse name: Not on file    Number of children: Not on file    Years of education: Not on file    Highest education level: Not on file   Tobacco Use    Smoking status: Former Smoker    Smokeless tobacco: Never Used    Tobacco comment: pt states quit in early 60's late 50's    Vaping Use    Vaping Use: Never used   Substance and Sexual Activity    Alcohol use: No     Alcohol/week: 0.0 standard drinks    Drug use: No    Sexual activity: Yes     Partners: Female     Social Determinants of Health     Financial Resource Strain:     Difficulty of Paying Living Expenses:    Food Insecurity:     Worried About Running Out of Food in the Last Year:     [address] in the Last Year:    Transportation Needs:     Lack of Transportation (Medical):  Lack of Transportation (Non-Medical):    Physical Activity:     Days of Exercise per Week:     Minutes of Exercise per Session:    Stress:     Feeling of Stress :    Social Connections:     Frequency of Communication with Friends and Family:     Frequency of Social Gatherings with Friends and Family:     Attends Zoroastrianism Services:     Active Member of Clubs or Organizations:     Attends Club or Organization Meetings:     Marital Status:      Social History     Tobacco Use   Smoking Status Former Smoker   Smokeless Tobacco Never Used   Tobacco Comment    pt states quit in early 60's late 52's      Family History   Problem Relation Age of Onset    Heart Attack Father     Cancer Father         skin     Other Sister     Cancer Sister     Other Brother         melanoma     ROS: The patient has no difficulty with chest pain or shortness of breath. No fever or chills. Comprehensive review of systems was otherwise unremarkable except as noted above.     Physical Exam:   Visit Vitals  /73   Pulse 78   Temp 98.2 °F (36.8 °C)   Resp 17   Ht 5' 11\" (1.803 m)   Wt 158 lb (71.7 kg)   SpO2 98%   BMI 22.04 kg/m²     Vitals:    06/11/21 0401 06/11/21 0740 06/11/21 1209 06/11/21 1525   BP: 136/73 101/66 116/75 123/73   Pulse: 88 86 76 78   Resp: 18 17 18 17   Temp: 98.1 °F (36.7 °C) 97.9 °F (36.6 °C) 97.6 °F (36.4 °C) 98.2 °F (36.8 °C)   SpO2: 98% 98% 98% 98%   Weight:       Height:         [unfilled]  [unfilled]    Constitutional: Alert, oriented, cooperative patient in no acute distress; appears stated age    Eyes:Sclera are clear. EOMs intact  ENMT: no external lesions; he is hard of hearing. no obvious neck masses, no ear or lip lesions, nares normal  CV: RRR. Normal perfusion      Large open incision from left axillae to left iliac crest with wound vac in place  No surrounding cellulitis    Right flank hematoma stable    Resp: No JVD. Breathing is  non-labored; no audible wheezing. GI: soft and non-distended     Musculoskeletal: unremarkable with normal function. No embolic signs or cyanosis. Neuro:  Oriented to person, place and time;  moves all 4; no focal deficits; slow moving and slow talking. Psychiatric: blunted affect, no memory impairment    Recent vitals (if inpt):  @IPVITALS(24:)@    Amount and/or Complexity of Data Reviewed and Analyzed:  I reviewed and analyzed all of the unique labs and radiologic studies that are shown below as well as any that are in the HPI, and any that are in the expanded problem list below  *Each unique test, order, or document contributes to the combination of 2 or combination of 3 in Category 1 below. For this visit I also reviewed old records and prior notes. No results for input(s): WBC, HGB, PLT, NA, K, CL, CO2, BUN, CREA, GLU, PTP, INR, APTT, TBIL, TBILI, CBIL, ALT, AP, AML, AML, LPSE, LCAD, NH4, TROPT, TROIQ, PCO2, PO2, HCO3, HGBEXT, PLTEXT, INREXT, HGBEXT, PLTEXT, INREXT in the last 72 hours.     No lab exists for component: SGOT, GPT,  PH  Review of most recent CBC  Lab Results   Component Value Date/Time    WBC 7.3 06/08/2021 09:34 AM    HGB 12.2 (L) 06/08/2021 09:34 AM    HCT 37.2 (L) 06/08/2021 09:34 AM    PLATELET 568 16/80/0530 09:34 AM    MCV 98.4 (H) 06/08/2021 09:34 AM       Review of most recent BMP  Lab Results   Component Value Date/Time    Sodium 137 (L) 06/08/2021 09:34 AM    Potassium 4.0 06/08/2021 09:34 AM    Chloride 106 06/08/2021 09:34 AM    CO2 27 06/08/2021 09:34 AM    Anion gap 4 (L) 06/08/2021 09:34 AM    Glucose 174 (H) 06/08/2021 09:34 AM    BUN 11 06/08/2021 09:34 AM    Creatinine 0.83 06/08/2021 09:34 AM    BUN/Creatinine ratio 19 03/31/2016 11:26 AM    GFR est AA >60 06/08/2021 09:34 AM    GFR est non-AA >60 06/08/2021 09:34 AM    Calcium 8.3 06/08/2021 09:34 AM       Review of most recent LFTs (and lipase if done)  Lab Results   Component Value Date/Time    ALT (SGPT) 64 06/08/2021 09:34 AM    AST (SGOT) 27 06/08/2021 09:34 AM    Alk. phosphatase 89 06/08/2021 09:34 AM    Bilirubin, total 0.2 06/08/2021 09:34 AM     No results found for: LPSE    No results found for: INR, APTT, CBIL, LCAD, NH4, TROPT, TROIQ, INREXT, INREXT    Review of most recent HgbA1c  Lab Results   Component Value Date/Time    Hemoglobin A1c 4.9 06/05/2021 02:21 PM       Nutritional assessment screen for wound healing issues:  Lab Results   Component Value Date/Time    Protein, total 6.6 06/08/2021 09:34 AM    Albumin 2.4 (L) 06/08/2021 09:34 AM       @lastcovr@  XR Results (most recent):  Results from Hospital Encounter encounter on 05/20/21    XR CHEST PA LAT    Narrative  EXAM: XR CHEST PA LAT    INDICATION: Left chest wall hematoma with cellulitis rule out rib fracture or  pneumothorax after a fall at home on the floor. COMPARISON: None. FINDINGS: PA and lateral radiographs of the chest demonstrate clear lungs. The  cardiac and mediastinal contours and pulmonary vascularity are normal. The bones  and soft tissues are within normal limits. No evidence of a pneumothorax. Impression  Normal chest.      CT Results (most recent):  Results from Hospital Encounter encounter on 05/24/21    CT CHEST ABD PELV W CONT    Narrative  CT OF THE CHEST ABDOMEN AND PELVIS    INDICATION: MRSA cellulitis of left chest wall abscess and fever. TECHNIQUE:  Multiple axial images were obtained through the chest, abdomen and  pelvis. Oral contrast was used for bowel opacification.   100mL of Isovue 370  intravenous contrast was used for better evaluation of solid organs and vascular  structures. Radiation dose reduction techniques were used for this study. All  CT scans performed at this facility use one or all of the following: Automated  exposure control, adjustment of the mA and/or kVp according to patient's size,  iterative reconstruction. COMPARISON: None    FINDINGS:  CHEST:  -LUNGS: No lobar pneumonia. Linear atelectasis left base. -AIRWAYS: Trachea and proximal bronchi grossly patent. -PLEURA: Small left pleural effusion. -LYMPH NODES: No enlarged axillary, hilar or mediastinal lymph nodes. -HEART: Normal size.    -SKELETAL/CHEST WALL: Long left chest wall and body wall there is a complex,  peripherally enhancing fluid collection which measures approximately 32 cm  superior inferior, 17 cm wide and 4 cm thick. There are several bubbles of gas  within it. Larger bubbles of gas extend up into the axilla. ABDOMEN/PELVIS:  -LIVER: Normal in size and appearance. -GALLBLADDER: No gallstones identified  -BILE DUCTS: Not dilated. -PANCREAS: Normal.  -SPLEEN: Normal.    -ADRENALS: Normal.  -KIDNEYS/URETERS: Kidneys enhance symmetrically. There are multiple bilateral  parapelvic cysts versus bilateral UPJ obstructions. -BLADDER: Normal.  -REPRODUCTIVE ORGANS: Prostate and seminal vesicles unremarkable. -BOWEL: Normal caliber. No inflammatory changes. Scattered diverticula. -LYMPH NODES: No significant retroperitoneal, mesenteric, or pelvic adenopathy. -BONES: Degenerative changes. No periostitis. -VASCULATURE: Aorta is normal caliber and mildly calcified. -OTHER: No ascites. Impression  Large left abscess, 32 cm x 17 cm x 4 cm which extends from the  axilla to the iliac crest.  689    US Results (most recent):  No results found for this or any previous visit.         Admission date (for inpatients): 5/24/2021   * No surgery found *  Procedure(s):  LEFT CHEST WALL AND FLANK WOUND DEBRIDEMENT        ASSESSMENT/PLAN:  Problem List  Date Reviewed: 5/24/2021        Codes Class Noted    Confusion ICD-10-CM: R41.0  ICD-9-CM: 298.9  5/27/2021        Sundowning ICD-10-CM: F05  ICD-9-CM: Aracelis Hooker  5/27/2021        Leukocytosis ICD-10-CM: H75.020  ICD-9-CM: 288.60  5/26/2021        Acute renal insufficiency ICD-10-CM: N28.9  ICD-9-CM: 593.9  5/25/2021        Fever ICD-10-CM: R50.9  ICD-9-CM: 780.60  5/24/2021        Suppression of immune system subtherapeutic (HCC) ICD-10-CM: R15.93  ICD-9-CM: 279.8  5/24/2021        Right flank hematoma ICD-10-CM: S30. 1XXA  ICD-9-CM: 922.2  5/24/2021        * (Principal) Chest wall abscess ICD-10-CM: L02.213  ICD-9-CM: 682.2  5/20/2021        Hematoma of left chest wall ICD-10-CM: K33.996C  ICD-9-CM: 922.1  5/20/2021        Cellulitis of chest wall ICD-10-CM: L03.313  ICD-9-CM: 682.2  5/20/2021        Fall ICD-10-CM: K62. Luis Gómez  ICD-9-CM: E888.9  5/20/2021        Psoriasis ICD-10-CM: L40.9  ICD-9-CM: 696.1  Unknown        Insomnia ICD-10-CM: G47.00  ICD-9-CM: 780.52  Unknown        Depression ICD-10-CM: F32.9  ICD-9-CM: 503  Unknown        Tachycardia ICD-10-CM: R00.0  ICD-9-CM: 785.0  6/13/2016        Murmur ICD-10-CM: R01.1  ICD-9-CM: 785.2  6/13/2016        [facility] ICD-10-CM: L40.50  ICD-9-CM: 696.0  2/1/2013        Wears hearing aid ICD-10-CM: Z97.4  ICD-9-CM: V45.89  2/1/2013        HLD (hyperlipidemia) ICD-10-CM: E78.5  ICD-9-CM: 272.4  2/1/2013        H/O urinary frequency ICD-10-CM: S12.246  ICD-9-CM: V13.09  2/1/2013        History of BPH ICD-10-CM: Z87.438  ICD-9-CM: V13.89  2/1/2013            Principal Problem:    Chest wall abscess (5/20/2021)    Active Problems:    Cellulitis of chest wall (5/20/2021)      Fall (5/20/2021)      Fever (5/24/2021)      Suppression of immune system subtherapeutic ([facility] [address] (5/24/2021)      Right flank hematoma (5/24/2021)      Acute renal insufficiency (5/25/2021)      Leukocytosis (5/26/2021)      Confusion (5/27/2021)      Sundowning (5/27/2021)           Number and Complexity of Problems addressed and   Risks of complications and/or morbidity of management        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 He went to OR for serial debridements  6/2/21 OR for debridement #1  6/4/21 OR for debridement/washout/dressing change #2          Chronic immune suppression  Continue to hold his methotrexate, prednisone, and Humira to help reduce his immune suppression during this acute phase of infection. Dermatitis around left chest wall wound from moisture maceration  Desitin ordered to destinee-wound skin    Confusion/sundowning at night-->resolved  For many days he was uncooperative and pulling IV sites   Sitters were required  This has improving significantly  May no longer require sitter              Level of MDM (2/3 elements below)  Number and Complexity of Problems Addressed Amount and/or Complexity of Data to be Reviewed and Analyzed  *Each unique test, order, or document contributes to the combination of 2 or combination of 3 in Category 1 below.  Risk of Complications and/or Morbidity or Mortality of pt Management     23232  06586 SF Minimal  1self-limited or minor problem Minimal or none Minimal risk of morbidity from additional diagnostic testing or Rx   35832  47823 Low Low  2or more self-limited or minor problems;    or  1stable chronic illness;    or  3HPGBP, uncomplicated illness or injury   Limited  (Must meet the requirements of at least 1 of the 2 categories)  Category 1: Tests and documents   Any combination of 2 from the following:  Review of prior external note(s) from each unique source*;  review of the result(s) of each unique test*;   ordering of each unique test*    or   Category 2: Assessment requiring an independent historian(s)  (For the categories of independent interpretation of tests and discussion of management or test interpretation, see moderate or high) Low risk of morbidity from additional diagnostic testing or treatment     20755  24523 Mod Moderate  1or more chronic illnesses with exacerbation, progression, or side effects of treatment;    or  2or more stable chronic illnesses;    or  1undiagnosed new problem with uncertain prognosis;    or  1acute illness with systemic symptoms;    or  3AJCPU complicated injury   Moderate  (Must meet the requirements of at least 1 out of 3 categories)  Category 1: Tests, documents, or independent historian(s)  Any combination of 3 from the following:   Review of prior external note(s) from each unique source*;  Review of the result(s) of each unique test*;  Ordering of each unique test*;  Assessment requiring an independent historian(s)    or  Category 2: Independent interpretation of tests   Independent interpretation of a test performed by another physician/other qualified health care professional (not separately reported);     or  Category 3: Discussion of management or test interpretation  Discussion of management or test interpretation with external physician/other qualified health care professional/appropriate source (not separately reported)   Moderate risk of morbidity from additional diagnostic testing or treatment  Examples only:  Prescription drug management   Decision regarding minor surgery with identified patient or procedure risk factors  Decision regarding elective major surgery without identified patient or procedure risk factors   Diagnosis or treatment significantly limited by social determinants of health       32199  31436 High High  1or more chronic illnesses with severe exacerbation, progression, or side effects of treatment;    or  1 acute or chronic illness or injury that poses a threat to life or bodily function   Extensive  (Must meet the requirements of at least 2 out of 3 categories)  Category 1: Tests, documents, or independent historian(s)  Any combination of 3 from the following:   Review of prior external note(s) from each unique source*;  Review of the result(s) of each unique test*;   Ordering of each unique test*;   Assessment requiring an independent historian(s)    or   Category 2: Independent interpretation of tests   Independent interpretation of a test performed by another physician/other qualified health care professional (not separately reported);     or  Category 3: Discussion of management or test interpretation  Discussion of management or test interpretation with external physician/other qualified health care professional/appropriate source (not separately reported)   High risk of morbidity from additional diagnostic testing or treatment  Examples only:  Drug therapy requiring intensive monitoring for toxicity  Decision regarding elective major surgery with identified patient or procedure risk factors  Decision regarding emergency major surgery  Decision regarding hospitalization  Decision not to resuscitate or to de-escalate care because of poor prognosis             I have personally performed a face-to-face diagnostic evaluation and management  service on this patient. I have independently seen the patient. I have independently obtained the above history from the patient/family. I have independently examined the patient with above findings. I have independently reviewed data/labs for this patient and developed the above plan of care (MDM).   Signed: Elida Flores MD, FACS

## 2021-06-12 PROCEDURE — 74011250636 HC RX REV CODE- 250/636: Performed by: NURSE PRACTITIONER

## 2021-06-12 PROCEDURE — 74011250637 HC RX REV CODE- 250/637: Performed by: NURSE PRACTITIONER

## 2021-06-12 PROCEDURE — 65270000029 HC RM PRIVATE

## 2021-06-12 PROCEDURE — 74011000250 HC RX REV CODE- 250: Performed by: NURSE PRACTITIONER

## 2021-06-12 PROCEDURE — 74011250636 HC RX REV CODE- 250/636: Performed by: SURGERY

## 2021-06-12 PROCEDURE — 99232 SBSQ HOSP IP/OBS MODERATE 35: CPT | Performed by: SURGERY

## 2021-06-12 PROCEDURE — 74011250637 HC RX REV CODE- 250/637: Performed by: SURGERY

## 2021-06-12 PROCEDURE — 65660000000 HC RM CCU STEPDOWN

## 2021-06-12 RX ADMIN — HYDROCODONE BITARTRATE AND ACETAMINOPHEN 1 TABLET: 5; 325 TABLET ORAL at 07:15

## 2021-06-12 RX ADMIN — DIPHENHYDRAMINE HYDROCHLORIDE 25 MG: 50 INJECTION, SOLUTION INTRAMUSCULAR; INTRAVENOUS at 02:35

## 2021-06-12 RX ADMIN — FAMOTIDINE 20 MG: 20 TABLET ORAL at 17:31

## 2021-06-12 RX ADMIN — Medication 1 AMPULE: at 13:58

## 2021-06-12 RX ADMIN — Medication: at 20:43

## 2021-06-12 RX ADMIN — Medication 1 AMPULE: at 05:41

## 2021-06-12 RX ADMIN — SODIUM CHLORIDE 600 MG: 9 INJECTION, SOLUTION INTRAMUSCULAR; INTRAVENOUS; SUBCUTANEOUS at 17:33

## 2021-06-12 RX ADMIN — Medication 1 AMPULE: at 21:00

## 2021-06-12 RX ADMIN — HYDROCODONE BITARTRATE AND ACETAMINOPHEN 1 TABLET: 5; 325 TABLET ORAL at 14:01

## 2021-06-12 RX ADMIN — HYDROCODONE BITARTRATE AND ACETAMINOPHEN 1 TABLET: 5; 325 TABLET ORAL at 20:41

## 2021-06-12 RX ADMIN — LORATADINE 10 MG: 10 TABLET ORAL at 09:29

## 2021-06-12 RX ADMIN — ONDANSETRON 4 MG: 2 INJECTION INTRAMUSCULAR; INTRAVENOUS at 11:23

## 2021-06-12 RX ADMIN — Medication: at 06:00

## 2021-06-12 RX ADMIN — FAMOTIDINE 20 MG: 20 TABLET ORAL at 09:28

## 2021-06-12 RX ADMIN — Medication: at 13:55

## 2021-06-12 RX ADMIN — TAMSULOSIN HYDROCHLORIDE 0.4 MG: 0.4 CAPSULE ORAL at 09:29

## 2021-06-12 NOTE — PROGRESS NOTES
END OF SHIFT NOTE:    INTAKE/OUTPUT  06/11 0701 - 06/12 0700  In: 840 [P.O.:840]  Out: 1850 [Urine:1700; Drains:150]  Voiding: gonsalez removed 1610; has not voided yet. Catheter: NO  Drain:      Flatus: Patient does have flatus present. Stool:  0 occurrences. Characteristics:  Stool Assessment  Stool Color: Brown  Stool Appearance:  (have not observed)  Stool Amount: Medium  Stool Source/Status: Rectum    Emesis: 0 occurrences. Characteristics:        VITAL SIGNS  Patient Vitals for the past 12 hrs:   Temp Pulse Resp BP SpO2   06/12/21 1613 99 °F (37.2 °C) 83 18 103/70 95 %   06/12/21 1124 98.9 °F (37.2 °C) 90 18 113/77 98 %       Pain Assessment  Pain Intensity 1: 4 (06/12/21 1431)  Pain Location 1: Flank  Pain Intervention(s) 1: Medication (see MAR)  Patient Stated Pain Goal: 5    Ambulating  Yes; up in chair for meals. Tolerated well. Shift report given to oncoming nurse at the bedside.     Maribell Menard RN

## 2021-06-12 NOTE — PROGRESS NOTES
END OF SHIFT NOTE:    INTAKE/OUTPUT  06/10 0701 - 06/11 0700  In: 1150 [P.O.:1150]  Out: 5640 [Urine:4570; Drains:275]  Voiding: NO  Catheter: YES  Drain:        Flatus: Patient does have flatus present. Stool:  0 occurrences. Characteristics:  Stool Assessment  Stool Color: Brown  Stool Appearance:  (have not observed)  Stool Amount: Medium  Stool Source/Status: Rectum    Emesis: 0 occurrences. Characteristics:        VITAL SIGNS  Patient Vitals for the past 12 hrs:   Temp Pulse Resp BP SpO2   06/11/21 1927 98.2 °F (36.8 °C) 70 17 117/72 100 %   06/11/21 1600 -- 77 -- -- --   06/11/21 1525 98.2 °F (36.8 °C) 78 17 123/73 98 %   06/11/21 1209 97.6 °F (36.4 °C) 76 18 116/75 98 %   06/11/21 1200 -- (!) 102 -- -- --       Pain Assessment  Pain Intensity 1: 6 (06/11/21 1516)  Pain Location 1: Flank  Pain Intervention(s) 1: Medication (see MAR)  Patient Stated Pain Goal: 3    Ambulating  Yes; to chair in room with assist.    Shift report given to oncoming nurse at the bedside.     Gloria Lloyd RN

## 2021-06-12 NOTE — PROGRESS NOTES
H&P/Consult Note/Progress Note/Office Note:   Betsey Boas. MRN: [id number]  :1945  Age:75 y.o.    HPI: Betsey Boas. is a 77 y.o. male who was initially referred in consultation by Em Herbert PA-C with a left chest wall hematoma and cellulitis   after a fall at home on the floor approx 21. He sustained a blunt injury to his left chest wall. CXR below showed no PTx or rib fractures. He taked Humira for arthritis and is immune suppressed. We saw him for the 1st time on 21 with erythema of the left chest wall and placed him on Bactrim and marked the cellulitis border. He was instructed to call if any fevers or worsening erythema and given f/u 4 days later  He did not call. When he came to the office on 21 he reported high fevers over 101 degrees associate with nausea with his Bactrim. The erythema had extended >20cm  beyond the original inscribed lines of his left chest wall and extended down his left flank and in his left lower quadrant. We was admitted directly from office. He denies any new falls since 21. On 21 he also had a new hematoma involving his right flank which was not present on 21 (likely a second fall)      21 CXR, 2 views  Clear lungs. The cardiac and mediastinal contours and pulmonary vascularity are normal.   The bones and soft tissues are within normal limits. No evidence of a pneumothorax. IMPRESSION:  Normal chest.      21 CT chest/abd/pelvis with oral and IV contrast  Hx: MRSA cellulitis of left chest wall abscess and fever.     CHEST:   -LUNGS: No lobar pneumonia. Linear atelectasis left base. -AIRWAYS: Trachea and proximal bronchi grossly patent. -PLEURA: Small left pleural effusion. -LYMPH NODES: No enlarged axillary, hilar or mediastinal lymph nodes.   -HEART: Normal size.     -SKELETAL/CHEST WALL: Along left chest wall and body wall there is a complex,  peripherally enhancing fluid collection which measures approximately 32 cm  superior inferior, 17 cm wide and 4 cm thick. There are several bubbles of gas  within it. Larger bubbles of gas extend up into the axilla.     ABDOMEN/PELVIS:  -LIVER: Normal in size and appearance. -GALLBLADDER: No gallstones identified  -BILE DUCTS: Not dilated.     -PANCREAS: Normal.  -SPLEEN: Normal.     -ADRENALS: Normal.  -KIDNEYS/URETERS: Kidneys enhance symmetrically. There are multiple bilateral  parapelvic cysts versus bilateral UPJ obstructions. -BLADDER: Normal.  -REPRODUCTIVE ORGANS: Prostate and seminal vesicles unremarkable.     -BOWEL: Normal caliber. No inflammatory changes. Scattered diverticula. -LYMPH NODES: No significant retroperitoneal, mesenteric, or pelvic adenopathy. -BONES: Degenerative changes. No periostitis. -VASCULATURE: Aorta is normal caliber and mildly calcified. -OTHER: No ascites.     IMPRESSION  Large left abscess, 32 cm x 17 cm x 4 cm which extends from the axilla to the iliac crest.            Additional hx:  5/27/21 wound culture growing MRSA; Pharmacy dosing IV Vanc; trough tonight  5/28/21  Less Confused; no fever; WBC elevated but better;  on IV Abx  5/29/21  Resting in bed; no fever; WBC 12.3;  on IV Abx  5/30/21  Sitting on side of bed; no fever; WBC 13.8;  on IV Abx  5/31/21 AF; wbc still elevated; left hemithorax , left flank and LLQ edematous and indurated  6/1/21 continues IV Vanc; WBC higher at 12.2k   6/2/21 IV Vanc; CT as above with large abscess; I&D/debridment #1 in OR today  6/3/21 IV Vanc/Rocephin; OR culture pending  6/4/21 plan return to OR today for debridement #2; unless pt signs out AMA    6/5/21 POD 3/1; Pt resting in bed. No complaints voiced. Appears comfortable. Postop dressing c/d/i.   6/6/21 POD 4/2; Pt sitting on side of bed. No complaints voiced. Appears comfortable.  Dressing c/di  6/7/21 POD 5/3;  Comfortable; dressing dry; wound vac started today; IV Vanc; Likely SNF soon  6/8/21 POD 6/4 Feels OK; ID rec IV Vanc until 6/21/21; wound vac will be changed 3x/week; Vanc changed to IV Dapto by ID  6/9/21 POD 7/5; Cont IV Daptomycin; wound vac 3 times a week; consult PT; OOB daily  6/10/21 POD8/6 Cont IV Dapto; wound vac change 3 x a week; PT; OOB daily; feeling better  6/11/21 POD 9/7; Cont IV Dapto; wound vac changed today; PT following  6/12/21 POD10/8; Cont IV Dapto; wound vac q M/W/F; inpt PT                Past Medical History:   Diagnosis Date    Depression     H/O urinary frequency 2/1/2013    History of BPH 2/1/2013    HLD (hyperlipidemia) 2/1/2013    Hx: UTI (urinary tract infection) 2/1/2013    Insomnia     Psoriasis     Psoriatic arthritis ([facility] [address] 2/1/2013    Wears hearing aid 2/1/2013     No past surgical history on file.   Current Facility-Administered Medications   Medication Dose Route Frequency    HYDROmorphone (DILAUDID) injection 0.2-0.4 mg  0.2-0.4 mg IntraVENous Q3H PRN    DAPTOmycin (CUBICIN) 600 mg in 0.9% sodium chloride 12 mL IV Syringe  600 mg IntraVENous Q24H    loratadine (CLARITIN) tablet 10 mg  10 mg Oral DAILY    diphenhydrAMINE (BENADRYL) injection 25 mg  25 mg IntraVENous Q8H PRN    famotidine (PEPCID) tablet 20 mg  20 mg Oral BID    alcohol 62% (NOZIN) nasal  1 Ampule  1 Ampule Topical Q8H    zinc oxide-cod liver oil (DESITIN) 40 % paste   Topical Q8H    acetaminophen (TYLENOL) tablet 1,000 mg  1,000 mg Oral Q6H PRN    ondansetron (ZOFRAN) injection 4 mg  4 mg IntraVENous Q4H PRN    HYDROcodone-acetaminophen (NORCO) 5-325 mg per tablet 1 Tablet  1 Tablet Oral Q6H PRN    tamsulosin (FLOMAX) capsule 0.4 mg  0.4 mg Oral DAILY     Vancomycin  Social History     Socioeconomic History    Marital status: SINGLE     Spouse name: Not on file    Number of children: Not on file    Years of education: Not on file    Highest education level: Not on file   Tobacco Use    Smoking status: Former Smoker    Smokeless tobacco: Never Used    Tobacco comment: pt states quit in early 63's late 52's    Vaping Use    Vaping Use: Never used   Substance and Sexual Activity    Alcohol use: No     Alcohol/week: 0.0 standard drinks    Drug use: No    Sexual activity: Yes     Partners: Female     Social Determinants of Health     Financial Resource Strain:     Difficulty of Paying Living Expenses:    Food Insecurity:     Worried About Running Out of Food in the Last Year:     [address] in the Last Year:    Transportation Needs:     Lack of Transportation (Medical):  Lack of Transportation (Non-Medical):    Physical Activity:     Days of Exercise per Week:     Minutes of Exercise per Session:    Stress:     Feeling of Stress :    Social Connections:     Frequency of Communication with Friends and Family:     Frequency of Social Gatherings with Friends and Family:     Attends [employer] Services:     Active Member of Clubs or Organizations:     Attends Club or Organization Meetings:     Marital Status:      Social History     Tobacco Use   Smoking Status Former Smoker   Smokeless Tobacco Never Used   Tobacco Comment    pt states quit in early 60's late 52's      Family History   Problem Relation Age of Onset    Heart Attack Father     Cancer Father         skin     Other Sister     Cancer Sister     Other Brother         melanoma     ROS: The patient has no difficulty with chest pain or shortness of breath. No fever or chills. Comprehensive review of systems was otherwise unremarkable except as noted above.     Physical Exam:   Visit Vitals  BP (!) 113/7   Pulse 90   Temp 98.9 °F (37.2 °C)   Resp 18   Ht 5' 11\" (1.803 m)   Wt 158 lb (71.7 kg)   SpO2 98%   BMI 22.04 kg/m²     Vitals:    06/11/21 2321 06/12/21 0254 06/12/21 0746 06/12/21 1124   BP: 130/78 122/77 126/80 (!) 113/7   Pulse: 81 84 100 90   Resp: 17 18 18 18   Temp: 98.5 °F (36.9 °C) 97.8 °F (36.6 °C) 98.7 °F (37.1 °C) 98.9 °F (37.2 °C)   SpO2: 98% 97% 91% 98%   Weight:       Height: [unfilled]  [unfilled]    Constitutional: Alert, oriented, cooperative patient in no acute distress; appears stated age    Eyes:Sclera are clear. EOMs intact  ENMT: no external lesions; he is hard of hearing. no obvious neck masses, no ear or lip lesions, nares normal  CV: RRR. Normal perfusion      Large open incision from left axillae to left iliac crest with wound vac in place  No surrounding cellulitis    Right flank hematoma stable    Resp: No JVD. Breathing is  non-labored; no audible wheezing. GI: soft and non-distended     Musculoskeletal: unremarkable with normal function. No embolic signs or cyanosis. Neuro:  Oriented to person, place and time;  moves all 4; no focal deficits; slow moving and slow talking. Psychiatric: blunted affect, no memory impairment    Recent vitals (if inpt):  @IPVITALS(24:)@    Amount and/or Complexity of Data Reviewed and Analyzed:  I reviewed and analyzed all of the unique labs and radiologic studies that are shown below as well as any that are in the HPI, and any that are in the expanded problem list below  *Each unique test, order, or document contributes to the combination of 2 or combination of 3 in Category 1 below. For this visit I also reviewed old records and prior notes. No results for input(s): WBC, HGB, PLT, NA, K, CL, CO2, BUN, CREA, GLU, PTP, INR, APTT, TBIL, TBILI, CBIL, ALT, AP, AML, AML, LPSE, LCAD, NH4, TROPT, TROIQ, PCO2, PO2, HCO3, HGBEXT, PLTEXT, INREXT, HGBEXT, PLTEXT, INREXT in the last 72 hours.     No lab exists for component: SGOT, GPT,  PH  Review of most recent CBC  Lab Results   Component Value Date/Time    WBC 7.3 06/08/2021 09:34 AM    HGB 12.2 (L) 06/08/2021 09:34 AM    HCT 37.2 (L) 06/08/2021 09:34 AM    PLATELET 326 32/63/3172 09:34 AM    MCV 98.4 (H) 06/08/2021 09:34 AM       Review of most recent BMP  Lab Results   Component Value Date/Time    Sodium 137 (L) 06/08/2021 09:34 AM    Potassium 4.0 06/08/2021 09:34 AM Chloride 106 06/08/2021 09:34 AM    CO2 27 06/08/2021 09:34 AM    Anion gap 4 (L) 06/08/2021 09:34 AM    Glucose 174 (H) 06/08/2021 09:34 AM    BUN 11 06/08/2021 09:34 AM    Creatinine 0.83 06/08/2021 09:34 AM    BUN/Creatinine ratio 19 03/31/2016 11:26 AM    GFR est AA >60 06/08/2021 09:34 AM    GFR est non-AA >60 06/08/2021 09:34 AM    Calcium 8.3 06/08/2021 09:34 AM       Review of most recent LFTs (and lipase if done)  Lab Results   Component Value Date/Time    ALT (SGPT) 64 06/08/2021 09:34 AM    AST (SGOT) 27 06/08/2021 09:34 AM    Alk. phosphatase 89 06/08/2021 09:34 AM    Bilirubin, total 0.2 06/08/2021 09:34 AM     No results found for: LPSE    No results found for: INR, APTT, CBIL, LCAD, NH4, TROPT, TROIQ, INREXT, INREXT    Review of most recent HgbA1c  Lab Results   Component Value Date/Time    Hemoglobin A1c 4.9 06/05/2021 02:21 PM       Nutritional assessment screen for wound healing issues:  Lab Results   Component Value Date/Time    Protein, total 6.6 06/08/2021 09:34 AM    Albumin 2.4 (L) 06/08/2021 09:34 AM       @lastcovr@  XR Results (most recent):  Results from Hospital Encounter encounter on 05/20/21    XR CHEST PA LAT    Narrative  EXAM: XR CHEST PA LAT    INDICATION: Left chest wall hematoma with cellulitis rule out rib fracture or  pneumothorax after a fall at home on the floor. COMPARISON: None. FINDINGS: PA and lateral radiographs of the chest demonstrate clear lungs. The  cardiac and mediastinal contours and pulmonary vascularity are normal. The bones  and soft tissues are within normal limits. No evidence of a pneumothorax. Impression  Normal chest.      CT Results (most recent):  Results from Hospital Encounter encounter on 05/24/21    CT CHEST ABD PELV W CONT    Narrative  CT OF THE CHEST ABDOMEN AND PELVIS    INDICATION: MRSA cellulitis of left chest wall abscess and fever. TECHNIQUE:  Multiple axial images were obtained through the chest, abdomen and  pelvis.   Oral contrast was used for bowel opacification. 100mL of Isovue 370  intravenous contrast was used for better evaluation of solid organs and vascular  structures. Radiation dose reduction techniques were used for this study. All  CT scans performed at this facility use one or all of the following: Automated  exposure control, adjustment of the mA and/or kVp according to patient's size,  iterative reconstruction. COMPARISON: None    FINDINGS:  CHEST:  -LUNGS: No lobar pneumonia. Linear atelectasis left base. -AIRWAYS: Trachea and proximal bronchi grossly patent. -PLEURA: Small left pleural effusion. -LYMPH NODES: No enlarged axillary, hilar or mediastinal lymph nodes. -HEART: Normal size.    -SKELETAL/CHEST WALL: Long left chest wall and body wall there is a complex,  peripherally enhancing fluid collection which measures approximately 32 cm  superior inferior, 17 cm wide and 4 cm thick. There are several bubbles of gas  within it. Larger bubbles of gas extend up into the axilla. ABDOMEN/PELVIS:  -LIVER: Normal in size and appearance. -GALLBLADDER: No gallstones identified  -BILE DUCTS: Not dilated. -PANCREAS: Normal.  -SPLEEN: Normal.    -ADRENALS: Normal.  -KIDNEYS/URETERS: Kidneys enhance symmetrically. There are multiple bilateral  parapelvic cysts versus bilateral UPJ obstructions. -BLADDER: Normal.  -REPRODUCTIVE ORGANS: Prostate and seminal vesicles unremarkable. -BOWEL: Normal caliber. No inflammatory changes. Scattered diverticula. -LYMPH NODES: No significant retroperitoneal, mesenteric, or pelvic adenopathy. -BONES: Degenerative changes. No periostitis. -VASCULATURE: Aorta is normal caliber and mildly calcified. -OTHER: No ascites. Impression  Large left abscess, 32 cm x 17 cm x 4 cm which extends from the  axilla to the iliac crest.  689    US Results (most recent):  No results found for this or any previous visit.         Admission date (for inpatients): 5/24/2021   * No surgery found *  Procedure(s):  LEFT CHEST WALL AND FLANK WOUND DEBRIDEMENT        ASSESSMENT/PLAN:  Problem List  Date Reviewed: 5/24/2021        Codes Class Noted    Confusion ICD-10-CM: R41.0  ICD-9-CM: 298.9  5/27/2021        Sundowning ICD-10-CM: F05  ICD-9-CM: Rebecca Woodward  5/27/2021        Leukocytosis ICD-10-CM: P27.037  ICD-9-CM: 288.60  5/26/2021        Acute renal insufficiency ICD-10-CM: N28.9  ICD-9-CM: 593.9  5/25/2021        Fever ICD-10-CM: R50.9  ICD-9-CM: 780.60  5/24/2021        Suppression of immune system subtherapeutic (HCC) ICD-10-CM: D89.89  ICD-9-CM: 279.8  5/24/2021        Right flank hematoma ICD-10-CM: S30. 1XXA  ICD-9-CM: 922.2  5/24/2021        * (Principal) Chest wall abscess ICD-10-CM: L02.213  ICD-9-CM: 682.2  5/20/2021        Hematoma of left chest wall ICD-10-CM: U83.054B  ICD-9-CM: 922.1  5/20/2021        Cellulitis of chest wall ICD-10-CM: L03.313  ICD-9-CM: 682.2  5/20/2021        Fall ICD-10-CM: S71. Olegario Hewitt  ICD-9-CM: E888.9  5/20/2021        Psoriasis ICD-10-CM: L40.9  ICD-9-CM: 696.1  Unknown        Insomnia ICD-10-CM: G47.00  ICD-9-CM: 780.52  Unknown        Depression ICD-10-CM: F32.9  ICD-9-CM: 429  Unknown        Tachycardia ICD-10-CM: R00.0  ICD-9-CM: 785.0  6/13/2016        Murmur ICD-10-CM: R01.1  ICD-9-CM: 785.2  6/13/2016        [facility] ([facility] ICD-10-CM: L40.50  ICD-9-CM: 696.0  2/1/2013        Wears hearing aid ICD-10-CM: Z97.4  ICD-9-CM: V45.89  2/1/2013        HLD (hyperlipidemia) ICD-10-CM: E78.5  ICD-9-CM: 272.4  2/1/2013        H/O urinary frequency ICD-10-CM: O61.964  ICD-9-CM: V13.09  2/1/2013        History of BPH ICD-10-CM: R25.010  ICD-9-CM: V13.89  2/1/2013            Principal Problem:    Chest wall abscess (5/20/2021)    Active Problems:    Cellulitis of chest wall (5/20/2021)      Fall (5/20/2021)      Fever (5/24/2021)      Suppression of immune system subtherapeutic ([address] (5/24/2021)      Right flank hematoma (5/24/2021)      Acute renal insufficiency (5/25/2021)      Leukocytosis (5/26/2021)      Confusion (5/27/2021)      Sundowning (5/27/2021)           Number and Complexity of Problems addressed and   Risks of complications and/or morbidity of management        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 He went to OR for serial debridements  6/2/21 OR for debridement #1  6/4/21 OR for debridement/washout/dressing change #2          Chronic immune suppression  Continue to hold his methotrexate, prednisone, and Humira to help reduce his immune suppression during this acute phase of infection. Dermatitis around left chest wall wound from moisture maceration  Desitin ordered to destinee-wound skin    Confusion/sundowning at night-->resolved  For many days he was uncooperative and pulling IV sites   Sitters were required  This has improving significantly  May no longer require sitter              Level of MDM (2/3 elements below)  Number and Complexity of Problems Addressed Amount and/or Complexity of Data to be Reviewed and Analyzed  *Each unique test, order, or document contributes to the combination of 2 or combination of 3 in Category 1 below.  Risk of Complications and/or Morbidity or Mortality of pt Management     98410  28813 SF Minimal  1self-limited or minor problem Minimal or none Minimal risk of morbidity from additional diagnostic testing or Rx   04977  32359 Low Low  2or more self-limited or minor problems;    or  1stable chronic illness;    or  2WSFAL, uncomplicated illness or injury   Limited  (Must meet the requirements of at least 1 of the 2 categories)  Category 1: Tests and documents   Any combination of 2 from the following:  Review of prior external note(s) from each unique source*;  review of the result(s) of each unique test*;   ordering of each unique test*    or   Category 2: Assessment requiring an independent historian(s)  (For the categories of independent interpretation of tests and discussion of management or test interpretation, see moderate or high) Low risk of morbidity from additional diagnostic testing or treatment     57466  16216 Mod Moderate  1or more chronic illnesses with exacerbation, progression, or side effects of treatment;    or  2or more stable chronic illnesses;    or  1undiagnosed new problem with uncertain prognosis;    or  1acute illness with systemic symptoms;    or  [id number] complicated injury   Moderate  (Must meet the requirements of at least 1 out of 3 categories)  Category 1: Tests, documents, or independent historian(s)  Any combination of 3 from the following:   Review of prior external note(s) from each unique source*;  Review of the result(s) of each unique test*;  Ordering of each unique test*;  Assessment requiring an independent historian(s)    or  Category 2: Independent interpretation of tests   Independent interpretation of a test performed by another physician/other qualified health care professional (not separately reported);     or  Category 3: Discussion of management or test interpretation  Discussion of management or test interpretation with external physician/other qualified health care professional/appropriate source (not separately reported)   Moderate risk of morbidity from additional diagnostic testing or treatment  Examples only:  Prescription drug management   Decision regarding minor surgery with identified patient or procedure risk factors  Decision regarding elective major surgery without identified patient or procedure risk factors   Diagnosis or treatment significantly limited by social determinants of health       75691  96831 High High  1or more chronic illnesses with severe exacerbation, progression, or side effects of treatment;    or  1 acute or chronic illness or injury that poses a threat to life or bodily function   Extensive  (Must meet the requirements of at least 2 out of 3 categories)  Category 1: Tests, documents, or independent historian(s)  Any combination of 3 from the following:   Review of prior external note(s) from each unique source*;  Review of the result(s) of each unique test*;   Ordering of each unique test*;   Assessment requiring an independent historian(s)    or   Category 2: Independent interpretation of tests   Independent interpretation of a test performed by another physician/other qualified health care professional (not separately reported);     or  Category 3: Discussion of management or test interpretation  Discussion of management or test interpretation with external physician/other qualified health care professional/appropriate source (not separately reported)   High risk of morbidity from additional diagnostic testing or treatment  Examples only:  Drug therapy requiring intensive monitoring for toxicity  Decision regarding elective major surgery with identified patient or procedure risk factors  Decision regarding emergency major surgery  Decision regarding hospitalization  Decision not to resuscitate or to de-escalate care because of poor prognosis             I have personally performed a face-to-face diagnostic evaluation and management  service on this patient. I have independently seen the patient. I have independently obtained the above history from the patient/family. I have independently examined the patient with above findings.   I have independently reviewed data/labs for this patient and developed the above plan of care (MDM). Signed: Wannetta Pallas.  Raphael Krause MD, FACS

## 2021-06-12 NOTE — PROGRESS NOTES
END OF SHIFT NOTE:    INTAKE/OUTPUT  06/11 0701 - 06/12 0700  In: 840 [P.O.:840]  Out: 1850 [Urine:1700; Drains:150]  Voiding: YES  Catheter: YES  Drain:              Flatus: Patient does have flatus present. Stool:  0 occurrences. Characteristics:  Stool Assessment  Stool Color: Brown  Stool Appearance:  (have not observed)  Stool Amount: Medium  Stool Source/Status: Rectum    Emesis: 0 occurrences. Characteristics:        VITAL SIGNS  Patient Vitals for the past 12 hrs:   Temp Pulse Resp BP SpO2   06/12/21 0254 97.8 °F (36.6 °C) 84 18 122/77 97 %   06/11/21 2321 98.5 °F (36.9 °C) 81 17 130/78 98 %   06/11/21 1927 98.2 °F (36.8 °C) 70 17 117/72 100 %       Pain Assessment  Pain Intensity 1: 4 (06/11/21 2233)  Pain Location 1: Flank  Pain Intervention(s) 1: Declines  Patient Stated Pain Goal: 3    Ambulating  Yes    Shift report given to oncoming nurse at the bedside.     Taylor Vasquez RN

## 2021-06-13 ENCOUNTER — APPOINTMENT (OUTPATIENT)
Dept: CT IMAGING | Age: 76
DRG: 264 | End: 2021-06-13
Attending: SURGERY
Payer: MEDICARE

## 2021-06-13 PROCEDURE — 71250 CT THORAX DX C-: CPT

## 2021-06-13 PROCEDURE — 65270000029 HC RM PRIVATE

## 2021-06-13 PROCEDURE — 74011250636 HC RX REV CODE- 250/636: Performed by: NURSE PRACTITIONER

## 2021-06-13 PROCEDURE — 74011250637 HC RX REV CODE- 250/637: Performed by: NURSE PRACTITIONER

## 2021-06-13 PROCEDURE — 99232 SBSQ HOSP IP/OBS MODERATE 35: CPT | Performed by: SURGERY

## 2021-06-13 PROCEDURE — 74011250637 HC RX REV CODE- 250/637: Performed by: SURGERY

## 2021-06-13 PROCEDURE — 74011250636 HC RX REV CODE- 250/636: Performed by: SURGERY

## 2021-06-13 PROCEDURE — 2709999900 HC NON-CHARGEABLE SUPPLY

## 2021-06-13 PROCEDURE — 65660000000 HC RM CCU STEPDOWN

## 2021-06-13 PROCEDURE — 74011000250 HC RX REV CODE- 250: Performed by: NURSE PRACTITIONER

## 2021-06-13 RX ADMIN — HYDROMORPHONE HYDROCHLORIDE 0.4 MG: 1 INJECTION, SOLUTION INTRAMUSCULAR; INTRAVENOUS; SUBCUTANEOUS at 14:49

## 2021-06-13 RX ADMIN — HYDROCODONE BITARTRATE AND ACETAMINOPHEN 1 TABLET: 5; 325 TABLET ORAL at 11:08

## 2021-06-13 RX ADMIN — Medication: at 21:22

## 2021-06-13 RX ADMIN — HYDROCODONE BITARTRATE AND ACETAMINOPHEN 1 TABLET: 5; 325 TABLET ORAL at 19:22

## 2021-06-13 RX ADMIN — Medication 1 AMPULE: at 21:22

## 2021-06-13 RX ADMIN — FAMOTIDINE 20 MG: 20 TABLET ORAL at 17:40

## 2021-06-13 RX ADMIN — Medication 1 AMPULE: at 17:41

## 2021-06-13 RX ADMIN — FAMOTIDINE 20 MG: 20 TABLET ORAL at 09:43

## 2021-06-13 RX ADMIN — LORATADINE 10 MG: 10 TABLET ORAL at 09:43

## 2021-06-13 RX ADMIN — Medication: at 06:00

## 2021-06-13 RX ADMIN — HYDROCODONE BITARTRATE AND ACETAMINOPHEN 1 TABLET: 5; 325 TABLET ORAL at 04:12

## 2021-06-13 RX ADMIN — Medication: at 17:42

## 2021-06-13 RX ADMIN — TAMSULOSIN HYDROCHLORIDE 0.4 MG: 0.4 CAPSULE ORAL at 09:43

## 2021-06-13 RX ADMIN — Medication 1 AMPULE: at 05:03

## 2021-06-13 RX ADMIN — SODIUM CHLORIDE 600 MG: 9 INJECTION, SOLUTION INTRAMUSCULAR; INTRAVENOUS; SUBCUTANEOUS at 17:43

## 2021-06-13 NOTE — PROGRESS NOTES
END OF SHIFT NOTE:    INTAKE/OUTPUT  06/12 0701 - 06/13 0700  In: 1120 [P.O.:1120]  Out: 2820 [PPYRK:8996; Drains:375]  Voiding: YES  Catheter: NO  Drain:      Flatus: Patient does have flatus present. Stool:  1 occurrences. Characteristics:  Stool Assessment  Stool Color: Brown  Stool Appearance:  (have not observed)  Stool Amount: Medium  Stool Source/Status: Rectum    Emesis: 0 occurrences. Characteristics:        VITAL SIGNS  Patient Vitals for the past 12 hrs:   Temp Pulse Resp BP SpO2   06/13/21 1614 98.3 °F (36.8 °C) 88 17 117/73 98 %   06/13/21 1600 -- 90 -- -- --   06/13/21 1200 -- (!) 110 -- -- --   06/13/21 1120 98.2 °F (36.8 °C) 90 17 119/74 97 %   06/13/21 0800 -- 90 -- -- --   06/13/21 0757 99 °F (37.2 °C) 84 17 100/61 95 %       Pain Assessment  Pain Intensity 1: 5 (06/13/21 1918)  Pain Location 1: Flank  Pain Intervention(s) 1: Medication (see MAR)  Patient Stated Pain Goal: 4    Ambulating  Yes; to chair and bathroom    Shift report given to oncoming nurse at the bedside.     Lucas Cuevas RN

## 2021-06-13 NOTE — PROGRESS NOTES
END OF SHIFT NOTE:    INTAKE/OUTPUT  06/12 0701 - 06/13 0700  In: 1120 [P.O.:1120]  Out: 2720 [Urine:2445; Drains:275]  Voiding: YES  Catheter: YES  Drain:              Flatus: Patient does have flatus present. Stool:  0 occurrences. Characteristics:  Stool Assessment  Stool Color: Brown  Stool Appearance:  (have not observed)  Stool Amount: Medium  Stool Source/Status: Rectum    Emesis: 0 occurrences. Characteristics:        VITAL SIGNS  Patient Vitals for the past 12 hrs:   Temp Pulse Resp BP SpO2   06/13/21 0231 98.2 °F (36.8 °C) 97 17 123/80 97 %   06/12/21 2352 99 °F (37.2 °C) 97 17 114/72 93 %   06/12/21 2319 -- 93 -- -- --   06/12/21 2153 -- (!) 111 -- -- --   06/12/21 1935 98.1 °F (36.7 °C) 90 17 107/73 99 %       Pain Assessment  Pain Intensity 1: 0 (06/13/21 0502)  Pain Location 1: Flank  Pain Intervention(s) 1: Medication (see MAR)  Patient Stated Pain Goal: 3    Ambulating  No    Shift report given to oncoming nurse at the bedside.     Yrn Bass RN

## 2021-06-13 NOTE — PROGRESS NOTES
H&P/Consult Note/Progress Note/Office Note:   Bradly Yee MRN: [id number]  :1945  Age:75 y.o.    HPI: Bradly Yee is a 77 y.o. male who was initially referred in consultation by Jennifer Wan PA-C with a left chest wall hematoma and cellulitis   after a fall at home on the floor approx 21. He sustained a blunt injury to his left chest wall. CXR below showed no PTx or rib fractures. He taked Humira for arthritis and is immune suppressed. We saw him for the 1st time on 21 with erythema of the left chest wall and placed him on Bactrim and marked the cellulitis border. He was instructed to call if any fevers or worsening erythema and given f/u 4 days later  He did not call. When he came to the office on 21 he reported high fevers over 101 degrees associate with nausea with his Bactrim. The erythema had extended >20cm  beyond the original inscribed lines of his left chest wall and extended down his left flank and in his left lower quadrant. We was admitted directly from office. He denies any new falls since 21. On 21 he also had a new hematoma involving his right flank which was not present on 21 (likely a second fall)      21 CXR, 2 views  Clear lungs. The cardiac and mediastinal contours and pulmonary vascularity are normal.   The bones and soft tissues are within normal limits. No evidence of a pneumothorax. IMPRESSION:  Normal chest.      21 CT chest/abd/pelvis with oral and IV contrast  Hx: MRSA cellulitis of left chest wall abscess and fever.     CHEST:   -LUNGS: No lobar pneumonia. Linear atelectasis left base. -AIRWAYS: Trachea and proximal bronchi grossly patent. -PLEURA: Small left pleural effusion. -LYMPH NODES: No enlarged axillary, hilar or mediastinal lymph nodes.   -HEART: Normal size.     -SKELETAL/CHEST WALL: Along left chest wall and body wall there is a complex,  peripherally enhancing fluid collection which measures approximately 32 cm  superior inferior, 17 cm wide and 4 cm thick. There are several bubbles of gas  within it. Larger bubbles of gas extend up into the axilla.     ABDOMEN/PELVIS:  -LIVER: Normal in size and appearance. -GALLBLADDER: No gallstones identified  -BILE DUCTS: Not dilated.     -PANCREAS: Normal.  -SPLEEN: Normal.     -ADRENALS: Normal.  -KIDNEYS/URETERS: Kidneys enhance symmetrically. There are multiple bilateral  parapelvic cysts versus bilateral UPJ obstructions. -BLADDER: Normal.  -REPRODUCTIVE ORGANS: Prostate and seminal vesicles unremarkable.     -BOWEL: Normal caliber. No inflammatory changes. Scattered diverticula. -LYMPH NODES: No significant retroperitoneal, mesenteric, or pelvic adenopathy. -BONES: Degenerative changes. No periostitis. -VASCULATURE: Aorta is normal caliber and mildly calcified. -OTHER: No ascites.     IMPRESSION  Large left abscess, 32 cm x 17 cm x 4 cm which extends from the axilla to the iliac crest.            Additional hx:  5/27/21 wound culture growing MRSA; Pharmacy dosing IV Vanc; trough tonight  5/28/21  Less Confused; no fever; WBC elevated but better;  on IV Abx  5/29/21  Resting in bed; no fever; WBC 12.3;  on IV Abx  5/30/21  Sitting on side of bed; no fever; WBC 13.8;  on IV Abx  5/31/21 AF; wbc still elevated; left hemithorax , left flank and LLQ edematous and indurated  6/1/21 continues IV Vanc; WBC higher at 12.2k   6/2/21 IV Vanc; CT as above with large abscess; I&D/debridment #1 in OR today  6/3/21 IV Vanc/Rocephin; OR culture pending  6/4/21 plan return to OR today for debridement #2; unless pt signs out AMA    6/5/21 POD 3/1; Pt resting in bed. No complaints voiced. Appears comfortable. Postop dressing c/d/i.   6/6/21 POD 4/2; Pt sitting on side of bed. No complaints voiced. Appears comfortable.  Dressing c/di  6/7/21 POD 5/3;  Comfortable; dressing dry; wound vac started today; IV Vanc; Likely SNF soon  6/8/21 POD 6/4 Feels OK; ID rec IV Vanc until 6/21/21; wound vac will be changed 3x/week; Vanc changed to IV Dapto by ID  6/9/21 POD 7/5; Cont IV Daptomycin; wound vac 3 times a week; consult PT; OOB daily  6/10/21 POD8/6 Cont IV Dapto; wound vac change 3 x a week; PT; OOB daily; feeling better  6/11/21 POD 9/7; Cont IV Dapto; wound vac changed today; PT following  6/12/21 POD10/8; Cont IV Dapto; wound vac q M/W/F; inpt PT  6/13/21 POD11/9; Cont IV Dapto; wound vac q M/W/F; CT today to reassess                  Past Medical History:   Diagnosis Date    Depression     H/O urinary frequency 2/1/2013    History of BPH 2/1/2013    HLD (hyperlipidemia) 2/1/2013    Hx: UTI (urinary tract infection) 2/1/2013    Insomnia     Psoriasis     Psoriatic arthritis ([facility] [address] 2/1/2013    Wears hearing aid 2/1/2013     No past surgical history on file.   Current Facility-Administered Medications   Medication Dose Route Frequency    HYDROmorphone (DILAUDID) injection 0.2-0.4 mg  0.2-0.4 mg IntraVENous Q3H PRN    DAPTOmycin (CUBICIN) 600 mg in 0.9% sodium chloride 12 mL IV Syringe  600 mg IntraVENous Q24H    loratadine (CLARITIN) tablet 10 mg  10 mg Oral DAILY    diphenhydrAMINE (BENADRYL) injection 25 mg  25 mg IntraVENous Q8H PRN    famotidine (PEPCID) tablet 20 mg  20 mg Oral BID    alcohol 62% (NOZIN) nasal  1 Ampule  1 Ampule Topical Q8H    zinc oxide-cod liver oil (DESITIN) 40 % paste   Topical Q8H    acetaminophen (TYLENOL) tablet 1,000 mg  1,000 mg Oral Q6H PRN    ondansetron (ZOFRAN) injection 4 mg  4 mg IntraVENous Q4H PRN    HYDROcodone-acetaminophen (NORCO) 5-325 mg per tablet 1 Tablet  1 Tablet Oral Q6H PRN    tamsulosin (FLOMAX) capsule 0.4 mg  0.4 mg Oral DAILY     Vancomycin  Social History     Socioeconomic History    Marital status: SINGLE     Spouse name: Not on file    Number of children: Not on file    Years of education: Not on file    Highest education level: Not on file   Tobacco Use    Smoking status: Former Smoker    Smokeless tobacco: Never Used    Tobacco comment: pt states quit in early 60's late 52's    Vaping Use    Vaping Use: Never used   Substance and Sexual Activity    Alcohol use: No     Alcohol/week: 0.0 standard drinks    Drug use: No    Sexual activity: Yes     Partners: Female     Social Determinants of Health     Financial Resource Strain:     Difficulty of Paying Living Expenses:    Food Insecurity:     Worried About Running Out of Food in the Last Year:     Ran Out of Food in the Last Year:    Transportation Needs:     Lack of Transportation (Medical):  Lack of Transportation (Non-Medical):    Physical Activity:     Days of Exercise per Week:     Minutes of Exercise per Session:    Stress:     Feeling of Stress :    Social Connections:     Frequency of Communication with Friends and Family:     Frequency of Social Gatherings with Friends and Family:     Attends [employer] Services:     Active Member of Clubs or Organizations:     Attends Club or Organization Meetings:     Marital Status:      Social History     Tobacco Use   Smoking Status Former Smoker   Smokeless Tobacco Never Used   Tobacco Comment    pt states quit in early 60's late 52's      Family History   Problem Relation Age of Onset    Heart Attack Father     Cancer Father         skin     Other Sister     Cancer Sister     Other Brother         melanoma     ROS: The patient has no difficulty with chest pain or shortness of breath. No fever or chills. Comprehensive review of systems was otherwise unremarkable except as noted above.     Physical Exam:   Visit Vitals  /61   Pulse 84   Temp 99 °F (37.2 °C)   Resp 17   Ht 5' 11\" (1.803 m)   Wt 158 lb (71.7 kg)   SpO2 95%   BMI 22.04 kg/m²     Vitals:    06/12/21 2319 06/12/21 2352 06/13/21 0231 06/13/21 0757   BP:  114/72 123/80 100/61   Pulse: 93 97 97 84   Resp:  17 17 17   Temp:  99 °F (37.2 °C) 98.2 °F (36.8 °C) 99 °F (37.2 °C)   SpO2:  93% 97% 95%   Weight: Height:         [unfilled]  [unfilled]    Constitutional: Alert, oriented, cooperative patient in no acute distress; appears stated age    Eyes:Sclera are clear. EOMs intact  ENMT: no external lesions; he is hard of hearing. no obvious neck masses, no ear or lip lesions, nares normal  CV: RRR. Normal perfusion      Large open incision from left axillae to left iliac crest   Good granulation tissue, no sign debris  No surrounding cellulitis        Right flank hematoma stable    Resp: No JVD. Breathing is  non-labored; no audible wheezing. GI: soft and non-distended     Musculoskeletal: unremarkable with normal function. No embolic signs or cyanosis. Neuro:  Oriented to person, place and time;  moves all 4; no focal deficits; slow moving and slow talking. Psychiatric: blunted affect, no memory impairment    Recent vitals (if inpt):  @IPVITALS(24:)@    Amount and/or Complexity of Data Reviewed and Analyzed:  I reviewed and analyzed all of the unique labs and radiologic studies that are shown below as well as any that are in the HPI, and any that are in the expanded problem list below  *Each unique test, order, or document contributes to the combination of 2 or combination of 3 in Category 1 below. For this visit I also reviewed old records and prior notes. No results for input(s): WBC, HGB, PLT, NA, K, CL, CO2, BUN, CREA, GLU, PTP, INR, APTT, TBIL, TBILI, CBIL, ALT, AP, AML, AML, LPSE, LCAD, NH4, TROPT, TROIQ, PCO2, PO2, HCO3, HGBEXT, PLTEXT, INREXT, HGBEXT, PLTEXT, INREXT in the last 72 hours.     No lab exists for component: SGOT, GPT,  PH  Review of most recent CBC  Lab Results   Component Value Date/Time    WBC 7.3 06/08/2021 09:34 AM    HGB 12.2 (L) 06/08/2021 09:34 AM    HCT 37.2 (L) 06/08/2021 09:34 AM    PLATELET 921 14/08/4800 09:34 AM    MCV 98.4 (H) 06/08/2021 09:34 AM       Review of most recent BMP  Lab Results   Component Value Date/Time    Sodium 137 (L) 06/08/2021 09:34 AM Potassium 4.0 06/08/2021 09:34 AM    Chloride 106 06/08/2021 09:34 AM    CO2 27 06/08/2021 09:34 AM    Anion gap 4 (L) 06/08/2021 09:34 AM    Glucose 174 (H) 06/08/2021 09:34 AM    BUN 11 06/08/2021 09:34 AM    Creatinine 0.83 06/08/2021 09:34 AM    BUN/Creatinine ratio 19 03/31/2016 11:26 AM    GFR est AA >60 06/08/2021 09:34 AM    GFR est non-AA >60 06/08/2021 09:34 AM    Calcium 8.3 06/08/2021 09:34 AM       Review of most recent LFTs (and lipase if done)  Lab Results   Component Value Date/Time    ALT (SGPT) 64 06/08/2021 09:34 AM    AST (SGOT) 27 06/08/2021 09:34 AM    Alk. phosphatase 89 06/08/2021 09:34 AM    Bilirubin, total 0.2 06/08/2021 09:34 AM     No results found for: LPSE    No results found for: INR, APTT, CBIL, LCAD, NH4, TROPT, TROIQ, INREXT, INREXT    Review of most recent HgbA1c  Lab Results   Component Value Date/Time    Hemoglobin A1c 4.9 06/05/2021 02:21 PM       Nutritional assessment screen for wound healing issues:  Lab Results   Component Value Date/Time    Protein, total 6.6 06/08/2021 09:34 AM    Albumin 2.4 (L) 06/08/2021 09:34 AM       @lastcovr@  XR Results (most recent):  Results from Hospital Encounter encounter on 05/20/21    XR CHEST PA LAT    Narrative  EXAM: XR CHEST PA LAT    INDICATION: Left chest wall hematoma with cellulitis rule out rib fracture or  pneumothorax after a fall at home on the floor. COMPARISON: None. FINDINGS: PA and lateral radiographs of the chest demonstrate clear lungs. The  cardiac and mediastinal contours and pulmonary vascularity are normal. The bones  and soft tissues are within normal limits. No evidence of a pneumothorax. Impression  Normal chest.      CT Results (most recent):  Results from Hospital Encounter encounter on 05/24/21    CT CHEST ABD PELV W CONT    Narrative  CT OF THE CHEST ABDOMEN AND PELVIS    INDICATION: MRSA cellulitis of left chest wall abscess and fever.     TECHNIQUE:  Multiple axial images were obtained through the chest, abdomen and  pelvis. Oral contrast was used for bowel opacification. 100mL of Isovue 370  intravenous contrast was used for better evaluation of solid organs and vascular  structures. Radiation dose reduction techniques were used for this study. All  CT scans performed at this facility use one or all of the following: Automated  exposure control, adjustment of the mA and/or kVp according to patient's size,  iterative reconstruction. COMPARISON: None    FINDINGS:  CHEST:  -LUNGS: No lobar pneumonia. Linear atelectasis left base. -AIRWAYS: Trachea and proximal bronchi grossly patent. -PLEURA: Small left pleural effusion. -LYMPH NODES: No enlarged axillary, hilar or mediastinal lymph nodes. -HEART: Normal size.    -SKELETAL/CHEST WALL: Long left chest wall and body wall there is a complex,  peripherally enhancing fluid collection which measures approximately 32 cm  superior inferior, 17 cm wide and 4 cm thick. There are several bubbles of gas  within it. Larger bubbles of gas extend up into the axilla. ABDOMEN/PELVIS:  -LIVER: Normal in size and appearance. -GALLBLADDER: No gallstones identified  -BILE DUCTS: Not dilated. -PANCREAS: Normal.  -SPLEEN: Normal.    -ADRENALS: Normal.  -KIDNEYS/URETERS: Kidneys enhance symmetrically. There are multiple bilateral  parapelvic cysts versus bilateral UPJ obstructions. -BLADDER: Normal.  -REPRODUCTIVE ORGANS: Prostate and seminal vesicles unremarkable. -BOWEL: Normal caliber. No inflammatory changes. Scattered diverticula. -LYMPH NODES: No significant retroperitoneal, mesenteric, or pelvic adenopathy. -BONES: Degenerative changes. No periostitis. -VASCULATURE: Aorta is normal caliber and mildly calcified. -OTHER: No ascites. Impression  Large left abscess, 32 cm x 17 cm x 4 cm which extends from the  axilla to the iliac crest.  689    US Results (most recent):  No results found for this or any previous visit.         Admission date (for inpatients): 5/24/2021   * No surgery found *  Procedure(s):  LEFT CHEST WALL AND FLANK WOUND DEBRIDEMENT        ASSESSMENT/PLAN:  Problem List  Date Reviewed: 5/24/2021        Codes Class Noted    Confusion ICD-10-CM: R41.0  ICD-9-CM: 298.9  5/27/2021        Sundowning ICD-10-CM: F05  ICD-9-CM: [place]  5/27/2021        Leukocytosis ICD-10-CM: I19.925  ICD-9-CM: 288.60  5/26/2021        Acute renal insufficiency ICD-10-CM: N28.9  ICD-9-CM: 593.9  5/25/2021        Fever ICD-10-CM: R50.9  ICD-9-CM: 780.60  5/24/2021        Suppression of immune system subtherapeutic (HCC) ICD-10-CM: D89.89  ICD-9-CM: 279.8  5/24/2021        Right flank hematoma ICD-10-CM: S30. 1XXA  ICD-9-CM: 922.2  5/24/2021        * (Principal) Chest wall abscess ICD-10-CM: L02.213  ICD-9-CM: 682.2  5/20/2021        Hematoma of left chest wall ICD-10-CM: B63.805J  ICD-9-CM: 922.1  5/20/2021        Cellulitis of chest wall ICD-10-CM: L03.313  ICD-9-CM: 682.2  5/20/2021        Fall ICD-10-CM: M62. [address]  ICD-9-CM: E888.9  5/20/2021        Psoriasis ICD-10-CM: L40.9  ICD-9-CM: 696.1  Unknown        Insomnia ICD-10-CM: G47.00  ICD-9-CM: 780.52  Unknown        Depression ICD-10-CM: F32.9  ICD-9-CM: 291  Unknown        Tachycardia ICD-10-CM: R00.0  ICD-9-CM: 785.0  6/13/2016        Murmur ICD-10-CM: R01.1  ICD-9-CM: 785.2  6/13/2016        Psoriatic arthritis ([facility] ICD-10-CM: L40.50  ICD-9-CM: 696.0  2/1/2013        Wears hearing aid ICD-10-CM: Z97.4  ICD-9-CM: V45.89  2/1/2013        HLD (hyperlipidemia) ICD-10-CM: E78.5  ICD-9-CM: 272.4  2/1/2013        H/O urinary frequency ICD-10-CM: I26.376  ICD-9-CM: V13.09  2/1/2013        History of BPH ICD-10-CM: E70.348  ICD-9-CM: V13.89  2/1/2013            Principal Problem:    Chest wall abscess (5/20/2021)    Active Problems:    Cellulitis of chest wall (5/20/2021)      Fall (5/20/2021)      Fever (5/24/2021)      Suppression of immune system subtherapeutic ([address] (5/24/2021)      Right flank hematoma (5/24/2021)      Acute renal insufficiency (5/25/2021)      Leukocytosis (5/26/2021)      Confusion (5/27/2021)      Sundowning (5/27/2021)           Number and Complexity of Problems addressed and   Risks of complications and/or morbidity of management        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 He went to OR for serial debridements  6/2/21 OR for debridement #1  6/4/21 OR for debridement/washout/dressing change #2    6/13/21 repeat CT today to reassess wound and rule out any undrained collections      Chronic immune suppression  Continue to hold his methotrexate, prednisone, and Humira to help reduce his immune suppression during this acute phase of infection. Dermatitis around left chest wall wound from moisture maceration  Desitin ordered to destinee-wound skin    Confusion/sundowning at night-->resolved  For many days he was uncooperative and pulling IV sites   Sitters were required  This has improving significantly  May no longer require sitter              Level of MDM (2/3 elements below)  Number and Complexity of Problems Addressed Amount and/or Complexity of Data to be Reviewed and Analyzed  *Each unique test, order, or document contributes to the combination of 2 or combination of 3 in Category 1 below.  Risk of Complications and/or Morbidity or Mortality of pt Management     32247  [id number] SF Minimal  1self-limited or minor problem Minimal or none Minimal risk of morbidity from additional diagnostic testing or Rx   61239  [id number] Low Low  2or more self-limited or minor problems;    or  1stable chronic illness;    or  3GOSDK, uncomplicated illness or injury   Limited  (Must meet the requirements of at least 1 of the 2 categories)  Category 1: Tests and documents   Any combination of 2 from the following:  Review of prior external note(s) from each unique source*;  review of the result(s) of each unique test*;   ordering of each unique test*    or   Category 2: Assessment requiring an independent historian(s)  (For the categories of independent interpretation of tests and discussion of management or test interpretation, see moderate or high) Low risk of morbidity from additional diagnostic testing or treatment     02837  83496 Mod Moderate  1or more chronic illnesses with exacerbation, progression, or side effects of treatment;    or  2or more stable chronic illnesses;    or  1undiagnosed new problem with uncertain prognosis;    or  1acute illness with systemic symptoms;    or  1[facility] complicated injury   Moderate  (Must meet the requirements of at least 1 out of 3 categories)  Category 1: Tests, documents, or independent historian(s)  Any combination of 3 from the following:   Review of prior external note(s) from each unique source*;  Review of the result(s) of each unique test*;  Ordering of each unique test*;  Assessment requiring an independent historian(s)    or  Category 2: Independent interpretation of tests   Independent interpretation of a test performed by another physician/other qualified health care professional (not separately reported);     or  Category 3: Discussion of management or test interpretation  Discussion of management or test interpretation with external physician/other qualified health care professional/appropriate source (not separately reported)   Moderate risk of morbidity from additional diagnostic testing or treatment  Examples only:  Prescription drug management   Decision regarding minor surgery with identified patient or procedure risk factors  Decision regarding elective major surgery without identified patient or procedure risk factors   Diagnosis or treatment significantly limited by social determinants of health       58757  68326 High High  1or more chronic illnesses with severe exacerbation, progression, or side effects of treatment;    or  1 acute or chronic illness or injury that poses a threat to life or bodily function   Extensive  (Must meet the requirements of at least 2 out of 3 categories)  Category 1: Tests, documents, or independent historian(s)  Any combination of 3 from the following:   Review of prior external note(s) from each unique source*;  Review of the result(s) of each unique test*;   Ordering of each unique test*;   Assessment requiring an independent historian(s)    or   Category 2: Independent interpretation of tests   Independent interpretation of a test performed by another physician/other qualified health care professional (not separately reported);     or  Category 3: Discussion of management or test interpretation  Discussion of management or test interpretation with external physician/other qualified health care professional/appropriate source (not separately reported)   High risk of morbidity from additional diagnostic testing or treatment  Examples only:  Drug therapy requiring intensive monitoring for toxicity  Decision regarding elective major surgery with identified patient or procedure risk factors  Decision regarding emergency major surgery  Decision regarding hospitalization  Decision not to resuscitate or to de-escalate care because of poor prognosis             I have personally performed a face-to-face diagnostic evaluation and management  service on this patient. I have independently seen the patient.    I have independently obtained the above history from the patient/family. I have independently examined the patient with above findings. I have independently reviewed data/labs for this patient and developed the above plan of care (MDM). Signed: Guilherme Murcia.  Carlos Rose MD, FACS

## 2021-06-14 PROCEDURE — 97606 NEG PRS WND THER DME>50 SQCM: CPT

## 2021-06-14 PROCEDURE — 77030019934 HC DRSG VAC ASST KCON -B

## 2021-06-14 PROCEDURE — 97605 NEG PRS WND THER DME<=50SQCM: CPT

## 2021-06-14 PROCEDURE — 65660000000 HC RM CCU STEPDOWN

## 2021-06-14 PROCEDURE — 74011000250 HC RX REV CODE- 250: Performed by: NURSE PRACTITIONER

## 2021-06-14 PROCEDURE — 2709999900 HC NON-CHARGEABLE SUPPLY

## 2021-06-14 PROCEDURE — 99232 SBSQ HOSP IP/OBS MODERATE 35: CPT | Performed by: SURGERY

## 2021-06-14 PROCEDURE — 97530 THERAPEUTIC ACTIVITIES: CPT

## 2021-06-14 PROCEDURE — 65270000029 HC RM PRIVATE

## 2021-06-14 PROCEDURE — 74011250636 HC RX REV CODE- 250/636: Performed by: NURSE PRACTITIONER

## 2021-06-14 PROCEDURE — 77030019952 HC CANSTR VAC ASST KCON -B

## 2021-06-14 PROCEDURE — 77030018717 HC DRSG GRNUFM KCON -B

## 2021-06-14 PROCEDURE — 74011250637 HC RX REV CODE- 250/637: Performed by: NURSE PRACTITIONER

## 2021-06-14 PROCEDURE — 74011250636 HC RX REV CODE- 250/636: Performed by: SURGERY

## 2021-06-14 PROCEDURE — 74011250637 HC RX REV CODE- 250/637: Performed by: SURGERY

## 2021-06-14 RX ADMIN — SODIUM CHLORIDE 600 MG: 9 INJECTION, SOLUTION INTRAMUSCULAR; INTRAVENOUS; SUBCUTANEOUS at 16:43

## 2021-06-14 RX ADMIN — HYDROCODONE BITARTRATE AND ACETAMINOPHEN 1 TABLET: 5; 325 TABLET ORAL at 20:36

## 2021-06-14 RX ADMIN — Medication 1 AMPULE: at 14:00

## 2021-06-14 RX ADMIN — Medication: at 22:11

## 2021-06-14 RX ADMIN — Medication 1 AMPULE: at 22:11

## 2021-06-14 RX ADMIN — LORATADINE 10 MG: 10 TABLET ORAL at 08:46

## 2021-06-14 RX ADMIN — HYDROCODONE BITARTRATE AND ACETAMINOPHEN 1 TABLET: 5; 325 TABLET ORAL at 02:29

## 2021-06-14 RX ADMIN — Medication 1 AMPULE: at 05:33

## 2021-06-14 RX ADMIN — HYDROMORPHONE HYDROCHLORIDE 0.4 MG: 1 INJECTION, SOLUTION INTRAMUSCULAR; INTRAVENOUS; SUBCUTANEOUS at 09:05

## 2021-06-14 RX ADMIN — TAMSULOSIN HYDROCHLORIDE 0.4 MG: 0.4 CAPSULE ORAL at 08:46

## 2021-06-14 RX ADMIN — Medication: at 14:05

## 2021-06-14 RX ADMIN — FAMOTIDINE 20 MG: 20 TABLET ORAL at 08:46

## 2021-06-14 RX ADMIN — HYDROCODONE BITARTRATE AND ACETAMINOPHEN 1 TABLET: 5; 325 TABLET ORAL at 14:03

## 2021-06-14 RX ADMIN — FAMOTIDINE 20 MG: 20 TABLET ORAL at 16:43

## 2021-06-14 RX ADMIN — Medication: at 06:00

## 2021-06-14 NOTE — PROGRESS NOTES
ACUTE PHYSICAL THERAPY GOALS:  (Developed with and agreed upon by patient and/or caregiver. )  LTG:  (1.)Mr. Melvin Mcleod will move from supine to sit and sit to supine , scoot up and down and roll side to side in bed with MODIFIED INDEPENDENCE within 7 treatment day(s). (2.)Mr. Melvin Mcleod will transfer from bed to chair and chair to bed with SUPERVISION using the least restrictive device within 7 treatment day(s). (3.)Mr. Melvin Mcleod will ambulate with SUPERVISION for 250 feet with the least restrictive device within 7 treatment day(s). (4.)Mr. Melvin Mcleod will perform seated and standing exercises for 15+ minutes to improve strength and mobility within 7 days. ________________________________________________________________________________________________      PHYSICAL THERAPY: Daily Note and PM Treatment Day # 3    Pao Hernandez is a 77 y.o. male   PRIMARY DIAGNOSIS: Chest wall abscess  Cellulitis of chest wall [L03.313]  Procedure(s) (LRB):  LEFT CHEST WALL AND FLANK WOUND DEBRIDEMENT (N/A)  10 Days Post-Op    ASSESSMENT:     REHAB RECOMMENDATIONS: CURRENT LEVEL OF FUNCTION:  (Most Recently Demonstrated)   Recommendation to date pending progress:  Setting:   Short-term Rehab  Equipment:    Rolling Walker Bed Mobility:   Modified Independent  Sit to Stand:  [employer] Assistance  Transfers:   Standby Assistance  Gait/Mobility:   Standby Assistance     ASSESSMENT:  Mr. Melvin Mcleod is supine in bed and agreeable to getting up. Bed mobility is modified independent. Sitting and standing balance is good. Gait training with rolling walker x 750 feet with slow but good karri. Patient is returned sitting edge of bed with needs within reach. Made really good progress today. Still needs rehab as the patient does live alone. Will continue PT efforts. SUBJECTIVE:   Mr. Melvin Mcleod states, \"You want me to walk? .\"    SOCIAL HISTORY/ LIVING ENVIRONMENT: see eval  Home Environment: Private residence  [facility] Residence: One story  Living Alone: No  Support Systems: Spouse/Significant Other/Partner  OBJECTIVE:     PAIN: VITAL SIGNS: LINES/DRAINS:   Pre Treatment: Pain Screen  Pain Scale 1: Numeric (0 - 10)  Pain Intensity 1: 0  Post Treatment: 0/10   Wound Vac  O2 Device: None (Room air)     MOBILITY: I Mod I S SBA CGA Min Mod Max Total  NT x2 Comments:   Bed Mobility    Rolling [] [x] [] [] [] [] [] [] [] [] []    Supine to Sit [] [x] [] [] [] [] [] [] [] [] []    Scooting [] [x] [] [] [] [] [] [] [] [] []    Sit to Supine [] [x] [] [] [] [] [] [] [] [] []    Transfers    Sit to Stand [] [] [] [x] [] [] [] [] [] [] []    Bed to Chair [] [] [] [] [] [] [] [] [] [x] []    Stand to Sit [] [] [] [x] [] [] [] [] [] [] []    I=Independent, Mod I=Modified Independent, S=Supervision, SBA=Standby Assistance, CGA=Contact Guard Assistance,   Min=Minimal Assistance, Mod=Moderate Assistance, Max=Maximal Assistance, Total=Total Assistance, NT=Not Tested    BALANCE: Good Fair+ Fair Fair- Poor NT Comments   Sitting Static [x] [] [] [] [] []    Sitting Dynamic [x] [] [] [] [] []              Standing Static [x] [] [] [] [] []    Standing Dynamic [] [x] [] [] [] []      GAIT: I Mod I S SBA CGA Min Mod Max Total  NT x2 Comments:   Level of Assistance [] [] [] [x] [] [] [] [] [] [] []    Distance 750 feet     DME Rolling Walker    Gait Quality slow    Weightbearing  Status N/A     I=Independent, Mod I=Modified Independent, S=Supervision, SBA=Standby Assistance, CGA=Contact Guard Assistance,   Min=Minimal Assistance, Mod=Moderate Assistance, Max=Maximal Assistance, Total=Total Assistance, NT=Not Tested    PLAN:   FREQUENCY/DURATION: PT Plan of Care: 3 times/week for duration of hospital stay or until stated goals are met, whichever comes first.  TREATMENT:     TREATMENT:   ($$ Therapeutic Activity: 23-37 mins    )  Therapeutic Activity (24 Minutes):  Therapeutic activity included Rolling, Supine to Sit, Sit to Supine, Scooting, Transfer Training, Ambulation on level ground, Sitting balance  and Standing balance to improve functional Mobility, Strength and Activity tolerance.     TREATMENT GRID:  N/A    AFTER TREATMENT POSITION/PRECAUTIONS:  Bed, Needs within reach and RN notified    INTERDISCIPLINARY COLLABORATION:  RN/PCT and PT/PTA    TOTAL TREATMENT DURATION:  PT Patient Time In/Time Out  Time In: 1456  Time Out: [address] [facility], PTA

## 2021-06-14 NOTE — PROGRESS NOTES
END OF SHIFT NOTE:    INTAKE/OUTPUT  06/13 0701 - 06/14 0700  In: 1080 [P.O.:1080]  Out: 2200 [Urine:2075; Drains:125]  Voiding: YES  Catheter: NO  Drain:              Flatus: Patient does have flatus present. Stool:  0 occurrences. Characteristics:  Stool Assessment  Stool Color: Brown  Stool Appearance:  (have not observed)  Stool Amount: Medium  Stool Source/Status: Rectum    Emesis: 0 occurrences. Characteristics:        VITAL SIGNS  Patient Vitals for the past 12 hrs:   Temp Pulse Resp BP SpO2   06/14/21 1520 97.7 °F (36.5 °C) (!) 106 19 104/69 97 %   06/14/21 1047 98 °F (36.7 °C) 88 17 109/72 98 %   06/14/21 0716 99 °F (37.2 °C) 89 17 124/78 96 %       Pain Assessment  Pain Intensity 1: 0 (06/14/21 1520)  Pain Location 1: Flank  Pain Intervention(s) 1: Rest  Patient Stated Pain Goal: 3    Ambulating  Yes    Shift report given to oncoming nurse at the bedside.     Nory Huizar

## 2021-06-14 NOTE — PROGRESS NOTES
Patient had rash on torso last week that has cleared up with iv abx being changed from vancomycin to daptomycin per ID recommendations. Patient now has rash on bilateral lower extremities. RN notified ID via perfect serve. No new orders at this time.

## 2021-06-14 NOTE — PROGRESS NOTES
H&P/Consult Note/Progress Note/Office Note:   Betsey Boas. MRN: [id number]  :1945  Age:75 y.o.    HPI: Betsey Boas. is a 77 y.o. male who was initially referred in consultation by Em Herbert PA-C with a left chest wall hematoma and cellulitis   after a fall at home on the floor approx 21. He sustained a blunt injury to his left chest wall. CXR below showed no PTx or rib fractures. He taked Humira for arthritis and is immune suppressed. We saw him for the 1st time on 21 with erythema of the left chest wall and placed him on Bactrim and marked the cellulitis border. He was instructed to call if any fevers or worsening erythema and given f/u 4 days later  He did not call. When he came to the office on 21 he reported high fevers over 101 degrees associate with nausea with his Bactrim. The erythema had extended >20cm  beyond the original inscribed lines of his left chest wall and extended down his left flank and in his left lower quadrant. We was admitted directly from office. He denies any new falls since 21. On 21 he also had a new hematoma involving his right flank which was not present on 21 (likely a second fall)      21 CXR, 2 views  Clear lungs. The cardiac and mediastinal contours and pulmonary vascularity are normal.   The bones and soft tissues are within normal limits. No evidence of a pneumothorax. IMPRESSION:  Normal chest.      21 CT chest/abd/pelvis with oral and IV contrast  Hx: MRSA cellulitis of left chest wall abscess and fever.     CHEST:   -LUNGS: No lobar pneumonia. Linear atelectasis left base. -AIRWAYS: Trachea and proximal bronchi grossly patent. -PLEURA: Small left pleural effusion. -LYMPH NODES: No enlarged axillary, hilar or mediastinal lymph nodes.   -HEART: Normal size.     -SKELETAL/CHEST WALL: Along left chest wall and body wall there is a complex,  peripherally enhancing fluid collection which measures approximately 32 cm  superior inferior, 17 cm wide and 4 cm thick. There are several bubbles of gas  within it. Larger bubbles of gas extend up into the axilla.     ABDOMEN/PELVIS:  -LIVER: Normal in size and appearance. -GALLBLADDER: No gallstones identified  -BILE DUCTS: Not dilated.     -PANCREAS: Normal.  -SPLEEN: Normal.     -ADRENALS: Normal.  -KIDNEYS/URETERS: Kidneys enhance symmetrically. There are multiple bilateral  parapelvic cysts versus bilateral UPJ obstructions. -BLADDER: Normal.  -REPRODUCTIVE ORGANS: Prostate and seminal vesicles unremarkable.     -BOWEL: Normal caliber. No inflammatory changes. Scattered diverticula. -LYMPH NODES: No significant retroperitoneal, mesenteric, or pelvic adenopathy. -BONES: Degenerative changes. No periostitis. -VASCULATURE: Aorta is normal caliber and mildly calcified. -OTHER: No ascites.     IMPRESSION  Large left abscess, 32 cm x 17 cm x 4 cm which extends from the axilla to the iliac crest.        6/13/21 CT chest/abd/pelvis without contrast  CT chest: There has been interval debridement of a large left axillary/left  flank fluid collection with a large open soft tissue defect, but no residual  drainable fluid collection throughout the majority of the previously seen  abnormality. There is a small air and fluid collection seen just anterior to the  subclavian vessels on the left. There is no pneumothorax. No adenopathy. CT ABDOMEN: No contour deforming abnormality of the liver or spleen. No change in the appearance of the kidneys, adrenal glands, pancreas, or gallbladder.     CT PELVIS: Air seen nondependently in the bladder. The rectum is normal. No pelvic adenopathy.     Bone window evaluation demonstrates no aggressive osseous lesions.     IMPRESSION  Large soft tissue defect in the previously seen area of abscess. Though there is  some fat stranding present, no large drainable fluid collection is present.   There is a small air-fluid collection seen just anterior to the left subclavian vessels which persists           Additional hx:  5/27/21 wound culture growing MRSA; Pharmacy dosing IV Vanc; trough tonight  5/28/21  Less Confused; no fever; WBC elevated but better;  on IV Abx  5/29/21  Resting in bed; no fever; WBC 12.3;  on IV Abx  5/30/21  Sitting on side of bed; no fever; WBC 13.8;  on IV Abx  5/31/21 AF; wbc still elevated; left hemithorax , left flank and LLQ edematous and indurated  6/1/21 continues IV Vanc; WBC higher at 12.2k   6/2/21 IV Vanc; CT as above with large abscess; I&D/debridment #1 in OR today  6/3/21 IV Vanc/Rocephin; OR culture pending  6/4/21 plan return to OR today for debridement #2; unless pt signs out AMA    6/5/21 POD 3/1; Pt resting in bed. No complaints voiced. Appears comfortable. Postop dressing c/d/i.   6/6/21 POD 4/2; Pt sitting on side of bed. No complaints voiced. Appears comfortable. Dressing c/di  6/7/21 POD 5/3;  Comfortable; dressing dry; wound vac started today; IV Vanc; Likely SNF soon  6/8/21 POD 6/4 Feels OK; ID rec IV Vanc until 6/21/21; wound vac will be changed 3x/week; Vanc changed to IV Dapto by ID  6/9/21 POD 7/5; Cont IV Daptomycin; wound vac 3 times a week; consult PT; OOB daily  6/10/21 POD8/6 Cont IV Dapto; wound vac change 3 x a week; PT; OOB daily; feeling better  6/11/21 POD 9/7; Cont IV Dapto; wound vac changed today; PT following  6/12/21 POD10/8; Cont IV Dapto; wound vac q M/W/F; inpt PT  6/13/21 POD11/9; Cont IV Dapto; wound vac q M/W/F; CT today to reassess  6/14/21 POD12/10 Cont IV Dapto; CT as above; looks well drained.  Collection anterior to subclavian looks close enough to decompress into axillary wound                Past Medical History:   Diagnosis Date    Depression     H/O urinary frequency 2/1/2013    History of BPH 2/1/2013    HLD (hyperlipidemia) 2/1/2013    Hx: UTI (urinary tract infection) 2/1/2013    Insomnia     Psoriasis     Psoriatic arthritis ([facility] [address] 2/1/2013  Wears hearing aid 2/1/2013     No past surgical history on file. Current Facility-Administered Medications   Medication Dose Route Frequency    HYDROmorphone (DILAUDID) injection 0.2-0.4 mg  0.2-0.4 mg IntraVENous Q3H PRN    DAPTOmycin (CUBICIN) 600 mg in 0.9% sodium chloride 12 mL IV Syringe  600 mg IntraVENous Q24H    loratadine (CLARITIN) tablet 10 mg  10 mg Oral DAILY    diphenhydrAMINE (BENADRYL) injection 25 mg  25 mg IntraVENous Q8H PRN    famotidine (PEPCID) tablet 20 mg  20 mg Oral BID    alcohol 62% (NOZIN) nasal  1 Ampule  1 Ampule Topical Q8H    zinc oxide-cod liver oil (DESITIN) 40 % paste   Topical Q8H    acetaminophen (TYLENOL) tablet 1,000 mg  1,000 mg Oral Q6H PRN    ondansetron (ZOFRAN) injection 4 mg  4 mg IntraVENous Q4H PRN    HYDROcodone-acetaminophen (NORCO) 5-325 mg per tablet 1 Tablet  1 Tablet Oral Q6H PRN    tamsulosin (FLOMAX) capsule 0.4 mg  0.4 mg Oral DAILY     Vancomycin  Social History     Socioeconomic History    Marital status: SINGLE     Spouse name: Not on file    Number of children: Not on file    Years of education: Not on file    Highest education level: Not on file   Tobacco Use    Smoking status: Former Smoker    Smokeless tobacco: Never Used    Tobacco comment: pt states quit in early 60's late 52's    Vaping Use    Vaping Use: Never used   Substance and Sexual Activity    Alcohol use: No     Alcohol/week: 0.0 standard drinks    Drug use: No    Sexual activity: Yes     Partners: Female     Social Determinants of Health     Financial Resource Strain:     Difficulty of Paying Living Expenses:    Food Insecurity:     Worried About Running Out of Food in the Last Year:     Ran Out of Food in the Last Year:    Transportation Needs:     Lack of Transportation (Medical):      Lack of Transportation (Non-Medical):    Physical Activity:     Days of Exercise per Week:     Minutes of Exercise per Session:    Stress:     Feeling of Stress : Social Connections:     Frequency of Communication with Friends and Family:     Frequency of Social Gatherings with Friends and Family:     Attends [employer] Services:     Active Member of Clubs or Organizations:     Attends Club or Organization Meetings:     Marital Status:      Social History     Tobacco Use   Smoking Status Former Smoker   Smokeless Tobacco Never Used   Tobacco Comment    pt states quit in early 60's late 52's      Family History   Problem Relation Age of Onset    Heart Attack Father     Cancer Father         skin     Other Sister     Cancer Sister     Other Brother         melanoma     ROS: The patient has no difficulty with chest pain or shortness of breath. No fever or chills. Comprehensive review of systems was otherwise unremarkable except as noted above. Physical Exam:   Visit Vitals  /78   Pulse 89   Temp 99 °F (37.2 °C)   Resp 17   Ht 5' 11\" (1.803 m)   Wt 158 lb (71.7 kg)   SpO2 96%   BMI 22.04 kg/m²     Vitals:    06/13/21 2021 06/13/21 2349 06/14/21 0302 06/14/21 0716   BP:  120/69 119/73 124/78   Pulse: 85 88 82 89   Resp:  17 17 17   Temp:  98.2 °F (36.8 °C) 98.3 °F (36.8 °C) 99 °F (37.2 °C)   SpO2:  94% 97% 96%   Weight:       Height:         [unfilled]  [unfilled]    Constitutional: Alert, oriented, cooperative patient in no acute distress; appears stated age    Eyes:Sclera are clear. EOMs intact  ENMT: no external lesions; he is hard of hearing. no obvious neck masses, no ear or lip lesions, nares normal  CV: RRR. Normal perfusion      Large open incision from left axillae to left iliac crest   Good granulation tissue, no sign debris  No surrounding cellulitis    Right flank hematoma stable    Resp: No JVD. Breathing is  non-labored; no audible wheezing. GI: soft and non-distended     Musculoskeletal: unremarkable with normal function. No embolic signs or cyanosis.    Neuro:  Oriented to person, place and time;  moves all 4; no focal deficits; slow moving and slow talking. Psychiatric: blunted affect, no memory impairment    Recent vitals (if inpt):  @IPVITALS(24:)@    Amount and/or Complexity of Data Reviewed and Analyzed:  I reviewed and analyzed all of the unique labs and radiologic studies that are shown below as well as any that are in the HPI, and any that are in the expanded problem list below  *Each unique test, order, or document contributes to the combination of 2 or combination of 3 in Category 1 below. For this visit I also reviewed old records and prior notes. No results for input(s): WBC, HGB, PLT, NA, K, CL, CO2, BUN, CREA, GLU, PTP, INR, APTT, TBIL, TBILI, CBIL, ALT, AP, AML, AML, LPSE, LCAD, NH4, TROPT, TROIQ, PCO2, PO2, HCO3, HGBEXT, PLTEXT, INREXT, HGBEXT, PLTEXT, INREXT in the last 72 hours. No lab exists for component: SGOT, GPT,  PH  Review of most recent CBC  Lab Results   Component Value Date/Time    WBC 7.3 06/08/2021 09:34 AM    HGB 12.2 (L) 06/08/2021 09:34 AM    HCT 37.2 (L) 06/08/2021 09:34 AM    PLATELET 688 68/00/6802 09:34 AM    MCV 98.4 (H) 06/08/2021 09:34 AM       Review of most recent BMP  Lab Results   Component Value Date/Time    Sodium 137 (L) 06/08/2021 09:34 AM    Potassium 4.0 06/08/2021 09:34 AM    Chloride 106 06/08/2021 09:34 AM    CO2 27 06/08/2021 09:34 AM    Anion gap 4 (L) 06/08/2021 09:34 AM    Glucose 174 (H) 06/08/2021 09:34 AM    BUN 11 06/08/2021 09:34 AM    Creatinine 0.83 06/08/2021 09:34 AM    BUN/Creatinine ratio 19 03/31/2016 11:26 AM    GFR est AA >60 06/08/2021 09:34 AM    GFR est non-AA >60 06/08/2021 09:34 AM    Calcium 8.3 06/08/2021 09:34 AM       Review of most recent LFTs (and lipase if done)  Lab Results   Component Value Date/Time    ALT (SGPT) 64 06/08/2021 09:34 AM    AST (SGOT) 27 06/08/2021 09:34 AM    Alk.  phosphatase 89 06/08/2021 09:34 AM    Bilirubin, total 0.2 06/08/2021 09:34 AM     No results found for: LPSE    No results found for: INR, APTT, CBIL, LCAD, NH4, TROPT, BARBRA Lujan    Review of most recent HgbA1c  Lab Results   Component Value Date/Time    Hemoglobin A1c 4.9 06/05/2021 02:21 PM       Nutritional assessment screen for wound healing issues:  Lab Results   Component Value Date/Time    Protein, total 6.6 06/08/2021 09:34 AM    Albumin 2.4 (L) 06/08/2021 09:34 AM       @lastcovr@  XR Results (most recent):  Results from [place] encounter on 05/20/21    XR CHEST PA LAT    Narrative  EXAM: XR CHEST PA LAT    INDICATION: Left chest wall hematoma with cellulitis rule out rib fracture or  pneumothorax after a fall at home on the floor. COMPARISON: None. FINDINGS: PA and lateral radiographs of the chest demonstrate clear lungs. The  cardiac and mediastinal contours and pulmonary vascularity are normal. The bones  and soft tissues are within normal limits. No evidence of a pneumothorax. Impression  Normal chest.      CT Results (most recent):  Results from Hospital Encounter encounter on 05/24/21    CT CHEST ABD PELV WO CONT    Narrative  History: Large open wound left axilla with left chest, flank, and iliac pain. Multiple debridements. EXAM: CT chest, abdomen, and pelvis without contrast    TECHNIQUE: Thin section axial CT images are obtained from the thoracic inlet  through the pubic symphysis. Radiation dose reduction techniques were used for  this study. Our CT scanners use one or all of the following: Automated exposure  control, adjustment of the mA and/or kV according to patient size, use of  iterative reconstruction. COMPARISON: 6/2/2021    FINDINGS:    CT CHEST: There has been interval debridement of a large left axillary/left  flank fluid collection with a large open soft tissue defect, but no residual  drainable fluid collection throughout the majority of the previously seen  abnormality. There is a small air and fluid collection seen just anterior to the  subclavian vessels on the left. There is no pneumothorax.  No adenopathy. CT ABDOMEN: No contour deforming abnormality of the liver or spleen. No change  in the appearance of the kidneys, adrenal glands, pancreas, or gallbladder. CT PELVIS: Air seen nondependently in the bladder. The rectum is normal. No  pelvic adenopathy. Bone window evaluation demonstrates no aggressive osseous lesions. Impression  Large soft tissue defect in the previously seen area of abscess. Though there is  some fat stranding present, no large drainable fluid collection is present. There is a small air-fluid collection seen just anterior to the left subclavian  vessels which persists. US Results (most recent):  No results found for this or any previous visit. Admission date (for inpatients): 5/24/2021   * No surgery found *  Procedure(s):  LEFT CHEST WALL AND FLANK WOUND DEBRIDEMENT        ASSESSMENT/PLAN:  Problem List  Date Reviewed: 5/24/2021        Codes Class Noted    Confusion ICD-10-CM: R41.0  ICD-9-CM: 298.9  5/27/2021        Sundowning ICD-10-CM: F05  ICD-9-CM: Hal Samuel  5/27/2021        Leukocytosis ICD-10-CM: Y42.918  ICD-9-CM: 288.60  5/26/2021        Acute renal insufficiency ICD-10-CM: N28.9  ICD-9-CM: 593.9  5/25/2021        Fever ICD-10-CM: R50.9  ICD-9-CM: 780.60  5/24/2021        Suppression of immune system subtherapeutic (HCC) ICD-10-CM: D89.89  ICD-9-CM: 279.8  5/24/2021        Right flank hematoma ICD-10-CM: S30. 1XXA  ICD-9-CM: 922.2  5/24/2021        * (Principal) Chest wall abscess ICD-10-CM: L02.213  ICD-9-CM: 682.2  5/20/2021        Hematoma of left chest wall ICD-10-CM: B31.653G  ICD-9-CM: 922.1  5/20/2021        Cellulitis of chest wall ICD-10-CM: L03.313  ICD-9-CM: 682.2  5/20/2021        Fall ICD-10-CM: Y76. Amanda Kraus  ICD-9-CM: I607.5  5/20/2021        Psoriasis ICD-10-CM: L40.9  ICD-9-CM: 696.1  Unknown        Insomnia ICD-10-CM: G47.00  ICD-9-CM: 780.52  Unknown        Depression ICD-10-CM: F32.9  ICD-9-CM: 311  Unknown        Tachycardia ICD-10-CM: R00.0  ICD-9-CM: 785.0  6/13/2016        Murmur ICD-10-CM: R01.1  ICD-9-CM: 785.2  6/13/2016        Psoriatic arthritis ([facility] [address] ICD-10-CM: L40.50  ICD-9-CM: 696.0  2/1/2013        Wears hearing aid ICD-10-CM: Z97.4  ICD-9-CM: V45.89  2/1/2013        HLD (hyperlipidemia) ICD-10-CM: E78.5  ICD-9-CM: 272.4  2/1/2013        H/O urinary frequency ICD-10-CM: Z87.898  ICD-9-CM: V13.09  2/1/2013        History of BPH ICD-10-CM: Z87.438  ICD-9-CM: V13.89  2/1/2013            Principal Problem:    Chest wall abscess (5/20/2021)    Active Problems:    Cellulitis of chest wall (5/20/2021)      Fall (5/20/2021)      Fever (5/24/2021)      Suppression of immune system subtherapeutic ([facility] [address] (5/24/2021)      Right flank hematoma (5/24/2021)      Acute renal insufficiency (5/25/2021)      Leukocytosis (5/26/2021)      Confusion (5/27/2021)      Sundowning (5/27/2021)           Number and Complexity of Problems addressed and   Risks of complications and/or morbidity of management        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     He went to OR for serial debridements  6/2/21 OR for debridement #1  6/4/21 OR for debridement/washout/dressing change #2    6/13/21 repeat CT as above looks well drained but there is a gas collection with no fluid anterior to subclavian vessels adjacent to the axillary wound  May need repeat CT at some point to follow-up on this area  I spent 20min doing a peer to peer review today for SNF placement which was approved after he completes IV Dapto if pt needs SNF at that time        Chronic immune suppression  Continue to hold his methotrexate, prednisone, and Humira to help reduce his immune suppression during this acute phase of infection. Dermatitis around left chest wall wound from moisture maceration  Desitin ordered to destinee-wound skin    Confusion/sundowning at night-->resolved  For many days he was uncooperative and pulling IV sites   Sitters were required  This has improving significantly  May no longer require sitter              Level of MDM (2/3 elements below)  Number and Complexity of Problems Addressed Amount and/or Complexity of Data to be Reviewed and Analyzed  *Each unique test, order, or document contributes to the combination of 2 or combination of 3 in Category 1 below.  Risk of Complications and/or Morbidity or Mortality of pt Management     95433  63355 SF Minimal  1self-limited or minor problem Minimal or none Minimal risk of morbidity from additional diagnostic testing or Rx   27079  49155 Low Low  2or more self-limited or minor problems;    or  1stable chronic illness;    or  9IQIGK, uncomplicated illness or injury   Limited  (Must meet the requirements of at least 1 of the 2 categories)  Category 1: Tests and documents   Any combination of 2 from the following:  Review of prior external note(s) from each unique source*;  review of the result(s) of each unique test*;   ordering of each unique test*    or   Category 2: Assessment requiring an independent historian(s)  (For the categories of independent interpretation of tests and discussion of management or test interpretation, see moderate or high) Low risk of morbidity from additional diagnostic testing or treatment     31981  42633 Mod Moderate  1or more chronic illnesses with exacerbation, progression, or side effects of treatment;    or  2or more stable chronic illnesses;    or  1undiagnosed new problem with uncertain prognosis;    or  1acute illness with systemic symptoms;    or  8IAPDQ complicated injury   Moderate  (Must meet the requirements of at least 1 out of 3 categories)  Category 1: Tests, documents, or independent historian(s)  Any combination of 3 from the following:   Review of prior external note(s) from each unique source*;  Review of the result(s) of each unique test*;  Ordering of each unique test*;  Assessment requiring an independent historian(s)    or  Category 2: Independent interpretation of tests   Independent interpretation of a test performed by another physician/other qualified health care professional (not separately reported);     or  Category 3: Discussion of management or test interpretation  Discussion of management or test interpretation with external physician/other qualified health care professional/appropriate source (not separately reported)   Moderate risk of morbidity from additional diagnostic testing or treatment  Examples only:  Prescription drug management   Decision regarding minor surgery with identified patient or procedure risk factors  Decision regarding elective major surgery without identified patient or procedure risk factors   Diagnosis or treatment significantly limited by social determinants of health       91630  71124 High High  1or more chronic illnesses with severe exacerbation, progression, or side effects of treatment;    or  1 acute or chronic illness or injury that poses a threat to life or bodily function   Extensive  (Must meet the requirements of at least 2 out of 3 categories)  Category 1: Tests, documents, or independent historian(s)  Any combination of 3 from the following:   Review of prior external note(s) from each unique source*;  Review of the result(s) of each unique test*;   Ordering of each unique test*;   Assessment requiring an independent historian(s)    or   Category 2: Independent interpretation of tests Independent interpretation of a test performed by another physician/other qualified health care professional (not separately reported);     or  Category 3: Discussion of management or test interpretation  Discussion of management or test interpretation with external physician/other qualified health care professional/appropriate source (not separately reported)   High risk of morbidity from additional diagnostic testing or treatment  Examples only:  Drug therapy requiring intensive monitoring for toxicity  Decision regarding elective major surgery with identified patient or procedure risk factors  Decision regarding emergency major surgery  Decision regarding hospitalization  Decision not to resuscitate or to de-escalate care because of poor prognosis             I have personally performed a face-to-face diagnostic evaluation and management  service on this patient. I have independently seen the patient. I have independently obtained the above history from the patient/family. I have independently examined the patient with above findings. I have independently reviewed data/labs for this patient and developed the above plan of care (MDM). Signed: Drake Robles.  Ivana Waed MD, FACS

## 2021-06-14 NOTE — PROGRESS NOTES
END OF SHIFT NOTE:    INTAKE/OUTPUT  06/13 0701 - 06/14 0700  In: 1080 [P.O.:1080]  Out: 2200 [Urine:2075; Drains:125]  Voiding: YES  Catheter: NO  Drain:              Flatus: Patient does have flatus present. Stool:  0 occurrences. Characteristics:  Stool Assessment  Stool Color: Brown  Stool Appearance:  (have not observed)  Stool Amount: Medium  Stool Source/Status: Rectum    Emesis: 0 occurrences. Characteristics:        VITAL SIGNS  Patient Vitals for the past 12 hrs:   Temp Pulse Resp BP SpO2   06/14/21 0302 98.3 °F (36.8 °C) 82 17 119/73 97 %   06/13/21 2349 98.2 °F (36.8 °C) 88 17 120/69 94 %   06/13/21 2021 -- 85 -- -- --   06/13/21 1903 99.5 °F (37.5 °C) (!) 111 17 112/74 97 %       Pain Assessment  Pain Intensity 1: 0 (06/14/21 0325)  Pain Location 1: Flank  Pain Intervention(s) 1: Medication (see MAR)  Patient Stated Pain Goal: 3    Ambulating  No    Shift report given to oncoming nurse at the bedside.     Avinash Perez RN

## 2021-06-14 NOTE — WOUND CARE
Patient seen for wound VAC dressing change. Talked to surgeon, he saw wound yesterday. Saline soak placed by him prior to patient CT scan. New VAC dressings placed to the 2 sites, bridged. Continued suction at 125 mmHg. Patient stated he tolerated better today than last week. Was premedicated by primary nurse prior to new dressing placement. Wounds measured today: lateral chest 24.2x 11x 4.5 cm with undermining 8.8 cm from 1-6 o'clock. Axilla 1.2x9.7x1.6 cm. Clean and viable bases.

## 2021-06-14 NOTE — PROGRESS NOTES
Chart screened by  for discharge planning. Patient has bed offer at [facility] for STR. Patient will need a wound vac and is requiring Daptomycin until 6/18/2021. If the facility can obtain the outlier from the insurance company for the antibiotic he can go if not he will remain hospitalized until he no longer needs the Daptomycin. Dr. Tia Luna spoke with the insurance company this morning for the Peer to Peer. According to Dr. Tia Luna patient has been approved for STR. CM will wait to hear from the insurance company for final approval. CM will continue to follow patient during hospitalization for discharge planning and needs. Please consult  if any new issues arise.

## 2021-06-15 PROBLEM — I47.1 SVT (SUPRAVENTRICULAR TACHYCARDIA) (HCC): Status: ACTIVE | Noted: 2021-06-15

## 2021-06-15 LAB
ALBUMIN SERPL-MCNC: 2.5 G/DL (ref 3.2–4.6)
ALBUMIN/GLOB SERPL: 0.5 {RATIO} (ref 1.2–3.5)
ALP SERPL-CCNC: 100 U/L (ref 50–136)
ALT SERPL-CCNC: 41 U/L (ref 12–65)
ANION GAP SERPL CALC-SCNC: 4 MMOL/L (ref 7–16)
AST SERPL-CCNC: 23 U/L (ref 15–37)
ATRIAL RATE: 111 BPM
BASOPHILS # BLD: 0.1 K/UL (ref 0–0.2)
BASOPHILS NFR BLD: 1 % (ref 0–2)
BILIRUB SERPL-MCNC: 0.2 MG/DL (ref 0.2–1.1)
BUN SERPL-MCNC: 13 MG/DL (ref 8–23)
CALCIUM SERPL-MCNC: 8.3 MG/DL (ref 8.3–10.4)
CALCULATED P AXIS, ECG09: 56 DEGREES
CALCULATED R AXIS, ECG10: 62 DEGREES
CALCULATED T AXIS, ECG11: 62 DEGREES
CHLORIDE SERPL-SCNC: 105 MMOL/L (ref 98–107)
CK SERPL-CCNC: 27 U/L (ref 21–215)
CO2 SERPL-SCNC: 28 MMOL/L (ref 21–32)
CREAT SERPL-MCNC: 0.93 MG/DL (ref 0.8–1.5)
CRP SERPL-MCNC: 4.4 MG/DL (ref 0–0.9)
DIAGNOSIS, 93000: NORMAL
DIFFERENTIAL METHOD BLD: ABNORMAL
EOSINOPHIL # BLD: 0.2 K/UL (ref 0–0.8)
EOSINOPHIL NFR BLD: 3 % (ref 0.5–7.8)
ERYTHROCYTE [DISTWIDTH] IN BLOOD BY AUTOMATED COUNT: 14.6 % (ref 11.9–14.6)
GLOBULIN SER CALC-MCNC: 4.6 G/DL (ref 2.3–3.5)
GLUCOSE SERPL-MCNC: 122 MG/DL (ref 65–100)
HCT VFR BLD AUTO: 38.3 % (ref 41.1–50.3)
HGB BLD-MCNC: 12.5 G/DL (ref 13.6–17.2)
IMM GRANULOCYTES # BLD AUTO: 0.1 K/UL (ref 0–0.5)
IMM GRANULOCYTES NFR BLD AUTO: 1 % (ref 0–5)
LYMPHOCYTES # BLD: 1.2 K/UL (ref 0.5–4.6)
LYMPHOCYTES NFR BLD: 16 % (ref 13–44)
MCH RBC QN AUTO: 32.4 PG (ref 26.1–32.9)
MCHC RBC AUTO-ENTMCNC: 32.6 G/DL (ref 31.4–35)
MCV RBC AUTO: 99.2 FL (ref 79.6–97.8)
MONOCYTES # BLD: 0.5 K/UL (ref 0.1–1.3)
MONOCYTES NFR BLD: 7 % (ref 4–12)
NEUTS SEG # BLD: 5.5 K/UL (ref 1.7–8.2)
NEUTS SEG NFR BLD: 73 % (ref 43–78)
NRBC # BLD: 0 K/UL (ref 0–0.2)
P-R INTERVAL, ECG05: 162 MS
PLATELET # BLD AUTO: 264 K/UL (ref 150–450)
PMV BLD AUTO: 9.6 FL (ref 9.4–12.3)
POTASSIUM SERPL-SCNC: 4.4 MMOL/L (ref 3.5–5.1)
PROT SERPL-MCNC: 7.1 G/DL (ref 6.3–8.2)
Q-T INTERVAL, ECG07: 316 MS
QRS DURATION, ECG06: 80 MS
QTC CALCULATION (BEZET), ECG08: 429 MS
RBC # BLD AUTO: 3.86 M/UL (ref 4.23–5.6)
SODIUM SERPL-SCNC: 137 MMOL/L (ref 136–145)
VENTRICULAR RATE, ECG03: 111 BPM
WBC # BLD AUTO: 7.6 K/UL (ref 4.3–11.1)

## 2021-06-15 PROCEDURE — 97530 THERAPEUTIC ACTIVITIES: CPT

## 2021-06-15 PROCEDURE — 74011250637 HC RX REV CODE- 250/637: Performed by: INTERNAL MEDICINE

## 2021-06-15 PROCEDURE — 85025 COMPLETE CBC W/AUTO DIFF WBC: CPT

## 2021-06-15 PROCEDURE — 74011250636 HC RX REV CODE- 250/636: Performed by: SURGERY

## 2021-06-15 PROCEDURE — 74011250637 HC RX REV CODE- 250/637: Performed by: NURSE PRACTITIONER

## 2021-06-15 PROCEDURE — 65660000000 HC RM CCU STEPDOWN

## 2021-06-15 PROCEDURE — 86140 C-REACTIVE PROTEIN: CPT

## 2021-06-15 PROCEDURE — 99223 1ST HOSP IP/OBS HIGH 75: CPT | Performed by: INTERNAL MEDICINE

## 2021-06-15 PROCEDURE — 80053 COMPREHEN METABOLIC PANEL: CPT

## 2021-06-15 PROCEDURE — 77030019952 HC CANSTR VAC ASST KCON -B

## 2021-06-15 PROCEDURE — 74011250636 HC RX REV CODE- 250/636: Performed by: NURSE PRACTITIONER

## 2021-06-15 PROCEDURE — 99232 SBSQ HOSP IP/OBS MODERATE 35: CPT | Performed by: SURGERY

## 2021-06-15 PROCEDURE — 93005 ELECTROCARDIOGRAM TRACING: CPT | Performed by: INTERNAL MEDICINE

## 2021-06-15 PROCEDURE — 36415 COLL VENOUS BLD VENIPUNCTURE: CPT

## 2021-06-15 PROCEDURE — 82550 ASSAY OF CK (CPK): CPT

## 2021-06-15 PROCEDURE — 2709999900 HC NON-CHARGEABLE SUPPLY

## 2021-06-15 PROCEDURE — 74011250637 HC RX REV CODE- 250/637: Performed by: SURGERY

## 2021-06-15 RX ORDER — TRIAMCINOLONE ACETONIDE 1 MG/G
CREAM TOPICAL 3 TIMES DAILY
Status: DISCONTINUED | OUTPATIENT
Start: 2021-06-15 | End: 2021-06-18 | Stop reason: HOSPADM

## 2021-06-15 RX ORDER — METOPROLOL TARTRATE 50 MG/1
50 TABLET ORAL EVERY 12 HOURS
Status: DISCONTINUED | OUTPATIENT
Start: 2021-06-15 | End: 2021-06-18 | Stop reason: HOSPADM

## 2021-06-15 RX ADMIN — TRIAMCINOLONE ACETONIDE: 1 CREAM TOPICAL at 22:00

## 2021-06-15 RX ADMIN — LORATADINE 10 MG: 10 TABLET ORAL at 08:00

## 2021-06-15 RX ADMIN — Medication: at 22:00

## 2021-06-15 RX ADMIN — FAMOTIDINE 20 MG: 20 TABLET ORAL at 16:42

## 2021-06-15 RX ADMIN — TRIAMCINOLONE ACETONIDE: 1 CREAM TOPICAL at 15:51

## 2021-06-15 RX ADMIN — TAMSULOSIN HYDROCHLORIDE 0.4 MG: 0.4 CAPSULE ORAL at 08:00

## 2021-06-15 RX ADMIN — Medication: at 05:25

## 2021-06-15 RX ADMIN — DIPHENHYDRAMINE HYDROCHLORIDE 25 MG: 50 INJECTION, SOLUTION INTRAMUSCULAR; INTRAVENOUS at 00:45

## 2021-06-15 RX ADMIN — Medication 1 AMPULE: at 13:17

## 2021-06-15 RX ADMIN — ONDANSETRON 4 MG: 2 INJECTION INTRAMUSCULAR; INTRAVENOUS at 16:43

## 2021-06-15 RX ADMIN — FAMOTIDINE 20 MG: 20 TABLET ORAL at 08:00

## 2021-06-15 RX ADMIN — DIPHENHYDRAMINE HYDROCHLORIDE 25 MG: 50 INJECTION, SOLUTION INTRAMUSCULAR; INTRAVENOUS at 08:00

## 2021-06-15 RX ADMIN — HYDROCODONE BITARTRATE AND ACETAMINOPHEN 1 TABLET: 5; 325 TABLET ORAL at 11:38

## 2021-06-15 RX ADMIN — Medication 1 AMPULE: at 21:00

## 2021-06-15 RX ADMIN — METOPROLOL TARTRATE 50 MG: 50 TABLET, FILM COATED ORAL at 19:37

## 2021-06-15 RX ADMIN — HYDROCODONE BITARTRATE AND ACETAMINOPHEN 1 TABLET: 5; 325 TABLET ORAL at 05:25

## 2021-06-15 RX ADMIN — Medication 1 AMPULE: at 05:26

## 2021-06-15 RX ADMIN — ONDANSETRON 4 MG: 2 INJECTION INTRAMUSCULAR; INTRAVENOUS at 11:33

## 2021-06-15 RX ADMIN — HYDROCODONE BITARTRATE AND ACETAMINOPHEN 1 TABLET: 5; 325 TABLET ORAL at 19:43

## 2021-06-15 NOTE — PROGRESS NOTES
ACUTE PHYSICAL THERAPY GOALS:  (Developed with and agreed upon by patient and/or caregiver. )  LTG:  (1.)Mr. Odin Angel will move from supine to sit and sit to supine , scoot up and down and roll side to side in bed with MODIFIED INDEPENDENCE within 7 treatment day(s). Goal met 06/15/21    (2.)Mr. Odin Angel will transfer from bed to chair and chair to bed with SUPERVISION using the least restrictive device within 7 treatment day(s). (3.)Mr. Odin Angel will ambulate with SUPERVISION for 250 feet with the least restrictive device within 7 treatment day(s). Goal met 06/145/21  (4.)Mr. Odin Angel will perform seated and standing exercises for 15+ minutes to improve strength and mobility within 7 days. ________________________________________________________________________________________________      PHYSICAL THERAPY: Daily Note and PM Treatment Day # 3    Sergei Sahni is a 77 y.o. male   PRIMARY DIAGNOSIS: Chest wall abscess  Cellulitis of chest wall [L03.313]  Procedure(s) (LRB):  LEFT CHEST WALL AND FLANK WOUND DEBRIDEMENT (N/A)  11 Days Post-Op    ASSESSMENT:     REHAB RECOMMENDATIONS: CURRENT LEVEL OF FUNCTION:  (Most Recently Demonstrated)   Recommendation to date pending progress:  Setting:   Short-term Rehab  Equipment:    Rolling Walker Bed Mobility:   Modified Independent  Sit to Stand:  [facility] [employer] Assistance  Transfers:   Standby Assistance  Gait/Mobility:   Standby Assistance     ASSESSMENT:  Mr. Odin Angel is supine in bed and agreeable to getting up. Bed mobility is modified independent. Sitting and standing balance is good. Gait training with rolling walker x 1000 feet with slow but good karri. Patient is returned sitting edge of bed with needs within reach. Made really good progress today. Visitor arrives. Still needs rehab as the patient does live alone. Will continue PT efforts. SUBJECTIVE:   Mr. Odin Angel states, \"Let's do it. \"    SOCIAL HISTORY/ LIVING ENVIRONMENT: see [facility]  Home Environment: Private residence  One/Two Story Residence: One story  Living Alone: No  Support Systems: Spouse/Significant Other/Partner  OBJECTIVE:     PAIN: VITAL SIGNS: LINES/DRAINS:   Pre Treatment: Pain Screen  Pain Scale 1: Numeric (0 - 10)  Pain Intensity 1: 0  Post Treatment: 0/10   Wound Vac  O2 Device: None (Room air)     MOBILITY: I Mod I S SBA CGA Min Mod Max Total  NT x2 Comments:   Bed Mobility    Rolling [x] [] [] [] [] [] [] [] [] [] []    Supine to Sit [x] [x] [] [] [] [] [] [] [] [] []    Scooting [x] [] [] [] [] [] [] [] [] [] []    Sit to Supine [x] [] [] [] [] [] [] [] [] [] []    Transfers    Sit to Stand [] [] [x] [] [] [] [] [] [] [] []    Bed to Chair [] [] [] [] [] [] [] [] [] [x] []    Stand to Sit [] [] [x] [] [] [] [] [] [] [] []    I=Independent, Mod I=Modified Independent, S=Supervision, SBA=Standby Assistance, CGA=Contact Guard Assistance,   Min=Minimal Assistance, Mod=Moderate Assistance, Max=Maximal Assistance, Total=Total Assistance, NT=Not Tested    BALANCE: Good Fair+ Fair Fair- Poor NT Comments   Sitting Static [x] [] [] [] [] []    Sitting Dynamic [x] [] [] [] [] []              Standing Static [x] [] [] [] [] []    Standing Dynamic [] [x] [] [] [] []      GAIT: I Mod I S SBA CGA Min Mod Max Total  NT x2 Comments:   Level of Assistance [] [] [] [x] [] [] [] [] [] [] []    Distance 1000 feet     DME Rolling Walker    Gait Quality slow    Weightbearing  Status N/A     I=Independent, Mod I=Modified Independent, S=Supervision, SBA=Standby Assistance, CGA=Contact Guard Assistance,   Min=Minimal Assistance, Mod=Moderate Assistance, Max=Maximal Assistance, Total=Total Assistance, NT=Not Tested    PLAN:   FREQUENCY/DURATION: PT Plan of Care: 3 times/week for duration of hospital stay or until stated goals are met, whichever comes first.  TREATMENT:     TREATMENT:   ($$ Therapeutic Activity: 23-37 mins    )  Therapeutic Activity (23 Minutes):  Therapeutic activity included Rolling, Supine to Sit, Sit to Supine, Scooting, Transfer Training, Ambulation on level ground, Sitting balance  and Standing balance to improve functional Mobility, Strength and Activity tolerance.     TREATMENT GRID:  N/A    AFTER TREATMENT POSITION/PRECAUTIONS:  Bed, Needs within reach and RN notified    INTERDISCIPLINARY COLLABORATION:  RN/PCT and PT/PTA    TOTAL TREATMENT DURATION:  PT Patient Time In/Time Out  Time In: 1400  Time Out: Ryan 3122 Karissa-Jennifer, PTA

## 2021-06-15 NOTE — PROGRESS NOTES
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 · Hold daptomycin. · Topical triamcinolone to areas of rash / itching. · ID will see tomorrow to come up with new antibiotic plan, likely oral as long as he continues to improve. Anti-infectives:   · Dapto (-  · Vanc (-)  · CTX (-) (-)    Subjective: Interval History:  Rash worsening; no fever; itching severe    Allergies   Allergen Reactions    Vancomycin Rash        Review of Systems:  A comprehensive review of systems was negative except for that written in the History of Present Illness. Objective:     Visit Vitals  /80 (BP 1 Location: Right arm, BP Patient Position: At rest)   Pulse 80   Temp 97.4 °F (36.3 °C)   Resp 20   Ht 5' 11\" (1.803 m)   Wt 71.7 kg (158 lb)   SpO2 97%   BMI 22.04 kg/m²     Temp (24hrs), Av °F (36.7 °C), Min:97.4 °F (36.3 °C), Max:98.5 °F (36.9 °C)     Patient was seen and examined on 6/15/21 and physical exam remains unchanged since yesterday, unless noted below:    Lines:  Peripheral IV:       Physical Exam:    General:  Alert, cooperative, in no distress   Eyes:  Sclera anicteric. Mouth/Throat: Mucous membranes normal, oral pharynx clear   Neck: Supple   Lungs:   Clear to auscultation bilaterally, good effort   CV:  Regular rate and rhythm,no murmur, click, rub or gallop   Abdomen:   Soft, non-tender.  bowel sounds normal. non-distended   Extremities: No cyanosis or edema   Skin: Wound vac to left chest wound; drug eruption on arms and upper thighs, less so on torso   Lymph nodes: C   Musculoskeletal: No swelling or deformity   Lines/Devices:  Intact, no erythema, drainage or tenderness   Psych: Alert and oriented, normal mood affect given the setting       Data Review:     CBC:  No results for input(s): WBC, GRANS, MONOS, EOS, ANEU, ABL, HGB, HCT, PLT, HGBEXT, HCTEXT, PLTEXT, HGBEXT, HCTEXT, PLTEXT in the last 72 hours. No lab exists for component: LYMPHS,  ANNEMARIE    BMP:  No results for input(s): CREA, BUN, NA, K, CL, CO2, AGAP, GLU in the last 72 hours. LFTS:  No results for input(s): TBILI, ALT, AP, TP, ALB in the last 72 hours. No lab exists for component: SGOT    Microbiology:     All Micro Results     Procedure Component Value Units Date/Time    COVID-19 RAPID TEST [[id number]] Collected: 06/09/21 1445    Order Status: Completed Specimen: Nasopharyngeal Updated: 06/09/21 1539     Specimen source NASAL        COVID-19 rapid test Not detected        Comment:      The specimen is NEGATIVE for SARS-CoV-2, the novel coronavirus associated with COVID-19. A negative result does not rule out COVID-19. This test has been authorized by the FDA under an Emergency Use Authorization (EUA) for use by authorized laboratories.         Fact sheet for Healthcare Providers: ConventionUpdate.co.nz  Fact sheet for Patients: ConventionUpdate.co.nz       Methodology: Isothermal Nucleic Acid Amplification         CULTURE, ANAEROBIC [[id number]] Collected: 06/02/21 1740    Order Status: Completed Specimen: Abscess Updated: 06/09/21 0904     Special Requests: NO SPECIAL REQUESTS        Culture result:       NO ANAEROBES ISOLATED 7 DAYS          CULTURE, WOUND Herschell Rinks STAIN [[id number]]  (Abnormal)  (Susceptibility) Collected: 06/02/21 1740    Order Status: Completed Specimen: Abscess Updated: 06/05/21 0810     Special Requests: NO SPECIAL REQUESTS        GRAM STAIN 0 TO 10 WBCS SEEN PER OIF      FEW GRAM POSITIVE COCCI        Culture result:       MODERATE * METHICILLIN RESISTANT STAPHYLOCOCCUS AUREUS *            PATIENT IS A KNOWN MRSA       CULTURE, BODY FLUID W Deb Pearson [[id number]] Collected: 21 1830    Order Status: Canceled Specimen: Body Fluid from Abdominal Fluid     CULTURE, Jennifer  STAIN [[id number]]  (Abnormal)  (Susceptibility) Collected: 21 0917    Order Status: Completed Specimen: Wound from Abscess Updated: 21 0838     Special Requests: NO SPECIAL REQUESTS        GRAM STAIN 20 TO 40 WBCS/OIF            MODERATE GRAM POSITIVE COCCI           Culture result:       HEAVY * METHICILLIN RESISTANT STAPHYLOCOCCUS AUREUS *                  RESULTS VERIFIED, PHONED TO AND READ BACK BY  ISAURO SHAVER RN @ 2629 ON 21 AK. SARS-COV-2, PCR [[id number]] Collected: 21 1229    Order Status: Completed Specimen: Nasopharyngeal Updated: 21 0629     Specimen source Nasopharyngeal        SARS-CoV-2 Not detected        Comment:      The specimen is NEGATIVE for SARS-CoV-2, the novel coronavirus associated with COVID-19. This test has been authorized by the FDA under an Emergency Use Authorization (EUA) for use by authorized laboratories. Fact sheet for Healthcare Providers: ConventionUpdate.co.nz       Fact sheet for Patients: ConventionUpdate.co.nz       Methodology: RT-PCR               Imagin21 CT c/a/p: IMPRESSION     Large soft tissue defect in the previously seen area of abscess. Though there is  some fat stranding present, no large drainable fluid collection is present. There is a small air-fluid collection seen just anterior to the left subclavian  vessels which persists.     Signed By: Tessie Perera MD     Nicolette 15, 2021

## 2021-06-15 NOTE — H&P
[facility] Cardiology Initial Cardiac Evaluation                 Date of  Admission: 5/24/2021 12:26 PM     Primary Care Physician:  Dr. Callie Montes  Primary Cardiologist: None  Referring Physician:  Dr. Liudmila Davis  Attending Physician: Dr. Guilherme Triplett     CC/Reason for consult: tachycardia      Wing Grabill. is a 77 y.o. male with PMH of BPH, depression, HLD, arthritis, and psoriasis, who presented as a direct admit on 5/24/2021 secondary to worsening erythema of chest wall, hew hematoma involving right flank and LLQ and fever. He was originally seen in consultation for a chest wall hematoma secondary to a fall and cellulitis. Wound culture +MRSA. Also with left hemithorax, flank, and LLQ continued to be edematous and indurated. He was taken to the OR on 6/2 for I&D of large complex abscesses with sharp excisional debridement of skin, fat, and fascia. He was taken back to OR on 6/4 for repeat debridement. Patient was again taken to the OR for debridement of wound involving left axilla, left chest, left back, left lower quadrant, left flank, and left iliac region. Wound vac remains in place. Patient was noted to have tachy rates up to 140s. EKG obtained and shows ST @ 111. Review of monitor possibly a tach and one episode of 6 beats of vT. Echo on 6/7/2021 EF 55-60%. Past Medical History:   Diagnosis Date    Depression     H/O urinary frequency 2/1/2013    History of BPH 2/1/2013    HLD (hyperlipidemia) 2/1/2013    Hx: UTI (urinary tract infection) 2/1/2013    Insomnia     Psoriasis     Psoriatic arthritis ([facility] [address] 2/1/2013    Wears hearing aid 2/1/2013      No past surgical history on file.   Allergies   Allergen Reactions    Vancomycin Rash      Family History   Problem Relation Age of Onset    Heart Attack Father     Cancer Father         skin     Other Sister    [employer] Cancer Sister     Other Brother         melanoma        Current Facility-Administered Medications   Medication Dose Route Frequency    triamcinolone acetonide (KENALOG) 0.1 % cream   Topical TID    HYDROmorphone (DILAUDID) injection 0.2-0.4 mg  0.2-0.4 mg IntraVENous Q3H PRN    loratadine (CLARITIN) tablet 10 mg  10 mg Oral DAILY    diphenhydrAMINE (BENADRYL) injection 25 mg  25 mg IntraVENous Q8H PRN    famotidine (PEPCID) tablet 20 mg  20 mg Oral BID    alcohol 62% (NOZIN) nasal  1 Ampule  1 Ampule Topical Q8H    zinc oxide-cod liver oil (DESITIN) 40 % paste   Topical Q8H    acetaminophen (TYLENOL) tablet 1,000 mg  1,000 mg Oral Q6H PRN    ondansetron (ZOFRAN) injection 4 mg  4 mg IntraVENous Q4H PRN    HYDROcodone-acetaminophen (NORCO) 5-325 mg per tablet 1 Tablet  1 Tablet Oral Q6H PRN    tamsulosin (FLOMAX) capsule 0.4 mg  0.4 mg Oral DAILY       Review of Systems   Constitutional: Positive for fever. HENT: Negative. Eyes: Negative. Respiratory: Negative. Cardiovascular: Positive for palpitations. Gastrointestinal: Negative. Genitourinary: Negative. Musculoskeletal: Negative. Skin: Negative. Neurological: Negative. Endo/Heme/Allergies: Negative. Psychiatric/Behavioral: Negative. Physical Exam  Vitals:    06/15/21 0749 06/15/21 0807 06/15/21 1119 06/15/21 1526   BP: 117/80  108/67 115/75   Pulse: 86 80 87 (!) 106   Resp: 20  20 18   Temp: 97.4 °F (36.3 °C)  98.4 °F (36.9 °C) 98.1 °F (36.7 °C)   SpO2: 97%  99% 96%   Weight:       Height:           Physical Exam:  Physical Exam  Constitutional:       Appearance: He is ill-appearing. Eyes:      Pupils: Pupils are equal, round, and reactive to light. Cardiovascular:      Rate and Rhythm: Regular rhythm. Tachycardia present. Pulmonary:      Effort: Pulmonary effort is normal.      Breath sounds: Normal breath sounds. Abdominal:      General: Bowel sounds are normal.      Palpations: Abdomen is soft. Musculoskeletal:         General: Normal range of motion. Skin:     General: Skin is warm and dry.    Neurological:      General: No focal deficit present. Mental Status: He is alert and oriented to person, place, and time. Psychiatric:         Mood and Affect: Mood normal.         Behavior: Behavior normal.         Thought Content: Thought content normal.         Judgment: Judgment normal.         Cardiographics    Telemetry: ST  ECG: sinus tachycardia  Echocardiogram:     · LV: Estimated LVEF is 55 - 60%. Normal cavity size, wall thickness, systolic function (ejection fraction normal) and diastolic function. Labs:   Recent Labs     06/15/21  1240      K 4.4   BUN 13   CREA 0.93   *   WBC 7.6   HGB 12.5*   HCT 38.3*           Assessment/Plan:     Assessment:      Principal Problem:    Chest wall abscess -- s/p multiple debridements, on ABx, wound vac in place     Active Problems:    Cellulitis of chest wall -- see above       Fall --       Fever -- resolved      Suppression of immune system subtherapeutic        Leukocytosis -- resolved       Confusion -- per primary       SVT (supraventricular tachycardia) -- EKG ST @ 111.  ? Runs of a tach, short run of VT.  EF nml. Start lopressor 50 mg BID. Patient currently in sinus tachycardia rate of approximately 112 his SVT is possibly atrial tachycardia or a reentrant or accessory pathway tachycardia it did break with a PVC. In any event we will start Lopressor 50 mg p.o. twice daily. If he were to be and his narrow complex tachycardia at 170 sustained he would respond to adenosine      Thank you very much for this referral. We appreciate the opportunity to participate in this patient's care. We will follow along with above stated plan.     Twila Martell NP  Attending MD: Jessica Mcgregor

## 2021-06-15 NOTE — PROGRESS NOTES
Monitor room reported 8 beats of V-tach at 2138, then sinus tach. Patient resting quietly with no signs of distress. Provider notified. No new orders, will continue to monitor.

## 2021-06-15 NOTE — PROGRESS NOTES
Problem: Falls - Risk of  Goal: *Absence of Falls  Description: Document [place] Fall Risk and appropriate interventions in the flowsheet. Outcome: Progressing Towards Goal  Note: Fall Risk Interventions:  Mobility Interventions: Communicate number of staff needed for ambulation/transfer, Patient to call before getting OOB    Mentation Interventions: Adequate sleep, hydration, pain control    Medication Interventions: Patient to call before getting OOB, Teach patient to arise slowly    Elimination Interventions: Call light in reach    History of Falls Interventions: Consult care management for discharge planning, Door open when patient unattended         Problem: Pressure Injury - Risk of  Goal: *Prevention of pressure injury  Description: Document Sabino Scale and appropriate interventions in the flowsheet. Outcome: Progressing Towards Goal  Note: Pressure Injury Interventions:  Sensory Interventions: Assess changes in LOC    Moisture Interventions: Absorbent underpads, Apply protective barrier, creams and emollients, Contain wound drainage, Internal/External urinary devices    Activity Interventions: Increase time out of bed, Pressure redistribution bed/mattress(bed type)    Mobility Interventions: HOB 30 degrees or less, Pressure redistribution bed/mattress (bed type)    Nutrition Interventions: Document food/fluid/supplement intake    Friction and Shear Interventions: Apply protective barrier, creams and emollients, HOB 30 degrees or less                Problem: Risk for Spread of Infection  Goal: Prevent transmission of infectious organism to others  Description: Prevent the transmission of infectious organisms to other patients, staff members, and visitors.   Outcome: Progressing Towards Goal

## 2021-06-15 NOTE — PROGRESS NOTES
END OF SHIFT NOTE:      Per monitor room pt was  at 1634. Pt in no distress. RN then called monitor room pt running 108 sinus tach. RN notified Ed Shanae STEELE. Dr. Juanito Flores ordered cardiology consult. Cardiology ordered EKG. INTAKE/OUTPUT  06/14 0701 - 06/15 0700  In: 1500 [P.O.:1500]  Out: 2950 [Urine:2700; Drains:250]  Voiding: YES  Catheter: NO  Drain:              Flatus: Patient does have flatus present. Stool:  1 occurrences. Characteristics:  Stool Assessment  Stool Color: Brown  Stool Appearance:  (have not observed)  Stool Amount: Medium  Stool Source/Status: Rectum    Emesis: 0 occurrences. Characteristics:        VITAL SIGNS  Patient Vitals for the past 12 hrs:   Temp Pulse Resp BP SpO2   06/15/21 1937 -- (!) 110 -- 121/82 --   06/15/21 1526 98.1 °F (36.7 °C) (!) 106 18 115/75 96 %   06/15/21 1119 98.4 °F (36.9 °C) 87 20 108/67 99 %   06/15/21 0807 -- 80 -- -- --   06/15/21 0749 97.4 °F (36.3 °C) 86 20 117/80 97 %       Pain Assessment  Pain Intensity 1: 0 (06/15/21 1300)  Pain Location 1: Flank  Pain Intervention(s) 1: Medication (see MAR)  Patient Stated Pain Goal: 0    Ambulating  Yes    Shift report given to oncoming nurse at the bedside.     Lakeshia Zimmerman

## 2021-06-15 NOTE — PROGRESS NOTES
Comprehensive Nutrition Assessment    Type and Reason for Visit: Reassess    Nutrition Recommendations/Plan:   Meals and Snacks:  Continue current diet. Nutrition Supplement Therapy:   Medical food supplement therapy:  Change Avinash three times per day (this provides 90 kcal and 2.5 grams protein per packet)   Stop Ensure High Protein     Malnutrition Assessment:  Malnutrition Status: At risk for malnutrition (specify) (advanced age, ? wt loss, open wounds)    Nutrition Assessment:   Nutrition History: Patient states eating well prior to admission. He denies any changes to PO intake. Vague historian. Nutrition Background: Patient with PMH significant for HLD. He was referred to surgery for hematoma and cellulitis of left chest wall s/p fall and was given abx. He presented back with fevers and hematoma found to be larger, so was admitted to hospital. Now s/p debridement of chest wall and flank . Wound vac placed . Daily Update:  Patient seen sitting up in bed. He states that his appetite is good and he continues to eat well. He states that he has tried Ensure High protein, but states that it makes him feel sick. Noted several bottles on bedside table. He agrees to trial clear supplement for tolerance. Nutrition Related Findings:   No physical signs of malnutrition Wound Type: Open wounds, Wound vac (healin.2x11x4.5 (chest) and 1.2x9.7x1.6 (axilla))    Current Nutrition Therapies:  ADULT DIET Regular; 4 carb choices (60 gm/meal)  ADULT ORAL NUTRITION SUPPLEMENT Breakfast, Lunch, Dinner;  Low Calorie/High Protein    Current Intake:   Average Meal Intake: % Average Supplement Intake:  (variable)      Anthropometric Measures:  Height: 5' 11\" (180.3 cm)  Current Body Wt: 71.7 kg (158 lb 1.1 oz) (), Weight source: Bed scale  BMI: 22.1, Normal weight (BMI 22.0-24.9) age over 72  Admission Body Weight: 182 lb 15.7 oz ( standing scale)  Ideal Body Weight (lbs) (Calculated): 172 lbs (78 kg), 96.9 %  Usual Body Wt: 82 kg (180 lb 12.4 oz) (per review of EMR, range 79-83 kg), Percent weight change: -7.8          Edema: LLE: No Edema (6/14/2021  8:35 PM)  RLE: No Edema (6/14/2021  8:35 PM)     Estimated Daily Nutrient Needs:  Energy (kcal/day): 6013-8745 (Kcal/kg (20-25), Weight Used: Admission (83 kg (5/25 standing scale)))  Protein (g/day): 108-125 (1.3-1.5) Weight Used: (Admission)  Fluid (ml/day):   (1 ml/kcal)    Nutrition Diagnosis:   · Increased nutrient needs (protein) related to  (wound healing) as evidenced by  (s/p debridement with wound vac, wounds as above)    Nutrition Interventions:   Food and/or Nutrient Delivery: Continue current diet, Modify oral nutrition supplement     Coordination of Nutrition Care: Continue to monitor while inpatient  Plan of Care discussed with Muriel Langley RN    Goals:   Previous Goal Met: Progressing toward goal(s)  Active Goal: Meet protein needs with PO diet and ONS by next RD follow-up    Nutrition Monitoring and Evaluation:      Food/Nutrient Intake Outcomes: Food and nutrient intake, Supplement intake       Discharge Planning:    Continue oral nutrition supplement    [address], LD on 6/15/2021 at 10:55 AM  Contact: [phone]

## 2021-06-15 NOTE — PROGRESS NOTES
H&P/Consult Note/Progress Note/Office Note:   Cheri Jacques MRN: [id number]  :1945  Age:75 y.o.    HPI: Cheri Jacques is a 77 y.o. male who was initially referred in consultation by Glendy Irving PA-C with a left chest wall hematoma and cellulitis   after a fall at home on the floor approx 21. He sustained a blunt injury to his left chest wall. CXR below showed no PTx or rib fractures. He taked Humira for arthritis and is immune suppressed. We saw him for the 1st time on 21 with erythema of the left chest wall and placed him on Bactrim and marked the cellulitis border. He was instructed to call if any fevers or worsening erythema and given f/u 4 days later  He did not call. When he came to the office on 21 he reported high fevers over 101 degrees associate with nausea with his Bactrim. The erythema had extended >20cm  beyond the original inscribed lines of his left chest wall and extended down his left flank and in his left lower quadrant. We was admitted directly from office. He denies any new falls since 21. On 21 he also had a new hematoma involving his right flank which was not present on 21 (likely a second fall)      21 CXR, 2 views  Clear lungs. The cardiac and mediastinal contours and pulmonary vascularity are normal.   The bones and soft tissues are within normal limits. No evidence of a pneumothorax. IMPRESSION:  Normal chest.      21 CT chest/abd/pelvis with oral and IV contrast  Hx: MRSA cellulitis of left chest wall abscess and fever.     CHEST:   -LUNGS: No lobar pneumonia. Linear atelectasis left base. -AIRWAYS: Trachea and proximal bronchi grossly patent. -PLEURA: Small left pleural effusion. -LYMPH NODES: No enlarged axillary, hilar or mediastinal lymph nodes.   -HEART: Normal size.     -SKELETAL/CHEST WALL: Along left chest wall and body wall there is a complex,  peripherally enhancing fluid collection which measures approximately 32 cm  superior inferior, 17 cm wide and 4 cm thick. There are several bubbles of gas  within it. Larger bubbles of gas extend up into the axilla.     ABDOMEN/PELVIS:  -LIVER: Normal in size and appearance. -GALLBLADDER: No gallstones identified  -BILE DUCTS: Not dilated.     -PANCREAS: Normal.  -SPLEEN: Normal.     -ADRENALS: Normal.  -KIDNEYS/URETERS: Kidneys enhance symmetrically. There are multiple bilateral  parapelvic cysts versus bilateral UPJ obstructions. -BLADDER: Normal.  -REPRODUCTIVE ORGANS: Prostate and seminal vesicles unremarkable.     -BOWEL: Normal caliber. No inflammatory changes. Scattered diverticula. -LYMPH NODES: No significant retroperitoneal, mesenteric, or pelvic adenopathy. -BONES: Degenerative changes. No periostitis. -VASCULATURE: Aorta is normal caliber and mildly calcified. -OTHER: No ascites.     IMPRESSION  Large left abscess, 32 cm x 17 cm x 4 cm which extends from the axilla to the iliac crest.        6/13/21 CT chest/abd/pelvis without contrast  CT chest: There has been interval debridement of a large left axillary/left  flank fluid collection with a large open soft tissue defect, but no residual  drainable fluid collection throughout the majority of the previously seen  abnormality. There is a small air and fluid collection seen just anterior to the  subclavian vessels on the left. There is no pneumothorax. No adenopathy. CT ABDOMEN: No contour deforming abnormality of the liver or spleen. No change in the appearance of the kidneys, adrenal glands, pancreas, or gallbladder.     CT PELVIS: Air seen nondependently in the bladder. The rectum is normal. No pelvic adenopathy.     Bone window evaluation demonstrates no aggressive osseous lesions.     IMPRESSION  Large soft tissue defect in the previously seen area of abscess. Though there is  some fat stranding present, no large drainable fluid collection is present.   There is a small air-fluid collection seen just anterior to the left subclavian vessels which persists           Additional hx:  5/27/21 wound culture growing MRSA; Pharmacy dosing IV Vanc; trough tonight  5/28/21  Less Confused; no fever; WBC elevated but better;  on IV Abx  5/29/21  Resting in bed; no fever; WBC 12.3;  on IV Abx  5/30/21  Sitting on side of bed; no fever; WBC 13.8;  on IV Abx  5/31/21 AF; wbc still elevated; left hemithorax , left flank and LLQ edematous and indurated  6/1/21 continues IV Vanc; WBC higher at 12.2k   6/2/21 IV Vanc; CT as above with large abscess; I&D/debridment #1 in OR today  6/3/21 IV Vanc/Rocephin; OR culture pending  6/4/21 plan return to OR today for debridement #2; unless pt signs out AMA    6/5/21 POD 3/1; Pt resting in bed. No complaints voiced. Appears comfortable. Postop dressing c/d/i.   6/6/21 POD 4/2; Pt sitting on side of bed. No complaints voiced. Appears comfortable. Dressing c/di  6/7/21 POD 5/3;  Comfortable; dressing dry; wound vac started today; IV Vanc; Likely SNF soon  6/8/21 POD 6/4 Feels OK; ID rec IV Vanc until 6/21/21; wound vac will be changed 3x/week; Vanc changed to IV Dapto by ID  6/9/21 POD 7/5; Cont IV Daptomycin; wound vac 3 times a week; consult PT; OOB daily  6/10/21 POD8/6 Cont IV Dapto; wound vac change 3 x a week; PT; OOB daily; feeling better  6/11/21 POD 9/7; Cont IV Dapto; wound vac changed today; PT following  6/12/21 POD10/8; Cont IV Dapto; wound vac q M/W/F; inpt PT  6/13/21 POD11/9; Cont IV Dapto; wound vac q M/W/F; CT today to reassess  6/14/21 POD12/10 Cont IV Dapto; CT as above; looks well drained.  Collection anterior to subclavian looks close enough to decompress into axillary wound  6/15/21 POD13/11; rash to IV Dapto; ID considering PO option,  SVT 120s, cardiology consulted              Past Medical History:   Diagnosis Date    Depression     H/O urinary frequency 2/1/2013    History of BPH 2/1/2013    HLD (hyperlipidemia) 2/1/2013    Hx: UTI (urinary tract infection) 2/1/2013    Insomnia     Psoriasis     Psoriatic arthritis ([facility] [address] 2/1/2013    Wears hearing aid 2/1/2013     No past surgical history on file. Current Facility-Administered Medications   Medication Dose Route Frequency    triamcinolone acetonide (KENALOG) 0.1 % cream   Topical TID    HYDROmorphone (DILAUDID) injection 0.2-0.4 mg  0.2-0.4 mg IntraVENous Q3H PRN    loratadine (CLARITIN) tablet 10 mg  10 mg Oral DAILY    diphenhydrAMINE (BENADRYL) injection 25 mg  25 mg IntraVENous Q8H PRN    famotidine (PEPCID) tablet 20 mg  20 mg Oral BID    alcohol 62% (NOZIN) nasal  1 Ampule  1 Ampule Topical Q8H    zinc oxide-cod liver oil (DESITIN) 40 % paste   Topical Q8H    acetaminophen (TYLENOL) tablet 1,000 mg  1,000 mg Oral Q6H PRN    ondansetron (ZOFRAN) injection 4 mg  4 mg IntraVENous Q4H PRN    HYDROcodone-acetaminophen (NORCO) 5-325 mg per tablet 1 Tablet  1 Tablet Oral Q6H PRN    tamsulosin (FLOMAX) capsule 0.4 mg  0.4 mg Oral DAILY     Vancomycin  Social History     Socioeconomic History    Marital status: SINGLE     Spouse name: Not on file    Number of children: Not on file    Years of education: Not on file    Highest education level: Not on file   Tobacco Use    Smoking status: Former Smoker    Smokeless tobacco: Never Used    Tobacco comment: pt states quit in early 60's late 52's    Vaping Use    Vaping Use: Never used   Substance and Sexual Activity    Alcohol use: No     Alcohol/week: 0.0 standard drinks    Drug use: No    Sexual activity: Yes     Partners: Female     Social Determinants of Health     Financial Resource Strain:     Difficulty of Paying Living Expenses:    Food Insecurity:     Worried About Running Out of Food in the Last Year:     Ran Out of Food in the Last Year:    Transportation Needs:     Lack of Transportation (Medical):      Lack of Transportation (Non-Medical):    Physical Activity:     Days of Exercise per Week:     Minutes of Exercise per Session:    Stress:     Feeling of Stress :    Social Connections:     Frequency of Communication with Friends and Family:     Frequency of Social Gatherings with Friends and Family:     Attends [employer] Services:     Active Member of Clubs or Organizations:     Attends Club or Organization Meetings:     Marital Status:      Social History     Tobacco Use   Smoking Status Former Smoker   Smokeless Tobacco Never Used   Tobacco Comment    pt states quit in early 60's late 52's      Family History   Problem Relation Age of Onset    Heart Attack Father     Cancer Father         skin     Other Sister     Cancer Sister     Other Brother         melanoma     ROS: The patient has no difficulty with chest pain or shortness of breath. No fever or chills. Comprehensive review of systems was otherwise unremarkable except as noted above. Physical Exam:   Visit Vitals  /75   Pulse (!) 106   Temp 98.1 °F (36.7 °C)   Resp 18   Ht 5' 11\" (1.803 m)   Wt 158 lb (71.7 kg)   SpO2 96%   BMI 22.04 kg/m²     Vitals:    06/15/21 0749 06/15/21 0807 06/15/21 1119 06/15/21 1526   BP: 117/80  108/67 115/75   Pulse: 86 80 87 (!) 106   Resp: 20  20 18   Temp: 97.4 °F (36.3 °C)  98.4 °F (36.9 °C) 98.1 °F (36.7 °C)   SpO2: 97%  99% 96%   Weight:       Height:         [unfilled]  [unfilled]    Constitutional: Alert, oriented, cooperative patient in no acute distress; appears stated age    Eyes:Sclera are clear. EOMs intact  ENMT: no external lesions; he is hard of hearing. no obvious neck masses, no ear or lip lesions, nares normal  CV: RRR. Normal perfusion      Large open incision from left axillae to left iliac crest   Good granulation tissue, no sign debris  No surrounding cellulitis    Right flank hematoma stable    Resp: No JVD. Breathing is  non-labored; no audible wheezing. GI: soft and non-distended     Musculoskeletal: unremarkable with normal function. No embolic signs or cyanosis.    Neuro: Oriented to person, place and time;  moves all 4; no focal deficits; slow moving and slow talking. Psychiatric: blunted affect, no memory impairment    Recent vitals (if inpt):  @IPVITALS(24:)@    Amount and/or Complexity of Data Reviewed and Analyzed:  I reviewed and analyzed all of the unique labs and radiologic studies that are shown below as well as any that are in the HPI, and any that are in the expanded problem list below  *Each unique test, order, or document contributes to the combination of 2 or combination of 3 in Category 1 below. For this visit I also reviewed old records and prior notes. Recent Labs     06/15/21  1240   WBC 7.6   HGB 12.5*         K 4.4      CO2 28   BUN 13   CREA 0.93   *   TBILI 0.2   ALT 41        Review of most recent CBC  Lab Results   Component Value Date/Time    WBC 7.6 06/15/2021 12:40 PM    HGB 12.5 (L) 06/15/2021 12:40 PM    HCT 38.3 (L) 06/15/2021 12:40 PM    PLATELET 511 25/00/7651 12:40 PM    MCV 99.2 (H) 06/15/2021 12:40 PM       Review of most recent BMP  Lab Results   Component Value Date/Time    Sodium 137 06/15/2021 12:40 PM    Potassium 4.4 06/15/2021 12:40 PM    Chloride 105 06/15/2021 12:40 PM    CO2 28 06/15/2021 12:40 PM    Anion gap 4 (L) 06/15/2021 12:40 PM    Glucose 122 (H) 06/15/2021 12:40 PM    BUN 13 06/15/2021 12:40 PM    Creatinine 0.93 06/15/2021 12:40 PM    BUN/Creatinine ratio 19 03/31/2016 11:26 AM    GFR est AA >60 06/15/2021 12:40 PM    GFR est non-AA >60 06/15/2021 12:40 PM    Calcium 8.3 06/15/2021 12:40 PM       Review of most recent LFTs (and lipase if done)  Lab Results   Component Value Date/Time    ALT (SGPT) 41 06/15/2021 12:40 PM    AST (SGOT) 23 06/15/2021 12:40 PM    Alk.  phosphatase 100 06/15/2021 12:40 PM    Bilirubin, total 0.2 06/15/2021 12:40 PM     No results found for: LPSE    No results found for: INR, APTT, CBIL, LCAD, NH4, TROPT, TROIQ, INREXT, INREXT    Review of most recent HgbA1c  Lab Results   Component Value Date/Time    Hemoglobin A1c 4.9 06/05/2021 02:21 PM       Nutritional assessment screen for wound healing issues:  Lab Results   Component Value Date/Time    Protein, total 7.1 06/15/2021 12:40 PM    Albumin 2.5 (L) 06/15/2021 12:40 PM       @lastcovr@  XR Results (most recent):  Results from Hospital Encounter encounter on 05/20/21    XR CHEST PA LAT    Narrative  EXAM: XR CHEST PA LAT    INDICATION: Left chest wall hematoma with cellulitis rule out rib fracture or  pneumothorax after a fall at home on the floor. COMPARISON: None. FINDINGS: PA and lateral radiographs of the chest demonstrate clear lungs. The  cardiac and mediastinal contours and pulmonary vascularity are normal. The bones  and soft tissues are within normal limits. No evidence of a pneumothorax. Impression  Normal chest.      CT Results (most recent):  Results from Hospital Encounter encounter on 05/24/21    CT CHEST ABD PELV WO CONT    Narrative  History: Large open wound left axilla with left chest, flank, and iliac pain. Multiple debridements. EXAM: CT chest, abdomen, and pelvis without contrast    TECHNIQUE: Thin section axial CT images are obtained from the thoracic inlet  through the pubic symphysis. Radiation dose reduction techniques were used for  this study. Our CT scanners use one or all of the following: Automated exposure  control, adjustment of the mA and/or kV according to patient size, use of  iterative reconstruction. COMPARISON: 6/2/2021    FINDINGS:    CT CHEST: There has been interval debridement of a large left axillary/left  flank fluid collection with a large open soft tissue defect, but no residual  drainable fluid collection throughout the majority of the previously seen  abnormality. There is a small air and fluid collection seen just anterior to the  subclavian vessels on the left. There is no pneumothorax. No adenopathy.     CT ABDOMEN: No contour deforming abnormality of the liver or spleen. No change  in the appearance of the kidneys, adrenal glands, pancreas, or gallbladder. CT PELVIS: Air seen nondependently in the bladder. The rectum is normal. No  pelvic adenopathy. Bone window evaluation demonstrates no aggressive osseous lesions. Impression  Large soft tissue defect in the previously seen area of abscess. Though there is  some fat stranding present, no large drainable fluid collection is present. There is a small air-fluid collection seen just anterior to the left subclavian  vessels which persists. US Results (most recent):  No results found for this or any previous visit. Admission date (for inpatients): 5/24/2021   * No surgery found *  Procedure(s):  LEFT CHEST WALL AND FLANK WOUND DEBRIDEMENT        ASSESSMENT/PLAN:  Problem List  Date Reviewed: 5/24/2021        Codes Class Noted    SVT (supraventricular tachycardia) ([facility] [address] ICD-10-CM: I47.1  ICD-9-CM: 427.89  6/15/2021        Confusion ICD-10-CM: R41.0  ICD-9-CM: 298.9  5/27/2021        Sundowning ICD-10-CM: F05  ICD-9-CM: Altaf Lambert  5/27/2021        Leukocytosis ICD-10-CM: X69.408  ICD-9-CM: 288.60  5/26/2021        Acute renal insufficiency ICD-10-CM: N28.9  ICD-9-CM: 593.9  5/25/2021        Fever ICD-10-CM: R50.9  ICD-9-CM: 780.60  5/24/2021        Suppression of immune system subtherapeutic (HCC) ICD-10-CM: D89.89  ICD-9-CM: 279.8  5/24/2021        Right flank hematoma ICD-10-CM: S30. 1XXA  ICD-9-CM: 922.2  5/24/2021        * (Principal) Chest wall abscess ICD-10-CM: L02.213  ICD-9-CM: 682.2  5/20/2021        Hematoma of left chest wall ICD-10-CM: C85.159S  ICD-9-CM: 922.1  5/20/2021        Cellulitis of chest wall ICD-10-CM: L03.313  ICD-9-CM: 682.2  5/20/2021        Fall ICD-10-CM: A14. Francisco Distel  ICD-9-CM: P573.4  5/20/2021        Psoriasis ICD-10-CM: L40.9  ICD-9-CM: 696.1  Unknown        Insomnia ICD-10-CM: G47.00  ICD-9-CM: 780.52  Unknown        Depression ICD-10-CM: F32.9  ICD-9-CM: 253  Unknown Tachycardia ICD-10-CM: R00.0  ICD-9-CM: 785.0  6/13/2016        Murmur ICD-10-CM: R01.1  ICD-9-CM: 785.2  6/13/2016        Psoriatic arthritis ([facility] [address] ICD-10-CM: L40.50  ICD-9-CM: 696.0  2/1/2013        Wears hearing aid ICD-10-CM: Z97.4  ICD-9-CM: V45.89  2/1/2013        HLD (hyperlipidemia) ICD-10-CM: E78.5  ICD-9-CM: 272.4  2/1/2013        H/O urinary frequency ICD-10-CM: Z87.898  ICD-9-CM: V13.09  2/1/2013        History of BPH ICD-10-CM: J22.974  ICD-9-CM: V13.89  2/1/2013            Principal Problem:    Chest wall abscess (5/20/2021)    Active Problems:    Cellulitis of chest wall (5/20/2021)      Fall (5/20/2021)      Fever (5/24/2021)      Suppression of immune system subtherapeutic ([facility] [address] (5/24/2021)      Right flank hematoma (5/24/2021)      Acute renal insufficiency (5/25/2021)      Leukocytosis (5/26/2021)      Confusion (5/27/2021)      Sundowning (5/27/2021)      SVT (supraventricular tachycardia) ([facility] [address] (6/15/2021)           Number and Complexity of Problems addressed and   Risks of complications and/or morbidity of management        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 Considering home on PO abx Friday vs SNF on PO abx (if PO abx OK with ID)  Pt not sure if he is string enough to go home.   He said he will let me know Thursday      He went to OR for serial debridements  6/2/21 OR for debridement #1  6/4/21 OR for debridement/washout/dressing change #2    6/13/21 repeat CT as above looks well drained but there is a gas collection with no fluid anterior to subclavian vessels adjacent to the axillary wound  May need repeat CT at some point to follow-up on this area  I spent 20min doing a peer to peer review on 6/14/21 for SNF placement which was approved after he completes IV Dapto if pt needs SNF at that time    SVT to 120s  New as of 6/15/21  I ordered cardiac monitoring and cardiology consult to help      Chronic immune suppression  Continue to hold his methotrexate, prednisone, and Humira to help reduce his immune suppression during this acute phase of infection. Dermatitis around left chest wall wound from moisture maceration  Desitin ordered to destinee-wound skin    Confusion/sundowning at night-->resolved  For many days he was uncooperative and pulling IV sites   Sitters were required  This has improving significantly  May no longer require sitter              Level of MDM (2/3 elements below)  Number and Complexity of Problems Addressed Amount and/or Complexity of Data to be Reviewed and Analyzed  *Each unique test, order, or document contributes to the combination of 2 or combination of 3 in Category 1 below.  Risk of Complications and/or Morbidity or Mortality of pt Management     [id number]  [id number]  Minimal  1self-limited or minor problem Minimal or none Minimal risk of morbidity from additional diagnostic testing or Rx   78169  18316 Low Low  2or more self-limited or minor problems;    or  1stable chronic illness;    or  3IFYQA, uncomplicated illness or injury   Limited  (Must meet the requirements of at least 1 of the 2 categories)  Category 1: Tests and documents   Any combination of 2 from the following:  Review of prior external note(s) from each unique source*;  review of the result(s) of each unique test*;   ordering of each unique test*    or   Category 2: Assessment requiring an independent historian(s)  (For the categories of independent interpretation of tests and discussion of management or test interpretation, see moderate or high) Low risk of morbidity from additional diagnostic testing or treatment     61925  10666 Mod Moderate  1or more chronic illnesses with exacerbation, progression, or side effects of treatment;    or  2or more stable chronic illnesses;    or  1undiagnosed new problem with uncertain prognosis;    or  1acute illness with systemic symptoms;    or  1TCNPP complicated injury   Moderate  (Must meet the requirements of at least 1 out of 3 categories)  Category 1: Tests, documents, or independent historian(s)  Any combination of 3 from the following:   Review of prior external note(s) from each unique source*;  Review of the result(s) of each unique test*;  Ordering of each unique test*;  Assessment requiring an independent historian(s)    or  Category 2: Independent interpretation of tests   Independent interpretation of a test performed by another physician/other qualified health care professional (not separately reported);     or  Category 3: Discussion of management or test interpretation  Discussion of management or test interpretation with external physician/other qualified health care professional/appropriate source (not separately reported)   Moderate risk of morbidity from additional diagnostic testing or treatment  Examples only:  Prescription drug management   Decision regarding minor surgery with identified patient or procedure risk factors  Decision regarding elective major surgery without identified patient or procedure risk factors   Diagnosis or treatment significantly limited by social determinants of health       46932  04090 High High  1or more chronic illnesses with severe exacerbation, progression, or side effects of treatment;    or  1 acute or chronic illness or injury that poses a threat to life or bodily function   Extensive  (Must meet the requirements of at least 2 out of 3 categories)  Category 1: Tests, documents, or independent historian(s)  Any combination of 3 from the following:   Review of prior external note(s) from each unique source*;  Review of the result(s) of each unique test*;   Ordering of each unique test*;   Assessment requiring an independent historian(s)    or   Category 2: Independent interpretation of tests   Independent interpretation of a test performed by another physician/other qualified health care professional (not separately reported);     or  Category 3: Discussion of management or test interpretation  Discussion of management or test interpretation with external physician/other qualified health care professional/appropriate source (not separately reported)   High risk of morbidity from additional diagnostic testing or treatment  Examples only:  Drug therapy requiring intensive monitoring for toxicity  Decision regarding elective major surgery with identified patient or procedure risk factors  Decision regarding emergency major surgery  Decision regarding hospitalization  Decision not to resuscitate or to de-escalate care because of poor prognosis             I have personally performed a face-to-face diagnostic evaluation and management  service on this patient. I have independently seen the patient. I have independently obtained the above history from the patient/family. I have independently examined the patient with above findings. I have independently reviewed data/labs for this patient and developed the above plan of care (MDM). Signed: Jovita Cyr.  Nate eVras MD, FACS

## 2021-06-16 PROCEDURE — 99232 SBSQ HOSP IP/OBS MODERATE 35: CPT | Performed by: SURGERY

## 2021-06-16 PROCEDURE — 97605 NEG PRS WND THER DME<=50SQCM: CPT

## 2021-06-16 PROCEDURE — 74011250637 HC RX REV CODE- 250/637: Performed by: NURSE PRACTITIONER

## 2021-06-16 PROCEDURE — 74011250637 HC RX REV CODE- 250/637: Performed by: SURGERY

## 2021-06-16 PROCEDURE — 99232 SBSQ HOSP IP/OBS MODERATE 35: CPT | Performed by: INTERNAL MEDICINE

## 2021-06-16 PROCEDURE — 97606 NEG PRS WND THER DME>50 SQCM: CPT

## 2021-06-16 PROCEDURE — 77030019934 HC DRSG VAC ASST KCON -B

## 2021-06-16 PROCEDURE — 77030018717 HC DRSG GRNUFM KCON -B

## 2021-06-16 PROCEDURE — 97530 THERAPEUTIC ACTIVITIES: CPT

## 2021-06-16 PROCEDURE — 74011250636 HC RX REV CODE- 250/636: Performed by: SURGERY

## 2021-06-16 PROCEDURE — 2709999900 HC NON-CHARGEABLE SUPPLY

## 2021-06-16 PROCEDURE — 65660000000 HC RM CCU STEPDOWN

## 2021-06-16 RX ORDER — MINOCYCLINE HYDROCHLORIDE 50 MG/1
100 CAPSULE ORAL 2 TIMES DAILY
Status: DISCONTINUED | OUTPATIENT
Start: 2021-06-16 | End: 2021-06-18 | Stop reason: HOSPADM

## 2021-06-16 RX ADMIN — MINOCYCLINE HYDROCHLORIDE 100 MG: 50 CAPSULE ORAL at 18:11

## 2021-06-16 RX ADMIN — Medication: at 05:06

## 2021-06-16 RX ADMIN — Medication 1 AMPULE: at 05:05

## 2021-06-16 RX ADMIN — HYDROCODONE BITARTRATE AND ACETAMINOPHEN 1 TABLET: 5; 325 TABLET ORAL at 03:10

## 2021-06-16 RX ADMIN — Medication 1 AMPULE: at 22:06

## 2021-06-16 RX ADMIN — Medication 1 AMPULE: at 15:19

## 2021-06-16 RX ADMIN — HYDROMORPHONE HYDROCHLORIDE 0.4 MG: 1 INJECTION, SOLUTION INTRAMUSCULAR; INTRAVENOUS; SUBCUTANEOUS at 08:56

## 2021-06-16 RX ADMIN — LORATADINE 10 MG: 10 TABLET ORAL at 08:59

## 2021-06-16 RX ADMIN — HYDROCODONE BITARTRATE AND ACETAMINOPHEN 1 TABLET: 5; 325 TABLET ORAL at 22:06

## 2021-06-16 RX ADMIN — MINOCYCLINE HYDROCHLORIDE 100 MG: 50 CAPSULE ORAL at 12:08

## 2021-06-16 RX ADMIN — HYDROCODONE BITARTRATE AND ACETAMINOPHEN 1 TABLET: 5; 325 TABLET ORAL at 15:20

## 2021-06-16 RX ADMIN — TAMSULOSIN HYDROCHLORIDE 0.4 MG: 0.4 CAPSULE ORAL at 08:59

## 2021-06-16 RX ADMIN — METOPROLOL TARTRATE 50 MG: 50 TABLET, FILM COATED ORAL at 22:06

## 2021-06-16 RX ADMIN — FAMOTIDINE 20 MG: 20 TABLET ORAL at 08:59

## 2021-06-16 RX ADMIN — TRIAMCINOLONE ACETONIDE: 1 CREAM TOPICAL at 12:08

## 2021-06-16 RX ADMIN — FAMOTIDINE 20 MG: 20 TABLET ORAL at 18:11

## 2021-06-16 NOTE — PROGRESS NOTES
ACUTE PHYSICAL THERAPY GOALS:  (Developed with and agreed upon by patient and/or caregiver. )  LTG:  (1.)Mr. Oz Franco will move from supine to sit and sit to supine , scoot up and down and roll side to side in bed with MODIFIED INDEPENDENCE within 7 treatment day(s). Goal met 06/15/21    (2.)Mr. Oz Franco will transfer from bed to chair and chair to bed with SUPERVISION using the least restrictive device within 7 treatment day(s). (3.)Mr. Oz Franco will ambulate with SUPERVISION for 250 feet with the least restrictive device within 7 treatment day(s). Goal met 06/145/21  (4.)Mr. Oz Franco will perform seated and standing exercises for 15+ minutes to improve strength and mobility within 7 days. ________________________________________________________________________________________________      PHYSICAL THERAPY: Daily Note and PM Treatment Day # [address]. is a 77 y.o. male   PRIMARY DIAGNOSIS: Chest wall abscess  Cellulitis of chest wall [L03.313]  Procedure(s) (LRB):  LEFT CHEST WALL AND FLANK WOUND DEBRIDEMENT (N/A)  12 Days Post-Op    ASSESSMENT:     REHAB RECOMMENDATIONS: CURRENT LEVEL OF FUNCTION:  (Most Recently Demonstrated)   Recommendation to date pending progress:  Setting:   Short-term Rehab  Equipment:    Rolling Walker Bed Mobility:   Modified Independent  Sit to Stand:   Supervision  Transfers:   Supervision  Gait/Mobility:  [place] Assistance     ASSESSMENT:  Mr. Oz Franco is supine in bed and agreeable to getting up. Bed mobility is modified independent. Sitting and standing balance is good. Gait training with rolling walker x 1250 feet with slow but good karri. Patient is returned to the room and to the bathroom then back to the bed  with needs within reach. Made really good progress today. Still needs rehab as the patient does live alone. Will continue PT efforts. SUBJECTIVE:   Mr. Oz Franco states, Metgrzegorz Henriquez you ready. \"    SOCIAL HISTORY/ LIVING ENVIRONMENT: see eval  Home Environment: Private residence  One/Two Story Residence: One story  Living Alone: No  Support Systems: Spouse/Significant Other/Partner  OBJECTIVE:     PAIN: VITAL SIGNS: LINES/DRAINS:   Pre Treatment: Pain Screen  Pain Scale 1: Numeric (0 - 10)  Pain Intensity 1: 0  Post Treatment: 0/10   Wound Vac  O2 Device: None (Room air)     MOBILITY: I Mod I S SBA CGA Min Mod Max Total  NT x2 Comments:   Bed Mobility    Rolling [x] [] [] [] [] [] [] [] [] [] []    Supine to Sit [x] [] [] [] [] [] [] [] [] [] []    Scooting [x] [] [] [] [] [] [] [] [] [] []    Sit to Supine [x] [] [] [] [] [] [] [] [] [] []    Transfers    Sit to Stand [] [] [x] [] [] [] [] [] [] [] []    Bed to Chair [] [] [] [] [] [] [] [] [] [x] []    Stand to Sit [] [] [x] [] [] [] [] [] [] [] []    I=Independent, Mod I=Modified Independent, S=Supervision, SBA=Standby Assistance, CGA=Contact Guard Assistance,   Min=Minimal Assistance, Mod=Moderate Assistance, Max=Maximal Assistance, Total=Total Assistance, NT=Not Tested    BALANCE: Good Fair+ Fair Fair- Poor NT Comments   Sitting Static [x] [] [] [] [] []    Sitting Dynamic [x] [] [] [] [] []              Standing Static [x] [] [] [] [] []    Standing Dynamic [x] [] [] [] [] []      GAIT: I Mod I S SBA CGA Min Mod Max Total  NT x2 Comments:   Level of Assistance [] [] [] [x] [] [] [] [] [] [] []    Distance 1250 feet     DME Rolling Walker    Gait Quality slow    Weightbearing  Status N/A     I=Independent, Mod I=Modified Independent, S=Supervision, SBA=Standby Assistance, CGA=Contact Guard Assistance,   Min=Minimal Assistance, Mod=Moderate Assistance, Max=Maximal Assistance, Total=Total Assistance, NT=Not Tested    PLAN:   FREQUENCY/DURATION: PT Plan of Care: 2 times/week for duration of hospital stay or until stated goals are met, whichever comes first.  TREATMENT:     TREATMENT:   ($$ Therapeutic Activity: 23-37 mins    )  Therapeutic Activity (23 Minutes):  Therapeutic activity included Rolling, Supine to Sit, Sit to Supine, Scooting, Transfer Training, Ambulation on level ground, Sitting balance  and Standing balance to improve functional Mobility, Strength and Activity tolerance.     TREATMENT GRID:  N/A    AFTER TREATMENT POSITION/PRECAUTIONS:  Bed, Needs within reach and RN notified    INTERDISCIPLINARY COLLABORATION:  RN/PCT and PT/PTA    TOTAL TREATMENT DURATION:  PT Patient Time In/Time Out  Time In: 1400  Time Out: Ryan [address] Karissa-Jennifer, PTA

## 2021-06-16 NOTE — PROGRESS NOTES
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 Will plan to start minocycline 100 mg PO BID. He is overall doing better. He will need outpatient ID follow up in a few weeks. Anti-infectives:   · Dapto (-6/15)  · Vanc (-)  · Ceftriaxone (-) (-)    Subjective: Interval History: Daptomycin discontinued yesterday due to worsening rash. Afebrile. WBCs 7.6k, abs eos 0.2, creatinine 0.93, CRP 4.4. Denies nausea, vomiting, diarrhea, fevers, chills, sweats. Reports improvement in rash, still itching but not as much. Denies other complaints. Allergies   Allergen Reactions    Vancomycin Rash        Review of Systems:  Pertinent items are noted in the History of Present Illness.     Objective:     Visit Vitals  /81   Pulse 72   Temp 97.9 °F (36.6 °C)   Resp 18   Ht 5' 11\" (1.803 m)   Wt 71.7 kg (158 lb)   SpO2 97%   BMI 22.04 kg/m²     Temp (24hrs), Av °F (36.7 °C), Min:97.4 °F (36.3 °C), Max:98.4 °F (36.9 °C)     General:  Alert, no acute distress, appears stated age, well nourished and well developed; hard of hearing  Head:    Normocephalic, atraumatic  Eyes:   Anicteric sclerae, no drainage, not injected, EOMI  Mouth:  Moist mucosa  Neck:   Supple, symmetrical, trachea midline, no JVD  Lungs:   Clear without increased work of breathing or audible wheezes  CV:   Regular rate and rhythm without audible murmur  Abdomen:  Soft, non tender, not distended, active bowel sounds  Extremities:  No cyanosis or edema  Musculoskeletal: Moves all extremities with equal strength, no deformity  Skin:   Faint pink rash over bilateral lower extremities, perineal area and abdomen; extensive wound vac from L chest wall under axilla, and scapula  Psych:  Alert, oriented and appropriate without evidence of thought disorder  Lines:    benign      Data Review:     CBC:  Recent Labs     06/15/21  1240   WBC 7.6   GRANS 73   MONOS 7   EOS 3   ANEU 5.5   ABL 1.2   HGB 12.5*   HCT 38.3*          BMP:  Recent Labs     06/15/21  1240   CREA 0.93   BUN 13      K 4.4      CO2 28   AGAP 4*   *       LFTS:  Recent Labs     06/15/21  1240   TBILI 0.2   ALT 41      TP 7.1   ALB 2.5*       Microbiology:     All Micro Results     Procedure Component Value Units Date/Time    COVID-19 RAPID TEST [[id number]] Collected: 06/09/21 1445    Order Status: Completed Specimen: Nasopharyngeal Updated: 06/09/21 1539     Specimen source NASAL        COVID-19 rapid test Not detected        Comment:      The specimen is NEGATIVE for SARS-CoV-2, the novel coronavirus associated with COVID-19. A negative result does not rule out COVID-19. This test has been authorized by the FDA under an Emergency Use Authorization (EUA) for use by authorized laboratories.         Fact sheet for Healthcare Providers: ConventionUpdate.co.nz  Fact sheet for Patients: ConventionUpdate.co.nz       Methodology: Isothermal Nucleic Acid Amplification         CULTURE, ANAEROBIC [[id number]] Collected: 06/02/21 1740    Order Status: Completed Specimen: Abscess Updated: 06/09/21 0904     Special Requests: NO SPECIAL REQUESTS        Culture result:       NO ANAEROBES ISOLATED 7 DAYS          CULTURE, WOUND Vania Pott STAIN [[id number]]  (Abnormal)  (Susceptibility) Collected: 06/02/21 1740    Order Status: Completed Specimen: Abscess Updated: 06/05/21 0810     Special Requests: NO SPECIAL REQUESTS        GRAM STAIN 0 TO 10 WBCS SEEN PER OIF      FEW GRAM POSITIVE COCCI        Culture result:       MODERATE * METHICILLIN RESISTANT STAPHYLOCOCCUS AUREUS *            PATIENT IS A KNOWN MRSA       CULTURE, BODY FLUID [address] [[id number]] Collected: 21 1830    Order Status: Canceled Specimen: Body Fluid from Abdominal Fluid     CULTURE, Cleavon Quigley STAIN [[id number]]  (Abnormal)  (Susceptibility) Collected: 21 0917    Order Status: Completed Specimen: Wound from Abscess Updated: 21 0838     Special Requests: NO SPECIAL REQUESTS        GRAM STAIN 20 TO 40 WBCS/OIF            MODERATE GRAM POSITIVE COCCI           Culture result:       HEAVY * METHICILLIN RESISTANT STAPHYLOCOCCUS AUREUS *                  RESULTS VERIFIED, PHONED TO AND READ BACK BY  ISAURO SHAVER RN @ 0294 ON 21 AK. SARS-COV-2, PCR [[id number]] Collected: 21 1229    Order Status: Completed Specimen: Nasopharyngeal Updated: 21 0629     Specimen source Nasopharyngeal        SARS-CoV-2 Not detected        Comment:      The specimen is NEGATIVE for SARS-CoV-2, the novel coronavirus associated with COVID-19. This test has been authorized by the FDA under an Emergency Use Authorization (EUA) for use by authorized laboratories. Fact sheet for Healthcare Providers: ConventionUpdate.co.nz       Fact sheet for Patients: ConventionUpdate.co.nz       Methodology: RT-PCR               Imagin21 CT c/a/p: IMPRESSION     Large soft tissue defect in the previously seen area of abscess. Though there is  some fat stranding present, no large drainable fluid collection is present. There is a small air-fluid collection seen just anterior to the left subclavian  vessels which persists.     Signed By: Pam Sellers NP     2021

## 2021-06-16 NOTE — WOUND CARE
Patient seen for wound VAC dressing change to left chest and axilla wounds. Both sites are granular and pocket areas can be collapsed. Patient tolerated procedure much better today. Multiple layers of skin prep used around destinee wound skin that accounted for improvement in removal pain. Updated patient on status. Bridged wounds to 1 VAC machine. Wound team ill follow, discussed with care manager with possible rehab discharge noted.

## 2021-06-16 NOTE — PROGRESS NOTES
ID has changed patient antibiotic to PO. CM notified STR liaison who is checking to see if patient can go today. Once CM gets word CM will notify Dr. Kaylee Thayer. Per Dr. Kaylee Thayer patient does not need LTACH so there will not be a peer to peer for this.

## 2021-06-16 NOTE — PROGRESS NOTES
Pt refused the kenalog cream stating \"that irritates my skin\"  Pt stated that he has been using his own gold bond lotion stating \"that seems to work better and I can feel it as soon as it is put on my skin\"  Will notify primary nurse

## 2021-06-16 NOTE — PROGRESS NOTES
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 Sinus tach/AT - likely driven from underlying medical illness/severe inflammation. Has responded to beta blockers. EF recently reviewed by echo and normal, 55--60%. Continue beta blocker. 2. Complex hematoma/cellulitis s/p debridement - per surgery. On Wound vac. 3. Fever - resolved. 4. Confusion - per primary. Thank you for allowing me to participate in the electrophysiologic care of this most pleasant patient. Please feel free to contact me if there are any questions or concerns. Iveth Batista. MD [place], [place]  Clinical Cardiac Electrophysiology  [facility]    Subjective:   No complaints this AM, no chest pain or shortness of breath    Interval History: (History of pertinent interval events obtained from nursing staff)    ROS:  GEN:  No fever or chills  Cardiovascular:  As noted above  Pulmonary:  As noted above  Neuro:  No new focal motor or sensory loss      Objective:     Vitals:    06/16/21 0000 06/16/21 0259 06/16/21 0400 06/16/21 0719   BP:  131/81  90/60   Pulse: 99 72 72 72   Resp:  18  18   Temp:  97.9 °F (36.6 °C)  98.2 °F (36.8 °C)   SpO2:  97%  94%   Weight:       Height:           Physical Exam:  General - ill-appearing.    Neck- supple, no JVD  CV- regular rate and rhythm no MRG  Lung- clear bilaterally  Abd- soft, nontender, nondistended  Ext- no edema bilaterally. Skin- warm and dry    Current Facility-Administered Medications   Medication Dose Route Frequency    minocycline (MINOCIN, DYNACIN) capsule 100 mg  100 mg Oral BID    triamcinolone acetonide (KENALOG) 0.1 % cream   Topical TID    metoprolol tartrate (LOPRESSOR) tablet 50 mg  50 mg Oral Q12H    HYDROmorphone (DILAUDID) injection 0.2-0.4 mg  0.2-0.4 mg IntraVENous Q3H PRN    loratadine (CLARITIN) tablet 10 mg  10 mg Oral DAILY    diphenhydrAMINE (BENADRYL) injection 25 mg  25 mg IntraVENous Q8H PRN    famotidine (PEPCID) tablet 20 mg  20 mg Oral BID    alcohol 62% (NOZIN) nasal  1 Ampule  1 Ampule Topical Q8H    zinc oxide-cod liver oil (DESITIN) 40 % paste   Topical Q8H    acetaminophen (TYLENOL) tablet 1,000 mg  1,000 mg Oral Q6H PRN    ondansetron (ZOFRAN) injection 4 mg  4 mg IntraVENous Q4H PRN    HYDROcodone-acetaminophen (NORCO) 5-325 mg per tablet 1 Tablet  1 Tablet Oral Q6H PRN    tamsulosin (FLOMAX) capsule 0.4 mg  0.4 mg Oral DAILY       Data Review:   Recent Results (from the past 24 hour(s))   CBC WITH AUTOMATED DIFF    Collection Time: 06/15/21 12:40 PM   Result Value Ref Range    WBC 7.6 4.3 - 11.1 K/uL    RBC 3.86 (L) 4.23 - 5.6 M/uL    HGB 12.5 (L) 13.6 - 17.2 g/dL    HCT 38.3 (L) 41.1 - 50.3 %    MCV 99.2 (H) 79.6 - 97.8 FL    MCH 32.4 26.1 - 32.9 PG    MCHC 32.6 31.4 - 35.0 g/dL    RDW 14.6 11.9 - 14.6 %    PLATELET 812 108 - 737 K/uL    MPV 9.6 9.4 - 12.3 FL    ABSOLUTE NRBC 0.00 0.0 - 0.2 K/uL    DF AUTOMATED      NEUTROPHILS 73 43 - 78 %    LYMPHOCYTES 16 13 - 44 %    MONOCYTES 7 4.0 - 12.0 %    EOSINOPHILS 3 0.5 - 7.8 %    BASOPHILS 1 0.0 - 2.0 %    IMMATURE GRANULOCYTES 1 0.0 - 5.0 %    ABS. NEUTROPHILS 5.5 1.7 - 8.2 K/UL    ABS. LYMPHOCYTES 1.2 0.5 - 4.6 K/UL    ABS. MONOCYTES 0.5 0.1 - 1.3 K/UL    ABS. EOSINOPHILS 0.2 0.0 - 0.8 K/UL    ABS. BASOPHILS 0.1 0.0 - 0.2 K/UL    ABS. IMM.  GRANS. 0.1 0.0 - 0.5 K/UL   METABOLIC PANEL, COMPREHENSIVE Collection Time: 06/15/21 12:40 PM   Result Value Ref Range    Sodium 137 136 - 145 mmol/L    Potassium 4.4 3.5 - 5.1 mmol/L    Chloride 105 98 - 107 mmol/L    CO2 28 21 - 32 mmol/L    Anion gap 4 (L) 7 - 16 mmol/L    Glucose 122 (H) 65 - 100 mg/dL    BUN 13 8 - 23 MG/DL    Creatinine 0.93 0.8 - 1.5 MG/DL    GFR est AA >60 >60 ml/min/1.73m2    GFR est non-AA >60 >60 ml/min/1.73m2    Calcium 8.3 8.3 - 10.4 MG/DL    Bilirubin, total 0.2 0.2 - 1.1 MG/DL    ALT (SGPT) 41 12 - 65 U/L    AST (SGOT) 23 15 - 37 U/L    Alk. phosphatase 100 50 - 136 U/L    Protein, total 7.1 6.3 - 8.2 g/dL    Albumin 2.5 (L) 3.2 - 4.6 g/dL    Globulin 4.6 (H) 2.3 - 3.5 g/dL    A-G Ratio 0.5 (L) 1.2 - 3.5     CK    Collection Time: 06/15/21 12:40 PM   Result Value Ref Range    CK 27 21 - 215 U/L   C REACTIVE PROTEIN, QT    Collection Time: 06/15/21 12:40 PM   Result Value Ref Range    C-Reactive protein 4.4 (H) 0.0 - 0.9 mg/dL   EKG, 12 LEAD, INITIAL    Collection Time: 06/15/21  6:30 PM   Result Value Ref Range    Ventricular Rate 111 BPM    Atrial Rate 111 BPM    P-R Interval 162 ms    QRS Duration 80 ms    Q-T Interval 316 ms    QTC Calculation (Bezet) 429 ms    Calculated P Axis 56 degrees    Calculated R Axis 62 degrees    Calculated T Axis 62 degrees    Diagnosis       Sinus tachycardia  Otherwise normal ECG  No previous ECGs available  Confirmed by Veronika Amaya MD (), Ox[place] Stout ([id number]) on 6/15/2021 7:31:08 PM         EKG:  (EKG has been independently visualized by me with interpretation below): Sinus tachycardia, normal axis, no ischemia.

## 2021-06-16 NOTE — PROGRESS NOTES
H&P/Consult Note/Progress Note/Office Note:   Alcides Fan MRN: [id number]  :1945  Age:75 y.o.    HPI: Alcides Patton. is a 77 y.o. male who was initially referred in consultation by Ana Chung PA-C with a left chest wall hematoma and cellulitis   after a fall at home on the floor approx 21. He sustained a blunt injury to his left chest wall. CXR below showed no PTx or rib fractures. He taked Humira for arthritis and is immune suppressed. We saw him for the 1st time on 21 with erythema of the left chest wall and placed him on Bactrim and marked the cellulitis border. He was instructed to call if any fevers or worsening erythema and given f/u 4 days later  He did not call. When he came to the office on 21 he reported high fevers over 101 degrees associate with nausea with his Bactrim. The erythema had extended >20cm  beyond the original inscribed lines of his left chest wall and extended down his left flank and in his left lower quadrant. We was admitted directly from office. He denies any new falls since 21. On 21 he also had a new hematoma involving his right flank which was not present on 21 (likely a second fall)      21 CXR, 2 views  Clear lungs. The cardiac and mediastinal contours and pulmonary vascularity are normal.   The bones and soft tissues are within normal limits. No evidence of a pneumothorax. IMPRESSION:  Normal chest.      21 CT chest/abd/pelvis with oral and IV contrast  Hx: MRSA cellulitis of left chest wall abscess and fever.     CHEST:   -LUNGS: No lobar pneumonia. Linear atelectasis left base. -AIRWAYS: Trachea and proximal bronchi grossly patent. -PLEURA: Small left pleural effusion. -LYMPH NODES: No enlarged axillary, hilar or mediastinal lymph nodes.   -HEART: Normal size.     -SKELETAL/CHEST WALL: Along left chest wall and body wall there is a complex,  peripherally enhancing fluid collection which measures approximately 32 cm  superior inferior, 17 cm wide and 4 cm thick. There are several bubbles of gas  within it. Larger bubbles of gas extend up into the axilla.     ABDOMEN/PELVIS:  -LIVER: Normal in size and appearance. -GALLBLADDER: No gallstones identified  -BILE DUCTS: Not dilated.     -PANCREAS: Normal.  -SPLEEN: Normal.     -ADRENALS: Normal.  -KIDNEYS/URETERS: Kidneys enhance symmetrically. There are multiple bilateral  parapelvic cysts versus bilateral UPJ obstructions. -BLADDER: Normal.  -REPRODUCTIVE ORGANS: Prostate and seminal vesicles unremarkable.     -BOWEL: Normal caliber. No inflammatory changes. Scattered diverticula. -LYMPH NODES: No significant retroperitoneal, mesenteric, or pelvic adenopathy. -BONES: Degenerative changes. No periostitis. -VASCULATURE: Aorta is normal caliber and mildly calcified. -OTHER: No ascites.     IMPRESSION  Large left abscess, 32 cm x 17 cm x 4 cm which extends from the axilla to the iliac crest.        6/13/21 CT chest/abd/pelvis without contrast  CT chest: There has been interval debridement of a large left axillary/left  flank fluid collection with a large open soft tissue defect, but no residual  drainable fluid collection throughout the majority of the previously seen  abnormality. There is a small air and fluid collection seen just anterior to the  subclavian vessels on the left. There is no pneumothorax. No adenopathy. CT ABDOMEN: No contour deforming abnormality of the liver or spleen. No change in the appearance of the kidneys, adrenal glands, pancreas, or gallbladder.     CT PELVIS: Air seen nondependently in the bladder. The rectum is normal. No pelvic adenopathy.     Bone window evaluation demonstrates no aggressive osseous lesions.     IMPRESSION  Large soft tissue defect in the previously seen area of abscess. Though there is  some fat stranding present, no large drainable fluid collection is present.   There is a small air-fluid collection seen just anterior to the left subclavian vessels which persists           Additional hx:  5/27/21 wound culture growing MRSA; Pharmacy dosing IV Vanc; trough tonight  5/28/21  Less Confused; no fever; WBC elevated but better;  on IV Abx  5/29/21  Resting in bed; no fever; WBC 12.3;  on IV Abx  5/30/21  Sitting on side of bed; no fever; WBC 13.8;  on IV Abx  5/31/21 AF; wbc still elevated; left hemithorax , left flank and LLQ edematous and indurated  6/1/21 continues IV Vanc; WBC higher at 12.2k   6/2/21 IV Vanc; CT as above with large abscess; I&D/debridment #1 in OR today  6/3/21 IV Vanc/Rocephin; OR culture pending  6/4/21 plan return to OR today for debridement #2; unless pt signs out AMA    6/5/21 POD 3/1; Pt resting in bed. No complaints voiced. Appears comfortable. Postop dressing c/d/i.   6/6/21 POD 4/2; Pt sitting on side of bed. No complaints voiced. Appears comfortable. Dressing c/di  6/7/21 POD 5/3;  Comfortable; dressing dry; wound vac started today; IV Vanc; Likely SNF soon  6/8/21 POD 6/4 Feels OK; ID rec IV Vanc until 6/21/21; wound vac will be changed 3x/week; Vanc changed to IV Dapto by ID  6/9/21 POD 7/5; Cont IV Daptomycin; wound vac 3 times a week; consult PT; OOB daily  6/10/21 POD8/6 Cont IV Dapto; wound vac change 3 x a week; PT; OOB daily; feeling better  6/11/21 POD 9/7; Cont IV Dapto; wound vac changed today; PT following  6/12/21 POD10/8; Cont IV Dapto; wound vac q M/W/F; inpt PT  6/13/21 POD11/9; Cont IV Dapto; wound vac q M/W/F; CT today to reassess  6/14/21 POD12/10 Cont IV Dapto; CT as above; looks well drained.  Collection anterior to subclavian looks close enough to decompress into axillary wound  6/15/21 POD13/11; rash to IV Dapto; ID considering PO option,  SVT 120s, cardiology consulted              Past Medical History:   Diagnosis Date    Depression     H/O urinary frequency 2/1/2013    History of BPH 2/1/2013    HLD (hyperlipidemia) 2/1/2013    Hx: UTI (urinary tract infection) 2/1/2013    Insomnia     Psoriasis     Psoriatic arthritis ([facility] [address] 2/1/2013    Wears hearing aid 2/1/2013     No past surgical history on file.   Current Facility-Administered Medications   Medication Dose Route Frequency    minocycline (MINOCIN, DYNACIN) capsule 100 mg  100 mg Oral BID    triamcinolone acetonide (KENALOG) 0.1 % cream   Topical TID    metoprolol tartrate (LOPRESSOR) tablet 50 mg  50 mg Oral Q12H    HYDROmorphone (DILAUDID) injection 0.2-0.4 mg  0.2-0.4 mg IntraVENous Q3H PRN    loratadine (CLARITIN) tablet 10 mg  10 mg Oral DAILY    diphenhydrAMINE (BENADRYL) injection 25 mg  25 mg IntraVENous Q8H PRN    famotidine (PEPCID) tablet 20 mg  20 mg Oral BID    alcohol 62% (NOZIN) nasal  1 Ampule  1 Ampule Topical Q8H    zinc oxide-cod liver oil (DESITIN) 40 % paste   Topical Q8H    acetaminophen (TYLENOL) tablet 1,000 mg  1,000 mg Oral Q6H PRN    ondansetron (ZOFRAN) injection 4 mg  4 mg IntraVENous Q4H PRN    HYDROcodone-acetaminophen (NORCO) 5-325 mg per tablet 1 Tablet  1 Tablet Oral Q6H PRN    tamsulosin (FLOMAX) capsule 0.4 mg  0.4 mg Oral DAILY     Vancomycin  Social History     Socioeconomic History    Marital status: SINGLE     Spouse name: Not on file    Number of children: Not on file    Years of education: Not on file    Highest education level: Not on file   Tobacco Use    Smoking status: Former Smoker    Smokeless tobacco: Never Used    Tobacco comment: pt states quit in early 60's late 52's    Vaping Use    Vaping Use: Never used   Substance and Sexual Activity    Alcohol use: No     Alcohol/week: 0.0 standard drinks    Drug use: No    Sexual activity: Yes     Partners: Female     Social Determinants of Health     Financial Resource Strain:     Difficulty of Paying Living Expenses:    Food Insecurity:     Worried About Running Out of Food in the Last Year:     Ran Out of Food in the Last Year:    Transportation Needs:     Lack of Transportation (Medical):  Lack of Transportation (Non-Medical):    Physical Activity:     Days of Exercise per Week:     Minutes of Exercise per Session:    Stress:     Feeling of Stress :    Social Connections:     Frequency of Communication with Friends and Family:     Frequency of Social Gatherings with Friends and Family:     Attends Jain Services:     Active Member of Clubs or Organizations:     Attends Club or Organization Meetings:     Marital Status:      Social History     Tobacco Use   Smoking Status Former Smoker   Smokeless Tobacco Never Used   Tobacco Comment    pt states quit in early 60's late 52's      Family History   Problem Relation Age of Onset    Heart Attack Father     Cancer Father         skin     Other Sister     Cancer Sister     Other Brother         melanoma     ROS: The patient has no difficulty with chest pain or shortness of breath. No fever or chills. Comprehensive review of systems was otherwise unremarkable except as noted above. Physical Exam:   Visit Vitals  BP 95/66   Pulse 77   Temp 98.3 °F (36.8 °C)   Resp 20   Ht 5' 11\" (1.803 m)   Wt 158 lb (71.7 kg)   SpO2 96%   BMI 22.04 kg/m²     Vitals:    06/16/21 0800 06/16/21 1059 06/16/21 1140 06/16/21 1200   BP:  102/78 95/66    Pulse: 74 73 95 77   Resp:  20 20    Temp:  98.6 °F (37 °C) 98.3 °F (36.8 °C)    SpO2:  97% 96%    Weight:       Height:         [unfilled]  [unfilled]    Constitutional: Alert, oriented, cooperative patient in no acute distress; appears stated age    Eyes:Sclera are clear. EOMs intact  ENMT: no external lesions; he is hard of hearing. no obvious neck masses, no ear or lip lesions, nares normal  CV: RRR. Normal perfusion      Large open incision from left axillae to left iliac crest   Good granulation tissue, no sign debris  No surrounding cellulitis    Right flank hematoma stable    Resp: No JVD. Breathing is  non-labored; no audible wheezing.     GI: soft and non-distended Musculoskeletal: unremarkable with normal function. No embolic signs or cyanosis. Neuro:  Oriented to person, place and time;  moves all 4; no focal deficits; slow moving and slow talking. Psychiatric: blunted affect, no memory impairment    Recent vitals (if inpt):  @IPVITALS(24:)@    Amount and/or Complexity of Data Reviewed and Analyzed:  I reviewed and analyzed all of the unique labs and radiologic studies that are shown below as well as any that are in the HPI, and any that are in the expanded problem list below  *Each unique test, order, or document contributes to the combination of 2 or combination of 3 in Category 1 below. For this visit I also reviewed old records and prior notes. Recent Labs     06/15/21  1240   WBC 7.6   HGB 12.5*         K 4.4      CO2 28   BUN 13   CREA 0.93   *   TBILI 0.2   ALT 41        Review of most recent CBC  Lab Results   Component Value Date/Time    WBC 7.6 06/15/2021 12:40 PM    HGB 12.5 (L) 06/15/2021 12:40 PM    HCT 38.3 (L) 06/15/2021 12:40 PM    PLATELET 222 75/60/3599 12:40 PM    MCV 99.2 (H) 06/15/2021 12:40 PM       Review of most recent BMP  Lab Results   Component Value Date/Time    Sodium 137 06/15/2021 12:40 PM    Potassium 4.4 06/15/2021 12:40 PM    Chloride 105 06/15/2021 12:40 PM    CO2 28 06/15/2021 12:40 PM    Anion gap 4 (L) 06/15/2021 12:40 PM    Glucose 122 (H) 06/15/2021 12:40 PM    BUN 13 06/15/2021 12:40 PM    Creatinine 0.93 06/15/2021 12:40 PM    BUN/Creatinine ratio 19 03/31/2016 11:26 AM    GFR est AA >60 06/15/2021 12:40 PM    GFR est non-AA >60 06/15/2021 12:40 PM    Calcium 8.3 06/15/2021 12:40 PM       Review of most recent LFTs (and lipase if done)  Lab Results   Component Value Date/Time    ALT (SGPT) 41 06/15/2021 12:40 PM    AST (SGOT) 23 06/15/2021 12:40 PM    Alk.  phosphatase 100 06/15/2021 12:40 PM    Bilirubin, total 0.2 06/15/2021 12:40 PM     No results found for: LPSE    No results found for: INR, APTT, CBIL, LCAD, NH4, TROPT, TROIQ, INREXT, INREXT    Review of most recent HgbA1c  Lab Results   Component Value Date/Time    Hemoglobin A1c 4.9 06/05/2021 02:21 PM       Nutritional assessment screen for wound healing issues:  Lab Results   Component Value Date/Time    Protein, total 7.1 06/15/2021 12:40 PM    Albumin 2.5 (L) 06/15/2021 12:40 PM       @lastcovr@  XR Results (most recent):  Results from Hospital Encounter encounter on 05/20/21    XR CHEST PA LAT    Narrative  EXAM: XR CHEST PA LAT    INDICATION: Left chest wall hematoma with cellulitis rule out rib fracture or  pneumothorax after a fall at home on the floor. COMPARISON: None. FINDINGS: PA and lateral radiographs of the chest demonstrate clear lungs. The  cardiac and mediastinal contours and pulmonary vascularity are normal. The bones  and soft tissues are within normal limits. No evidence of a pneumothorax. Impression  Normal chest.      CT Results (most recent):  Results from Hospital Encounter encounter on 05/24/21    CT CHEST ABD PELV WO CONT    Narrative  History: Large open wound left axilla with left chest, flank, and iliac pain. Multiple debridements. EXAM: CT chest, abdomen, and pelvis without contrast    TECHNIQUE: Thin section axial CT images are obtained from the thoracic inlet  through the pubic symphysis. Radiation dose reduction techniques were used for  this study. Our CT scanners use one or all of the following: Automated exposure  control, adjustment of the mA and/or kV according to patient size, use of  iterative reconstruction. COMPARISON: 6/2/2021    FINDINGS:    CT CHEST: There has been interval debridement of a large left axillary/left  flank fluid collection with a large open soft tissue defect, but no residual  drainable fluid collection throughout the majority of the previously seen  abnormality. There is a small air and fluid collection seen just anterior to the  subclavian vessels on the left.  There is no pneumothorax. No adenopathy. CT ABDOMEN: No contour deforming abnormality of the liver or spleen. No change  in the appearance of the kidneys, adrenal glands, pancreas, or gallbladder. CT PELVIS: Air seen nondependently in the bladder. The rectum is normal. No  pelvic adenopathy. Bone window evaluation demonstrates no aggressive osseous lesions. Impression  Large soft tissue defect in the previously seen area of abscess. Though there is  some fat stranding present, no large drainable fluid collection is present. There is a small air-fluid collection seen just anterior to the left subclavian  vessels which persists. US Results (most recent):  No results found for this or any previous visit. Admission date (for inpatients): 5/24/2021   * No surgery found *  Procedure(s):  LEFT CHEST WALL AND FLANK WOUND DEBRIDEMENT        ASSESSMENT/PLAN:  Problem List  Date Reviewed: 5/24/2021        Codes Class Noted    SVT (supraventricular tachycardia) ([facility] [address] ICD-10-CM: I47.1  ICD-9-CM: 427.89  6/15/2021        Confusion ICD-10-CM: R41.0  ICD-9-CM: 298.9  5/27/2021        Sundowning ICD-10-CM: F05  ICD-9-CM: Rene Salaam  5/27/2021        Leukocytosis ICD-10-CM: Z66.699  ICD-9-CM: 288.60  5/26/2021        Acute renal insufficiency ICD-10-CM: N28.9  ICD-9-CM: 593.9  5/25/2021        Fever ICD-10-CM: R50.9  ICD-9-CM: 780.60  5/24/2021        Suppression of immune system subtherapeutic (HCC) ICD-10-CM: D89.89  ICD-9-CM: 279.8  5/24/2021        Right flank hematoma ICD-10-CM: S30. 1XXA  ICD-9-CM: 922.2  5/24/2021        * (Principal) Chest wall abscess ICD-10-CM: L02.213  ICD-9-CM: 682.2  5/20/2021        Hematoma of left chest wall ICD-10-CM: G13.933H  ICD-9-CM: 922.1  5/20/2021        Cellulitis of chest wall ICD-10-CM: L03.313  ICD-9-CM: 682.2  5/20/2021        Fall ICD-10-CM: J20. [facility]  ICD-9-CM: I237.9  5/20/2021        Psoriasis ICD-10-CM: L40.9  ICD-9-CM: 696.1  Unknown        Insomnia ICD-10-CM: G47.00  ICD-9-CM: 780.52  Unknown        Depression ICD-10-CM: F32.9  ICD-9-CM: 930  Unknown        Tachycardia ICD-10-CM: R00.0  ICD-9-CM: 785.0  6/13/2016        Murmur ICD-10-CM: R01.1  ICD-9-CM: 785.2  6/13/2016        Psoriatic arthritis ([facility] [address] ICD-10-CM: L40.50  ICD-9-CM: 696.0  2/1/2013        Wears hearing aid ICD-10-CM: Z97.4  ICD-9-CM: V45.89  2/1/2013        HLD (hyperlipidemia) ICD-10-CM: E78.5  ICD-9-CM: 272.4  2/1/2013        H/O urinary frequency ICD-10-CM: Y50.671  ICD-9-CM: V13.09  2/1/2013        History of BPH ICD-10-CM: Y02.553  ICD-9-CM: V13.89  2/1/2013            Principal Problem:    Chest wall abscess (5/20/2021)    Active Problems:    Cellulitis of chest wall (5/20/2021)      Fall (5/20/2021)      Fever (5/24/2021)      Suppression of immune system subtherapeutic ([facility] [address] (5/24/2021)      Right flank hematoma (5/24/2021)      Acute renal insufficiency (5/25/2021)      Leukocytosis (5/26/2021)      Confusion (5/27/2021)      Sundowning (5/27/2021)      SV[facility]ricular tachycardia) (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 Considering home on PO abx Friday vs SNF on PO abx (if PO abx OK with ID)  Pt not sure if he is string enough to go home.   He said he will let me know Thursday      He went to OR for serial debridements  6/2/21 OR for debridement #1  6/4/21 OR for debridement/washout/dressing change #2    6/13/21 repeat CT as above looks well drained but there is a gas collection with no fluid anterior to subclavian vessels adjacent to the axillary wound  May need repeat CT at some point to follow-up on this area  I spent 20min doing a peer to peer review on 6/14/21 for SNF placement which was approved after he completes IV Dapto if pt needs SNF at that time    SVT to 120s  New as of 6/15/21  I ordered cardiac monitoring and cardiology consult to help  Cardiology ordered beta blocker     Chronic immune suppression  Continue to hold his methotrexate, prednisone, and Humira to help reduce his immune suppression during this acute phase of infection. Dermatitis around left chest wall wound from moisture maceration  Desitin ordered to destinee-wound skin    Confusion/sundowning at night-->resolved  For many days he was uncooperative and pulling IV sites   Sitters were required  This has improving significantly  May no longer require sitter              Level of MDM (2/3 elements below)  Number and Complexity of Problems Addressed Amount and/or Complexity of Data to be Reviewed and Analyzed  *Each unique test, order, or document contributes to the combination of 2 or combination of 3 in Category 1 below.  Risk of Complications and/or Morbidity or Mortality of pt Management     88441  93762 SF Minimal  1self-limited or minor problem Minimal or none Minimal risk of morbidity from additional diagnostic testing or Rx   29955  80108 Low Low  2or more self-limited or minor problems;    or  1stable chronic illness;    or  [id number], uncomplicated illness or injury   Limited  (Must meet the requirements of at least 1 of the 2 categories)  Category 1: Tests and documents   Any combination of 2 from the following:  Review of prior external note(s) from each unique source*;  review of the result(s) of each unique test*;   ordering of each unique test*    or   Category 2: Assessment requiring an independent historian(s)  (For the categories of independent interpretation of tests and discussion of management or test interpretation, see moderate or high) Low risk of morbidity from additional diagnostic testing or treatment     30116  15735 Mod Moderate  1or more chronic illnesses with exacerbation, progression, or side effects of treatment;    or  2or more stable chronic illnesses;    or  1undiagnosed new problem with uncertain prognosis;    or  1acute illness with systemic symptoms;    or  [id number] complicated injury   Moderate  (Must meet the requirements of at least 1 out of 3 categories)  Category 1: Tests, documents, or independent historian(s)  Any combination of 3 from the following:   Review of prior external note(s) from each unique source*;  Review of the result(s) of each unique test*;  Ordering of each unique test*;  Assessment requiring an independent historian(s)    or  Category 2: Independent interpretation of tests   Independent interpretation of a test performed by another physician/other qualified health care professional (not separately reported);     or  Category 3: Discussion of management or test interpretation  Discussion of management or test interpretation with external physician/other qualified health care professional/appropriate source (not separately reported)   Moderate risk of morbidity from additional diagnostic testing or treatment  Examples only:  Prescription drug management   Decision regarding minor surgery with identified patient or procedure risk factors  Decision regarding elective major surgery without identified patient or procedure risk factors   Diagnosis or treatment significantly limited by social determinants of health       05953  19538 High High  1or more chronic illnesses with severe exacerbation, progression, or side effects of treatment;    or  1 acute or chronic illness or injury that poses a threat to life or bodily function   Extensive  (Must meet the requirements of at least 2 out of 3 categories)  Category 1: Tests, documents, or independent historian(s)  Any combination of 3 from the following:   Review of prior external note(s) from each unique source*;  Review of the result(s) of each unique test*;   Ordering of each unique test*;   Assessment requiring an independent historian(s)    or   Category 2: Independent interpretation of tests   Independent interpretation of a test performed by another physician/other qualified health care professional (not separately reported);     or  Category 3: Discussion of management or test interpretation  Discussion of management or test interpretation with external physician/other qualified health care professional/appropriate source (not separately reported)   High risk of morbidity from additional diagnostic testing or treatment  Examples only:  Drug therapy requiring intensive monitoring for toxicity  Decision regarding elective major surgery with identified patient or procedure risk factors  Decision regarding emergency major surgery  Decision regarding hospitalization  Decision not to resuscitate or to de-escalate care because of poor prognosis             I have personally performed a face-to-face diagnostic evaluation and management  service on this patient. I have independently seen the patient. I have independently obtained the above history from the patient/family. I have independently examined the patient with above findings. I have independently reviewed data/labs for this patient and developed the above plan of care (MDM). Signed: Benito Hart.  Kaylee Thayer MD, FACS

## 2021-06-17 LAB
COVID-19 RAPID TEST, COVR: NOT DETECTED
SOURCE, COVRS: NORMAL

## 2021-06-17 PROCEDURE — 74011250637 HC RX REV CODE- 250/637: Performed by: NURSE PRACTITIONER

## 2021-06-17 PROCEDURE — 99232 SBSQ HOSP IP/OBS MODERATE 35: CPT | Performed by: INTERNAL MEDICINE

## 2021-06-17 PROCEDURE — 97530 THERAPEUTIC ACTIVITIES: CPT

## 2021-06-17 PROCEDURE — 74011250637 HC RX REV CODE- 250/637: Performed by: SURGERY

## 2021-06-17 PROCEDURE — 65660000000 HC RM CCU STEPDOWN

## 2021-06-17 PROCEDURE — 99232 SBSQ HOSP IP/OBS MODERATE 35: CPT | Performed by: SURGERY

## 2021-06-17 PROCEDURE — 87635 SARS-COV-2 COVID-19 AMP PRB: CPT

## 2021-06-17 PROCEDURE — 77030040393 HC DRSG OPTIFOAM GENT MDII -B

## 2021-06-17 RX ADMIN — HYDROCODONE BITARTRATE AND ACETAMINOPHEN 1 TABLET: 5; 325 TABLET ORAL at 09:11

## 2021-06-17 RX ADMIN — MINOCYCLINE HYDROCHLORIDE 100 MG: 50 CAPSULE ORAL at 09:11

## 2021-06-17 RX ADMIN — FAMOTIDINE 20 MG: 20 TABLET ORAL at 17:08

## 2021-06-17 RX ADMIN — HYDROCODONE BITARTRATE AND ACETAMINOPHEN 1 TABLET: 5; 325 TABLET ORAL at 23:28

## 2021-06-17 RX ADMIN — Medication 1 AMPULE: at 17:08

## 2021-06-17 RX ADMIN — METOPROLOL TARTRATE 50 MG: 50 TABLET, FILM COATED ORAL at 21:49

## 2021-06-17 RX ADMIN — Medication 1 AMPULE: at 21:49

## 2021-06-17 RX ADMIN — Medication 1 AMPULE: at 06:39

## 2021-06-17 RX ADMIN — FAMOTIDINE 20 MG: 20 TABLET ORAL at 09:11

## 2021-06-17 RX ADMIN — HYDROCODONE BITARTRATE AND ACETAMINOPHEN 1 TABLET: 5; 325 TABLET ORAL at 17:08

## 2021-06-17 RX ADMIN — TAMSULOSIN HYDROCHLORIDE 0.4 MG: 0.4 CAPSULE ORAL at 09:11

## 2021-06-17 RX ADMIN — HYDROCODONE BITARTRATE AND ACETAMINOPHEN 1 TABLET: 5; 325 TABLET ORAL at 03:25

## 2021-06-17 RX ADMIN — MINOCYCLINE HYDROCHLORIDE 100 MG: 50 CAPSULE ORAL at 17:08

## 2021-06-17 RX ADMIN — LORATADINE 10 MG: 10 TABLET ORAL at 09:00

## 2021-06-17 NOTE — PROGRESS NOTES
[facility] CARDIOLOGY PROGRESS NOTE           6/17/2021 4:19 PM    Admit Date: 5/24/2021      Subjective:     No overnight events. No issues with arrhythmias. Denies any chest discomfort. ROS:  Cardiovascular:  As noted above    Objective:      Vitals:    06/17/21 0800 06/17/21 1144 06/17/21 1200 06/17/21 1559   BP:  101/63  107/67   Pulse: 65 68 68 64   Resp:  18  17   Temp:  98.4 °F (36.9 °C)  98.1 °F (36.7 °C)   SpO2:  96%  97%   Weight:       Height:           Physical Exam:  General-No Acute Distress  Neck- supple, no JVD  CV- regular rate and rhythm no MRG  Lung- clear bilaterally  Abd- soft, nontender, nondistended  Ext- no edema bilaterally. Skin- warm and dry    Data Review:   Recent Labs     06/15/21  1240      K 4.4   BUN 13   CREA 0.93   *   WBC 7.6   HGB 12.5*   HCT 38.3*          Assessment/Plan:     Principal Problem:    Chest wall abscess (5/20/2021)  -Per primary team.    Active Problems:    Cellulitis of chest wall (5/20/2021)      Fall (5/20/2021)      Fever (5/24/2021)      Suppression of immune system subtherapeutic (HCC) (5/24/2021)      Right flank hematoma (5/24/2021)      Acute renal insufficiency (5/25/2021)      Leukocytosis (5/26/2021)      Confusion (5/27/2021)      Sundowning (5/27/2021)      SVT (supraventricular tachycardia) ([address] (6/15/2021)  -In the setting of acute underlying issues. Review of prior records from 2016 with holter with also noted short runs of atrial tachycardia. Continue beta-blocker therapy. Echo with preserved EF. No further cardiac work-up needed at this time. We will sign off. Please call if questions.         Oneida Klein MD  6/17/2021 4:19 PM

## 2021-06-17 NOTE — PROGRESS NOTES
Spiritual Care Visit, initial visit.  isited with patient at bedside. Patient declined prayer, saying he believes he will be alright. Visit by Amilcar Tejeda, Staff .  Tawana., Mauro.B., B.A.

## 2021-06-17 NOTE — PROGRESS NOTES
TRINIDAD was informed by [facility] who stated authorization for STR and the outlier have been received and patient can discharge to the facility if medically stable. CM notified Dr. Juanito Flores and will await a response. ,

## 2021-06-17 NOTE — PROGRESS NOTES
ACUTE PHYSICAL THERAPY GOALS:  (Developed with and agreed upon by patient and/or caregiver. )  LTG:  (1.)Mr. Dontrell Fenton will move from supine to sit and sit to supine , scoot up and down and roll side to side in bed with MODIFIED INDEPENDENCE within 7 treatment day(s). Goal met 06/15/21    (2.)Mr. Dontrell Fenton will transfer from bed to chair and chair to bed with SUPERVISION using the least restrictive device within 7 treatment day(s). (3.)Mr. Dontrell Fenton will ambulate with SUPERVISION for 250 feet with the least restrictive device within 7 treatment day(s). Goal met   (4.)Mr. Dontrell Fenton will perform seated and standing exercises for 15+ minutes to improve strength and mobility within 7 days. ________________________________________________________________________________________________      PHYSICAL THERAPY: Daily Note and PM Treatment Day # 5    Asael Toure is a 77 y.o. male   PRIMARY DIAGNOSIS: Chest wall abscess  Cellulitis of chest wall [L03.313]  Procedure(s) (LRB):  LEFT CHEST WALL AND FLANK WOUND DEBRIDEMENT (N/A)  13 Days Post-Op    ASSESSMENT:     REHAB RECOMMENDATIONS: CURRENT LEVEL OF FUNCTION:  (Most Recently Demonstrated)   Recommendation to date pending progress:  [address] [place] [address] Therapy  Equipment:    Rolling Walker Bed Mobility:   Modified Independent  Sit to Stand:   Supervision  Transfers:   Supervision  Gait/Mobility:  Ellie Perrin Assistance     ASSESSMENT:  Mr. Dontrell Fenton sat to EOB with modified independent. Gait training with rolling walker x 1250 feet with slow but good karri. He is a little unsteady, more so as he begins to get tired. Patient is returned to the room and to the bathroom then back to the bed  with needs within reach. Will continue PT efforts. SUBJECTIVE:   Mr. Dontrell Fenton states \"Where's that lady that's been seeing me? Is she not coming today? She's got a clip that   clips the back of my gown closed\".     SOCIAL HISTORY/ LIVING ENVIRONMENT:   Home Environment: Private residence  One/Two Story Residence: One story  Living Alone: No  Support Systems: Spouse/Significant Other/Partner  OBJECTIVE:     PAIN: VITAL SIGNS: LINES/DRAINS:   Pre Treatment: Pain Screen  Pain Intensity 1: 0  Post Treatment: 0/10   Wound Vac  O2 Device: None (Room air)     MOBILITY: I Mod I S SBA CGA Min Mod Max Total  NT x2 Comments:   Bed Mobility    Rolling [] [] [] [] [] [] [] [] [] [] []    Supine to Sit [x] [] [] [] [] [] [] [] [] [] []    Scooting [x] [] [] [] [] [] [] [] [] [] []    Sit to Supine [x] [] [] [] [] [] [] [] [] [] []    Transfers    Sit to Stand [] [] [x] [] [] [] [] [] [] [] []    Bed to Chair [] [] [] [] [] [] [] [] [] [x] []    Stand to Sit [] [] [x] [] [] [] [] [] [] [] []    I=Independent, Mod I=Modified Independent, S=Supervision, SBA=Standby Assistance, CGA=Contact Guard Assistance,   Min=Minimal Assistance, Mod=Moderate Assistance, Max=Maximal Assistance, Total=Total Assistance, NT=Not Tested    BALANCE: Good Fair+ Fair Fair- Poor NT Comments   Sitting Static [x] [] [] [] [] []    Sitting Dynamic [x] [] [] [] [] []              Standing Static [] [x] [] [] [] []    Standing Dynamic [] [x] [] [] [] []      GAIT: I Mod I S SBA CGA Min Mod Max Total  NT x2 Comments:   Level of Assistance [] [] [] [x] [] [] [] [] [] [] []    Distance 1250 feet     DME Rolling Walker    Gait Quality slow    Weightbearing  Status N/A     I=Independent, Mod I=Modified Independent, S=Supervision, SBA=Standby Assistance, CGA=Contact Guard Assistance,   Min=Minimal Assistance, Mod=Moderate Assistance, Max=Maximal Assistance, Total=Total Assistance, NT=Not Tested    PLAN:   FREQUENCY/DURATION: PT Plan of Care: 2 times/week for duration of hospital stay or until stated goals are met, whichever comes first.  TREATMENT:     TREATMENT:   ($$ Therapeutic Activity: 23-37 mins    )  Therapeutic Activity (24 Minutes):  Therapeutic activity included Rolling, Supine to Sit, Sit to Supine, Scooting, Transfer Training, Ambulation on level ground, Sitting balance  and Standing balance to improve functional Mobility, Strength and Activity tolerance.     TREATMENT GRID:  N/A    AFTER TREATMENT POSITION/PRECAUTIONS:  Bed, Needs within reach and RN notified    INTERDISCIPLINARY COLLABORATION:  RN/PCT and PT/PTA    TOTAL TREATMENT DURATION:  PT Patient Time In/Time Out  Time In: 1510  Time Out: [address] Kristen, PTA

## 2021-06-17 NOTE — PROGRESS NOTES
END OF SHIFT NOTE:    INTAKE/OUTPUT  06/15 0701 - 06/16 0700  In: 840 [P.O.:840]  Out: 2750 [Urine:2500; Drains:250]  Voiding: YES  Catheter: NO  Drain:      Flatus: Patient does have flatus present. Stool:  1 occurrences. Characteristics:  Stool Assessment  Stool Color: Brown  Stool Appearance: Formed  Stool Amount: Medium  Stool Source/Status: Rectum    Emesis: 0 occurrences. Characteristics:        VITAL SIGNS  Patient Vitals for the past 12 hrs:   Temp Pulse Resp BP SpO2   06/16/21 1941 97.2 °F (36.2 °C) 69 20 111/67 96 %   06/16/21 1512 98.1 °F (36.7 °C) 81 20 93/61 96 %   06/16/21 1200 -- 77 -- -- --   06/16/21 1140 98.3 °F (36.8 °C) 95 20 95/66 96 %   06/16/21 1059 98.6 °F (37 °C) 73 20 102/78 97 %       Pain Assessment  Pain Intensity 1: 3 (06/16/21 1550)  Pain Location 1: Flank  Pain Intervention(s) 1: Medication (see MAR)  Patient Stated Pain Goal: 0    Ambulating  Yes    Shift report given to oncoming nurse at the bedside.     Nhung Blanco RN

## 2021-06-17 NOTE — PROGRESS NOTES
END OF SHIFT NOTE:    INTAKE/OUTPUT  06/16 0701 - 06/17 0700  In: 1680 [P.O.:1680]  Out: 1200 [Urine:1050; Drains:150]  Voiding: YES  Catheter: NO  Drain:        Flatus: Patient does have flatus present. Stool:  0 occurrences. Characteristics:  Stool Assessment  Stool Color: Brown  Stool Appearance: Formed  Stool Amount: Medium  Stool Source/Status: Rectum    Emesis: 0 occurrences. Characteristics:        VITAL SIGNS  Patient Vitals for the past 12 hrs:   Temp Pulse Resp BP SpO2   06/17/21 1600 -- 68 -- -- --   06/17/21 1559 98.1 °F (36.7 °C) 64 17 107/67 97 %   06/17/21 1200 -- 68 -- -- --   06/17/21 1144 98.4 °F (36.9 °C) 68 18 101/63 96 %   06/17/21 0800 -- 65 -- -- --       Pain Assessment  Pain Intensity 1: 0 (06/17/21 1500)  Pain Location 1: Flank  Pain Intervention(s) 1: Medication (see MAR), Emotional support, Repositioned (at 0325)  Patient Stated Pain Goal: 3    Ambulating  Yes    Shift report given to oncoming nurse at the bedside.     Raheem Marvin RN

## 2021-06-17 NOTE — PROGRESS NOTES
END OF SHIFT NOTE:    INTAKE/OUTPUT  06/16 0701 - 06/17 0700  In: 1680 [P.O.:1680]  Out: 1200 [Urine:1050; Drains:150]  Voiding: YES  Catheter: NO  Drain:              Flatus: Patient does not have flatus present. Stool:  0 occurrences. Characteristics:  Stool Assessment  Stool Color: Brown  Stool Appearance: Formed  Stool Amount: Medium  Stool Source/Status: Rectum    Emesis: 0 occurrences. Characteristics:        VITAL SIGNS  Patient Vitals for the past 12 hrs:   Temp Pulse Resp BP SpO2   06/17/21 0312 98.2 °F (36.8 °C) 79 20 108/70 95 %   06/16/21 2337 97.7 °F (36.5 °C) 75 20 114/72 95 %       Pain Assessment  Pain Intensity 1: 6 (06/17/21 0232)  Pain Location 1: Flank  Pain Intervention(s) 1: Medication (see MAR), Emotional support, Repositioned (at 0325)  Patient Stated Pain Goal: 0    Ambulating  No    Shift report given to oncoming nurse at the bedside.     Meagan Brandt RN  ]

## 2021-06-17 NOTE — PROGRESS NOTES
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 Continue minocycline 100 mg PO BID for four weeks, follow up with ID 21 at 2:30 pm with Karlos Nieto NP  Rash may be related to body wash wipes; patient reports these are used on his arms and legs but not torso. Anti-infectives:   · Dapto (-6/15)  · Vanc (-)  · Ceftriaxone (-) (-)    Subjective: Interval History: Afebrile, still complaining of rash, extending over arms and legs. Denies nausea, vomiting, diarrhea, fevers, chills, sweats. Allergies   Allergen Reactions    Vancomycin Rash        Review of Systems:  Pertinent items are noted in the History of Present Illness.     Objective:     Visit Vitals  /63   Pulse 68   Temp 98.4 °F (36.9 °C)   Resp 18   Ht 5' 11\" (1.803 m)   Wt 71.7 kg (158 lb)   SpO2 96%   BMI 22.04 kg/m²     Temp (24hrs), Av.8 °F (36.6 °C), Min:97.2 °F (36.2 °C), Max:98.4 °F (36.9 °C)     General:  Alert, no acute distress, appears stated age, well nourished and well developed, hard of hearing  Head:    Normocephalic, atraumatic  Eyes:   Anicteric sclerae, no drainage, not injected, EOMI  Mouth:  Moist mucosa  Neck:   Supple, symmetrical, trachea midline, no JVD  Lungs:   Clear without increased work of breathing or audible wheezes  CV:   Regular rate and rhythm without audible murmur  Abdomen:  Soft, non tender, not distended, active bowel sounds  Extremities:  No cyanosis or edema  Pulses:  2+ DP bilaterally  Musculoskeletal: Moves all extremities with equal strength, no deformity  Skin:   Rash face, bilateral arms and legs, chest is spared; extensive wound vac over L chest, side, back  Psych:  Alert, oriented and appropriate without evidence of thought disorder  Lines:    benign      Data Review:     CBC:  Recent Labs     06/15/21  1240   WBC 7.6   GRANS 73   MONOS 7   EOS 3   ANEU 5.5   ABL 1.2   HGB 12.5*   HCT 38.3*          BMP:  Recent Labs     06/15/21  1240   CREA 0.93   BUN 13      K 4.4      CO2 28   AGAP 4*   *       LFTS:  Recent Labs     06/15/21  1240   TBILI 0.2   ALT 41      TP 7.1   ALB 2.5*       Microbiology:     All Micro Results     Procedure Component Value Units Date/Time    COVID-19 RAPID TEST [[id number]]     Order Status: Sent     COVID-19 RAPID TEST [[id number]] Collected: 06/09/21 1445    Order Status: Completed Specimen: Nasopharyngeal Updated: 06/09/21 1539     Specimen source NASAL        COVID-19 rapid test Not detected        Comment:      The specimen is NEGATIVE for SARS-CoV-2, the novel coronavirus associated with COVID-19. A negative result does not rule out COVID-19. This test has been authorized by the FDA under an Emergency Use Authorization (EUA) for use by authorized laboratories.         Fact sheet for Healthcare Providers: ConventionUpdate.co.nz  Fact sheet for Patients: ConventionUpdate.co.nz       Methodology: Isothermal Nucleic Acid Amplification         CULTURE, ANAEROBIC [[id number]] Collected: 06/02/21 1740    Order Status: Completed Specimen: Abscess Updated: 06/09/21 0904     Special Requests: NO SPECIAL REQUESTS        Culture result:       NO ANAEROBES ISOLATED 7 DAYS          CULTURE, WOUND Eugena Maximiliano STAIN [[id number]]  (Abnormal)  (Susceptibility) Collected: 06/02/21 1740    Order Status: Completed Specimen: Abscess Updated: 21 0810     Special Requests: NO SPECIAL REQUESTS        GRAM STAIN 0 TO 10 WBCS SEEN PER OIF      FEW GRAM POSITIVE COCCI        Culture result:       MODERATE * METHICILLIN RESISTANT STAPHYLOCOCCUS AUREUS *            PATIENT IS A KNOWN MRSA       CULTURE, BODY FLUID W Dayron Fisher [[id number]] Collected: 21 1830    Order Status: Canceled Specimen: Body Fluid from Abdominal Fluid     CULTURE, Lucinda Reap STAIN [[id number]]  (Abnormal)  (Susceptibility) Collected: 21 0917    Order Status: Completed Specimen: Wound from Abscess Updated: 21 0838     Special Requests: NO SPECIAL REQUESTS        GRAM STAIN 20 TO 40 WBCS/OIF            MODERATE GRAM POSITIVE COCCI           Culture result:       HEAVY * METHICILLIN RESISTANT STAPHYLOCOCCUS AUREUS *                  RESULTS VERIFIED, PHONED TO AND READ BACK BY  ISAURO SHAVER RN @ 1855 ON 21 AK. SARS-COV-2, PCR [[id number]] Collected: 21 1229    Order Status: Completed Specimen: Nasopharyngeal Updated: 21 0629     Specimen source Nasopharyngeal        SARS-CoV-2 Not detected        Comment:      The specimen is NEGATIVE for SARS-CoV-2, the novel coronavirus associated with COVID-19. This test has been authorized by the FDA under an Emergency Use Authorization (EUA) for use by authorized laboratories. Fact sheet for Healthcare Providers: ConventionUpdate.co.nz       Fact sheet for Patients: ConventionUpdate.co.nz       Methodology: RT-PCR               Imagin21 CT c/a/p: IMPRESSION     Large soft tissue defect in the previously seen area of abscess. Though there is  some fat stranding present, no large drainable fluid collection is present. There is a small air-fluid collection seen just anterior to the left subclavian  vessels which persists.     Signed By: Dayo Mendoza NP     2021

## 2021-06-18 ENCOUNTER — HOME HEALTH ADMISSION (OUTPATIENT)
Dept: HOME HEALTH SERVICES | Facility: HOME HEALTH | Age: 76
End: 2021-06-18
Payer: MEDICARE

## 2021-06-18 VITALS
HEART RATE: 67 BPM | WEIGHT: 171 LBS | HEIGHT: 71 IN | SYSTOLIC BLOOD PRESSURE: 122 MMHG | BODY MASS INDEX: 23.94 KG/M2 | TEMPERATURE: 97.3 F | OXYGEN SATURATION: 97 % | RESPIRATION RATE: 18 BRPM | DIASTOLIC BLOOD PRESSURE: 69 MMHG

## 2021-06-18 PROCEDURE — 97605 NEG PRS WND THER DME<=50SQCM: CPT

## 2021-06-18 PROCEDURE — 74011250637 HC RX REV CODE- 250/637: Performed by: SURGERY

## 2021-06-18 PROCEDURE — 74011250637 HC RX REV CODE- 250/637: Performed by: NURSE PRACTITIONER

## 2021-06-18 PROCEDURE — 77030018717 HC DRSG GRNUFM KCON -B

## 2021-06-18 PROCEDURE — 99238 HOSP IP/OBS DSCHRG MGMT 30/<: CPT | Performed by: SURGERY

## 2021-06-18 PROCEDURE — 2709999900 HC NON-CHARGEABLE SUPPLY

## 2021-06-18 PROCEDURE — 97606 NEG PRS WND THER DME>50 SQCM: CPT

## 2021-06-18 PROCEDURE — 77030019934 HC DRSG VAC ASST KCON -B

## 2021-06-18 RX ORDER — MINOCYCLINE HYDROCHLORIDE 100 MG/1
100 TABLET ORAL 2 TIMES DAILY
Qty: 60 TABLET | Refills: 0 | Status: SHIPPED | OUTPATIENT
Start: 2021-06-18 | End: 2021-07-18

## 2021-06-18 RX ORDER — FAMOTIDINE 20 MG/1
20 TABLET, FILM COATED ORAL 2 TIMES DAILY
Qty: 60 TABLET | Refills: 1 | Status: SHIPPED | OUTPATIENT
Start: 2021-06-18 | End: 2021-09-20

## 2021-06-18 RX ORDER — METOPROLOL TARTRATE 50 MG/1
50 TABLET ORAL EVERY 12 HOURS
Qty: 60 TABLET | Refills: 1 | Status: SHIPPED | OUTPATIENT
Start: 2021-06-18 | End: 2021-07-18

## 2021-06-18 RX ORDER — HYDROCODONE BITARTRATE AND ACETAMINOPHEN 5; 325 MG/1; MG/1
1-2 TABLET ORAL
Qty: 28 TABLET | Refills: 0 | Status: SHIPPED | OUTPATIENT
Start: 2021-06-18 | End: 2021-06-25

## 2021-06-18 RX ADMIN — HYDROCODONE BITARTRATE AND ACETAMINOPHEN 1 TABLET: 5; 325 TABLET ORAL at 10:50

## 2021-06-18 RX ADMIN — ACETAMINOPHEN 1000 MG: 500 TABLET ORAL at 02:59

## 2021-06-18 RX ADMIN — Medication 1 AMPULE: at 05:27

## 2021-06-18 RX ADMIN — FAMOTIDINE 20 MG: 20 TABLET ORAL at 08:16

## 2021-06-18 RX ADMIN — HYDROCODONE BITARTRATE AND ACETAMINOPHEN 1 TABLET: 5; 325 TABLET ORAL at 17:11

## 2021-06-18 RX ADMIN — FAMOTIDINE 20 MG: 20 TABLET ORAL at 17:11

## 2021-06-18 RX ADMIN — MINOCYCLINE HYDROCHLORIDE 100 MG: 50 CAPSULE ORAL at 08:14

## 2021-06-18 RX ADMIN — LORATADINE 10 MG: 10 TABLET ORAL at 08:14

## 2021-06-18 RX ADMIN — MINOCYCLINE HYDROCHLORIDE 100 MG: 50 CAPSULE ORAL at 17:11

## 2021-06-18 RX ADMIN — HYDROCODONE BITARTRATE AND ACETAMINOPHEN 1 TABLET: 5; 325 TABLET ORAL at 05:27

## 2021-06-18 RX ADMIN — TAMSULOSIN HYDROCHLORIDE 0.4 MG: 0.4 CAPSULE ORAL at 08:16

## 2021-06-18 RX ADMIN — Medication 1 AMPULE: at 14:29

## 2021-06-18 NOTE — PROGRESS NOTES
END OF SHIFT NOTE:    INTAKE/OUTPUT  06/17 0701 - 06/18 0700  In: 1560 [P.O.:1560]  Out: 1990 [Urine:1725; Drains:265]  Voiding: YES  Catheter: NO  Drain:              Flatus: Patient does have flatus present. Stool:  0 occurrences. Characteristics:  Stool Assessment  Stool Color: Brown  Stool Appearance: Formed  Stool Amount: Medium  Stool Source/Status: Rectum    Emesis: 0 occurrences. Characteristics:        VITAL SIGNS  Patient Vitals for the past 12 hrs:   Temp Pulse Resp BP SpO2   06/18/21 0330 98.3 °F (36.8 °C) 65 19 105/69 97 %   06/18/21 0015 98.4 °F (36.9 °C) 63 19 101/60 96 %       Pain Assessment  Pain Intensity 1: 0 (06/18/21 0610)  Pain Location 1: Flank  Pain Intervention(s) 1: Medication (see MAR)  Patient Stated Pain Goal: 0    Ambulating  No    Shift report given to oncoming nurse at the bedside.     Dick Bryant RN

## 2021-06-18 NOTE — WOUND CARE
Pt seen for wound vac dressing change of left flank and axilla wounds. Pt pre-medicated prior to dressing change. Removed old dressing. Noted flank wound clean and granular and axilla wound the same. Applied multiple layers of skin prep to destinee wound. Applied three pieces of black foam to flank wound, one piece of black foam to axilla wound and bridged together and attached to one wound vac machine. Seal obtained and pt tolerated well. Next dressing change is due Monday. Once home vac arrives may hook up to home vac and send home, primary RN aware and verbally understands how to connect to home machine and place hospital vac in dirty utility room. Wound team will continue to follow while in acute care setting.

## 2021-06-18 NOTE — PROGRESS NOTES
H&P/Consult Note/Progress Note/Office Note:   Dominic Tejada MRN: [id number]  :1945  Age:75 y.o.    HPI: Dominic Tejada is a 77 y.o. male who was initially referred in consultation by Caridad Rich PA-C with a left chest wall hematoma and cellulitis   after a fall at home on the floor approx 21. He sustained a blunt injury to his left chest wall. CXR below showed no PTx or rib fractures. He taked Humira for arthritis and is immune suppressed. We saw him for the 1st time on 21 with erythema of the left chest wall and placed him on Bactrim and marked the cellulitis border. He was instructed to call if any fevers or worsening erythema and given f/u 4 days later  He did not call. When he came to the office on 21 he reported high fevers over 101 degrees associate with nausea with his Bactrim. The erythema had extended >20cm  beyond the original inscribed lines of his left chest wall and extended down his left flank and in his left lower quadrant. We was admitted directly from office. He denies any new falls since 21. On 21 he also had a new hematoma involving his right flank which was not present on 21 (likely a second fall)      21 CXR, 2 views  Clear lungs. The cardiac and mediastinal contours and pulmonary vascularity are normal.   The bones and soft tissues are within normal limits. No evidence of a pneumothorax. IMPRESSION:  Normal chest.      21 CT chest/abd/pelvis with oral and IV contrast  Hx: MRSA cellulitis of left chest wall abscess and fever.     CHEST:   -LUNGS: No lobar pneumonia. Linear atelectasis left base. -AIRWAYS: Trachea and proximal bronchi grossly patent. -PLEURA: Small left pleural effusion. -LYMPH NODES: No enlarged axillary, hilar or mediastinal lymph nodes.   -HEART: Normal size.     -SKELETAL/CHEST WALL: Along left chest wall and body wall there is a complex,  peripherally enhancing fluid collection which measures approximately 32 cm  superior inferior, 17 cm wide and 4 cm thick. There are several bubbles of gas  within it. Larger bubbles of gas extend up into the axilla.     ABDOMEN/PELVIS:  -LIVER: Normal in size and appearance. -GALLBLADDER: No gallstones identified  -BILE DUCTS: Not dilated.     -PANCREAS: Normal.  -SPLEEN: Normal.     -ADRENALS: Normal.  -KIDNEYS/URETERS: Kidneys enhance symmetrically. There are multiple bilateral  parapelvic cysts versus bilateral UPJ obstructions. -BLADDER: Normal.  -REPRODUCTIVE ORGANS: Prostate and seminal vesicles unremarkable.     -BOWEL: Normal caliber. No inflammatory changes. Scattered diverticula. -LYMPH NODES: No significant retroperitoneal, mesenteric, or pelvic adenopathy. -BONES: Degenerative changes. No periostitis. -VASCULATURE: Aorta is normal caliber and mildly calcified. -OTHER: No ascites.     IMPRESSION  Large left abscess, 32 cm x 17 cm x 4 cm which extends from the axilla to the iliac crest.        6/13/21 CT chest/abd/pelvis without contrast  CT chest: There has been interval debridement of a large left axillary/left  flank fluid collection with a large open soft tissue defect, but no residual  drainable fluid collection throughout the majority of the previously seen  abnormality. There is a small air and fluid collection seen just anterior to the  subclavian vessels on the left. There is no pneumothorax. No adenopathy. CT ABDOMEN: No contour deforming abnormality of the liver or spleen. No change in the appearance of the kidneys, adrenal glands, pancreas, or gallbladder.     CT PELVIS: Air seen nondependently in the bladder. The rectum is normal. No pelvic adenopathy.     Bone window evaluation demonstrates no aggressive osseous lesions.     IMPRESSION  Large soft tissue defect in the previously seen area of abscess. Though there is  some fat stranding present, no large drainable fluid collection is present.   There is a small air-fluid collection seen just anterior to the left subclavian vessels which persists           Additional hx:  5/27/21 wound culture growing MRSA; Pharmacy dosing IV Vanc; trough tonight  5/28/21  Less Confused; no fever; WBC elevated but better;  on IV Abx  5/29/21  Resting in bed; no fever; WBC 12.3;  on IV Abx  5/30/21  Sitting on side of bed; no fever; WBC 13.8;  on IV Abx  5/31/21 AF; wbc still elevated; left hemithorax , left flank and LLQ edematous and indurated  6/1/21 continues IV Vanc; WBC higher at 12.2k   6/2/21 IV Vanc; CT as above with large abscess; I&D/debridment #1 in OR today  6/3/21 IV Vanc/Rocephin; OR culture pending  6/4/21 plan return to OR today for debridement #2; unless pt signs out AMA    6/5/21 POD 3/1; Pt resting in bed. No complaints voiced. Appears comfortable. Postop dressing c/d/i.   6/6/21 POD 4/2; Pt sitting on side of bed. No complaints voiced. Appears comfortable. Dressing c/di  6/7/21 POD 5/3;  Comfortable; dressing dry; wound vac started today; IV Vanc; Likely SNF soon  6/8/21 POD 6/4 Feels OK; ID rec IV Vanc until 6/21/21; wound vac will be changed 3x/week; Vanc changed to IV Dapto by ID  6/9/21 POD 7/5; Cont IV Daptomycin; wound vac 3 times a week; consult PT; OOB daily  6/10/21 POD8/6 Cont IV Dapto; wound vac change 3 x a week; PT; OOB daily; feeling better  6/11/21 POD 9/7; Cont IV Dapto; wound vac changed today; PT following  6/12/21 POD10/8; Cont IV Dapto; wound vac q M/W/F; inpt PT  6/13/21 POD11/9; Cont IV Dapto; wound vac q M/W/F; CT today to reassess  6/14/21 POD12/10 Cont IV Dapto; CT as above; looks well drained. Collection anterior to subclavian looks close enough to decompress into axillary wound  6/15/21 POD13/11; rash to IV Dapto; ID considering PO option,  SVT 120s, cardiology consulted    6/17/21 POD15/13;  He wants to go home in am; ID rec minocycline for 4 weeks                 Past Medical History:   Diagnosis Date    Depression     H/O urinary frequency 2/1/2013  History of BPH 2/1/2013    HLD (hyperlipidemia) 2/1/2013    Hx: UTI (urinary tract infection) 2/1/2013    Insomnia     Psoriasis     Psoriatic arthritis ([address] 2/1/2013    Wears hearing aid 2/1/2013     No past surgical history on file.   Current Facility-Administered Medications   Medication Dose Route Frequency    minocycline (MINOCIN, DYNACIN) capsule 100 mg  100 mg Oral BID    triamcinolone acetonide (KENALOG) 0.1 % cream   Topical TID    metoprolol tartrate (LOPRESSOR) tablet 50 mg  50 mg Oral Q12H    HYDROmorphone (DILAUDID) injection 0.2-0.4 mg  0.2-0.4 mg IntraVENous Q3H PRN    loratadine (CLARITIN) tablet 10 mg  10 mg Oral DAILY    diphenhydrAMINE (BENADRYL) injection 25 mg  25 mg IntraVENous Q8H PRN    famotidine (PEPCID) tablet 20 mg  20 mg Oral BID    alcohol 62% (NOZIN) nasal  1 Ampule  1 Ampule Topical Q8H    zinc oxide-cod liver oil (DESITIN) 40 % paste   Topical Q8H    acetaminophen (TYLENOL) tablet 1,000 mg  1,000 mg Oral Q6H PRN    ondansetron (ZOFRAN) injection 4 mg  4 mg IntraVENous Q4H PRN    HYDROcodone-acetaminophen (NORCO) 5-325 mg per tablet 1 Tablet  1 Tablet Oral Q6H PRN    tamsulosin (FLOMAX) capsule 0.4 mg  0.4 mg Oral DAILY     Vancomycin  Social History     Socioeconomic History    Marital status: SINGLE     Spouse name: Not on file    Number of children: Not on file    Years of education: Not on file    Highest education level: Not on file   Tobacco Use    Smoking status: Former Smoker    Smokeless tobacco: Never Used    Tobacco comment: pt states quit in early 60's late 52's    Vaping Use    Vaping Use: Never used   Substance and Sexual Activity    Alcohol use: No     Alcohol/week: 0.0 standard drinks    Drug use: No    Sexual activity: Yes     Partners: Female     Social Determinants of Health     Financial Resource Strain:     Difficulty of Paying Living Expenses:    Food Insecurity:     Worried About Running Out of Food in the Last Year:  Ran Out of Food in the Last Year:    Transportation Needs:     Lack of Transportation (Medical):  Lack of Transportation (Non-Medical):    Physical Activity:     Days of Exercise per Week:     Minutes of Exercise per Session:    Stress:     Feeling of Stress :    Social Connections:     Frequency of Communication with Friends and Family:     Frequency of Social Gatherings with Friends and Family:     Attends [employer] Services:     Active Member of Clubs or Organizations:     Attends Club or Organization Meetings:     Marital Status:      Social History     Tobacco Use   Smoking Status Former Smoker   Smokeless Tobacco Never Used   Tobacco Comment    pt states quit in early 60's late 52's      Family History   Problem Relation Age of Onset    Heart Attack Father     Cancer Father         skin     Other Sister     Cancer Sister     Other Brother         melanoma     ROS: The patient has no difficulty with chest pain or shortness of breath. No fever or chills. Comprehensive review of systems was otherwise unremarkable except as noted above. Physical Exam:   Visit Vitals  BP 99/62   Pulse 75   Temp 98.4 °F (36.9 °C)   Resp 18   Ht 5' 11\" (1.803 m)   Wt 158 lb (71.7 kg)   SpO2 96%   BMI 22.04 kg/m²     Vitals:    06/17/21 1200 06/17/21 1559 06/17/21 1600 06/17/21 1916   BP:  107/67  99/62   Pulse: 68 64 68 75   Resp:  17  18   Temp:  98.1 °F (36.7 °C)  98.4 °F (36.9 °C)   SpO2:  97%  96%   Weight:       Height:         [unfilled]  [unfilled]    Constitutional: Alert, oriented, cooperative patient in no acute distress; appears stated age    Eyes:Sclera are clear. EOMs intact  ENMT: no external lesions; he is hard of hearing. no obvious neck masses, no ear or lip lesions, nares normal  CV: RRR. Normal perfusion      Large open incision from left axillae to left iliac crest   Good granulation tissue, no sign debris  No surrounding cellulitis    Right flank hematoma stable    Resp: No JVD. Breathing is  non-labored; no audible wheezing. GI: soft and non-distended     Musculoskeletal: unremarkable with normal function. No embolic signs or cyanosis. Neuro:  Oriented to person, place and time;  moves all 4; no focal deficits; slow moving and slow talking. Psychiatric: blunted affect, no memory impairment    Recent vitals (if inpt):  @IPVITALS(24:)@    Amount and/or Complexity of Data Reviewed and Analyzed:  I reviewed and analyzed all of the unique labs and radiologic studies that are shown below as well as any that are in the HPI, and any that are in the expanded problem list below  *Each unique test, order, or document contributes to the combination of 2 or combination of 3 in Category 1 below. For this visit I also reviewed old records and prior notes. Recent Labs     06/15/21  1240   WBC 7.6   HGB 12.5*         K 4.4      CO2 28   BUN 13   CREA 0.93   *   TBILI 0.2   ALT 41        Review of most recent CBC  Lab Results   Component Value Date/Time    WBC 7.6 06/15/2021 12:40 PM    HGB 12.5 (L) 06/15/2021 12:40 PM    HCT 38.3 (L) 06/15/2021 12:40 PM    PLATELET 163 92/31/7549 12:40 PM    MCV 99.2 (H) 06/15/2021 12:40 PM       Review of most recent BMP  Lab Results   Component Value Date/Time    Sodium 137 06/15/2021 12:40 PM    Potassium 4.4 06/15/2021 12:40 PM    Chloride 105 06/15/2021 12:40 PM    CO2 28 06/15/2021 12:40 PM    Anion gap 4 (L) 06/15/2021 12:40 PM    Glucose 122 (H) 06/15/2021 12:40 PM    BUN 13 06/15/2021 12:40 PM    Creatinine 0.93 06/15/2021 12:40 PM    BUN/Creatinine ratio 19 03/31/2016 11:26 AM    GFR est AA >60 06/15/2021 12:40 PM    GFR est non-AA >60 06/15/2021 12:40 PM    Calcium 8.3 06/15/2021 12:40 PM       Review of most recent LFTs (and lipase if done)  Lab Results   Component Value Date/Time    ALT (SGPT) 41 06/15/2021 12:40 PM    AST (SGOT) 23 06/15/2021 12:40 PM    Alk.  phosphatase 100 06/15/2021 12:40 PM    Bilirubin, total 0.2 06/15/2021 12:40 PM     No results found for: LPSE    No results found for: INR, APTT, CBIL, LCAD, NH4, TROPT, TROIQ, INREXT, INREXT    Review of most recent HgbA1c  Lab Results   Component Value Date/Time    Hemoglobin A1c 4.9 06/05/2021 02:21 PM       Nutritional assessment screen for wound healing issues:  Lab Results   Component Value Date/Time    Protein, total 7.1 06/15/2021 12:40 PM    Albumin 2.5 (L) 06/15/2021 12:40 PM       @lastcovr@  XR Results (most recent):  Results from Hospital Encounter encounter on 05/20/21    XR CHEST PA LAT    Narrative  EXAM: XR CHEST PA LAT    INDICATION: Left chest wall hematoma with cellulitis rule out rib fracture or  pneumothorax after a fall at home on the floor. COMPARISON: None. FINDINGS: PA and lateral radiographs of the chest demonstrate clear lungs. The  cardiac and mediastinal contours and pulmonary vascularity are normal. The bones  and soft tissues are within normal limits. No evidence of a pneumothorax. Impression  Normal chest.      CT Results (most recent):  Results from Hospital Encounter encounter on 05/24/21    CT CHEST ABD PELV WO CONT    Narrative  History: Large open wound left axilla with left chest, flank, and iliac pain. Multiple debridements. EXAM: CT chest, abdomen, and pelvis without contrast    TECHNIQUE: Thin section axial CT images are obtained from the thoracic inlet  through the pubic symphysis. Radiation dose reduction techniques were used for  this study. Our CT scanners use one or all of the following: Automated exposure  control, adjustment of the mA and/or kV according to patient size, use of  iterative reconstruction. COMPARISON: 6/2/2021    FINDINGS:    CT CHEST: There has been interval debridement of a large left axillary/left  flank fluid collection with a large open soft tissue defect, but no residual  drainable fluid collection throughout the majority of the previously seen  abnormality.  There is a small air and fluid collection seen just anterior to the  subclavian vessels on the left. There is no pneumothorax. No adenopathy. CT ABDOMEN: No contour deforming abnormality of the liver or spleen. No change  in the appearance of the kidneys, adrenal glands, pancreas, or gallbladder. CT PELVIS: Air seen nondependently in the bladder. The rectum is normal. No  pelvic adenopathy. Bone window evaluation demonstrates no aggressive osseous lesions. Impression  Large soft tissue defect in the previously seen area of abscess. Though there is  some fat stranding present, no large drainable fluid collection is present. There is a small air-fluid collection seen just anterior to the left subclavian  vessels which persists. US Results (most recent):  No results found for this or any previous visit. Admission date (for inpatients): 5/24/2021   * No surgery found *  Procedure(s):  LEFT CHEST WALL AND FLANK WOUND DEBRIDEMENT        ASSESSMENT/PLAN:  Problem List  Date Reviewed: 5/24/2021        Codes Class Noted    SVT (supraventricular tachycardia) ([facility] [address] ICD-10-CM: I47.1  ICD-9-CM: 427.89  6/15/2021        Confusion ICD-10-CM: R41.0  ICD-9-CM: 298.9  5/27/2021        Sundowning ICD-10-CM: F05  ICD-9-CM: Jillene Hooker  5/27/2021        Leukocytosis ICD-10-CM: X65.975  ICD-9-CM: 288.60  5/26/2021        Acute renal insufficiency ICD-10-CM: N28.9  ICD-9-CM: 593.9  5/25/2021        Fever ICD-10-CM: R50.9  ICD-9-CM: 780.60  5/24/2021        Suppression of immune system subtherapeutic (HCC) ICD-10-CM: D89.89  ICD-9-CM: 279.8  5/24/2021        Right flank hematoma ICD-10-CM: S30. 1XXA  ICD-9-CM: 922.2  5/24/2021        * (Principal) Chest wall abscess ICD-10-CM: L02.213  ICD-9-CM: 682.2  5/20/2021        Hematoma of left chest wall ICD-10-CM: L97.364M  ICD-9-CM: 922.1  5/20/2021        Cellulitis of chest wall ICD-10-CM: L03.313  ICD-9-CM: 682.2  5/20/2021        Fall ICD-10-CM: Z55. Luis Gómez  ICD-9-CM: C560.1  5/20/2021 Psoriasis ICD-10-CM: L40.9  ICD-9-CM: 696.1  Unknown        Insomnia ICD-10-CM: G47.00  ICD-9-CM: 780.52  Unknown        Depression ICD-10-CM: F32.9  ICD-9-CM: 319  Unknown        Tachycardia ICD-10-CM: R00.0  ICD-9-CM: 785.0  6/13/2016        Murmur ICD-10-CM: R01.1  ICD-9-CM: 785.2  6/13/2016        Psoriatic arthritis ([address] ICD-10-CM: L40.50  ICD-9-CM: 696.0  2/1/2013        Wears hearing aid ICD-10-CM: Z97.4  ICD-9-CM: V45.89  2/1/2013        HLD (hyperlipidemia) ICD-10-CM: E78.5  ICD-9-CM: 272.4  2/1/2013        H/O urinary frequency ICD-10-CM: Z87.898  ICD-9-CM: V13.09  2/1/2013        History of BPH ICD-10-CM: I74.958  ICD-9-CM: V13.89  2/1/2013            Principal Problem:    Chest wall abscess (5/20/2021)    Active Problems:    Cellulitis of chest wall (5/20/2021)      Fall (5/20/2021)      Fever (5/24/2021)      Suppression of immune system subtherapeutic ([address] (5/24/2021)      Right flank hematoma (5/24/2021)      Acute renal insufficiency (5/25/2021)      Leukocytosis (5/26/2021)      Confusion (5/27/2021)      Sundowning (5/27/2021)      SVT (supraventricular tachycardia) ([facility] [address] (6/15/2021)           Number and Complexity of Problems addressed and   Risks of complications and/or morbidity of management        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   Pt decided on 6/17/21 he wanted to go home home on PO abx with Steve Jules went to OR for serial debridements  6/2/21 OR for debridement #1  6/4/21 OR for debridement/washout/dressing change #2    6/13/21 repeat CT as above looks well drained but there is a gas collection with no fluid anterior to subclavian vessels adjacent to the axillary wound  May need repeat CT at some point to follow-up on this area  I previously spent 20min doing a peer to peer review on 6/14/21 for SNF placement which was approved after he completes IV Dapto if pt needs SNF at that time      SVT to 120s  New as of 6/15/21  Cardiology ordered beta blocker      Cardiology notes on 6/17/21 as below:  SVT in the setting of acute underlying issues. Review of prior records from 2016 with holter with also noted short runs of atrial tachycardia. Continue beta-blocker therapy. Echo with preserved EF. No further cardiac work-up needed at this time. We will sign off. Please call if questions. Oneal Mendosa MD  6/17/2021 4:19 PM            Chronic immune suppression  Continue to hold his methotrexate, prednisone, and Humira to help reduce his immune suppression during this acute phase of infection. Dermatitis around left chest wall wound from moisture maceration  Desitin ordered to destinee-wound skin    Confusion/sundowning at night-->resolved  For many days he was uncooperative and pulling IV sites   Sitters were required  This has improving significantly  May no longer require sitter              Level of MDM (2/3 elements below)  Number and Complexity of Problems Addressed Amount and/or Complexity of Data to be Reviewed and Analyzed  *Each unique test, order, or document contributes to the combination of 2 or combination of 3 in Category 1 below.  Risk of Complications and/or Morbidity or Mortality of pt Management     03337  14235 SF Minimal  1self-limited or minor problem Minimal or none Minimal risk of morbidity from additional diagnostic testing or Rx   62635  85665 Low Low  2or more self-limited or minor problems;    or  1stable chronic illness;    or  3JNIID, uncomplicated illness or injury   Limited  (Must meet the requirements of at least 1 of the 2 categories)  Category 1: Tests and documents   Any combination of 2 from the following:  Review of prior external note(s) from each unique source*;  review of the result(s) of each unique test*;   ordering of each unique test*    or   Category 2: Assessment requiring an independent historian(s)  (For the categories of independent interpretation of tests and discussion of management or test interpretation, see moderate or high) Low risk of morbidity from additional diagnostic testing or treatment     80035  82791 Mod Moderate  1or more chronic illnesses with exacerbation, progression, or side effects of treatment;    or  2or more stable chronic illnesses;    or  1undiagnosed new problem with uncertain prognosis;    or  1acute illness with systemic symptoms;    or  1NQBJE complicated injury   Moderate  (Must meet the requirements of at least 1 out of 3 categories)  Category 1: Tests, documents, or independent historian(s)  Any combination of 3 from the following:   Review of prior external note(s) from each unique source*;  Review of the result(s) of each unique test*;  Ordering of each unique test*;  Assessment requiring an independent historian(s)    or  Category 2: Independent interpretation of tests   Independent interpretation of a test performed by another physician/other qualified health care professional (not separately reported);     or  Category 3: Discussion of management or test interpretation  Discussion of management or test interpretation with external physician/other qualified health care professional/appropriate source (not separately reported)   Moderate risk of morbidity from additional diagnostic testing or treatment  Examples only:  Prescription drug management   Decision regarding minor surgery with identified patient or procedure risk factors  Decision regarding elective major surgery without identified patient or procedure risk factors   Diagnosis or treatment significantly limited by social determinants of health       82704  96672 High High  1or more chronic illnesses with severe exacerbation, progression, or side effects of treatment;    or  1 acute or chronic illness or injury that poses a threat to life or bodily function   Extensive  (Must meet the requirements of at least 2 out of 3 categories)  Category 1: Tests, documents, or independent historian(s)  Any combination of 3 from the following:   Review of prior external note(s) from each unique source*;  Review of the result(s) of each unique test*;   Ordering of each unique test*;   Assessment requiring an independent historian(s)    or   Category 2: Independent interpretation of tests   Independent interpretation of a test performed by another physician/other qualified health care professional (not separately reported);     or  Category 3: Discussion of management or test interpretation  Discussion of management or test interpretation with external physician/other qualified health care professional/appropriate source (not separately reported)   High risk of morbidity from additional diagnostic testing or treatment  Examples only:  Drug therapy requiring intensive monitoring for toxicity  Decision regarding elective major surgery with identified patient or procedure risk factors  Decision regarding emergency major surgery  Decision regarding hospitalization  Decision not to resuscitate or to de-escalate care because of poor prognosis             I have personally performed a face-to-face diagnostic evaluation and management  service on this patient. I have independently seen the patient. I have independently obtained the above history from the patient/family. I have independently examined the patient with above findings.   I have independently reviewed data/labs for this patient and developed the above plan of care (MDM). Signed: Layla Moreno.  Ashleigh Montejo MD, FACS

## 2021-06-18 NOTE — WOUND CARE
Attempted to see pt for wound vac dressing change, pt requests po pain medication prior to dressing change. Noted new plan for home with [place], [employer] home vac form filled out and given to CM. Will return for dressing change after pain medication given.

## 2021-06-18 NOTE — DISCHARGE INSTRUCTIONS
[address] has been requested to help with wound vac changes every Monday, Wednesday , and Friday  Leave dressings 5-6 days. Then remove, but keep incision(s) covered daily until follow-up. Try to keep incision(s) as dry as possible to lower risk of infection.     No driving for now    Pain prescription (Norco) electronically sent to your pharmacy  Antibiotic prescription (minocycline) for home use 100mg every 12 hrs for approx 4 weeks  Take pepcid 20mg twice a day anytime you are on mobic (meloxicam) to lower risk of gastritis or ulcers  Metoprolol (lopressor) is a new drug for you recommended by cardiology for your heart arrythmia (SVT)--refills will need to come from them or your primary care doctor    Follow-up with Dr Blake Valencia in approx 2 weeks in the office at:  [address], [address]  (Call for an appt time unless one is already made for you by discharge nurse which is preferred->[phone]-->option 1)    Follow up with ID 7/19/21 at 2:30 pm with Bhakti Hansen NP

## 2021-06-18 NOTE — ACP (ADVANCE CARE PLANNING)
Advance Care Planning   Advance Care Planning Inpatient Note  [facility] Care Department    Today's Date: 6/18/2021  Unit: [facility] 2 SURGICAL    Received request from patient. Upon review of chart and communication with care team, . Patient and his friend Richard Che present in the room during visit. Goals of ACP Conversation:  [address] with patient and his friend. Assessment:    Interventions:  Reviewed but did not complete ACP document     Outcomes/Plan:  Patient will take document home to review with significant other.      Electronically signed by Chaplain Marivel on 6/18/2021 at 3:18 PM

## 2021-06-18 NOTE — DISCHARGE SUMMARY
[facility] [address]  [place], [address]  [phone]   Discharge Summary     Hermann Collado MRN: [id number]     : 1945     Age: 77 y.o. Admit date: 2021     Discharge date:  21  Attending Physician: Roberto Jarrett MD, MD, FACS  Primary Discharge Diagnosis:   Principal Problem:    Chest wall abscess (2021)    Active Problems:    Cellulitis of chest wall (2021)      Fall (2021)      Fever (2021)      Suppression of immune system subtherapeutic ([address] (2021)      Right flank hematoma (2021)      Acute renal insufficiency (2021)      Leukocytosis (2021)      Confusion (2021)      Sundowning (2021)      SVT (supraventricular tachycardia) ([address] (6/15/2021)      Primary Operations or Procedures Performed :  Procedure(s):  LEFT CHEST WALL AND FLANK WOUND DEBRIDEMENT     Brief History and Reason for Admission: Hermann Collado was admitted with the following history of present illness. HPI: Hermann Reno is a 77 y.o. male who was initially referred in consultation by Halie Cooley PA-C with a left chest wall hematoma and cellulitis   after a fall at home on the floor approx 21.     He sustained a blunt injury to his left chest wall. CXR below showed no PTx or rib fractures. He taked Humira for arthritis and is immune suppressed.     We saw him for the 1st time on 21 with erythema of the left chest wall and placed him on Bactrim and marked the cellulitis border. He was instructed to call if any fevers or worsening erythema and given f/u 4 days later  He did not call. When he came to the office on 21 he reported high fevers over 101 degrees associate with nausea with his Bactrim. The erythema had extended >20cm  beyond the original inscribed lines of his left chest wall and extended down his left flank and in his left lower quadrant.   We was admitted directly from office.     He denies any new falls since 5/16/21. On 5/24/21 he also had a new hematoma involving his right flank which was not present on 5/20/21 (likely a second fall)        5/20/21 CXR, 2 views  Clear lungs. The cardiac and mediastinal contours and pulmonary vascularity are normal.   The bones and soft tissues are within normal limits. No evidence of a pneumothorax.     IMPRESSION:  Normal chest.        6/2/21 CT chest/abd/pelvis with oral and IV contrast  Hx: MRSA cellulitis of left chest wall abscess and fever.     CHEST:   -LUNGS: No lobar pneumonia.  Linear atelectasis left base. -AIRWAYS: Trachea and proximal bronchi grossly patent. -PLEURA: Small left pleural effusion. -LYMPH NODES: No enlarged axillary, hilar or mediastinal lymph nodes. -HEART: Normal size.     -SKELETAL/CHEST WALL: Along left chest wall and body wall there is a complex,  peripherally enhancing fluid collection which measures approximately 32 cm  superior inferior, 17 cm wide and 4 cm thick. There are several bubbles of gas  within it. Larger bubbles of gas extend up into the axilla.     ABDOMEN/PELVIS:  -LIVER: Normal in size and appearance.    -GALLBLADDER: No gallstones identified  -BILE DUCTS: Not dilated.     -PANCREAS: Normal.  -SPLEEN: Normal.     -ADRENALS: Normal.  -KIDNEYS/URETERS: Kidneys enhance symmetrically. There are multiple bilateral  parapelvic cysts versus bilateral UPJ obstructions. -BLADDER: Normal.  -REPRODUCTIVE ORGANS: Prostate and seminal vesicles unremarkable.     -BOWEL: Normal caliber.  No inflammatory changes. Scattered diverticula. -LYMPH NODES: No significant retroperitoneal, mesenteric, or pelvic adenopathy. -BONES: Degenerative changes. No periostitis. -VASCULATURE: Aorta is normal caliber and mildly calcified.   -OTHER: No ascites.     IMPRESSION  Large left abscess, 32 cm x 17 cm x 4 cm which extends from the axilla to the iliac crest.           6/13/21 CT chest/abd/pelvis without contrast  CT chest: There has been interval debridement of a large left axillary/left  flank fluid collection with a large open soft tissue defect, but no residual  drainable fluid collection throughout the majority of the previously seen  abnormality. There is a small air and fluid collection seen just anterior to the  subclavian vessels on the left. There is no pneumothorax. No adenopathy.     CT ABDOMEN: No contour deforming abnormality of the liver or spleen. No change in the appearance of the kidneys, adrenal glands, pancreas, or gallbladder.     CT PELVIS: Air seen nondependently in the bladder. The rectum is normal. No pelvic adenopathy.     Bone window evaluation demonstrates no aggressive osseous lesions.     IMPRESSION  Large soft tissue defect in the previously seen area of abscess. Though there is  some fat stranding present, no large drainable fluid collection is present. There is a small air-fluid collection seen just anterior to the left subclavian vessels which persists           [facility] Course:      5/27/21 wound culture growing MRSA; Pharmacy dosing IV Vanc; trough tonight  5/28/21  Less Confused; no fever; WBC elevated but better;  on IV Abx  5/29/21  Resting in bed; no fever; WBC 12.3;  on IV Abx  5/30/21  Sitting on side of bed; no fever; WBC 13.8;  on IV Abx  5/31/21 AF; wbc still elevated; left hemithorax , left flank and LLQ edematous and indurated  6/1/21 continues IV Vanc; WBC higher at 12.2k   6/2/21 IV Vanc; CT as above with large abscess; I&D/debridment #1 in OR today  6/3/21 IV Vanc/Rocephin; OR culture pending  6/4/21 plan return to OR today for debridement #2; unless pt signs out AMA     6/5/21 POD 3/1; Pt resting in bed. No complaints voiced. Appears comfortable. Postop dressing c/d/i.   6/6/21 POD 4/2; Pt sitting on side of bed. No complaints voiced.  Appears comfortable. Dressing c/di  6/7/21 POD 5/3;  Comfortable; dressing dry; wound vac started today; IV Vanc; Likely SNF soon  6/8/21 POD 6/4 Feels OK; ID rec IV Vanc until 6/21/21; wound vac will be changed 3x/week; Vanc changed to IV Dapto by ID  6/9/21 POD 7/5; Cont IV Daptomycin; wound vac 3 times a week; consult PT; OOB daily  6/10/21 POD8/6 Cont IV Dapto; wound vac change 3 x a week; PT; OOB daily; feeling better  6/11/21 POD 9/7; Cont IV Dapto; wound vac changed today; PT following  6/12/21 POD10/8; Cont IV Dapto; wound vac q M/W/F; inpt PT  6/13/21 POD11/9; Cont IV Dapto; wound vac q M/W/F; CT today to reassess  6/14/21 POD12/10 Cont IV Dapto; CT as above; looks well drained. Collection anterior to subclavian looks close enough to decompress into axillary wound  6/15/21 POD13/11; rash to IV Dapto; ID considering PO option,  SVT 120s, cardiology consulted     6/17/21 POD15/13; He wants to go home in am; ID rec minocycline for 4 weeks  6/18/21 POD 16/14  Discharge today        PROBLEM LIST AS OF 6/18/21      Massive MRSA Infection of soft tissues of left chest wall with abscess and extension, cellulitis, and large open wound with necrosis of the left axillae, left chest wall, left flank, LLQ, and left iliac region  This failed outpt Rx with PO Bactrim and also failed initial inpt Rx with IV Vanc and Rocpehin      Black foam wound vac TID started 6/7/21  ID rec IV Vanc -->IV Daptomycin until Friday 6/18/21-->Minocycline 6/16/21  ID rec Monocycline for 4 more weeks and f/u IF 7/19/21        Continue inpt admission   IV Abx and complex dressing changes for a massive left chest wall, left axillary, left flank, LLQ wound abscess cavity.   Pt decided on 6/17/21 he wanted to go home home on PO abx with [facility]        He went to OR for serial debridements  6/2/21 OR for debridement #1  6/4/21 OR for debridement/washout/dressing change #2     6/13/21 repeat CT as above looks well drained but there is a gas collection with no fluid anterior to subclavian vessels adjacent to the axillary wound  May need repeat CT at some point to follow-up on this area  I previously spent 20min doing a peer to peer review on 6/14/21 for SNF placement which was approved after he completes IV Dapto if pt needs SNF at that time        SVT to 120s  New as of 6/15/21  Cardiology ordered beta blocker       Cardiology notes on 6/17/21 as below:  SVT in the setting of acute underlying issues.    Review of prior records from 2016 with holter with also noted short runs of atrial tachycardia.    Continue beta-blocker therapy.  Echo with preserved EF. No further cardiac work-up needed at this time.  We will sign off.  Please call if questions. Randolph Leahy MD  7/85/1311 9:40 PM                 Chronic immune suppression  Continue to hold his methotrexate, prednisone, and Humira to help reduce his immune suppression during this acute phase of infection.        Dermatitis around left chest wall wound from moisture maceration  Desitin ordered to destinee-wound skin     Confusion/sundowning at night-->resolved  For many days he was uncooperative and pulling IV sites   Sitters were required  This has improving significantly  May no longer require sitter                Condition at Discharge: good    Discharge Medications:   Current Discharge Medication List      START taking these medications    Details   minocycline (DYNACIN) 100 mg tablet Take 1 Tablet by mouth two (2) times a day for 30 days. Qty: 60 Tablet, Refills: 0      metoprolol tartrate (LOPRESSOR) 50 mg tablet Take 1 Tablet by mouth every twelve (12) hours for 30 days. Qty: 60 Tablet, Refills: 1      famotidine (Pepcid) 20 mg tablet Take 1 Tablet by mouth two (2) times a day. Qty: 60 Tablet, Refills: 1         CONTINUE these medications which have CHANGED    Details   HYDROcodone-acetaminophen (Norco) 5-325 mg per tablet Take 1-2 Tablets by mouth every six (6) hours as needed for Pain for up to 7 days. Max Daily Amount: 8 Tablets.   Qty: 28 Tablet, Refills: 0    Associated Diagnoses: Open wound of left chest wall, subsequent encounter; Open wound of left axillary region, subsequent encounter; Open wound of flank, initial encounter; Open wound of lower back and pelvis without penetration into retroperitoneum, subsequent encounter         CONTINUE these medications which have NOT CHANGED    Details   clonazePAM (KlonoPIN) 1 mg tablet Take 1 Tab by mouth three (3) times daily. Max Daily Amount: 3 mg. Cancel Xanax  Qty: 90 Tab, Refills: 3    Associated Diagnoses: Anxiety      finasteride (PROSCAR) 5 mg tablet Take 1 Tab by mouth daily. Stop dutasteride 0.5 mg cap 0.5 mg, tamsulosin 0.4 mg cap 0.4 mg [  Qty: 90 Tab, Refills: 3    Associated Diagnoses: History of BPH      tamsulosin (FLOMAX) 0.4 mg capsule Take 2 Caps by mouth daily. Stop dutasteride 0.5 mg cap 0.5 mg, tamsulosin 0.4 mg cap 0.4 mg [  Qty: 180 Cap, Refills: 3    Associated Diagnoses: History of BPH      hydrocortisone valerate (WESTCORT) 0.2 % topical cream APPLY  A THIN LAYER TOPICALLY TO AFFECTED AREA ON FACE TWICE DAILY  Qty: 60 g, Refills: 6    Comments: Please consider 90 day supplies to promote better adherence  Associated Diagnoses: Psoriasis      adalimumab (Humira Pen) 40 mg/0.8 mL injection pen 40 mg by SubCUTAneous route Once every 2 weeks. meloxicam (MOBIC) 15 mg tablet Take 1 Tab by mouth daily. Qty: 90 Tab, Refills: 3    Associated Diagnoses: Psoriatic arthritis (HCC)      fluocinoNIDE (LIDEX) 0.05 % topical cream APPLY  CREAM TOPICALLY TO AFFECTED AREA ON SCALP TWICE DAILY  Qty: 60 g, Refills: 12    Comments: Please consider 90 day supplies to promote better adherence  Associated Diagnoses: Psoriasis      DULoxetine (CYMBALTA) 60 mg capsule Take 1 Cap by mouth daily.   Qty: 90 Cap, Refills: 3    Comments: Please consider 90 day supplies to promote better adherence  Associated Diagnoses: Insomnia, unspecified type         STOP taking these medications       methotrexate (RHEUMATREX) 2.5 mg tablet Comments:   Reason for Stopping: Disposition/Discharge Instructions/Follow-up Care:      [address] has been requested to help with wound vac changes every Monday, Wednesday , and Friday  Leave dressings 5-6 days. Then remove, but keep incision(s) covered daily until follow-up. Try to keep incision(s) as dry as possible to lower risk of infection.     No driving for now    Pain prescription (Norco) electronically sent to your pharmacy  Antibiotic prescription (minocycline) for home use 100mg every 12 hrs for approx 4 weeks  Take pepcid 20mg twice a day anytime you are on mobic (meloxicam) to lower risk of gastritis or ulcers  Metoprolol (lopressor) is a new drug for you recommended by cardiology for your heart arrythmia (SVT)--refills will need to come from them or your primary care doctor    Follow-up with Dr Trish Soto in approx 2 weeks in the office at:  [address], [address]  (Call for an appt time unless one is already made for you by discharge nurse which is preferred->[phone]-->option 1)    Follow up with ID 7/19/21 at 2:30 pm with Maribel Tolliver NP      Signed:  Lukasz Jacome MD, FACS   6/18/2021  2:01 AM

## 2021-06-18 NOTE — PROGRESS NOTES
Problem: Falls - Risk of  Goal: *Absence of Falls  Description: Document Natalia Scott Fall Risk and appropriate interventions in the flowsheet. Outcome: Progressing Towards Goal  Note: Fall Risk Interventions:  Mobility Interventions: Communicate number of staff needed for ambulation/transfer, Patient to call before getting OOB    Mentation Interventions: Door open when patient unattended, Evaluate medications/consider consulting pharmacy    Medication Interventions: Patient to call before getting OOB, Teach patient to arise slowly    Elimination Interventions: Call light in reach, Patient to call for help with toileting needs    History of Falls Interventions: Door open when patient unattended         Problem: Pressure Injury - Risk of  Goal: *Prevention of pressure injury  Description: Document Sabino Scale and appropriate interventions in the flowsheet. Outcome: Progressing Towards Goal  Note: Pressure Injury Interventions:  Sensory Interventions: Assess changes in LOC, Assess need for specialty bed, Avoid rigorous massage over bony prominences, Check visual cues for pain, Keep linens dry and wrinkle-free, Maintain/enhance activity level, Minimize linen layers, Monitor skin under medical devices, Pressure redistribution bed/mattress (bed type), Turn and reposition approx.  every two hours (pillows and wedges if needed)    Moisture Interventions: Absorbent underpads, Limit adult briefs, Minimize layers    Activity Interventions: Increase time out of bed, PT/OT evaluation    Mobility Interventions: Pressure redistribution bed/mattress (bed type), PT/OT evaluation    Nutrition Interventions: Offer support with meals,snacks and hydration    Friction and Shear Interventions: Apply protective barrier, creams and emollients, HOB 30 degrees or less                Problem: Risk for Spread of Infection  Goal: Prevent transmission of infectious organism to others  Description: Prevent the transmission of infectious organisms to other patients, staff members, and visitors.   Outcome: Progressing Towards Goal

## 2021-06-18 NOTE — PROGRESS NOTES
Spiritual Care Visit, initial visit. Advance Care Planning     visited  Patient and his significant other to explain [facility] documtent. Gave them a copy to take home and review, and possibly to review. Visit by Gina Mobley, Staff .  Tawana., Mauro.B., B.A.

## 2021-06-18 NOTE — PROGRESS NOTES
Problem: Falls - Risk of  Goal: *Absence of Falls  Description: Document Hilda Snyder Fall Risk and appropriate interventions in the flowsheet. Outcome: Resolved/Met  Note: Fall Risk Interventions:  Mobility Interventions: Communicate number of staff needed for ambulation/transfer    Mentation Interventions: Door open when patient unattended, Evaluate medications/consider consulting pharmacy    Medication Interventions: Patient to call before getting OOB    Elimination Interventions: Call light in reach    History of Falls Interventions: Door open when patient unattended         Problem: Patient Education: Go to Patient Education Activity  Goal: Patient/Family Education  Outcome: Resolved/Met     Problem: Patient Education: Go to Patient Education Activity  Goal: Patient/Family Education  Outcome: Resolved/Met     Problem: Pressure Injury - Risk of  Goal: *Prevention of pressure injury  Description: Document Sabino Scale and appropriate interventions in the flowsheet. Outcome: Resolved/Met  Note: Pressure Injury Interventions:  Sensory Interventions: Assess changes in LOC, Assess need for specialty bed, Avoid rigorous massage over bony prominences, Check visual cues for pain, Keep linens dry and wrinkle-free, Maintain/enhance activity level, Minimize linen layers, Monitor skin under medical devices, Pressure redistribution bed/mattress (bed type), Turn and reposition approx.  every two hours (pillows and wedges if needed)    Moisture Interventions: Absorbent underpads, Limit adult briefs, Minimize layers    Activity Interventions: Increase time out of bed    Mobility Interventions: Pressure redistribution bed/mattress (bed type)    Nutrition Interventions: Offer support with meals,snacks and hydration    Friction and Shear Interventions: Apply protective barrier, creams and emollients, HOB 30 degrees or less                Problem: Patient Education: Go to Patient Education Activity  Goal: Patient/Family Education  Outcome: Resolved/Met     Problem: Risk for Spread of Infection  Goal: Prevent transmission of infectious organism to others  Description: Prevent the transmission of infectious organisms to other patients, staff members, and visitors.   Outcome: Resolved/Met     Problem: Patient Education:  Go to Education Activity  Goal: Patient/Family Education  Outcome: Resolved/Met

## 2021-06-18 NOTE — PROGRESS NOTES
Received notification from [facility] that patient's wound vac has been approved and released for delivery. Patient's wound vac will be delivered this afternoon betwee 4:30 - 6:00pm.     Home [facility] Sobeida hutton aware of d/c today. CM will follow for any other needs. Care Management Interventions  PCP Verified by CM:  Yes  Mode of Transport at Discharge: Self  Transition of Care Consult (CM Consult): [address]: Yes  Discharge Durable Medical Equipment: Yes  Physical Therapy Consult: Yes  Occupational Therapy Consult: Yes  Speech Therapy Consult: No  Current Support Network: Lives with Spouse  Confirm Follow Up Transport: Family  The Patient and/or Patient Representative was Provided with a Choice of Provider and Agrees with the Discharge Plan?: Yes  Name of the Patient Representative Who was Provided with a Choice of Provider and Agrees with the Discharge Plan: self  Freedom of Choice List was Provided with Basic Dialogue that Supports the Patient's Individualized Plan of Care/Goals, Treatment Preferences and Shares the Quality Data Associated with the Providers?: Yes   Resource Information Provided?: No  Discharge Location  Discharge Placement: Home with [facility] - RN/PT/OT)

## 2021-06-18 NOTE — PROGRESS NOTES
Pain med given in anticipation of going home. Pain at 5/10 in left side. Continuing to wait for wound vac.

## 2021-06-18 NOTE — PROGRESS NOTES
Patient has decided to go home with home health instead of to short term rehab. I spoke with patient and he is 100% ready to go home and feels confident he does not need rehab at this time. Patient is scheduled for d/c today, however, I am currently working with [facility] to receive approval for a home wound vac today. Wound vac form, written prescription for wound vac, and all supporting clinical documents have been faxed to [facility] and the [facility] representative is working on the order. Home Health - RN/PT/OT ordered and referral sent to [facility] as requested. CM will update with wound vac status once I receive an update.

## 2021-06-18 NOTE — PROGRESS NOTES
Patient's family at bedside inquired about healthcare power of  - left a message for  services to meet with patient about healthcare power of . CM will continue to follow.

## 2021-06-20 ENCOUNTER — HOME CARE VISIT (OUTPATIENT)
Dept: SCHEDULING | Facility: HOME HEALTH | Age: 76
End: 2021-06-20
Payer: MEDICARE

## 2021-06-20 VITALS
SYSTOLIC BLOOD PRESSURE: 128 MMHG | DIASTOLIC BLOOD PRESSURE: 72 MMHG | OXYGEN SATURATION: 98 % | HEART RATE: 71 BPM | RESPIRATION RATE: 18 BRPM | TEMPERATURE: 97.9 F

## 2021-06-20 PROCEDURE — 400013 HH SOC

## 2021-06-20 PROCEDURE — G0299 HHS/HOSPICE OF RN EA 15 MIN: HCPCS

## 2021-06-20 PROCEDURE — A9270 NON-COVERED ITEM OR SERVICE: HCPCS

## 2021-06-20 PROCEDURE — A6216 NON-STERILE GAUZE<=16 SQ IN: HCPCS

## 2021-06-20 PROCEDURE — MED11658 SALINE,.9%,15 ML,ADDIPAK,STERILE

## 2021-06-21 ENCOUNTER — HOME CARE VISIT (OUTPATIENT)
Dept: SCHEDULING | Facility: HOME HEALTH | Age: 76
End: 2021-06-21
Payer: MEDICARE

## 2021-06-21 VITALS
RESPIRATION RATE: 18 BRPM | DIASTOLIC BLOOD PRESSURE: 64 MMHG | HEART RATE: 60 BPM | SYSTOLIC BLOOD PRESSURE: 114 MMHG | TEMPERATURE: 98 F

## 2021-06-21 VITALS
SYSTOLIC BLOOD PRESSURE: 114 MMHG | DIASTOLIC BLOOD PRESSURE: 64 MMHG | OXYGEN SATURATION: 98 % | RESPIRATION RATE: 18 BRPM | HEART RATE: 60 BPM | TEMPERATURE: 98 F

## 2021-06-21 PROCEDURE — G0151 HHCP-SERV OF PT,EA 15 MIN: HCPCS

## 2021-06-21 PROCEDURE — G0299 HHS/HOSPICE OF RN EA 15 MIN: HCPCS

## 2021-06-22 VITALS
SYSTOLIC BLOOD PRESSURE: 110 MMHG | DIASTOLIC BLOOD PRESSURE: 70 MMHG | RESPIRATION RATE: 18 BRPM | TEMPERATURE: 98 F | HEART RATE: 60 BPM | OXYGEN SATURATION: 98 %

## 2021-06-23 ENCOUNTER — HOME CARE VISIT (OUTPATIENT)
Dept: SCHEDULING | Facility: HOME HEALTH | Age: 76
End: 2021-06-23
Payer: MEDICARE

## 2021-06-23 VITALS
TEMPERATURE: 98.3 F | DIASTOLIC BLOOD PRESSURE: 78 MMHG | OXYGEN SATURATION: 98 % | SYSTOLIC BLOOD PRESSURE: 138 MMHG | HEART RATE: 60 BPM | RESPIRATION RATE: 18 BRPM

## 2021-06-23 VITALS
SYSTOLIC BLOOD PRESSURE: 140 MMHG | OXYGEN SATURATION: 97 % | RESPIRATION RATE: 18 BRPM | TEMPERATURE: 98.1 F | DIASTOLIC BLOOD PRESSURE: 89 MMHG | HEART RATE: 62 BPM

## 2021-06-23 PROCEDURE — G0299 HHS/HOSPICE OF RN EA 15 MIN: HCPCS

## 2021-06-24 ENCOUNTER — HOME CARE VISIT (OUTPATIENT)
Dept: SCHEDULING | Facility: HOME HEALTH | Age: 76
End: 2021-06-24
Payer: MEDICARE

## 2021-06-24 ENCOUNTER — HOME CARE VISIT (OUTPATIENT)
Dept: HOME HEALTH SERVICES | Facility: HOME HEALTH | Age: 76
End: 2021-06-24
Payer: MEDICARE

## 2021-06-24 VITALS
DIASTOLIC BLOOD PRESSURE: 60 MMHG | SYSTOLIC BLOOD PRESSURE: 100 MMHG | HEART RATE: 64 BPM | RESPIRATION RATE: 16 BRPM | OXYGEN SATURATION: 97 % | TEMPERATURE: 98 F

## 2021-06-24 PROCEDURE — G0152 HHCP-SERV OF OT,EA 15 MIN: HCPCS

## 2021-06-25 ENCOUNTER — HOME CARE VISIT (OUTPATIENT)
Dept: SCHEDULING | Facility: HOME HEALTH | Age: 76
End: 2021-06-25
Payer: MEDICARE

## 2021-06-25 ENCOUNTER — HOME CARE VISIT (OUTPATIENT)
Dept: HOME HEALTH SERVICES | Facility: HOME HEALTH | Age: 76
End: 2021-06-25
Payer: MEDICARE

## 2021-06-25 VITALS
SYSTOLIC BLOOD PRESSURE: 116 MMHG | TEMPERATURE: 99 F | HEART RATE: 58 BPM | DIASTOLIC BLOOD PRESSURE: 64 MMHG | RESPIRATION RATE: 18 BRPM | OXYGEN SATURATION: 98 %

## 2021-06-25 VITALS
DIASTOLIC BLOOD PRESSURE: 62 MMHG | SYSTOLIC BLOOD PRESSURE: 108 MMHG | TEMPERATURE: 97.1 F | HEART RATE: 74 BPM | RESPIRATION RATE: 18 BRPM | OXYGEN SATURATION: 97 %

## 2021-06-25 PROCEDURE — G0299 HHS/HOSPICE OF RN EA 15 MIN: HCPCS

## 2021-06-26 PROCEDURE — A4385 OST SKN BARRIER SLD EXT WEAR: HCPCS

## 2021-06-26 NOTE — CASE COMMUNICATION
Pt called homecare office and requested on call visit for the following reason: Wound Vac Beeping, pt's wife states she tried reinforcing with tegaderm but was unable to get a seal, states she does not know how to do a wet to dry dressing. This RN arrived to home. VSS. Wound vac was in place with suction at 125 when this RN arrived to home. Patient and wife state that the wound vac will sometimes dip down below 100 and then come back up to 125. This RN inspected dressing for possible leak sites and reinforced the dressing with tegaderm. This RN instructed pt's wife in wet to dry dressing changes if there is a problem with the wound vac. pt's wife verbalized understanding.  This RN left eakins rings in the home to assist with next dressing change (can be rolled into a thin line and pressed around wound edges to assist in obtaining a seal. Stoma paste ordered to assist in getting a seal.

## 2021-06-27 ENCOUNTER — HOME CARE VISIT (OUTPATIENT)
Dept: HOME HEALTH SERVICES | Facility: HOME HEALTH | Age: 76
End: 2021-06-27
Payer: MEDICARE

## 2021-06-28 ENCOUNTER — HOME CARE VISIT (OUTPATIENT)
Dept: HOME HEALTH SERVICES | Facility: HOME HEALTH | Age: 76
End: 2021-06-28
Payer: MEDICARE

## 2021-06-28 ENCOUNTER — HOME CARE VISIT (OUTPATIENT)
Dept: SCHEDULING | Facility: HOME HEALTH | Age: 76
End: 2021-06-28
Payer: MEDICARE

## 2021-06-28 VITALS
RESPIRATION RATE: 16 BRPM | DIASTOLIC BLOOD PRESSURE: 84 MMHG | OXYGEN SATURATION: 98 % | SYSTOLIC BLOOD PRESSURE: 130 MMHG | TEMPERATURE: 97.9 F | HEART RATE: 70 BPM

## 2021-06-28 VITALS
HEART RATE: 82 BPM | DIASTOLIC BLOOD PRESSURE: 76 MMHG | TEMPERATURE: 97.1 F | SYSTOLIC BLOOD PRESSURE: 128 MMHG | OXYGEN SATURATION: 97 % | RESPIRATION RATE: 18 BRPM

## 2021-06-28 PROCEDURE — G0299 HHS/HOSPICE OF RN EA 15 MIN: HCPCS

## 2021-06-28 PROCEDURE — G0151 HHCP-SERV OF PT,EA 15 MIN: HCPCS

## 2021-06-28 PROCEDURE — G0158 HHC OT ASSISTANT EA 15: HCPCS

## 2021-06-29 VITALS
OXYGEN SATURATION: 97 % | DIASTOLIC BLOOD PRESSURE: 82 MMHG | TEMPERATURE: 98.4 F | RESPIRATION RATE: 18 BRPM | HEART RATE: 86 BPM | SYSTOLIC BLOOD PRESSURE: 136 MMHG

## 2021-06-30 ENCOUNTER — HOME CARE VISIT (OUTPATIENT)
Dept: SCHEDULING | Facility: HOME HEALTH | Age: 76
End: 2021-06-30
Payer: MEDICARE

## 2021-06-30 VITALS
SYSTOLIC BLOOD PRESSURE: 111 MMHG | HEART RATE: 70 BPM | DIASTOLIC BLOOD PRESSURE: 76 MMHG | RESPIRATION RATE: 16 BRPM | TEMPERATURE: 98 F | OXYGEN SATURATION: 98 %

## 2021-06-30 VITALS
DIASTOLIC BLOOD PRESSURE: 76 MMHG | HEART RATE: 76 BPM | SYSTOLIC BLOOD PRESSURE: 118 MMHG | TEMPERATURE: 97.6 F | RESPIRATION RATE: 18 BRPM

## 2021-06-30 PROCEDURE — G0158 HHC OT ASSISTANT EA 15: HCPCS

## 2021-06-30 PROCEDURE — G0299 HHS/HOSPICE OF RN EA 15 MIN: HCPCS

## 2021-07-01 ENCOUNTER — HOME CARE VISIT (OUTPATIENT)
Dept: SCHEDULING | Facility: HOME HEALTH | Age: 76
End: 2021-07-01
Payer: MEDICARE

## 2021-07-01 PROCEDURE — G0157 HHC PT ASSISTANT EA 15: HCPCS

## 2021-07-02 ENCOUNTER — HOME CARE VISIT (OUTPATIENT)
Dept: SCHEDULING | Facility: HOME HEALTH | Age: 76
End: 2021-07-02
Payer: MEDICARE

## 2021-07-02 VITALS
TEMPERATURE: 97.5 F | HEART RATE: 84 BPM | DIASTOLIC BLOOD PRESSURE: 78 MMHG | SYSTOLIC BLOOD PRESSURE: 108 MMHG | OXYGEN SATURATION: 97 % | RESPIRATION RATE: 18 BRPM

## 2021-07-02 VITALS
SYSTOLIC BLOOD PRESSURE: 102 MMHG | DIASTOLIC BLOOD PRESSURE: 62 MMHG | HEART RATE: 78 BPM | RESPIRATION RATE: 17 BRPM | TEMPERATURE: 97.6 F

## 2021-07-02 PROCEDURE — MED11890

## 2021-07-02 PROCEDURE — A4217 STERILE WATER/SALINE, 500 ML: HCPCS

## 2021-07-02 PROCEDURE — G0299 HHS/HOSPICE OF RN EA 15 MIN: HCPCS

## 2021-07-02 PROCEDURE — A4452 WATERPROOF TAPE: HCPCS

## 2021-07-02 PROCEDURE — A6402 STERILE GAUZE <= 16 SQ IN: HCPCS

## 2021-07-02 PROCEDURE — A5120 SKIN BARRIER, WIPE OR SWAB: HCPCS

## 2021-07-05 ENCOUNTER — HOME CARE VISIT (OUTPATIENT)
Dept: SCHEDULING | Facility: HOME HEALTH | Age: 76
End: 2021-07-05
Payer: MEDICARE

## 2021-07-05 VITALS
OXYGEN SATURATION: 98 % | RESPIRATION RATE: 18 BRPM | HEART RATE: 66 BPM | SYSTOLIC BLOOD PRESSURE: 122 MMHG | TEMPERATURE: 97.5 F | DIASTOLIC BLOOD PRESSURE: 70 MMHG

## 2021-07-05 PROCEDURE — G0299 HHS/HOSPICE OF RN EA 15 MIN: HCPCS

## 2021-07-06 ENCOUNTER — HOME CARE VISIT (OUTPATIENT)
Dept: HOME HEALTH SERVICES | Facility: HOME HEALTH | Age: 76
End: 2021-07-06
Payer: MEDICARE

## 2021-07-06 ENCOUNTER — HOME CARE VISIT (OUTPATIENT)
Dept: SCHEDULING | Facility: HOME HEALTH | Age: 76
End: 2021-07-06
Payer: MEDICARE

## 2021-07-06 PROCEDURE — G0157 HHC PT ASSISTANT EA 15: HCPCS

## 2021-07-07 ENCOUNTER — HOME CARE VISIT (OUTPATIENT)
Dept: SCHEDULING | Facility: HOME HEALTH | Age: 76
End: 2021-07-07
Payer: MEDICARE

## 2021-07-07 VITALS
RESPIRATION RATE: 18 BRPM | HEART RATE: 74 BPM | SYSTOLIC BLOOD PRESSURE: 136 MMHG | TEMPERATURE: 97.3 F | OXYGEN SATURATION: 97 % | DIASTOLIC BLOOD PRESSURE: 82 MMHG

## 2021-07-07 VITALS
TEMPERATURE: 97.3 F | SYSTOLIC BLOOD PRESSURE: 108 MMHG | DIASTOLIC BLOOD PRESSURE: 60 MMHG | OXYGEN SATURATION: 96 % | HEART RATE: 60 BPM | RESPIRATION RATE: 18 BRPM

## 2021-07-07 VITALS
TEMPERATURE: 98.2 F | HEART RATE: 66 BPM | SYSTOLIC BLOOD PRESSURE: 118 MMHG | OXYGEN SATURATION: 95 % | DIASTOLIC BLOOD PRESSURE: 76 MMHG | RESPIRATION RATE: 16 BRPM

## 2021-07-07 PROCEDURE — G0299 HHS/HOSPICE OF RN EA 15 MIN: HCPCS

## 2021-07-07 PROCEDURE — G0152 HHCP-SERV OF OT,EA 15 MIN: HCPCS

## 2021-07-08 ENCOUNTER — HOME CARE VISIT (OUTPATIENT)
Dept: SCHEDULING | Facility: HOME HEALTH | Age: 76
End: 2021-07-08
Payer: MEDICARE

## 2021-07-08 VITALS
RESPIRATION RATE: 17 BRPM | DIASTOLIC BLOOD PRESSURE: 74 MMHG | HEART RATE: 78 BPM | SYSTOLIC BLOOD PRESSURE: 122 MMHG | TEMPERATURE: 98 F

## 2021-07-08 PROBLEM — L30.9 DERMATITIS: Status: ACTIVE | Noted: 2021-07-08

## 2021-07-08 PROBLEM — S21.102A OPEN WOUND OF LEFT CHEST WALL: Status: ACTIVE | Noted: 2021-07-08

## 2021-07-08 PROBLEM — S31.109A OPEN WOUND OF FLANK: Status: ACTIVE | Noted: 2021-07-08

## 2021-07-08 PROBLEM — S41.102A OPEN WOUND OF LEFT AXILLARY REGION: Status: ACTIVE | Noted: 2021-07-08

## 2021-07-08 PROCEDURE — G0151 HHCP-SERV OF PT,EA 15 MIN: HCPCS

## 2021-07-09 ENCOUNTER — HOME CARE VISIT (OUTPATIENT)
Dept: SCHEDULING | Facility: HOME HEALTH | Age: 76
End: 2021-07-09
Payer: MEDICARE

## 2021-07-09 VITALS
HEART RATE: 76 BPM | TEMPERATURE: 96.8 F | OXYGEN SATURATION: 97 % | SYSTOLIC BLOOD PRESSURE: 132 MMHG | RESPIRATION RATE: 18 BRPM | DIASTOLIC BLOOD PRESSURE: 76 MMHG

## 2021-07-09 PROCEDURE — G0299 HHS/HOSPICE OF RN EA 15 MIN: HCPCS

## 2021-07-09 PROCEDURE — G0151 HHCP-SERV OF PT,EA 15 MIN: HCPCS

## 2021-07-10 ENCOUNTER — HOME CARE VISIT (OUTPATIENT)
Dept: SCHEDULING | Facility: HOME HEALTH | Age: 76
End: 2021-07-10
Payer: MEDICARE

## 2021-07-10 VITALS
SYSTOLIC BLOOD PRESSURE: 132 MMHG | RESPIRATION RATE: 18 BRPM | TEMPERATURE: 98.4 F | OXYGEN SATURATION: 96 % | DIASTOLIC BLOOD PRESSURE: 78 MMHG | HEART RATE: 72 BPM

## 2021-07-10 PROCEDURE — G0299 HHS/HOSPICE OF RN EA 15 MIN: HCPCS

## 2021-07-10 PROCEDURE — A6252 ABSORPT DRG >16 <=48 W/O BDR: HCPCS

## 2021-07-10 PROCEDURE — A6445 CONFORM BAND S W <3"/YD: HCPCS

## 2021-07-11 ENCOUNTER — HOME CARE VISIT (OUTPATIENT)
Dept: SCHEDULING | Facility: HOME HEALTH | Age: 76
End: 2021-07-11
Payer: MEDICARE

## 2021-07-11 VITALS
HEART RATE: 84 BPM | SYSTOLIC BLOOD PRESSURE: 132 MMHG | RESPIRATION RATE: 16 BRPM | OXYGEN SATURATION: 98 % | DIASTOLIC BLOOD PRESSURE: 76 MMHG | TEMPERATURE: 97.9 F

## 2021-07-11 VITALS
RESPIRATION RATE: 17 BRPM | SYSTOLIC BLOOD PRESSURE: 122 MMHG | HEART RATE: 81 BPM | DIASTOLIC BLOOD PRESSURE: 74 MMHG | TEMPERATURE: 97.3 F

## 2021-07-11 PROCEDURE — G0299 HHS/HOSPICE OF RN EA 15 MIN: HCPCS

## 2021-07-12 ENCOUNTER — HOME CARE VISIT (OUTPATIENT)
Dept: SCHEDULING | Facility: HOME HEALTH | Age: 76
End: 2021-07-12
Payer: MEDICARE

## 2021-07-12 VITALS
RESPIRATION RATE: 18 BRPM | TEMPERATURE: 97.7 F | SYSTOLIC BLOOD PRESSURE: 126 MMHG | OXYGEN SATURATION: 96 % | HEART RATE: 76 BPM | DIASTOLIC BLOOD PRESSURE: 72 MMHG

## 2021-07-12 PROCEDURE — G0157 HHC PT ASSISTANT EA 15: HCPCS

## 2021-07-12 PROCEDURE — G0299 HHS/HOSPICE OF RN EA 15 MIN: HCPCS

## 2021-07-13 VITALS
SYSTOLIC BLOOD PRESSURE: 130 MMHG | OXYGEN SATURATION: 95 % | RESPIRATION RATE: 18 BRPM | DIASTOLIC BLOOD PRESSURE: 70 MMHG | HEART RATE: 70 BPM | TEMPERATURE: 97.7 F

## 2021-07-14 ENCOUNTER — HOME CARE VISIT (OUTPATIENT)
Dept: SCHEDULING | Facility: HOME HEALTH | Age: 76
End: 2021-07-14
Payer: MEDICARE

## 2021-07-14 VITALS
OXYGEN SATURATION: 95 % | DIASTOLIC BLOOD PRESSURE: 72 MMHG | TEMPERATURE: 98.2 F | RESPIRATION RATE: 18 BRPM | SYSTOLIC BLOOD PRESSURE: 136 MMHG | HEART RATE: 72 BPM

## 2021-07-14 PROCEDURE — G0299 HHS/HOSPICE OF RN EA 15 MIN: HCPCS

## 2021-07-14 PROCEDURE — G0157 HHC PT ASSISTANT EA 15: HCPCS

## 2021-07-15 VITALS
RESPIRATION RATE: 18 BRPM | DIASTOLIC BLOOD PRESSURE: 70 MMHG | OXYGEN SATURATION: 95 % | HEART RATE: 70 BPM | TEMPERATURE: 98.2 F | SYSTOLIC BLOOD PRESSURE: 130 MMHG

## 2021-07-16 ENCOUNTER — HOME CARE VISIT (OUTPATIENT)
Dept: SCHEDULING | Facility: HOME HEALTH | Age: 76
End: 2021-07-16
Payer: MEDICARE

## 2021-07-16 VITALS
DIASTOLIC BLOOD PRESSURE: 76 MMHG | HEART RATE: 76 BPM | TEMPERATURE: 97.3 F | OXYGEN SATURATION: 97 % | RESPIRATION RATE: 18 BRPM | SYSTOLIC BLOOD PRESSURE: 128 MMHG

## 2021-07-16 PROCEDURE — G0299 HHS/HOSPICE OF RN EA 15 MIN: HCPCS

## 2021-07-19 ENCOUNTER — HOME CARE VISIT (OUTPATIENT)
Dept: SCHEDULING | Facility: HOME HEALTH | Age: 76
End: 2021-07-19
Payer: MEDICARE

## 2021-07-19 ENCOUNTER — HOME CARE VISIT (OUTPATIENT)
Dept: HOME HEALTH SERVICES | Facility: HOME HEALTH | Age: 76
End: 2021-07-19
Payer: MEDICARE

## 2021-07-19 VITALS
OXYGEN SATURATION: 97 % | HEART RATE: 76 BPM | SYSTOLIC BLOOD PRESSURE: 118 MMHG | DIASTOLIC BLOOD PRESSURE: 72 MMHG | TEMPERATURE: 97.5 F | RESPIRATION RATE: 18 BRPM

## 2021-07-19 PROCEDURE — G0299 HHS/HOSPICE OF RN EA 15 MIN: HCPCS

## 2021-07-19 PROCEDURE — A6252 ABSORPT DRG >16 <=48 W/O BDR: HCPCS

## 2021-07-19 PROCEDURE — A4452 WATERPROOF TAPE: HCPCS

## 2021-07-20 ENCOUNTER — HOME CARE VISIT (OUTPATIENT)
Dept: HOME HEALTH SERVICES | Facility: HOME HEALTH | Age: 76
End: 2021-07-20
Payer: MEDICARE

## 2021-07-20 NOTE — CASE COMMUNICATION
Called patient evening of 7-18 to schedule PT for 7-19, patient in agreement. Received a phone call from nursing who saw patient around noon, stating patient had an afternoon doctor's appt and would not be available for physical therapy.   Will try to see on Wednesday, 7-21

## 2021-07-21 ENCOUNTER — HOME CARE VISIT (OUTPATIENT)
Dept: SCHEDULING | Facility: HOME HEALTH | Age: 76
End: 2021-07-21
Payer: MEDICARE

## 2021-07-21 VITALS
OXYGEN SATURATION: 96 % | DIASTOLIC BLOOD PRESSURE: 68 MMHG | RESPIRATION RATE: 18 BRPM | HEART RATE: 72 BPM | TEMPERATURE: 98 F | SYSTOLIC BLOOD PRESSURE: 118 MMHG

## 2021-07-21 PROCEDURE — G0157 HHC PT ASSISTANT EA 15: HCPCS

## 2021-07-21 PROCEDURE — G0299 HHS/HOSPICE OF RN EA 15 MIN: HCPCS

## 2021-07-21 PROCEDURE — 400013 HH SOC

## 2021-07-23 ENCOUNTER — HOME CARE VISIT (OUTPATIENT)
Dept: SCHEDULING | Facility: HOME HEALTH | Age: 76
End: 2021-07-23
Payer: MEDICARE

## 2021-07-23 VITALS
TEMPERATURE: 96.4 F | OXYGEN SATURATION: 97 % | SYSTOLIC BLOOD PRESSURE: 118 MMHG | RESPIRATION RATE: 18 BRPM | HEART RATE: 78 BPM | DIASTOLIC BLOOD PRESSURE: 72 MMHG

## 2021-07-23 VITALS
OXYGEN SATURATION: 97 % | TEMPERATURE: 98 F | RESPIRATION RATE: 18 BRPM | SYSTOLIC BLOOD PRESSURE: 120 MMHG | DIASTOLIC BLOOD PRESSURE: 68 MMHG | HEART RATE: 70 BPM

## 2021-07-23 PROCEDURE — G0299 HHS/HOSPICE OF RN EA 15 MIN: HCPCS

## 2021-07-26 ENCOUNTER — HOME CARE VISIT (OUTPATIENT)
Dept: SCHEDULING | Facility: HOME HEALTH | Age: 76
End: 2021-07-26
Payer: MEDICARE

## 2021-07-26 VITALS
OXYGEN SATURATION: 96 % | TEMPERATURE: 97.3 F | SYSTOLIC BLOOD PRESSURE: 112 MMHG | RESPIRATION RATE: 18 BRPM | HEART RATE: 74 BPM | DIASTOLIC BLOOD PRESSURE: 72 MMHG

## 2021-07-26 PROCEDURE — G0157 HHC PT ASSISTANT EA 15: HCPCS

## 2021-07-26 PROCEDURE — G0299 HHS/HOSPICE OF RN EA 15 MIN: HCPCS

## 2021-07-27 ENCOUNTER — HOME CARE VISIT (OUTPATIENT)
Dept: SCHEDULING | Facility: HOME HEALTH | Age: 76
End: 2021-07-27
Payer: MEDICARE

## 2021-07-27 VITALS
TEMPERATURE: 97.3 F | HEART RATE: 74 BPM | SYSTOLIC BLOOD PRESSURE: 110 MMHG | RESPIRATION RATE: 18 BRPM | DIASTOLIC BLOOD PRESSURE: 70 MMHG

## 2021-07-27 PROCEDURE — G0151 HHCP-SERV OF PT,EA 15 MIN: HCPCS

## 2021-07-28 ENCOUNTER — HOME CARE VISIT (OUTPATIENT)
Dept: SCHEDULING | Facility: HOME HEALTH | Age: 76
End: 2021-07-28
Payer: MEDICARE

## 2021-07-28 VITALS
HEART RATE: 76 BPM | OXYGEN SATURATION: 96 % | SYSTOLIC BLOOD PRESSURE: 114 MMHG | DIASTOLIC BLOOD PRESSURE: 72 MMHG | TEMPERATURE: 98 F | RESPIRATION RATE: 18 BRPM

## 2021-07-28 PROCEDURE — G0299 HHS/HOSPICE OF RN EA 15 MIN: HCPCS

## 2021-07-29 VITALS
RESPIRATION RATE: 18 BRPM | DIASTOLIC BLOOD PRESSURE: 76 MMHG | SYSTOLIC BLOOD PRESSURE: 124 MMHG | HEART RATE: 78 BPM | TEMPERATURE: 97.7 F

## 2021-07-30 ENCOUNTER — HOME CARE VISIT (OUTPATIENT)
Dept: SCHEDULING | Facility: HOME HEALTH | Age: 76
End: 2021-07-30
Payer: MEDICARE

## 2021-07-30 VITALS
SYSTOLIC BLOOD PRESSURE: 138 MMHG | OXYGEN SATURATION: 97 % | DIASTOLIC BLOOD PRESSURE: 82 MMHG | RESPIRATION RATE: 18 BRPM | HEART RATE: 76 BPM | TEMPERATURE: 97.6 F

## 2021-07-30 PROCEDURE — G0299 HHS/HOSPICE OF RN EA 15 MIN: HCPCS

## 2021-08-02 ENCOUNTER — HOME CARE VISIT (OUTPATIENT)
Dept: SCHEDULING | Facility: HOME HEALTH | Age: 76
End: 2021-08-02
Payer: MEDICARE

## 2021-08-02 VITALS
DIASTOLIC BLOOD PRESSURE: 74 MMHG | HEART RATE: 84 BPM | OXYGEN SATURATION: 97 % | TEMPERATURE: 97.4 F | RESPIRATION RATE: 18 BRPM | SYSTOLIC BLOOD PRESSURE: 108 MMHG

## 2021-08-02 PROCEDURE — A6402 STERILE GAUZE <= 16 SQ IN: HCPCS

## 2021-08-02 PROCEDURE — G0299 HHS/HOSPICE OF RN EA 15 MIN: HCPCS

## 2021-08-02 PROCEDURE — A6443 CONFORM BAND N/S W>=3"<5"/YD: HCPCS

## 2021-08-02 PROCEDURE — A6252 ABSORPT DRG >16 <=48 W/O BDR: HCPCS

## 2021-08-04 ENCOUNTER — HOME CARE VISIT (OUTPATIENT)
Dept: SCHEDULING | Facility: HOME HEALTH | Age: 76
End: 2021-08-04
Payer: MEDICARE

## 2021-08-04 PROBLEM — R55 SYNCOPE: Status: ACTIVE | Noted: 2021-08-04

## 2021-08-06 ENCOUNTER — HOME CARE VISIT (OUTPATIENT)
Dept: SCHEDULING | Facility: HOME HEALTH | Age: 76
End: 2021-08-06
Payer: MEDICARE

## 2021-08-06 VITALS
SYSTOLIC BLOOD PRESSURE: 110 MMHG | HEART RATE: 68 BPM | DIASTOLIC BLOOD PRESSURE: 76 MMHG | OXYGEN SATURATION: 97 % | TEMPERATURE: 98.1 F | RESPIRATION RATE: 18 BRPM

## 2021-08-06 PROCEDURE — G0299 HHS/HOSPICE OF RN EA 15 MIN: HCPCS

## 2021-08-09 ENCOUNTER — HOME CARE VISIT (OUTPATIENT)
Dept: SCHEDULING | Facility: HOME HEALTH | Age: 76
End: 2021-08-09
Payer: MEDICARE

## 2021-08-09 VITALS
SYSTOLIC BLOOD PRESSURE: 128 MMHG | OXYGEN SATURATION: 95 % | RESPIRATION RATE: 18 BRPM | HEART RATE: 84 BPM | TEMPERATURE: 97.2 F | DIASTOLIC BLOOD PRESSURE: 76 MMHG

## 2021-08-09 PROCEDURE — G0299 HHS/HOSPICE OF RN EA 15 MIN: HCPCS

## 2021-08-11 ENCOUNTER — HOME CARE VISIT (OUTPATIENT)
Dept: SCHEDULING | Facility: HOME HEALTH | Age: 76
End: 2021-08-11
Payer: MEDICARE

## 2021-08-11 VITALS
HEART RATE: 84 BPM | DIASTOLIC BLOOD PRESSURE: 78 MMHG | TEMPERATURE: 98.3 F | OXYGEN SATURATION: 98 % | RESPIRATION RATE: 18 BRPM | SYSTOLIC BLOOD PRESSURE: 120 MMHG

## 2021-08-11 PROCEDURE — G0299 HHS/HOSPICE OF RN EA 15 MIN: HCPCS

## 2021-08-13 ENCOUNTER — HOME CARE VISIT (OUTPATIENT)
Dept: SCHEDULING | Facility: HOME HEALTH | Age: 76
End: 2021-08-13
Payer: MEDICARE

## 2021-08-13 VITALS
SYSTOLIC BLOOD PRESSURE: 132 MMHG | OXYGEN SATURATION: 96 % | HEART RATE: 76 BPM | RESPIRATION RATE: 18 BRPM | TEMPERATURE: 97.6 F | DIASTOLIC BLOOD PRESSURE: 78 MMHG

## 2021-08-13 PROCEDURE — G0299 HHS/HOSPICE OF RN EA 15 MIN: HCPCS

## 2021-08-16 ENCOUNTER — HOME CARE VISIT (OUTPATIENT)
Dept: SCHEDULING | Facility: HOME HEALTH | Age: 76
End: 2021-08-16
Payer: MEDICARE

## 2021-08-16 VITALS
SYSTOLIC BLOOD PRESSURE: 126 MMHG | DIASTOLIC BLOOD PRESSURE: 80 MMHG | RESPIRATION RATE: 18 BRPM | TEMPERATURE: 97.5 F | HEART RATE: 80 BPM | OXYGEN SATURATION: 99 %

## 2021-08-16 PROCEDURE — G0299 HHS/HOSPICE OF RN EA 15 MIN: HCPCS

## 2021-08-16 NOTE — HOME HEALTH
TC placed to Dr Amanda Victor office at [place] [id number] for verbal order for recertification and continuation of skilled nurisng care and to inquire on fridays return call to patient from SN call to office due to patient and cg missed call and was unsuccessful in attempts to recontact.

## 2021-08-18 ENCOUNTER — HOME CARE VISIT (OUTPATIENT)
Dept: SCHEDULING | Facility: HOME HEALTH | Age: 76
End: 2021-08-18
Payer: MEDICARE

## 2021-08-18 PROBLEM — B37.2 YEAST DERMATITIS: Status: ACTIVE | Noted: 2021-08-18

## 2021-08-20 ENCOUNTER — HOME CARE VISIT (OUTPATIENT)
Dept: SCHEDULING | Facility: HOME HEALTH | Age: 76
End: 2021-08-20
Payer: MEDICARE

## 2021-08-20 VITALS
HEART RATE: 72 BPM | TEMPERATURE: 97.3 F | OXYGEN SATURATION: 95 % | DIASTOLIC BLOOD PRESSURE: 76 MMHG | SYSTOLIC BLOOD PRESSURE: 122 MMHG | RESPIRATION RATE: 16 BRPM

## 2021-08-20 PROCEDURE — 400014 HH F/U

## 2021-08-20 PROCEDURE — G0299 HHS/HOSPICE OF RN EA 15 MIN: HCPCS

## 2021-08-23 ENCOUNTER — HOME CARE VISIT (OUTPATIENT)
Dept: SCHEDULING | Facility: HOME HEALTH | Age: 76
End: 2021-08-23
Payer: MEDICARE

## 2021-08-23 PROCEDURE — G0299 HHS/HOSPICE OF RN EA 15 MIN: HCPCS

## 2021-08-24 VITALS
SYSTOLIC BLOOD PRESSURE: 128 MMHG | DIASTOLIC BLOOD PRESSURE: 82 MMHG | HEART RATE: 76 BPM | OXYGEN SATURATION: 96 % | TEMPERATURE: 98.9 F | RESPIRATION RATE: 18 BRPM

## 2021-08-25 ENCOUNTER — HOME CARE VISIT (OUTPATIENT)
Dept: SCHEDULING | Facility: HOME HEALTH | Age: 76
End: 2021-08-25
Payer: MEDICARE

## 2021-08-25 VITALS
TEMPERATURE: 97.6 F | HEART RATE: 72 BPM | SYSTOLIC BLOOD PRESSURE: 132 MMHG | RESPIRATION RATE: 18 BRPM | DIASTOLIC BLOOD PRESSURE: 82 MMHG | OXYGEN SATURATION: 95 %

## 2021-08-25 PROCEDURE — G0299 HHS/HOSPICE OF RN EA 15 MIN: HCPCS

## 2021-08-27 ENCOUNTER — HOME CARE VISIT (OUTPATIENT)
Dept: SCHEDULING | Facility: HOME HEALTH | Age: 76
End: 2021-08-27
Payer: MEDICARE

## 2021-08-27 VITALS
RESPIRATION RATE: 17 BRPM | TEMPERATURE: 97.9 F | OXYGEN SATURATION: 96 % | DIASTOLIC BLOOD PRESSURE: 76 MMHG | SYSTOLIC BLOOD PRESSURE: 132 MMHG | HEART RATE: 78 BPM

## 2021-08-27 PROCEDURE — G0299 HHS/HOSPICE OF RN EA 15 MIN: HCPCS

## 2021-08-30 ENCOUNTER — HOME CARE VISIT (OUTPATIENT)
Dept: SCHEDULING | Facility: HOME HEALTH | Age: 76
End: 2021-08-30
Payer: MEDICARE

## 2021-08-30 VITALS
HEART RATE: 72 BPM | TEMPERATURE: 97.5 F | OXYGEN SATURATION: 96 % | SYSTOLIC BLOOD PRESSURE: 118 MMHG | RESPIRATION RATE: 18 BRPM | DIASTOLIC BLOOD PRESSURE: 78 MMHG

## 2021-08-30 PROCEDURE — G0299 HHS/HOSPICE OF RN EA 15 MIN: HCPCS

## 2021-09-01 ENCOUNTER — HOME CARE VISIT (OUTPATIENT)
Dept: SCHEDULING | Facility: HOME HEALTH | Age: 76
End: 2021-09-01
Payer: MEDICARE

## 2021-09-01 VITALS
SYSTOLIC BLOOD PRESSURE: 122 MMHG | DIASTOLIC BLOOD PRESSURE: 78 MMHG | OXYGEN SATURATION: 96 % | RESPIRATION RATE: 18 BRPM | TEMPERATURE: 97.4 F | HEART RATE: 76 BPM

## 2021-09-01 PROCEDURE — G0299 HHS/HOSPICE OF RN EA 15 MIN: HCPCS

## 2021-09-03 ENCOUNTER — HOME CARE VISIT (OUTPATIENT)
Dept: SCHEDULING | Facility: HOME HEALTH | Age: 76
End: 2021-09-03
Payer: MEDICARE

## 2021-09-03 VITALS
HEART RATE: 76 BPM | DIASTOLIC BLOOD PRESSURE: 72 MMHG | RESPIRATION RATE: 19 BRPM | TEMPERATURE: 96.9 F | SYSTOLIC BLOOD PRESSURE: 132 MMHG | OXYGEN SATURATION: 97 %

## 2021-09-03 PROCEDURE — A5120 SKIN BARRIER, WIPE OR SWAB: HCPCS

## 2021-09-03 PROCEDURE — A4452 WATERPROOF TAPE: HCPCS

## 2021-09-03 PROCEDURE — G0299 HHS/HOSPICE OF RN EA 15 MIN: HCPCS

## 2021-09-03 PROCEDURE — A6402 STERILE GAUZE <= 16 SQ IN: HCPCS

## 2021-09-07 ENCOUNTER — HOME CARE VISIT (OUTPATIENT)
Dept: SCHEDULING | Facility: HOME HEALTH | Age: 76
End: 2021-09-07
Payer: MEDICARE

## 2021-09-07 VITALS
TEMPERATURE: 96.6 F | HEART RATE: 72 BPM | RESPIRATION RATE: 18 BRPM | OXYGEN SATURATION: 95 % | SYSTOLIC BLOOD PRESSURE: 128 MMHG | DIASTOLIC BLOOD PRESSURE: 82 MMHG

## 2021-09-07 PROCEDURE — G0299 HHS/HOSPICE OF RN EA 15 MIN: HCPCS

## 2021-09-08 ENCOUNTER — HOME CARE VISIT (OUTPATIENT)
Dept: SCHEDULING | Facility: HOME HEALTH | Age: 76
End: 2021-09-08
Payer: MEDICARE

## 2021-09-08 VITALS
DIASTOLIC BLOOD PRESSURE: 82 MMHG | HEART RATE: 76 BPM | RESPIRATION RATE: 18 BRPM | OXYGEN SATURATION: 94 % | SYSTOLIC BLOOD PRESSURE: 136 MMHG | TEMPERATURE: 97.1 F

## 2021-09-08 PROCEDURE — G0299 HHS/HOSPICE OF RN EA 15 MIN: HCPCS

## 2021-09-10 ENCOUNTER — HOME CARE VISIT (OUTPATIENT)
Dept: SCHEDULING | Facility: HOME HEALTH | Age: 76
End: 2021-09-10
Payer: MEDICARE

## 2021-09-10 VITALS
TEMPERATURE: 97.6 F | DIASTOLIC BLOOD PRESSURE: 78 MMHG | HEART RATE: 76 BPM | SYSTOLIC BLOOD PRESSURE: 122 MMHG | RESPIRATION RATE: 18 BRPM | OXYGEN SATURATION: 96 %

## 2021-09-10 PROCEDURE — G0299 HHS/HOSPICE OF RN EA 15 MIN: HCPCS

## 2021-09-13 ENCOUNTER — HOME CARE VISIT (OUTPATIENT)
Dept: SCHEDULING | Facility: HOME HEALTH | Age: 76
End: 2021-09-13
Payer: MEDICARE

## 2021-09-13 VITALS
TEMPERATURE: 97.3 F | RESPIRATION RATE: 18 BRPM | DIASTOLIC BLOOD PRESSURE: 80 MMHG | HEART RATE: 70 BPM | OXYGEN SATURATION: 97 % | SYSTOLIC BLOOD PRESSURE: 132 MMHG

## 2021-09-13 PROCEDURE — G0299 HHS/HOSPICE OF RN EA 15 MIN: HCPCS

## 2021-09-16 ENCOUNTER — HOME CARE VISIT (OUTPATIENT)
Dept: SCHEDULING | Facility: HOME HEALTH | Age: 76
End: 2021-09-16
Payer: MEDICARE

## 2021-09-16 VITALS
HEART RATE: 84 BPM | OXYGEN SATURATION: 97 % | SYSTOLIC BLOOD PRESSURE: 128 MMHG | RESPIRATION RATE: 18 BRPM | DIASTOLIC BLOOD PRESSURE: 84 MMHG | TEMPERATURE: 97.1 F

## 2021-09-16 PROCEDURE — G0299 HHS/HOSPICE OF RN EA 15 MIN: HCPCS

## 2021-09-18 ENCOUNTER — HOME CARE VISIT (OUTPATIENT)
Dept: SCHEDULING | Facility: HOME HEALTH | Age: 76
End: 2021-09-18
Payer: MEDICARE

## 2021-09-18 VITALS
SYSTOLIC BLOOD PRESSURE: 103 MMHG | DIASTOLIC BLOOD PRESSURE: 62 MMHG | RESPIRATION RATE: 18 BRPM | OXYGEN SATURATION: 95 % | TEMPERATURE: 98.9 F | HEART RATE: 86 BPM

## 2021-09-18 PROCEDURE — G0299 HHS/HOSPICE OF RN EA 15 MIN: HCPCS

## 2021-09-18 PROCEDURE — 400014 HH F/U

## 2021-09-20 ENCOUNTER — HOME CARE VISIT (OUTPATIENT)
Dept: SCHEDULING | Facility: HOME HEALTH | Age: 76
End: 2021-09-20
Payer: MEDICARE

## 2021-09-22 ENCOUNTER — HOME CARE VISIT (OUTPATIENT)
Dept: SCHEDULING | Facility: HOME HEALTH | Age: 76
End: 2021-09-22
Payer: MEDICARE

## 2021-09-22 VITALS
RESPIRATION RATE: 18 BRPM | DIASTOLIC BLOOD PRESSURE: 68 MMHG | TEMPERATURE: 99.3 F | HEART RATE: 80 BPM | SYSTOLIC BLOOD PRESSURE: 102 MMHG | OXYGEN SATURATION: 98 %

## 2021-09-22 PROCEDURE — G0299 HHS/HOSPICE OF RN EA 15 MIN: HCPCS

## 2021-09-24 ENCOUNTER — HOME CARE VISIT (OUTPATIENT)
Dept: SCHEDULING | Facility: HOME HEALTH | Age: 76
End: 2021-09-24
Payer: MEDICARE

## 2021-09-24 VITALS
HEART RATE: 72 BPM | DIASTOLIC BLOOD PRESSURE: 78 MMHG | TEMPERATURE: 98.8 F | SYSTOLIC BLOOD PRESSURE: 128 MMHG | RESPIRATION RATE: 18 BRPM | OXYGEN SATURATION: 96 %

## 2021-09-24 PROCEDURE — G0299 HHS/HOSPICE OF RN EA 15 MIN: HCPCS

## 2021-09-27 ENCOUNTER — HOME CARE VISIT (OUTPATIENT)
Dept: SCHEDULING | Facility: HOME HEALTH | Age: 76
End: 2021-09-27
Payer: MEDICARE

## 2021-09-27 VITALS
RESPIRATION RATE: 16 BRPM | TEMPERATURE: 98.8 F | OXYGEN SATURATION: 98 % | HEART RATE: 70 BPM | DIASTOLIC BLOOD PRESSURE: 82 MMHG | SYSTOLIC BLOOD PRESSURE: 140 MMHG

## 2021-09-27 PROCEDURE — G0299 HHS/HOSPICE OF RN EA 15 MIN: HCPCS

## 2021-09-29 ENCOUNTER — HOME CARE VISIT (OUTPATIENT)
Dept: SCHEDULING | Facility: HOME HEALTH | Age: 76
End: 2021-09-29
Payer: MEDICARE

## 2021-09-29 VITALS
TEMPERATURE: 97.2 F | SYSTOLIC BLOOD PRESSURE: 132 MMHG | OXYGEN SATURATION: 97 % | DIASTOLIC BLOOD PRESSURE: 74 MMHG | RESPIRATION RATE: 18 BRPM | HEART RATE: 78 BPM

## 2021-09-29 PROCEDURE — G0299 HHS/HOSPICE OF RN EA 15 MIN: HCPCS

## 2021-10-01 ENCOUNTER — HOME CARE VISIT (OUTPATIENT)
Dept: SCHEDULING | Facility: HOME HEALTH | Age: 76
End: 2021-10-01
Payer: MEDICARE

## 2021-10-01 VITALS
TEMPERATURE: 98.7 F | DIASTOLIC BLOOD PRESSURE: 80 MMHG | RESPIRATION RATE: 16 BRPM | HEART RATE: 74 BPM | SYSTOLIC BLOOD PRESSURE: 122 MMHG

## 2021-10-01 PROCEDURE — A4452 WATERPROOF TAPE: HCPCS

## 2021-10-01 PROCEDURE — G0299 HHS/HOSPICE OF RN EA 15 MIN: HCPCS

## 2021-10-04 ENCOUNTER — HOME CARE VISIT (OUTPATIENT)
Dept: SCHEDULING | Facility: HOME HEALTH | Age: 76
End: 2021-10-04
Payer: MEDICARE

## 2021-10-04 VITALS
HEART RATE: 74 BPM | RESPIRATION RATE: 16 BRPM | DIASTOLIC BLOOD PRESSURE: 78 MMHG | TEMPERATURE: 97.3 F | SYSTOLIC BLOOD PRESSURE: 136 MMHG | OXYGEN SATURATION: 97 %

## 2021-10-04 PROCEDURE — G0299 HHS/HOSPICE OF RN EA 15 MIN: HCPCS

## 2021-10-06 ENCOUNTER — HOME CARE VISIT (OUTPATIENT)
Dept: SCHEDULING | Facility: HOME HEALTH | Age: 76
End: 2021-10-06
Payer: MEDICARE

## 2021-10-08 ENCOUNTER — HOME CARE VISIT (OUTPATIENT)
Dept: SCHEDULING | Facility: HOME HEALTH | Age: 76
End: 2021-10-08
Payer: MEDICARE

## 2021-10-08 VITALS
DIASTOLIC BLOOD PRESSURE: 88 MMHG | SYSTOLIC BLOOD PRESSURE: 152 MMHG | OXYGEN SATURATION: 98 % | RESPIRATION RATE: 18 BRPM | HEART RATE: 80 BPM | TEMPERATURE: 98.8 F

## 2021-10-08 PROCEDURE — G0299 HHS/HOSPICE OF RN EA 15 MIN: HCPCS

## 2021-10-12 ENCOUNTER — HOME CARE VISIT (OUTPATIENT)
Dept: SCHEDULING | Facility: HOME HEALTH | Age: 76
End: 2021-10-12
Payer: MEDICARE

## 2021-10-12 PROCEDURE — G0299 HHS/HOSPICE OF RN EA 15 MIN: HCPCS

## 2021-10-14 ENCOUNTER — HOME CARE VISIT (OUTPATIENT)
Dept: SCHEDULING | Facility: HOME HEALTH | Age: 76
End: 2021-10-14
Payer: MEDICARE

## 2021-10-14 VITALS
TEMPERATURE: 98.6 F | HEART RATE: 68 BPM | RESPIRATION RATE: 20 BRPM | SYSTOLIC BLOOD PRESSURE: 126 MMHG | DIASTOLIC BLOOD PRESSURE: 70 MMHG | OXYGEN SATURATION: 97 %

## 2021-10-14 PROCEDURE — G0299 HHS/HOSPICE OF RN EA 15 MIN: HCPCS

## 2021-10-15 ENCOUNTER — HOME CARE VISIT (OUTPATIENT)
Dept: SCHEDULING | Facility: HOME HEALTH | Age: 76
End: 2021-10-15
Payer: MEDICARE

## 2021-10-15 VITALS
DIASTOLIC BLOOD PRESSURE: 76 MMHG | SYSTOLIC BLOOD PRESSURE: 134 MMHG | RESPIRATION RATE: 18 BRPM | TEMPERATURE: 96.9 F | HEART RATE: 76 BPM | OXYGEN SATURATION: 97 %

## 2021-10-15 PROCEDURE — G0299 HHS/HOSPICE OF RN EA 15 MIN: HCPCS

## 2021-10-18 ENCOUNTER — HOME CARE VISIT (OUTPATIENT)
Dept: SCHEDULING | Facility: HOME HEALTH | Age: 76
End: 2021-10-18
Payer: MEDICARE

## 2021-10-18 VITALS
OXYGEN SATURATION: 95 % | HEART RATE: 76 BPM | DIASTOLIC BLOOD PRESSURE: 84 MMHG | RESPIRATION RATE: 18 BRPM | TEMPERATURE: 97.8 F | SYSTOLIC BLOOD PRESSURE: 126 MMHG

## 2021-10-18 PROCEDURE — 400014 HH F/U

## 2021-10-18 PROCEDURE — G0299 HHS/HOSPICE OF RN EA 15 MIN: HCPCS

## 2021-10-20 ENCOUNTER — HOME CARE VISIT (OUTPATIENT)
Dept: SCHEDULING | Facility: HOME HEALTH | Age: 76
End: 2021-10-20
Payer: MEDICARE

## 2021-10-20 VITALS
OXYGEN SATURATION: 99 % | TEMPERATURE: 97.4 F | SYSTOLIC BLOOD PRESSURE: 132 MMHG | RESPIRATION RATE: 18 BRPM | DIASTOLIC BLOOD PRESSURE: 82 MMHG | HEART RATE: 76 BPM

## 2021-10-20 PROCEDURE — G0299 HHS/HOSPICE OF RN EA 15 MIN: HCPCS

## 2021-10-22 ENCOUNTER — HOME CARE VISIT (OUTPATIENT)
Dept: SCHEDULING | Facility: HOME HEALTH | Age: 76
End: 2021-10-22
Payer: MEDICARE

## 2021-10-22 VITALS
TEMPERATURE: 98.1 F | SYSTOLIC BLOOD PRESSURE: 116 MMHG | DIASTOLIC BLOOD PRESSURE: 72 MMHG | RESPIRATION RATE: 16 BRPM | OXYGEN SATURATION: 97 % | HEART RATE: 84 BPM

## 2021-10-22 PROCEDURE — G0299 HHS/HOSPICE OF RN EA 15 MIN: HCPCS

## 2021-10-25 ENCOUNTER — HOME CARE VISIT (OUTPATIENT)
Dept: SCHEDULING | Facility: HOME HEALTH | Age: 76
End: 2021-10-25
Payer: MEDICARE

## 2021-10-25 VITALS
HEART RATE: 82 BPM | SYSTOLIC BLOOD PRESSURE: 128 MMHG | TEMPERATURE: 97.2 F | RESPIRATION RATE: 18 BRPM | DIASTOLIC BLOOD PRESSURE: 86 MMHG | OXYGEN SATURATION: 97 %

## 2021-10-25 PROCEDURE — G0299 HHS/HOSPICE OF RN EA 15 MIN: HCPCS

## 2021-10-27 ENCOUNTER — HOME CARE VISIT (OUTPATIENT)
Dept: SCHEDULING | Facility: HOME HEALTH | Age: 76
End: 2021-10-27
Payer: MEDICARE

## 2021-10-27 VITALS
RESPIRATION RATE: 18 BRPM | TEMPERATURE: 96.9 F | OXYGEN SATURATION: 98 % | HEART RATE: 78 BPM | SYSTOLIC BLOOD PRESSURE: 138 MMHG | DIASTOLIC BLOOD PRESSURE: 84 MMHG

## 2021-10-27 PROCEDURE — G0299 HHS/HOSPICE OF RN EA 15 MIN: HCPCS

## 2021-10-29 ENCOUNTER — HOME CARE VISIT (OUTPATIENT)
Dept: SCHEDULING | Facility: HOME HEALTH | Age: 76
End: 2021-10-29
Payer: MEDICARE

## 2021-10-29 VITALS
SYSTOLIC BLOOD PRESSURE: 112 MMHG | HEART RATE: 72 BPM | RESPIRATION RATE: 18 BRPM | OXYGEN SATURATION: 95 % | DIASTOLIC BLOOD PRESSURE: 72 MMHG | TEMPERATURE: 97.2 F

## 2021-10-29 PROCEDURE — G0299 HHS/HOSPICE OF RN EA 15 MIN: HCPCS

## 2021-11-01 ENCOUNTER — HOME CARE VISIT (OUTPATIENT)
Dept: SCHEDULING | Facility: HOME HEALTH | Age: 76
End: 2021-11-01
Payer: MEDICARE

## 2021-11-01 VITALS
OXYGEN SATURATION: 98 % | DIASTOLIC BLOOD PRESSURE: 72 MMHG | TEMPERATURE: 98.1 F | HEART RATE: 80 BPM | RESPIRATION RATE: 18 BRPM | SYSTOLIC BLOOD PRESSURE: 128 MMHG

## 2021-11-01 PROCEDURE — G0299 HHS/HOSPICE OF RN EA 15 MIN: HCPCS

## 2021-11-01 PROCEDURE — A6216 NON-STERILE GAUZE<=16 SQ IN: HCPCS

## 2021-11-01 PROCEDURE — A6219 GAUZE <= 16 SQ IN W/BORDER: HCPCS

## 2021-11-01 PROCEDURE — A9270 NON-COVERED ITEM OR SERVICE: HCPCS

## 2021-11-03 ENCOUNTER — HOME CARE VISIT (OUTPATIENT)
Dept: SCHEDULING | Facility: HOME HEALTH | Age: 76
End: 2021-11-03
Payer: MEDICARE

## 2021-11-03 VITALS
RESPIRATION RATE: 18 BRPM | SYSTOLIC BLOOD PRESSURE: 122 MMHG | OXYGEN SATURATION: 96 % | HEART RATE: 78 BPM | DIASTOLIC BLOOD PRESSURE: 76 MMHG | TEMPERATURE: 96.9 F

## 2021-11-03 PROCEDURE — G0299 HHS/HOSPICE OF RN EA 15 MIN: HCPCS

## 2021-11-05 ENCOUNTER — HOME CARE VISIT (OUTPATIENT)
Dept: HOME HEALTH SERVICES | Facility: HOME HEALTH | Age: 76
End: 2021-11-05
Payer: MEDICARE

## 2021-11-05 ENCOUNTER — HOME CARE VISIT (OUTPATIENT)
Dept: SCHEDULING | Facility: HOME HEALTH | Age: 76
End: 2021-11-05
Payer: MEDICARE

## 2021-11-05 VITALS
OXYGEN SATURATION: 98 % | DIASTOLIC BLOOD PRESSURE: 76 MMHG | RESPIRATION RATE: 18 BRPM | SYSTOLIC BLOOD PRESSURE: 128 MMHG | HEART RATE: 82 BPM | TEMPERATURE: 98.9 F

## 2021-11-05 PROCEDURE — G0299 HHS/HOSPICE OF RN EA 15 MIN: HCPCS

## 2021-11-08 ENCOUNTER — HOME CARE VISIT (OUTPATIENT)
Dept: SCHEDULING | Facility: HOME HEALTH | Age: 76
End: 2021-11-08
Payer: MEDICARE

## 2021-11-08 VITALS
RESPIRATION RATE: 18 BRPM | SYSTOLIC BLOOD PRESSURE: 138 MMHG | HEART RATE: 70 BPM | TEMPERATURE: 97.7 F | DIASTOLIC BLOOD PRESSURE: 76 MMHG | OXYGEN SATURATION: 97 %

## 2021-11-08 PROCEDURE — G0299 HHS/HOSPICE OF RN EA 15 MIN: HCPCS

## 2021-11-10 ENCOUNTER — HOME CARE VISIT (OUTPATIENT)
Dept: SCHEDULING | Facility: HOME HEALTH | Age: 76
End: 2021-11-10
Payer: MEDICARE

## 2021-11-10 VITALS
HEART RATE: 70 BPM | SYSTOLIC BLOOD PRESSURE: 122 MMHG | TEMPERATURE: 97.1 F | OXYGEN SATURATION: 96 % | DIASTOLIC BLOOD PRESSURE: 78 MMHG | RESPIRATION RATE: 18 BRPM

## 2021-11-10 PROCEDURE — G0299 HHS/HOSPICE OF RN EA 15 MIN: HCPCS

## 2021-11-12 ENCOUNTER — HOME CARE VISIT (OUTPATIENT)
Dept: SCHEDULING | Facility: HOME HEALTH | Age: 76
End: 2021-11-12
Payer: MEDICARE

## 2021-11-12 VITALS
OXYGEN SATURATION: 97 % | RESPIRATION RATE: 16 BRPM | HEART RATE: 76 BPM | TEMPERATURE: 98.7 F | DIASTOLIC BLOOD PRESSURE: 76 MMHG | SYSTOLIC BLOOD PRESSURE: 102 MMHG

## 2021-11-12 PROCEDURE — G0299 HHS/HOSPICE OF RN EA 15 MIN: HCPCS

## 2021-11-15 ENCOUNTER — HOME CARE VISIT (OUTPATIENT)
Dept: SCHEDULING | Facility: HOME HEALTH | Age: 76
End: 2021-11-15
Payer: MEDICARE

## 2021-11-15 VITALS
SYSTOLIC BLOOD PRESSURE: 138 MMHG | HEART RATE: 67 BPM | OXYGEN SATURATION: 97 % | RESPIRATION RATE: 16 BRPM | TEMPERATURE: 98.5 F | DIASTOLIC BLOOD PRESSURE: 82 MMHG

## 2021-11-15 PROCEDURE — G0299 HHS/HOSPICE OF RN EA 15 MIN: HCPCS

## 2021-11-15 PROCEDURE — A4452 WATERPROOF TAPE: HCPCS

## 2021-11-17 ENCOUNTER — HOME CARE VISIT (OUTPATIENT)
Dept: SCHEDULING | Facility: HOME HEALTH | Age: 76
End: 2021-11-17
Payer: MEDICARE

## 2021-11-17 VITALS
SYSTOLIC BLOOD PRESSURE: 116 MMHG | DIASTOLIC BLOOD PRESSURE: 72 MMHG | HEART RATE: 80 BPM | RESPIRATION RATE: 18 BRPM | TEMPERATURE: 99 F | OXYGEN SATURATION: 98 %

## 2021-11-17 PROCEDURE — 400014 HH F/U

## 2021-11-17 PROCEDURE — G0299 HHS/HOSPICE OF RN EA 15 MIN: HCPCS

## 2021-11-19 ENCOUNTER — HOME CARE VISIT (OUTPATIENT)
Dept: SCHEDULING | Facility: HOME HEALTH | Age: 76
End: 2021-11-19
Payer: MEDICARE

## 2021-11-22 ENCOUNTER — HOME CARE VISIT (OUTPATIENT)
Dept: SCHEDULING | Facility: HOME HEALTH | Age: 76
End: 2021-11-22
Payer: MEDICARE

## 2021-11-22 VITALS
RESPIRATION RATE: 18 BRPM | SYSTOLIC BLOOD PRESSURE: 140 MMHG | TEMPERATURE: 98.3 F | DIASTOLIC BLOOD PRESSURE: 68 MMHG | OXYGEN SATURATION: 96 % | HEART RATE: 69 BPM

## 2021-11-22 PROCEDURE — G0299 HHS/HOSPICE OF RN EA 15 MIN: HCPCS

## 2021-11-24 ENCOUNTER — HOME CARE VISIT (OUTPATIENT)
Dept: SCHEDULING | Facility: HOME HEALTH | Age: 76
End: 2021-11-24
Payer: MEDICARE

## 2021-11-24 VITALS
DIASTOLIC BLOOD PRESSURE: 64 MMHG | SYSTOLIC BLOOD PRESSURE: 110 MMHG | RESPIRATION RATE: 18 BRPM | HEART RATE: 77 BPM | OXYGEN SATURATION: 95 % | TEMPERATURE: 98.1 F

## 2021-11-24 PROCEDURE — G0299 HHS/HOSPICE OF RN EA 15 MIN: HCPCS

## 2021-11-24 PROCEDURE — A9270 NON-COVERED ITEM OR SERVICE: HCPCS

## 2021-11-24 PROCEDURE — A6216 NON-STERILE GAUZE<=16 SQ IN: HCPCS

## 2021-11-26 ENCOUNTER — HOME CARE VISIT (OUTPATIENT)
Dept: SCHEDULING | Facility: HOME HEALTH | Age: 76
End: 2021-11-26
Payer: MEDICARE

## 2021-11-26 VITALS
HEART RATE: 68 BPM | RESPIRATION RATE: 16 BRPM | SYSTOLIC BLOOD PRESSURE: 128 MMHG | TEMPERATURE: 98 F | DIASTOLIC BLOOD PRESSURE: 78 MMHG | OXYGEN SATURATION: 97 %

## 2021-11-26 PROCEDURE — G0299 HHS/HOSPICE OF RN EA 15 MIN: HCPCS

## 2021-11-29 ENCOUNTER — HOME CARE VISIT (OUTPATIENT)
Dept: SCHEDULING | Facility: HOME HEALTH | Age: 76
End: 2021-11-29
Payer: MEDICARE

## 2021-11-29 VITALS
DIASTOLIC BLOOD PRESSURE: 70 MMHG | TEMPERATURE: 98.6 F | SYSTOLIC BLOOD PRESSURE: 138 MMHG | HEART RATE: 69 BPM | RESPIRATION RATE: 18 BRPM | OXYGEN SATURATION: 97 %

## 2021-11-29 PROCEDURE — G0299 HHS/HOSPICE OF RN EA 15 MIN: HCPCS

## 2021-11-29 PROCEDURE — A5120 SKIN BARRIER, WIPE OR SWAB: HCPCS

## 2021-11-29 PROCEDURE — A4927 NON-STERILE GLOVES: HCPCS

## 2021-11-29 NOTE — HOME HEALTH
Wound care performed per POC. Site assessed by SN and no S/S of infection noted. SN reenforced with patient the S/S of infection to patient/caregiver.

## 2021-12-01 ENCOUNTER — HOME CARE VISIT (OUTPATIENT)
Dept: SCHEDULING | Facility: HOME HEALTH | Age: 76
End: 2021-12-01
Payer: MEDICARE

## 2021-12-01 VITALS
DIASTOLIC BLOOD PRESSURE: 82 MMHG | SYSTOLIC BLOOD PRESSURE: 130 MMHG | OXYGEN SATURATION: 96 % | RESPIRATION RATE: 18 BRPM | TEMPERATURE: 98.9 F | HEART RATE: 87 BPM

## 2021-12-01 PROCEDURE — G0299 HHS/HOSPICE OF RN EA 15 MIN: HCPCS

## 2021-12-03 ENCOUNTER — HOME CARE VISIT (OUTPATIENT)
Dept: SCHEDULING | Facility: HOME HEALTH | Age: 76
End: 2021-12-03
Payer: MEDICARE

## 2021-12-03 VITALS
SYSTOLIC BLOOD PRESSURE: 140 MMHG | OXYGEN SATURATION: 98 % | TEMPERATURE: 98.3 F | RESPIRATION RATE: 12 BRPM | DIASTOLIC BLOOD PRESSURE: 88 MMHG | HEART RATE: 85 BPM

## 2021-12-03 PROCEDURE — G0299 HHS/HOSPICE OF RN EA 15 MIN: HCPCS

## 2021-12-06 ENCOUNTER — HOME CARE VISIT (OUTPATIENT)
Dept: SCHEDULING | Facility: HOME HEALTH | Age: 76
End: 2021-12-06
Payer: MEDICARE

## 2021-12-06 VITALS
TEMPERATURE: 98.7 F | OXYGEN SATURATION: 96 % | RESPIRATION RATE: 18 BRPM | DIASTOLIC BLOOD PRESSURE: 70 MMHG | SYSTOLIC BLOOD PRESSURE: 120 MMHG | HEART RATE: 78 BPM

## 2021-12-06 PROCEDURE — G0299 HHS/HOSPICE OF RN EA 15 MIN: HCPCS

## 2021-12-08 ENCOUNTER — HOME CARE VISIT (OUTPATIENT)
Dept: SCHEDULING | Facility: HOME HEALTH | Age: 76
End: 2021-12-08
Payer: MEDICARE

## 2021-12-08 VITALS
TEMPERATURE: 98.2 F | HEART RATE: 77 BPM | SYSTOLIC BLOOD PRESSURE: 128 MMHG | OXYGEN SATURATION: 97 % | DIASTOLIC BLOOD PRESSURE: 72 MMHG | RESPIRATION RATE: 18 BRPM

## 2021-12-08 PROCEDURE — G0299 HHS/HOSPICE OF RN EA 15 MIN: HCPCS

## 2021-12-10 ENCOUNTER — HOME CARE VISIT (OUTPATIENT)
Dept: SCHEDULING | Facility: HOME HEALTH | Age: 76
End: 2021-12-10
Payer: MEDICARE

## 2021-12-10 VITALS
RESPIRATION RATE: 12 BRPM | TEMPERATURE: 98 F | HEART RATE: 90 BPM | OXYGEN SATURATION: 98 % | DIASTOLIC BLOOD PRESSURE: 78 MMHG | SYSTOLIC BLOOD PRESSURE: 120 MMHG

## 2021-12-10 PROCEDURE — G0299 HHS/HOSPICE OF RN EA 15 MIN: HCPCS

## 2021-12-13 ENCOUNTER — HOME CARE VISIT (OUTPATIENT)
Dept: SCHEDULING | Facility: HOME HEALTH | Age: 76
End: 2021-12-13
Payer: MEDICARE

## 2021-12-13 VITALS
HEART RATE: 68 BPM | TEMPERATURE: 98.6 F | RESPIRATION RATE: 16 BRPM | OXYGEN SATURATION: 98 % | SYSTOLIC BLOOD PRESSURE: 138 MMHG | DIASTOLIC BLOOD PRESSURE: 80 MMHG

## 2021-12-13 PROCEDURE — A6252 ABSORPT DRG >16 <=48 W/O BDR: HCPCS

## 2021-12-13 PROCEDURE — A4452 WATERPROOF TAPE: HCPCS

## 2021-12-13 PROCEDURE — G0299 HHS/HOSPICE OF RN EA 15 MIN: HCPCS

## 2021-12-13 PROCEDURE — A6446 CONFORM BAND S W>=3" <5"/YD: HCPCS

## 2021-12-15 ENCOUNTER — HOME CARE VISIT (OUTPATIENT)
Dept: SCHEDULING | Facility: HOME HEALTH | Age: 76
End: 2021-12-15
Payer: MEDICARE

## 2021-12-15 VITALS
HEART RATE: 73 BPM | DIASTOLIC BLOOD PRESSURE: 78 MMHG | TEMPERATURE: 98.1 F | SYSTOLIC BLOOD PRESSURE: 130 MMHG | OXYGEN SATURATION: 97 % | RESPIRATION RATE: 16 BRPM

## 2021-12-15 PROCEDURE — A4452 WATERPROOF TAPE: HCPCS

## 2021-12-15 PROCEDURE — G0299 HHS/HOSPICE OF RN EA 15 MIN: HCPCS

## 2021-12-16 ENCOUNTER — HOME CARE VISIT (OUTPATIENT)
Dept: HOME HEALTH SERVICES | Facility: HOME HEALTH | Age: 76
End: 2021-12-16
Payer: MEDICARE

## 2021-12-17 ENCOUNTER — HOME CARE VISIT (OUTPATIENT)
Dept: SCHEDULING | Facility: HOME HEALTH | Age: 76
End: 2021-12-17
Payer: MEDICARE

## 2021-12-17 VITALS
SYSTOLIC BLOOD PRESSURE: 112 MMHG | OXYGEN SATURATION: 96 % | TEMPERATURE: 98.7 F | RESPIRATION RATE: 19 BRPM | HEART RATE: 74 BPM | DIASTOLIC BLOOD PRESSURE: 64 MMHG

## 2021-12-17 PROCEDURE — G0299 HHS/HOSPICE OF RN EA 15 MIN: HCPCS

## 2021-12-17 PROCEDURE — 400014 HH F/U

## 2021-12-20 ENCOUNTER — HOME CARE VISIT (OUTPATIENT)
Dept: SCHEDULING | Facility: HOME HEALTH | Age: 76
End: 2021-12-20
Payer: MEDICARE

## 2021-12-20 VITALS
OXYGEN SATURATION: 98 % | RESPIRATION RATE: 16 BRPM | HEART RATE: 60 BPM | DIASTOLIC BLOOD PRESSURE: 72 MMHG | TEMPERATURE: 97.9 F | SYSTOLIC BLOOD PRESSURE: 136 MMHG

## 2021-12-20 PROCEDURE — A4452 WATERPROOF TAPE: HCPCS

## 2021-12-20 PROCEDURE — G0299 HHS/HOSPICE OF RN EA 15 MIN: HCPCS

## 2021-12-22 ENCOUNTER — HOME CARE VISIT (OUTPATIENT)
Dept: SCHEDULING | Facility: HOME HEALTH | Age: 76
End: 2021-12-22
Payer: MEDICARE

## 2021-12-22 VITALS
TEMPERATURE: 97.7 F | SYSTOLIC BLOOD PRESSURE: 124 MMHG | RESPIRATION RATE: 16 BRPM | OXYGEN SATURATION: 97 % | DIASTOLIC BLOOD PRESSURE: 78 MMHG | HEART RATE: 72 BPM

## 2021-12-22 PROCEDURE — G0299 HHS/HOSPICE OF RN EA 15 MIN: HCPCS

## 2021-12-24 ENCOUNTER — HOME CARE VISIT (OUTPATIENT)
Dept: SCHEDULING | Facility: HOME HEALTH | Age: 76
End: 2021-12-24
Payer: MEDICARE

## 2021-12-24 VITALS
OXYGEN SATURATION: 97 % | TEMPERATURE: 98 F | DIASTOLIC BLOOD PRESSURE: 78 MMHG | RESPIRATION RATE: 16 BRPM | SYSTOLIC BLOOD PRESSURE: 138 MMHG | HEART RATE: 69 BPM

## 2021-12-24 PROCEDURE — A9270 NON-COVERED ITEM OR SERVICE: HCPCS

## 2021-12-24 PROCEDURE — G0299 HHS/HOSPICE OF RN EA 15 MIN: HCPCS

## 2021-12-24 PROCEDURE — A6216 NON-STERILE GAUZE<=16 SQ IN: HCPCS

## 2021-12-27 ENCOUNTER — HOME CARE VISIT (OUTPATIENT)
Dept: SCHEDULING | Facility: HOME HEALTH | Age: 76
End: 2021-12-27
Payer: MEDICARE

## 2021-12-27 VITALS
TEMPERATURE: 97.8 F | RESPIRATION RATE: 18 BRPM | OXYGEN SATURATION: 97 % | HEART RATE: 79 BPM | SYSTOLIC BLOOD PRESSURE: 142 MMHG | DIASTOLIC BLOOD PRESSURE: 82 MMHG

## 2021-12-27 PROCEDURE — G0299 HHS/HOSPICE OF RN EA 15 MIN: HCPCS

## 2021-12-29 ENCOUNTER — HOME CARE VISIT (OUTPATIENT)
Dept: SCHEDULING | Facility: HOME HEALTH | Age: 76
End: 2021-12-29
Payer: MEDICARE

## 2021-12-29 VITALS
TEMPERATURE: 98.3 F | RESPIRATION RATE: 18 BRPM | DIASTOLIC BLOOD PRESSURE: 80 MMHG | HEART RATE: 64 BPM | OXYGEN SATURATION: 96 % | SYSTOLIC BLOOD PRESSURE: 124 MMHG

## 2021-12-29 PROCEDURE — G0299 HHS/HOSPICE OF RN EA 15 MIN: HCPCS

## 2021-12-31 ENCOUNTER — HOME CARE VISIT (OUTPATIENT)
Dept: SCHEDULING | Facility: HOME HEALTH | Age: 76
End: 2021-12-31
Payer: MEDICARE

## 2021-12-31 VITALS
RESPIRATION RATE: 18 BRPM | SYSTOLIC BLOOD PRESSURE: 136 MMHG | OXYGEN SATURATION: 97 % | HEART RATE: 71 BPM | TEMPERATURE: 98.2 F | DIASTOLIC BLOOD PRESSURE: 82 MMHG

## 2021-12-31 PROCEDURE — G0299 HHS/HOSPICE OF RN EA 15 MIN: HCPCS

## 2022-01-04 ENCOUNTER — HOME CARE VISIT (OUTPATIENT)
Dept: SCHEDULING | Facility: HOME HEALTH | Age: 77
End: 2022-01-04
Payer: MEDICARE

## 2022-01-04 PROCEDURE — G0299 HHS/HOSPICE OF RN EA 15 MIN: HCPCS

## 2022-01-05 ENCOUNTER — HOME CARE VISIT (OUTPATIENT)
Dept: SCHEDULING | Facility: HOME HEALTH | Age: 77
End: 2022-01-05
Payer: MEDICARE

## 2022-01-05 VITALS
OXYGEN SATURATION: 98 % | HEART RATE: 74 BPM | TEMPERATURE: 98.1 F | SYSTOLIC BLOOD PRESSURE: 138 MMHG | DIASTOLIC BLOOD PRESSURE: 84 MMHG | RESPIRATION RATE: 17 BRPM

## 2022-01-05 PROCEDURE — G0299 HHS/HOSPICE OF RN EA 15 MIN: HCPCS

## 2022-01-06 VITALS
DIASTOLIC BLOOD PRESSURE: 68 MMHG | HEART RATE: 76 BPM | RESPIRATION RATE: 16 BRPM | TEMPERATURE: 97.9 F | OXYGEN SATURATION: 97 % | SYSTOLIC BLOOD PRESSURE: 124 MMHG

## 2022-01-07 ENCOUNTER — HOME CARE VISIT (OUTPATIENT)
Dept: SCHEDULING | Facility: HOME HEALTH | Age: 77
End: 2022-01-07
Payer: MEDICARE

## 2022-01-10 ENCOUNTER — HOME CARE VISIT (OUTPATIENT)
Dept: SCHEDULING | Facility: HOME HEALTH | Age: 77
End: 2022-01-10
Payer: MEDICARE

## 2022-01-10 VITALS
HEART RATE: 86 BPM | DIASTOLIC BLOOD PRESSURE: 82 MMHG | OXYGEN SATURATION: 97 % | RESPIRATION RATE: 17 BRPM | SYSTOLIC BLOOD PRESSURE: 124 MMHG | TEMPERATURE: 98.9 F

## 2022-01-10 PROCEDURE — G0299 HHS/HOSPICE OF RN EA 15 MIN: HCPCS

## 2022-01-10 PROCEDURE — A6402 STERILE GAUZE <= 16 SQ IN: HCPCS

## 2022-01-12 ENCOUNTER — HOME CARE VISIT (OUTPATIENT)
Dept: SCHEDULING | Facility: HOME HEALTH | Age: 77
End: 2022-01-12
Payer: MEDICARE

## 2022-01-12 VITALS
OXYGEN SATURATION: 98 % | DIASTOLIC BLOOD PRESSURE: 78 MMHG | RESPIRATION RATE: 16 BRPM | SYSTOLIC BLOOD PRESSURE: 132 MMHG | TEMPERATURE: 98.6 F | HEART RATE: 78 BPM

## 2022-01-12 PROCEDURE — G0299 HHS/HOSPICE OF RN EA 15 MIN: HCPCS

## 2022-01-14 ENCOUNTER — HOME CARE VISIT (OUTPATIENT)
Dept: SCHEDULING | Facility: HOME HEALTH | Age: 77
End: 2022-01-14
Payer: MEDICARE

## 2022-01-14 VITALS
TEMPERATURE: 98.5 F | SYSTOLIC BLOOD PRESSURE: 132 MMHG | OXYGEN SATURATION: 98 % | DIASTOLIC BLOOD PRESSURE: 81 MMHG | HEART RATE: 75 BPM | RESPIRATION RATE: 14 BRPM

## 2022-01-14 PROCEDURE — G0299 HHS/HOSPICE OF RN EA 15 MIN: HCPCS

## 2022-01-14 PROCEDURE — A6446 CONFORM BAND S W>=3" <5"/YD: HCPCS

## 2022-01-17 ENCOUNTER — HOME CARE VISIT (OUTPATIENT)
Dept: HOME HEALTH SERVICES | Facility: HOME HEALTH | Age: 77
End: 2022-01-17
Payer: MEDICARE

## 2022-01-18 ENCOUNTER — HOME CARE VISIT (OUTPATIENT)
Dept: SCHEDULING | Facility: HOME HEALTH | Age: 77
End: 2022-01-18
Payer: MEDICARE

## 2022-01-18 VITALS
HEART RATE: 75 BPM | SYSTOLIC BLOOD PRESSURE: 123 MMHG | RESPIRATION RATE: 15 BRPM | DIASTOLIC BLOOD PRESSURE: 75 MMHG | TEMPERATURE: 97.7 F | OXYGEN SATURATION: 97 %

## 2022-01-18 PROCEDURE — G0299 HHS/HOSPICE OF RN EA 15 MIN: HCPCS

## 2022-01-18 PROCEDURE — 400014 HH F/U

## 2022-01-19 ENCOUNTER — HOME CARE VISIT (OUTPATIENT)
Dept: SCHEDULING | Facility: HOME HEALTH | Age: 77
End: 2022-01-19
Payer: MEDICARE

## 2022-01-19 VITALS
OXYGEN SATURATION: 98 % | SYSTOLIC BLOOD PRESSURE: 127 MMHG | HEART RATE: 70 BPM | TEMPERATURE: 98.7 F | DIASTOLIC BLOOD PRESSURE: 81 MMHG | RESPIRATION RATE: 15 BRPM

## 2022-01-19 PROCEDURE — G0299 HHS/HOSPICE OF RN EA 15 MIN: HCPCS

## 2022-01-22 ENCOUNTER — HOME CARE VISIT (OUTPATIENT)
Dept: SCHEDULING | Facility: HOME HEALTH | Age: 77
End: 2022-01-22
Payer: MEDICARE

## 2022-01-22 VITALS
HEART RATE: 80 BPM | TEMPERATURE: 97.9 F | OXYGEN SATURATION: 97 % | SYSTOLIC BLOOD PRESSURE: 132 MMHG | DIASTOLIC BLOOD PRESSURE: 82 MMHG | RESPIRATION RATE: 18 BRPM

## 2022-01-22 PROCEDURE — G0299 HHS/HOSPICE OF RN EA 15 MIN: HCPCS

## 2022-01-22 PROCEDURE — A6213 FOAM DRG >16<=48 SQ IN W/BDR: HCPCS

## 2022-01-24 ENCOUNTER — HOME CARE VISIT (OUTPATIENT)
Dept: SCHEDULING | Facility: HOME HEALTH | Age: 77
End: 2022-01-24
Payer: MEDICARE

## 2022-01-24 VITALS
HEART RATE: 84 BPM | DIASTOLIC BLOOD PRESSURE: 98 MMHG | OXYGEN SATURATION: 98 % | TEMPERATURE: 98 F | SYSTOLIC BLOOD PRESSURE: 156 MMHG | RESPIRATION RATE: 16 BRPM

## 2022-01-24 PROCEDURE — G0299 HHS/HOSPICE OF RN EA 15 MIN: HCPCS

## 2022-01-26 ENCOUNTER — HOME CARE VISIT (OUTPATIENT)
Dept: SCHEDULING | Facility: HOME HEALTH | Age: 77
End: 2022-01-26
Payer: MEDICARE

## 2022-01-26 VITALS
DIASTOLIC BLOOD PRESSURE: 83 MMHG | HEART RATE: 82 BPM | OXYGEN SATURATION: 97 % | TEMPERATURE: 98.7 F | RESPIRATION RATE: 17 BRPM | SYSTOLIC BLOOD PRESSURE: 148 MMHG

## 2022-01-26 PROCEDURE — G0299 HHS/HOSPICE OF RN EA 15 MIN: HCPCS

## 2022-01-28 ENCOUNTER — HOME CARE VISIT (OUTPATIENT)
Dept: SCHEDULING | Facility: HOME HEALTH | Age: 77
End: 2022-01-28
Payer: MEDICARE

## 2022-01-28 VITALS
OXYGEN SATURATION: 96 % | DIASTOLIC BLOOD PRESSURE: 70 MMHG | RESPIRATION RATE: 18 BRPM | TEMPERATURE: 99 F | SYSTOLIC BLOOD PRESSURE: 122 MMHG | HEART RATE: 81 BPM

## 2022-01-28 PROCEDURE — G0299 HHS/HOSPICE OF RN EA 15 MIN: HCPCS

## 2022-01-31 ENCOUNTER — HOME CARE VISIT (OUTPATIENT)
Dept: SCHEDULING | Facility: HOME HEALTH | Age: 77
End: 2022-01-31
Payer: MEDICARE

## 2022-01-31 VITALS
HEART RATE: 64 BPM | OXYGEN SATURATION: 97 % | SYSTOLIC BLOOD PRESSURE: 130 MMHG | DIASTOLIC BLOOD PRESSURE: 70 MMHG | TEMPERATURE: 97.3 F | RESPIRATION RATE: 18 BRPM

## 2022-01-31 PROCEDURE — G0299 HHS/HOSPICE OF RN EA 15 MIN: HCPCS

## 2022-02-02 ENCOUNTER — HOME CARE VISIT (OUTPATIENT)
Dept: SCHEDULING | Facility: HOME HEALTH | Age: 77
End: 2022-02-02
Payer: MEDICARE

## 2022-02-02 VITALS
DIASTOLIC BLOOD PRESSURE: 77 MMHG | OXYGEN SATURATION: 98 % | SYSTOLIC BLOOD PRESSURE: 116 MMHG | HEART RATE: 74 BPM | RESPIRATION RATE: 18 BRPM | TEMPERATURE: 98.2 F

## 2022-02-02 PROCEDURE — G0299 HHS/HOSPICE OF RN EA 15 MIN: HCPCS

## 2022-02-03 ENCOUNTER — HOME CARE VISIT (OUTPATIENT)
Dept: SCHEDULING | Facility: HOME HEALTH | Age: 77
End: 2022-02-03
Payer: MEDICARE

## 2022-02-03 VITALS
DIASTOLIC BLOOD PRESSURE: 80 MMHG | SYSTOLIC BLOOD PRESSURE: 119 MMHG | TEMPERATURE: 98.3 F | OXYGEN SATURATION: 98 % | RESPIRATION RATE: 18 BRPM | HEART RATE: 78 BPM

## 2022-02-03 PROCEDURE — A5120 SKIN BARRIER, WIPE OR SWAB: HCPCS

## 2022-02-03 PROCEDURE — A6213 FOAM DRG >16<=48 SQ IN W/BDR: HCPCS

## 2022-02-03 PROCEDURE — G0299 HHS/HOSPICE OF RN EA 15 MIN: HCPCS

## 2022-02-04 ENCOUNTER — HOME CARE VISIT (OUTPATIENT)
Dept: SCHEDULING | Facility: HOME HEALTH | Age: 77
End: 2022-02-04
Payer: MEDICARE

## 2022-02-04 PROCEDURE — G0155 HHCP-SVS OF CSW,EA 15 MIN: HCPCS

## 2022-02-05 ENCOUNTER — HOME CARE VISIT (OUTPATIENT)
Dept: SCHEDULING | Facility: HOME HEALTH | Age: 77
End: 2022-02-05
Payer: MEDICARE

## 2022-02-05 PROCEDURE — G0299 HHS/HOSPICE OF RN EA 15 MIN: HCPCS

## 2022-02-06 ENCOUNTER — HOME CARE VISIT (OUTPATIENT)
Dept: SCHEDULING | Facility: HOME HEALTH | Age: 77
End: 2022-02-06
Payer: MEDICARE

## 2022-02-06 VITALS
RESPIRATION RATE: 19 BRPM | TEMPERATURE: 98.8 F | HEART RATE: 78 BPM | DIASTOLIC BLOOD PRESSURE: 64 MMHG | SYSTOLIC BLOOD PRESSURE: 112 MMHG | OXYGEN SATURATION: 99 %

## 2022-02-06 VITALS
HEART RATE: 71 BPM | RESPIRATION RATE: 16 BRPM | OXYGEN SATURATION: 99 % | SYSTOLIC BLOOD PRESSURE: 102 MMHG | TEMPERATURE: 98.5 F | DIASTOLIC BLOOD PRESSURE: 70 MMHG

## 2022-02-06 PROCEDURE — G0299 HHS/HOSPICE OF RN EA 15 MIN: HCPCS

## 2022-02-07 ENCOUNTER — HOME CARE VISIT (OUTPATIENT)
Dept: SCHEDULING | Facility: HOME HEALTH | Age: 77
End: 2022-02-07
Payer: MEDICARE

## 2022-02-07 VITALS
HEART RATE: 76 BPM | SYSTOLIC BLOOD PRESSURE: 134 MMHG | RESPIRATION RATE: 12 BRPM | TEMPERATURE: 97.3 F | DIASTOLIC BLOOD PRESSURE: 80 MMHG | OXYGEN SATURATION: 97 %

## 2022-02-07 PROCEDURE — G0299 HHS/HOSPICE OF RN EA 15 MIN: HCPCS

## 2022-02-08 ENCOUNTER — HOME CARE VISIT (OUTPATIENT)
Dept: SCHEDULING | Facility: HOME HEALTH | Age: 77
End: 2022-02-08
Payer: MEDICARE

## 2022-02-08 VITALS
HEART RATE: 71 BPM | OXYGEN SATURATION: 98 % | DIASTOLIC BLOOD PRESSURE: 77 MMHG | TEMPERATURE: 98.3 F | SYSTOLIC BLOOD PRESSURE: 137 MMHG | RESPIRATION RATE: 18 BRPM

## 2022-02-08 PROCEDURE — G0299 HHS/HOSPICE OF RN EA 15 MIN: HCPCS

## 2022-02-09 ENCOUNTER — HOME CARE VISIT (OUTPATIENT)
Dept: SCHEDULING | Facility: HOME HEALTH | Age: 77
End: 2022-02-09
Payer: MEDICARE

## 2022-02-09 VITALS
DIASTOLIC BLOOD PRESSURE: 92 MMHG | OXYGEN SATURATION: 98 % | TEMPERATURE: 98.3 F | RESPIRATION RATE: 18 BRPM | HEART RATE: 76 BPM | SYSTOLIC BLOOD PRESSURE: 128 MMHG

## 2022-02-09 PROCEDURE — G0299 HHS/HOSPICE OF RN EA 15 MIN: HCPCS

## 2022-02-10 ENCOUNTER — HOME CARE VISIT (OUTPATIENT)
Dept: SCHEDULING | Facility: HOME HEALTH | Age: 77
End: 2022-02-10
Payer: MEDICARE

## 2022-02-10 VITALS
OXYGEN SATURATION: 97 % | RESPIRATION RATE: 18 BRPM | SYSTOLIC BLOOD PRESSURE: 139 MMHG | HEART RATE: 76 BPM | TEMPERATURE: 98.2 F | DIASTOLIC BLOOD PRESSURE: 89 MMHG

## 2022-02-10 PROCEDURE — G0299 HHS/HOSPICE OF RN EA 15 MIN: HCPCS

## 2022-02-11 ENCOUNTER — HOME CARE VISIT (OUTPATIENT)
Dept: SCHEDULING | Facility: HOME HEALTH | Age: 77
End: 2022-02-11
Payer: MEDICARE

## 2022-02-11 VITALS
RESPIRATION RATE: 12 BRPM | OXYGEN SATURATION: 96 % | DIASTOLIC BLOOD PRESSURE: 82 MMHG | SYSTOLIC BLOOD PRESSURE: 154 MMHG | HEART RATE: 86 BPM | TEMPERATURE: 97.7 F

## 2022-02-11 PROCEDURE — G0299 HHS/HOSPICE OF RN EA 15 MIN: HCPCS

## 2022-02-12 ENCOUNTER — HOME CARE VISIT (OUTPATIENT)
Dept: SCHEDULING | Facility: HOME HEALTH | Age: 77
End: 2022-02-12
Payer: MEDICARE

## 2022-02-12 VITALS
SYSTOLIC BLOOD PRESSURE: 130 MMHG | DIASTOLIC BLOOD PRESSURE: 72 MMHG | RESPIRATION RATE: 16 BRPM | TEMPERATURE: 99 F | OXYGEN SATURATION: 97 % | HEART RATE: 74 BPM

## 2022-02-12 PROCEDURE — G0299 HHS/HOSPICE OF RN EA 15 MIN: HCPCS

## 2022-02-13 ENCOUNTER — HOME CARE VISIT (OUTPATIENT)
Dept: SCHEDULING | Facility: HOME HEALTH | Age: 77
End: 2022-02-13
Payer: MEDICARE

## 2022-02-13 PROCEDURE — G0299 HHS/HOSPICE OF RN EA 15 MIN: HCPCS

## 2022-02-14 ENCOUNTER — HOME CARE VISIT (OUTPATIENT)
Dept: SCHEDULING | Facility: HOME HEALTH | Age: 77
End: 2022-02-14
Payer: MEDICARE

## 2022-02-14 VITALS
SYSTOLIC BLOOD PRESSURE: 134 MMHG | OXYGEN SATURATION: 97 % | DIASTOLIC BLOOD PRESSURE: 76 MMHG | TEMPERATURE: 98 F | HEART RATE: 81 BPM | RESPIRATION RATE: 18 BRPM

## 2022-02-15 ENCOUNTER — HOME CARE VISIT (OUTPATIENT)
Dept: SCHEDULING | Facility: HOME HEALTH | Age: 77
End: 2022-02-15
Payer: MEDICARE

## 2022-02-15 VITALS
RESPIRATION RATE: 18 BRPM | DIASTOLIC BLOOD PRESSURE: 70 MMHG | OXYGEN SATURATION: 97 % | TEMPERATURE: 97.9 F | HEART RATE: 70 BPM | SYSTOLIC BLOOD PRESSURE: 124 MMHG

## 2022-02-15 PROCEDURE — G0299 HHS/HOSPICE OF RN EA 15 MIN: HCPCS

## 2022-02-15 PROCEDURE — 400014 HH F/U

## 2022-02-16 ENCOUNTER — HOME CARE VISIT (OUTPATIENT)
Dept: SCHEDULING | Facility: HOME HEALTH | Age: 77
End: 2022-02-16
Payer: MEDICARE

## 2022-02-16 VITALS
HEART RATE: 76 BPM | OXYGEN SATURATION: 98 % | SYSTOLIC BLOOD PRESSURE: 138 MMHG | TEMPERATURE: 99.5 F | DIASTOLIC BLOOD PRESSURE: 79 MMHG | RESPIRATION RATE: 18 BRPM

## 2022-02-16 PROCEDURE — A6216 NON-STERILE GAUZE<=16 SQ IN: HCPCS

## 2022-02-16 PROCEDURE — G0299 HHS/HOSPICE OF RN EA 15 MIN: HCPCS

## 2022-02-16 PROCEDURE — MED11538 DRESSING,HYDROFIBER,ADVANTAGE,4"X5"

## 2022-02-16 PROCEDURE — A6213 FOAM DRG >16<=48 SQ IN W/BDR: HCPCS

## 2022-02-18 ENCOUNTER — HOME CARE VISIT (OUTPATIENT)
Dept: SCHEDULING | Facility: HOME HEALTH | Age: 77
End: 2022-02-18
Payer: MEDICARE

## 2022-02-18 VITALS
HEART RATE: 77 BPM | DIASTOLIC BLOOD PRESSURE: 62 MMHG | TEMPERATURE: 99.3 F | SYSTOLIC BLOOD PRESSURE: 122 MMHG | RESPIRATION RATE: 19 BRPM | OXYGEN SATURATION: 98 %

## 2022-02-18 PROCEDURE — G0299 HHS/HOSPICE OF RN EA 15 MIN: HCPCS

## 2022-02-21 ENCOUNTER — HOME CARE VISIT (OUTPATIENT)
Dept: SCHEDULING | Facility: HOME HEALTH | Age: 77
End: 2022-02-21
Payer: MEDICARE

## 2022-02-21 VITALS
TEMPERATURE: 99.5 F | SYSTOLIC BLOOD PRESSURE: 120 MMHG | DIASTOLIC BLOOD PRESSURE: 82 MMHG | HEART RATE: 79 BPM | RESPIRATION RATE: 18 BRPM | OXYGEN SATURATION: 98 %

## 2022-02-21 PROCEDURE — G0299 HHS/HOSPICE OF RN EA 15 MIN: HCPCS

## 2022-02-23 ENCOUNTER — HOME CARE VISIT (OUTPATIENT)
Dept: SCHEDULING | Facility: HOME HEALTH | Age: 77
End: 2022-02-23
Payer: MEDICARE

## 2022-02-23 VITALS
OXYGEN SATURATION: 98 % | SYSTOLIC BLOOD PRESSURE: 112 MMHG | RESPIRATION RATE: 18 BRPM | DIASTOLIC BLOOD PRESSURE: 68 MMHG | HEART RATE: 71 BPM | TEMPERATURE: 99.3 F

## 2022-02-23 PROCEDURE — G0299 HHS/HOSPICE OF RN EA 15 MIN: HCPCS

## 2022-02-25 ENCOUNTER — HOME CARE VISIT (OUTPATIENT)
Dept: SCHEDULING | Facility: HOME HEALTH | Age: 77
End: 2022-02-25
Payer: MEDICARE

## 2022-02-25 VITALS
DIASTOLIC BLOOD PRESSURE: 86 MMHG | OXYGEN SATURATION: 98 % | HEART RATE: 74 BPM | RESPIRATION RATE: 18 BRPM | SYSTOLIC BLOOD PRESSURE: 132 MMHG | TEMPERATURE: 98.8 F

## 2022-02-25 PROCEDURE — G0299 HHS/HOSPICE OF RN EA 15 MIN: HCPCS

## 2022-02-28 ENCOUNTER — HOME CARE VISIT (OUTPATIENT)
Dept: SCHEDULING | Facility: HOME HEALTH | Age: 77
End: 2022-02-28
Payer: MEDICARE

## 2022-03-02 ENCOUNTER — HOME CARE VISIT (OUTPATIENT)
Dept: SCHEDULING | Facility: HOME HEALTH | Age: 77
End: 2022-03-02
Payer: MEDICARE

## 2022-03-02 VITALS
RESPIRATION RATE: 18 BRPM | OXYGEN SATURATION: 98 % | SYSTOLIC BLOOD PRESSURE: 128 MMHG | TEMPERATURE: 99.8 F | DIASTOLIC BLOOD PRESSURE: 84 MMHG | HEART RATE: 75 BPM

## 2022-03-02 PROCEDURE — G0299 HHS/HOSPICE OF RN EA 15 MIN: HCPCS

## 2022-03-04 ENCOUNTER — HOME CARE VISIT (OUTPATIENT)
Dept: HOME HEALTH SERVICES | Facility: HOME HEALTH | Age: 77
End: 2022-03-04
Payer: MEDICARE

## 2022-03-04 ENCOUNTER — HOME CARE VISIT (OUTPATIENT)
Dept: SCHEDULING | Facility: HOME HEALTH | Age: 77
End: 2022-03-04
Payer: MEDICARE

## 2022-03-04 VITALS
SYSTOLIC BLOOD PRESSURE: 126 MMHG | HEART RATE: 68 BPM | DIASTOLIC BLOOD PRESSURE: 68 MMHG | RESPIRATION RATE: 18 BRPM | TEMPERATURE: 99 F | OXYGEN SATURATION: 97 %

## 2022-03-04 PROCEDURE — G0299 HHS/HOSPICE OF RN EA 15 MIN: HCPCS

## 2022-03-07 ENCOUNTER — HOME CARE VISIT (OUTPATIENT)
Dept: SCHEDULING | Facility: HOME HEALTH | Age: 77
End: 2022-03-07
Payer: MEDICARE

## 2022-03-07 VITALS
RESPIRATION RATE: 18 BRPM | DIASTOLIC BLOOD PRESSURE: 72 MMHG | TEMPERATURE: 98.1 F | SYSTOLIC BLOOD PRESSURE: 122 MMHG | OXYGEN SATURATION: 97 % | HEART RATE: 70 BPM

## 2022-03-07 PROCEDURE — G0299 HHS/HOSPICE OF RN EA 15 MIN: HCPCS

## 2022-03-09 ENCOUNTER — HOME CARE VISIT (OUTPATIENT)
Dept: SCHEDULING | Facility: HOME HEALTH | Age: 77
End: 2022-03-09
Payer: MEDICARE

## 2022-03-09 VITALS
OXYGEN SATURATION: 97 % | HEART RATE: 73 BPM | DIASTOLIC BLOOD PRESSURE: 64 MMHG | RESPIRATION RATE: 18 BRPM | TEMPERATURE: 98.5 F | SYSTOLIC BLOOD PRESSURE: 110 MMHG

## 2022-03-09 PROCEDURE — G0299 HHS/HOSPICE OF RN EA 15 MIN: HCPCS

## 2022-03-11 ENCOUNTER — HOME CARE VISIT (OUTPATIENT)
Dept: SCHEDULING | Facility: HOME HEALTH | Age: 77
End: 2022-03-11
Payer: MEDICARE

## 2022-03-11 PROCEDURE — G0299 HHS/HOSPICE OF RN EA 15 MIN: HCPCS

## 2022-03-13 VITALS
HEART RATE: 81 BPM | TEMPERATURE: 98.5 F | SYSTOLIC BLOOD PRESSURE: 142 MMHG | DIASTOLIC BLOOD PRESSURE: 80 MMHG | OXYGEN SATURATION: 95 %

## 2022-03-14 ENCOUNTER — HOME CARE VISIT (OUTPATIENT)
Dept: SCHEDULING | Facility: HOME HEALTH | Age: 77
End: 2022-03-14
Payer: MEDICARE

## 2022-03-16 ENCOUNTER — HOME CARE VISIT (OUTPATIENT)
Dept: SCHEDULING | Facility: HOME HEALTH | Age: 77
End: 2022-03-16
Payer: MEDICARE

## 2022-03-16 VITALS
RESPIRATION RATE: 18 BRPM | OXYGEN SATURATION: 98 % | TEMPERATURE: 99.7 F | SYSTOLIC BLOOD PRESSURE: 110 MMHG | HEART RATE: 74 BPM | DIASTOLIC BLOOD PRESSURE: 72 MMHG

## 2022-03-16 PROCEDURE — A4452 WATERPROOF TAPE: HCPCS

## 2022-03-16 PROCEDURE — G0299 HHS/HOSPICE OF RN EA 15 MIN: HCPCS

## 2022-03-16 PROCEDURE — A6213 FOAM DRG >16<=48 SQ IN W/BDR: HCPCS

## 2022-03-16 PROCEDURE — A6210 FOAM DRG >16<=48 SQ IN W/O B: HCPCS

## 2022-03-18 ENCOUNTER — HOME CARE VISIT (OUTPATIENT)
Dept: SCHEDULING | Facility: HOME HEALTH | Age: 77
End: 2022-03-18
Payer: MEDICARE

## 2022-03-18 VITALS
TEMPERATURE: 99.5 F | RESPIRATION RATE: 18 BRPM | DIASTOLIC BLOOD PRESSURE: 68 MMHG | OXYGEN SATURATION: 95 % | SYSTOLIC BLOOD PRESSURE: 133 MMHG | HEART RATE: 76 BPM

## 2022-03-18 PROBLEM — N28.9 ACUTE RENAL INSUFFICIENCY: Status: ACTIVE | Noted: 2021-05-25

## 2022-03-18 PROCEDURE — G0299 HHS/HOSPICE OF RN EA 15 MIN: HCPCS

## 2022-03-18 PROCEDURE — 400014 HH F/U

## 2022-03-19 PROBLEM — S30.1XXA RIGHT FLANK HEMATOMA: Status: ACTIVE | Noted: 2021-05-24

## 2022-03-19 PROBLEM — S41.102A OPEN WOUND OF LEFT AXILLARY REGION: Status: ACTIVE | Noted: 2021-07-08

## 2022-03-19 PROBLEM — D72.829 LEUKOCYTOSIS: Status: ACTIVE | Noted: 2021-05-26

## 2022-03-19 PROBLEM — L30.9 DERMATITIS: Status: ACTIVE | Noted: 2021-07-08

## 2022-03-19 PROBLEM — R50.9 FEVER: Status: ACTIVE | Noted: 2021-05-24

## 2022-03-19 PROBLEM — S31.109A OPEN WOUND OF FLANK: Status: ACTIVE | Noted: 2021-07-08

## 2022-03-19 PROBLEM — R55 SYNCOPE: Status: ACTIVE | Noted: 2021-08-04

## 2022-03-19 PROBLEM — D89.89 SUPPRESSION OF IMMUNE SYSTEM SUBTHERAPEUTIC (HCC): Status: ACTIVE | Noted: 2021-05-24

## 2022-03-19 PROBLEM — S20.212A HEMATOMA OF LEFT CHEST WALL: Status: ACTIVE | Noted: 2021-05-20

## 2022-03-19 PROBLEM — B37.2 YEAST DERMATITIS: Status: ACTIVE | Noted: 2021-08-18

## 2022-03-19 PROBLEM — F05 SUNDOWNING: Status: ACTIVE | Noted: 2021-05-27
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
## 2022-03-20 PROBLEM — S21.102A OPEN WOUND OF LEFT CHEST WALL: Status: ACTIVE | Noted: 2021-07-08

## 2022-03-20 PROBLEM — L03.313 CELLULITIS OF CHEST WALL: Status: ACTIVE | Noted: 2021-05-20

## 2022-03-20 PROBLEM — R41.0 CONFUSION: Status: ACTIVE | Noted: 2021-05-27

## 2022-03-20 PROBLEM — L02.213 CHEST WALL ABSCESS: Status: ACTIVE | Noted: 2021-05-20

## 2022-03-20 PROBLEM — I47.10 SVT (SUPRAVENTRICULAR TACHYCARDIA): Status: ACTIVE | Noted: 2021-06-15

## 2022-03-20 PROBLEM — I47.1 SVT (SUPRAVENTRICULAR TACHYCARDIA) (HCC): Status: ACTIVE | Noted: 2021-06-15

## 2022-03-21 ENCOUNTER — HOME CARE VISIT (OUTPATIENT)
Dept: SCHEDULING | Facility: HOME HEALTH | Age: 77
End: 2022-03-21
Payer: MEDICARE

## 2022-03-23 ENCOUNTER — HOME CARE VISIT (OUTPATIENT)
Dept: SCHEDULING | Facility: HOME HEALTH | Age: 77
End: 2022-03-23
Payer: MEDICARE

## 2022-03-23 VITALS
HEART RATE: 82 BPM | OXYGEN SATURATION: 95 % | SYSTOLIC BLOOD PRESSURE: 102 MMHG | RESPIRATION RATE: 14 BRPM | DIASTOLIC BLOOD PRESSURE: 60 MMHG | TEMPERATURE: 98.3 F

## 2022-03-23 PROCEDURE — G0299 HHS/HOSPICE OF RN EA 15 MIN: HCPCS

## 2022-03-25 ENCOUNTER — HOME CARE VISIT (OUTPATIENT)
Dept: SCHEDULING | Facility: HOME HEALTH | Age: 77
End: 2022-03-25
Payer: MEDICARE

## 2022-03-25 VITALS
HEART RATE: 87 BPM | RESPIRATION RATE: 16 BRPM | SYSTOLIC BLOOD PRESSURE: 104 MMHG | OXYGEN SATURATION: 94 % | TEMPERATURE: 98.3 F | DIASTOLIC BLOOD PRESSURE: 74 MMHG

## 2022-03-25 PROCEDURE — G0299 HHS/HOSPICE OF RN EA 15 MIN: HCPCS

## 2022-03-28 ENCOUNTER — HOME CARE VISIT (OUTPATIENT)
Dept: SCHEDULING | Facility: HOME HEALTH | Age: 77
End: 2022-03-28
Payer: MEDICARE

## 2022-03-30 ENCOUNTER — HOME CARE VISIT (OUTPATIENT)
Dept: SCHEDULING | Facility: HOME HEALTH | Age: 77
End: 2022-03-30
Payer: MEDICARE

## 2022-03-30 VITALS
DIASTOLIC BLOOD PRESSURE: 86 MMHG | HEART RATE: 75 BPM | RESPIRATION RATE: 18 BRPM | SYSTOLIC BLOOD PRESSURE: 128 MMHG | OXYGEN SATURATION: 94 % | TEMPERATURE: 99.8 F

## 2022-03-30 PROCEDURE — G0299 HHS/HOSPICE OF RN EA 15 MIN: HCPCS

## 2022-04-01 ENCOUNTER — HOME CARE VISIT (OUTPATIENT)
Dept: SCHEDULING | Facility: HOME HEALTH | Age: 77
End: 2022-04-01
Payer: MEDICARE

## 2022-04-01 VITALS
SYSTOLIC BLOOD PRESSURE: 132 MMHG | RESPIRATION RATE: 18 BRPM | TEMPERATURE: 99.9 F | DIASTOLIC BLOOD PRESSURE: 74 MMHG | HEART RATE: 82 BPM | OXYGEN SATURATION: 95 %

## 2022-04-01 PROCEDURE — G0299 HHS/HOSPICE OF RN EA 15 MIN: HCPCS

## 2022-04-04 ENCOUNTER — HOME CARE VISIT (OUTPATIENT)
Dept: SCHEDULING | Facility: HOME HEALTH | Age: 77
End: 2022-04-04
Payer: MEDICARE

## 2022-04-04 VITALS
DIASTOLIC BLOOD PRESSURE: 70 MMHG | RESPIRATION RATE: 18 BRPM | HEART RATE: 89 BPM | TEMPERATURE: 99.3 F | SYSTOLIC BLOOD PRESSURE: 110 MMHG | OXYGEN SATURATION: 96 %

## 2022-04-04 PROCEDURE — G0299 HHS/HOSPICE OF RN EA 15 MIN: HCPCS

## 2022-04-06 ENCOUNTER — HOME CARE VISIT (OUTPATIENT)
Dept: SCHEDULING | Facility: HOME HEALTH | Age: 77
End: 2022-04-06
Payer: MEDICARE

## 2022-04-06 VITALS
RESPIRATION RATE: 18 BRPM | DIASTOLIC BLOOD PRESSURE: 70 MMHG | SYSTOLIC BLOOD PRESSURE: 118 MMHG | OXYGEN SATURATION: 96 % | HEART RATE: 79 BPM | TEMPERATURE: 100.2 F

## 2022-04-06 PROCEDURE — G0299 HHS/HOSPICE OF RN EA 15 MIN: HCPCS

## 2022-04-08 ENCOUNTER — HOME CARE VISIT (OUTPATIENT)
Dept: SCHEDULING | Facility: HOME HEALTH | Age: 77
End: 2022-04-08
Payer: MEDICARE

## 2022-04-08 VITALS
TEMPERATURE: 98.8 F | DIASTOLIC BLOOD PRESSURE: 70 MMHG | RESPIRATION RATE: 14 BRPM | OXYGEN SATURATION: 95 % | SYSTOLIC BLOOD PRESSURE: 130 MMHG | HEART RATE: 81 BPM

## 2022-04-08 PROCEDURE — G0299 HHS/HOSPICE OF RN EA 15 MIN: HCPCS

## 2022-04-11 ENCOUNTER — HOME CARE VISIT (OUTPATIENT)
Dept: SCHEDULING | Facility: HOME HEALTH | Age: 77
End: 2022-04-11
Payer: MEDICARE

## 2022-04-13 ENCOUNTER — HOME CARE VISIT (OUTPATIENT)
Dept: SCHEDULING | Facility: HOME HEALTH | Age: 77
End: 2022-04-13
Payer: MEDICARE

## 2022-04-13 VITALS
OXYGEN SATURATION: 98 % | HEART RATE: 77 BPM | DIASTOLIC BLOOD PRESSURE: 74 MMHG | TEMPERATURE: 99.4 F | RESPIRATION RATE: 18 BRPM | SYSTOLIC BLOOD PRESSURE: 132 MMHG

## 2022-04-13 PROCEDURE — A6212 FOAM DRG <=16 SQ IN W/BORDER: HCPCS

## 2022-04-13 PROCEDURE — G0299 HHS/HOSPICE OF RN EA 15 MIN: HCPCS

## 2022-04-13 PROCEDURE — MED12329

## 2022-04-15 ENCOUNTER — HOME CARE VISIT (OUTPATIENT)
Dept: SCHEDULING | Facility: HOME HEALTH | Age: 77
End: 2022-04-15
Payer: MEDICARE

## 2022-04-15 PROCEDURE — G0299 HHS/HOSPICE OF RN EA 15 MIN: HCPCS

## 2022-04-17 VITALS
SYSTOLIC BLOOD PRESSURE: 128 MMHG | RESPIRATION RATE: 17 BRPM | DIASTOLIC BLOOD PRESSURE: 74 MMHG | OXYGEN SATURATION: 97 % | HEART RATE: 79 BPM | TEMPERATURE: 97.9 F

## 2022-04-20 ENCOUNTER — HOME CARE VISIT (OUTPATIENT)
Dept: SCHEDULING | Facility: HOME HEALTH | Age: 77
End: 2022-04-20
Payer: MEDICARE

## 2022-04-20 VITALS
HEART RATE: 65 BPM | RESPIRATION RATE: 18 BRPM | DIASTOLIC BLOOD PRESSURE: 78 MMHG | SYSTOLIC BLOOD PRESSURE: 134 MMHG | OXYGEN SATURATION: 98 % | TEMPERATURE: 98.5 F

## 2022-04-20 PROCEDURE — G0299 HHS/HOSPICE OF RN EA 15 MIN: HCPCS

## 2022-04-20 PROCEDURE — 400014 HH F/U

## 2022-04-22 ENCOUNTER — HOME CARE VISIT (OUTPATIENT)
Dept: SCHEDULING | Facility: HOME HEALTH | Age: 77
End: 2022-04-22
Payer: MEDICARE

## 2022-04-22 PROCEDURE — G0299 HHS/HOSPICE OF RN EA 15 MIN: HCPCS

## 2022-04-24 VITALS
TEMPERATURE: 98.3 F | RESPIRATION RATE: 18 BRPM | SYSTOLIC BLOOD PRESSURE: 137 MMHG | HEART RATE: 72 BPM | DIASTOLIC BLOOD PRESSURE: 82 MMHG | OXYGEN SATURATION: 98 %

## 2022-04-25 ENCOUNTER — HOME CARE VISIT (OUTPATIENT)
Dept: SCHEDULING | Facility: HOME HEALTH | Age: 77
End: 2022-04-25
Payer: MEDICARE

## 2022-04-27 ENCOUNTER — HOME CARE VISIT (OUTPATIENT)
Dept: SCHEDULING | Facility: HOME HEALTH | Age: 77
End: 2022-04-27
Payer: MEDICARE

## 2022-04-27 VITALS
RESPIRATION RATE: 17 BRPM | HEART RATE: 71 BPM | DIASTOLIC BLOOD PRESSURE: 79 MMHG | TEMPERATURE: 98.6 F | SYSTOLIC BLOOD PRESSURE: 130 MMHG | OXYGEN SATURATION: 97 %

## 2022-04-27 PROCEDURE — G0299 HHS/HOSPICE OF RN EA 15 MIN: HCPCS

## 2022-04-29 ENCOUNTER — HOME CARE VISIT (OUTPATIENT)
Dept: SCHEDULING | Facility: HOME HEALTH | Age: 77
End: 2022-04-29
Payer: MEDICARE

## 2022-04-29 VITALS
DIASTOLIC BLOOD PRESSURE: 80 MMHG | RESPIRATION RATE: 16 BRPM | SYSTOLIC BLOOD PRESSURE: 138 MMHG | TEMPERATURE: 97.6 F | OXYGEN SATURATION: 96 % | HEART RATE: 73 BPM

## 2022-04-29 PROCEDURE — A6022 COLLAGEN DRSG>16<=48 SQ IN: HCPCS

## 2022-04-29 PROCEDURE — G0299 HHS/HOSPICE OF RN EA 15 MIN: HCPCS

## 2022-05-02 ENCOUNTER — HOME CARE VISIT (OUTPATIENT)
Dept: SCHEDULING | Facility: HOME HEALTH | Age: 77
End: 2022-05-02
Payer: MEDICARE

## 2022-05-02 VITALS
TEMPERATURE: 99.8 F | SYSTOLIC BLOOD PRESSURE: 130 MMHG | DIASTOLIC BLOOD PRESSURE: 80 MMHG | RESPIRATION RATE: 18 BRPM | HEART RATE: 81 BPM | OXYGEN SATURATION: 98 %

## 2022-05-02 PROCEDURE — G0299 HHS/HOSPICE OF RN EA 15 MIN: HCPCS

## 2022-05-04 ENCOUNTER — HOME CARE VISIT (OUTPATIENT)
Dept: SCHEDULING | Facility: HOME HEALTH | Age: 77
End: 2022-05-04
Payer: MEDICARE

## 2022-05-04 VITALS
TEMPERATURE: 99.5 F | HEART RATE: 74 BPM | OXYGEN SATURATION: 98 % | RESPIRATION RATE: 18 BRPM | SYSTOLIC BLOOD PRESSURE: 140 MMHG | DIASTOLIC BLOOD PRESSURE: 88 MMHG

## 2022-05-04 PROCEDURE — G0299 HHS/HOSPICE OF RN EA 15 MIN: HCPCS

## 2022-05-05 PROBLEM — D89.89 SUPPRESSION OF IMMUNE SYSTEM SUBTHERAPEUTIC (HCC): Status: RESOLVED | Noted: 2021-05-24 | Resolved: 2022-05-05

## 2022-05-06 ENCOUNTER — HOME CARE VISIT (OUTPATIENT)
Dept: SCHEDULING | Facility: HOME HEALTH | Age: 77
End: 2022-05-06
Payer: MEDICARE

## 2022-05-06 PROCEDURE — G0299 HHS/HOSPICE OF RN EA 15 MIN: HCPCS

## 2022-05-08 VITALS
HEART RATE: 79 BPM | DIASTOLIC BLOOD PRESSURE: 78 MMHG | TEMPERATURE: 99.6 F | RESPIRATION RATE: 18 BRPM | OXYGEN SATURATION: 98 % | SYSTOLIC BLOOD PRESSURE: 120 MMHG

## 2022-05-09 ENCOUNTER — HOME CARE VISIT (OUTPATIENT)
Dept: SCHEDULING | Facility: HOME HEALTH | Age: 77
End: 2022-05-09
Payer: MEDICARE

## 2022-05-11 ENCOUNTER — HOME CARE VISIT (OUTPATIENT)
Dept: SCHEDULING | Facility: HOME HEALTH | Age: 77
End: 2022-05-11
Payer: MEDICARE

## 2022-05-11 VITALS
OXYGEN SATURATION: 98 % | DIASTOLIC BLOOD PRESSURE: 76 MMHG | TEMPERATURE: 99.3 F | RESPIRATION RATE: 18 BRPM | HEART RATE: 67 BPM | SYSTOLIC BLOOD PRESSURE: 132 MMHG

## 2022-05-11 PROCEDURE — G0299 HHS/HOSPICE OF RN EA 15 MIN: HCPCS

## 2022-05-13 ENCOUNTER — HOME CARE VISIT (OUTPATIENT)
Dept: SCHEDULING | Facility: HOME HEALTH | Age: 77
End: 2022-05-13
Payer: MEDICARE

## 2022-05-13 PROCEDURE — G0299 HHS/HOSPICE OF RN EA 15 MIN: HCPCS

## 2022-05-14 VITALS
OXYGEN SATURATION: 97 % | DIASTOLIC BLOOD PRESSURE: 62 MMHG | RESPIRATION RATE: 19 BRPM | TEMPERATURE: 98.7 F | SYSTOLIC BLOOD PRESSURE: 120 MMHG | HEART RATE: 70 BPM

## 2022-05-16 ENCOUNTER — HOME CARE VISIT (OUTPATIENT)
Dept: SCHEDULING | Facility: HOME HEALTH | Age: 77
End: 2022-05-16
Payer: MEDICARE

## 2022-05-16 VITALS
OXYGEN SATURATION: 97 % | HEART RATE: 60 BPM | DIASTOLIC BLOOD PRESSURE: 72 MMHG | SYSTOLIC BLOOD PRESSURE: 108 MMHG | RESPIRATION RATE: 18 BRPM | TEMPERATURE: 99.6 F

## 2022-05-16 PROCEDURE — G0299 HHS/HOSPICE OF RN EA 15 MIN: HCPCS

## 2022-05-16 PROCEDURE — 400014 HH F/U

## 2022-05-18 ENCOUNTER — HOME CARE VISIT (OUTPATIENT)
Dept: SCHEDULING | Facility: HOME HEALTH | Age: 77
End: 2022-05-18
Payer: MEDICARE

## 2022-05-18 VITALS
TEMPERATURE: 98.8 F | SYSTOLIC BLOOD PRESSURE: 124 MMHG | OXYGEN SATURATION: 98 % | DIASTOLIC BLOOD PRESSURE: 78 MMHG | RESPIRATION RATE: 18 BRPM | HEART RATE: 57 BPM

## 2022-05-18 PROCEDURE — G0299 HHS/HOSPICE OF RN EA 15 MIN: HCPCS

## 2022-05-20 ENCOUNTER — HOME CARE VISIT (OUTPATIENT)
Dept: SCHEDULING | Facility: HOME HEALTH | Age: 77
End: 2022-05-20
Payer: MEDICARE

## 2022-05-20 VITALS
SYSTOLIC BLOOD PRESSURE: 116 MMHG | RESPIRATION RATE: 18 BRPM | HEART RATE: 62 BPM | OXYGEN SATURATION: 96 % | DIASTOLIC BLOOD PRESSURE: 71 MMHG | TEMPERATURE: 98.5 F

## 2022-05-20 PROCEDURE — G0299 HHS/HOSPICE OF RN EA 15 MIN: HCPCS

## 2022-05-23 ENCOUNTER — HOME CARE VISIT (OUTPATIENT)
Dept: SCHEDULING | Facility: HOME HEALTH | Age: 77
End: 2022-05-23
Payer: MEDICARE

## 2022-05-23 VITALS
OXYGEN SATURATION: 98 % | DIASTOLIC BLOOD PRESSURE: 70 MMHG | TEMPERATURE: 99.5 F | SYSTOLIC BLOOD PRESSURE: 120 MMHG | HEART RATE: 60 BPM | RESPIRATION RATE: 18 BRPM

## 2022-05-23 PROCEDURE — G0299 HHS/HOSPICE OF RN EA 15 MIN: HCPCS

## 2022-05-23 PROCEDURE — A6212 FOAM DRG <=16 SQ IN W/BORDER: HCPCS

## 2022-05-23 PROCEDURE — A6216 NON-STERILE GAUZE<=16 SQ IN: HCPCS

## 2022-05-23 RX ORDER — METOPROLOL SUCCINATE 50 MG/1
25 TABLET, EXTENDED RELEASE ORAL DAILY
Qty: 90 TABLET | Refills: 3 | Status: SHIPPED | OUTPATIENT
Start: 2022-05-23 | End: 2022-06-09 | Stop reason: SDUPTHER

## 2022-05-25 ENCOUNTER — HOME CARE VISIT (OUTPATIENT)
Dept: SCHEDULING | Facility: HOME HEALTH | Age: 77
End: 2022-05-25
Payer: MEDICARE

## 2022-05-27 ENCOUNTER — HOME CARE VISIT (OUTPATIENT)
Dept: SCHEDULING | Facility: HOME HEALTH | Age: 77
End: 2022-05-27
Payer: MEDICARE

## 2022-05-27 VITALS
OXYGEN SATURATION: 96 % | SYSTOLIC BLOOD PRESSURE: 106 MMHG | DIASTOLIC BLOOD PRESSURE: 60 MMHG | HEART RATE: 60 BPM | TEMPERATURE: 98.7 F | RESPIRATION RATE: 14 BRPM

## 2022-05-27 PROCEDURE — A6021 COLLAGEN DRESSING <=16 SQ IN: HCPCS

## 2022-05-27 PROCEDURE — G0299 HHS/HOSPICE OF RN EA 15 MIN: HCPCS

## 2022-05-30 ENCOUNTER — HOME CARE VISIT (OUTPATIENT)
Dept: SCHEDULING | Facility: HOME HEALTH | Age: 77
End: 2022-05-30
Payer: MEDICARE

## 2022-05-30 VITALS
TEMPERATURE: 99 F | DIASTOLIC BLOOD PRESSURE: 80 MMHG | RESPIRATION RATE: 18 BRPM | SYSTOLIC BLOOD PRESSURE: 124 MMHG | OXYGEN SATURATION: 98 % | HEART RATE: 62 BPM

## 2022-05-30 PROCEDURE — G0299 HHS/HOSPICE OF RN EA 15 MIN: HCPCS

## 2022-06-01 ENCOUNTER — HOME CARE VISIT (OUTPATIENT)
Dept: SCHEDULING | Facility: HOME HEALTH | Age: 77
End: 2022-06-01
Payer: MEDICARE

## 2022-06-01 VITALS
RESPIRATION RATE: 18 BRPM | TEMPERATURE: 99.6 F | HEART RATE: 62 BPM | DIASTOLIC BLOOD PRESSURE: 78 MMHG | SYSTOLIC BLOOD PRESSURE: 136 MMHG | OXYGEN SATURATION: 99 %

## 2022-06-01 PROCEDURE — G0299 HHS/HOSPICE OF RN EA 15 MIN: HCPCS

## 2022-06-03 ENCOUNTER — HOME CARE VISIT (OUTPATIENT)
Dept: SCHEDULING | Facility: HOME HEALTH | Age: 77
End: 2022-06-03
Payer: MEDICARE

## 2022-06-03 ENCOUNTER — HOSPITAL ENCOUNTER (OUTPATIENT)
Dept: GENERAL RADIOLOGY | Age: 77
Discharge: HOME | End: 2022-06-06
Payer: MEDICARE

## 2022-06-03 VITALS
SYSTOLIC BLOOD PRESSURE: 110 MMHG | DIASTOLIC BLOOD PRESSURE: 60 MMHG | RESPIRATION RATE: 14 BRPM | HEART RATE: 62 BPM | OXYGEN SATURATION: 96 % | TEMPERATURE: 98.2 F

## 2022-06-03 DIAGNOSIS — M17.0 OSTEOARTHRITIS OF BOTH KNEES, UNSPECIFIED OSTEOARTHRITIS TYPE: ICD-10-CM

## 2022-06-03 PROCEDURE — 73560 X-RAY EXAM OF KNEE 1 OR 2: CPT

## 2022-06-03 PROCEDURE — G0299 HHS/HOSPICE OF RN EA 15 MIN: HCPCS

## 2022-06-06 ENCOUNTER — HOME CARE VISIT (OUTPATIENT)
Dept: SCHEDULING | Facility: HOME HEALTH | Age: 77
End: 2022-06-06
Payer: MEDICARE

## 2022-06-06 VITALS
SYSTOLIC BLOOD PRESSURE: 144 MMHG | TEMPERATURE: 99.3 F | OXYGEN SATURATION: 98 % | DIASTOLIC BLOOD PRESSURE: 82 MMHG | RESPIRATION RATE: 18 BRPM | HEART RATE: 60 BPM

## 2022-06-06 PROCEDURE — G0299 HHS/HOSPICE OF RN EA 15 MIN: HCPCS

## 2022-06-08 ENCOUNTER — HOME HEALTH ADMISSION (OUTPATIENT)
Dept: HOME HEALTH SERVICES | Facility: HOME HEALTH | Age: 77
End: 2022-06-08
Payer: MEDICARE

## 2022-06-08 ENCOUNTER — HOME CARE VISIT (OUTPATIENT)
Dept: SCHEDULING | Facility: HOME HEALTH | Age: 77
End: 2022-06-08
Payer: MEDICARE

## 2022-06-08 VITALS
SYSTOLIC BLOOD PRESSURE: 138 MMHG | RESPIRATION RATE: 18 BRPM | DIASTOLIC BLOOD PRESSURE: 88 MMHG | OXYGEN SATURATION: 98 % | TEMPERATURE: 99.5 F | HEART RATE: 57 BPM

## 2022-06-08 PROCEDURE — G0299 HHS/HOSPICE OF RN EA 15 MIN: HCPCS

## 2022-06-09 ENCOUNTER — OFFICE VISIT (OUTPATIENT)
Dept: FAMILY MEDICINE CLINIC | Facility: CLINIC | Age: 77
End: 2022-06-09
Payer: MEDICARE

## 2022-06-09 VITALS
TEMPERATURE: 98.2 F | RESPIRATION RATE: 16 BRPM | WEIGHT: 185 LBS | HEART RATE: 58 BPM | SYSTOLIC BLOOD PRESSURE: 123 MMHG | OXYGEN SATURATION: 99 % | BODY MASS INDEX: 25.9 KG/M2 | DIASTOLIC BLOOD PRESSURE: 81 MMHG | HEIGHT: 71 IN

## 2022-06-09 DIAGNOSIS — I10 ESSENTIAL HYPERTENSION: ICD-10-CM

## 2022-06-09 DIAGNOSIS — R53.83 OTHER FATIGUE: ICD-10-CM

## 2022-06-09 DIAGNOSIS — G47.00 INSOMNIA, UNSPECIFIED TYPE: ICD-10-CM

## 2022-06-09 DIAGNOSIS — M15.9 GENERALIZED OSTEOARTHRITIS: ICD-10-CM

## 2022-06-09 DIAGNOSIS — E78.2 MIXED HYPERLIPIDEMIA: Primary | ICD-10-CM

## 2022-06-09 DIAGNOSIS — R26.81 GAIT INSTABILITY: ICD-10-CM

## 2022-06-09 PROCEDURE — 3017F COLORECTAL CA SCREEN DOC REV: CPT | Performed by: FAMILY MEDICINE

## 2022-06-09 PROCEDURE — G8427 DOCREV CUR MEDS BY ELIG CLIN: HCPCS | Performed by: FAMILY MEDICINE

## 2022-06-09 PROCEDURE — G8417 CALC BMI ABV UP PARAM F/U: HCPCS | Performed by: FAMILY MEDICINE

## 2022-06-09 PROCEDURE — 99213 OFFICE O/P EST LOW 20 MIN: CPT | Performed by: FAMILY MEDICINE

## 2022-06-09 PROCEDURE — 1036F TOBACCO NON-USER: CPT | Performed by: FAMILY MEDICINE

## 2022-06-09 PROCEDURE — 1123F ACP DISCUSS/DSCN MKR DOCD: CPT | Performed by: FAMILY MEDICINE

## 2022-06-09 RX ORDER — METOPROLOL SUCCINATE 25 MG/1
25 TABLET, EXTENDED RELEASE ORAL DAILY
Qty: 90 TABLET | Refills: 3 | Status: SHIPPED | OUTPATIENT
Start: 2022-06-09 | End: 2022-08-11

## 2022-06-09 RX ORDER — LORAZEPAM 0.5 MG/1
0.5 TABLET ORAL NIGHTLY PRN
Qty: 30 TABLET | Refills: 3 | Status: SHIPPED | OUTPATIENT
Start: 2022-06-09 | End: 2022-06-28

## 2022-06-09 RX ORDER — CALCIUM CARBONATE 160(400)MG
TABLET,CHEWABLE ORAL
Qty: 1 EACH | Refills: 0 | Status: SHIPPED | OUTPATIENT
Start: 2022-06-09 | End: 2022-06-22 | Stop reason: SDUPTHER

## 2022-06-09 RX ORDER — TRAZODONE HYDROCHLORIDE 50 MG/1
50 TABLET ORAL NIGHTLY
COMMUNITY
Start: 2022-05-05 | End: 2022-06-09

## 2022-06-09 ASSESSMENT — PATIENT HEALTH QUESTIONNAIRE - PHQ9
SUM OF ALL RESPONSES TO PHQ QUESTIONS 1-9: 0
1. LITTLE INTEREST OR PLEASURE IN DOING THINGS: 0
SUM OF ALL RESPONSES TO PHQ9 QUESTIONS 1 & 2: 0
SUM OF ALL RESPONSES TO PHQ QUESTIONS 1-9: 0
2. FEELING DOWN, DEPRESSED OR HOPELESS: 0
SUM OF ALL RESPONSES TO PHQ QUESTIONS 1-9: 0
SUM OF ALL RESPONSES TO PHQ QUESTIONS 1-9: 0

## 2022-06-09 ASSESSMENT — ENCOUNTER SYMPTOMS: BACK PAIN: 1

## 2022-06-10 ENCOUNTER — HOME CARE VISIT (OUTPATIENT)
Dept: SCHEDULING | Facility: HOME HEALTH | Age: 77
End: 2022-06-10
Payer: MEDICARE

## 2022-06-10 ENCOUNTER — HOME CARE VISIT (OUTPATIENT)
Dept: HOME HEALTH SERVICES | Facility: HOME HEALTH | Age: 77
End: 2022-06-10
Payer: MEDICARE

## 2022-06-10 PROCEDURE — G0299 HHS/HOSPICE OF RN EA 15 MIN: HCPCS

## 2022-06-10 ASSESSMENT — ENCOUNTER SYMPTOMS
HEMOPTYSIS: 0
PAIN LOCATION - PAIN QUALITY: ACHY

## 2022-06-10 NOTE — PROGRESS NOTES
Subjective:      Patient ID: Ashley Celaya is a 77 y.o. male. HPI   Patient comes in today for follow-up on his blood pressure. He was experiencing some dizziness so he has only been taking a fourth of the metoprolol twice daily and feels much better. Continues to feel very tired and did have some recent labs which overall look good. He is having some trouble though sleeping at night and tried trazodone but that made him feel too groggy the next day. Past Medical History:   Diagnosis Date    Depression     H/O urinary frequency 2/1/2013    History of BPH 2/1/2013    HLD (hyperlipidemia) 2/1/2013    Hx: UTI (urinary tract infection) 2/1/2013    Insomnia     Psoriasis     Psoriatic arthritis ([address] 2/1/2013    Wears hearing aid 2/1/2013       Allergies   Allergen Reactions    Vancomycin Rash       Current Outpatient Medications   Medication Sig Dispense Refill    metoprolol succinate (TOPROL XL) 25 MG extended release tablet Take 1 tablet by mouth daily 90 tablet 3    LORazepam (ATIVAN) 0.5 MG tablet Take 1 tablet by mouth nightly as needed for Anxiety for up to 30 days. 30 tablet 3    Misc.  Devices (ROLLATOR ULTRA-LIGHT) MISC Use daily as needed(R26.81) Gait instability, (M15.9) Generalized osteoarthritis 1 each 0    acetaminophen (TYLENOL) 500 MG tablet Take 1,000 mg by mouth every 6 hours as needed      celecoxib (CELEBREX) 200 MG capsule Take 200 mg by mouth daily      DULoxetine (CYMBALTA) 60 MG extended release capsule Take 60 mg by mouth 2 times daily      hydrocortisone (HYTONE) 2.5 % lotion Apply 1 Dose topically daily      ketoconazole (NIZORAL) 2 % shampoo Apply 1 Dose topically daily      tamsulosin (FLOMAX) 0.4 MG capsule Take 0.8 mg by mouth daily      triamcinolone (KENALOG) 0.1 % cream Apply topically 2 times daily      Bismuth Subsalicylate 518 MG TABS 524 mg as needed (Patient not taking: Reported on 6/9/2022)       No current facility-administered medications for this visit. Social History     Tobacco Use    Smoking status: Former Smoker    Smokeless tobacco: Never Used    Tobacco comment: Quit smoking: pt states quit in early 60's late 50's   Substance Use Topics    Alcohol use: No     Alcohol/week: 0.0 standard drinks    Drug use: No     Review of Systems   Constitutional: Positive for fatigue. Musculoskeletal: Positive for back pain and joint swelling. Neurological: Negative for dizziness. Psychiatric/Behavioral: Positive for sleep disturbance. Blood pressure 123/81, pulse 58, temperature 98.2 °F (36.8 °C), temperature source Temporal, resp. rate 16, height 5' 11\" (1.803 m), weight 185 lb (83.9 kg), SpO2 99 %. Objective:   Physical Exam  Vitals reviewed. Constitutional:       General: He is not in acute distress. Appearance: Normal appearance. Cardiovascular:      Rate and Rhythm: Normal rate and regular rhythm. Heart sounds: No murmur heard. Pulmonary:      Effort: Pulmonary effort is normal.      Breath sounds: Normal breath sounds. Neurological:      General: No focal deficit present. Mental Status: He is alert and oriented to person, place, and time.        Results for orders placed or performed in visit on 05/05/22   Comprehensive Metabolic Panel   Result Value Ref Range    Glucose 93 65 - 99 mg/dL    BUN 13 8 - 27 mg/dL    CREATININE 0.99 0.76 - 1.27 mg/dL    EGFR 79 >59 mL/min/1.73    Bun/Cre Ratio 13 10 - 24 NA    Sodium 138 134 - 144 mmol/L    Potassium 4.7 3.5 - 5.2 mmol/L    Chloride 99 96 - 106 mmol/L    CO2 21 20 - 29 mmol/L    Calcium 9.3 8.6 - 10.2 mg/dL    Total Protein 7.4 6.0 - 8.5 g/dL    Albumin 4.3 3.7 - 4.7 g/dL    Globulin, Total 3.1 1.5 - 4.5 g/dL    Albumin/Globulin Ratio 1.4 1.2 - 2.2 NA    Total Bilirubin 0.3 0.0 - 1.2 mg/dL    Alkaline Phosphatase 81 44 - 121 IU/L    AST 22 0 - 40 IU/L    ALT 18 0 - 44 IU/L   Vitamin B12   Result Value Ref Range    Vitamin B-12 388 232 - 1245 pg/mL   CBC with Auto Differential   Result Value Ref Range    WBC 5.1 3.4 - 10.8 x10E3/uL    RBC 4.86 4.14 - 5.80 x10E6/uL    Hemoglobin 15.0 13.0 - 17.7 g/dL    Hematocrit 46.2 37.5 - 51.0 %    MCV 95 79 - 97 fL    MCH 30.9 26.6 - 33.0 pg    MCHC 32.5 31.5 - 35.7 g/dL    RDW 13.9 11.6 - 15.4 %    Platelets 577 (L) 239 - 450 x10E3/uL    Neutrophils % 59 Not Estab. %    Lymphocytes % 30 Not Estab. %    Monocytes % 8 Not Estab. %    Eosinophils % 2 Not Estab. %    Basophils % 1 Not Estab. %    Neutrophils Absolute 3.0 1.4 - 7.0 x10E3/uL    Lymphocytes Absolute 1.5 0.7 - 3.1 x10E3/uL    Monocytes Absolute 0.4 0.1 - 0.9 x10E3/uL    Eosinophils Absolute 0.1 0.0 - 0.4 x10E3/uL    Basophils Absolute 0.1 0.0 - 0.2 x10E3/uL    Immature Granulocytes 0 Not Estab. %    GRANULOCYTE ABSOLUTE COUNT 0.0 0.0 - 0.1 x10E3/uL   TSH 3RD GENERATION   Result Value Ref Range    TSH 1.540 0.450 - 4.500 uIU/mL       Assessment / Plan:         Galen Dukes was seen today for dizziness, insomnia and fatigue. Diagnoses and all orders for this visit:    Mixed hyperlipidemia    Essential hypertension  -     metoprolol succinate (TOPROL XL) 25 MG extended release tablet; Take 1 tablet by mouth daily    Insomnia, unspecified type  -     LORazepam (ATIVAN) 0.5 MG tablet; Take 1 tablet by mouth nightly as needed for Anxiety for up to 30 days. Other fatigue    Gait instability  -     Misc. Devices (ROLLATOR ULTRA-LIGHT) MISC; Use daily as needed(R26.81) Gait instability, (M15.9) Generalized osteoarthritis    Generalized osteoarthritis  -     Misc. Devices (ROLLATOR ULTRA-LIGHT) MISC; Use daily as needed(R26.81) Gait instability, (M15.9) Generalized osteoarthritis       Because of weakness we will give him a prescription for the Rollator as she still is very unsteady on his feet. Try lorazepam to see if that helps with sleep at night. Follow-up and Dispositions    · Return in about 3 months (around 9/9/2022), or if symptoms worsen or fail to improve.          Dictated using voice recognition software.  Proofread, but unrecognized errors may exist.

## 2022-06-11 VITALS
HEART RATE: 60 BPM | SYSTOLIC BLOOD PRESSURE: 108 MMHG | WEIGHT: 185 LBS | OXYGEN SATURATION: 98 % | RESPIRATION RATE: 18 BRPM | BODY MASS INDEX: 25.8 KG/M2 | TEMPERATURE: 99.4 F | DIASTOLIC BLOOD PRESSURE: 60 MMHG

## 2022-06-13 ENCOUNTER — HOME CARE VISIT (OUTPATIENT)
Dept: HOME HEALTH SERVICES | Facility: HOME HEALTH | Age: 77
End: 2022-06-13
Payer: MEDICARE

## 2022-06-15 ENCOUNTER — HOME CARE VISIT (OUTPATIENT)
Dept: SCHEDULING | Facility: HOME HEALTH | Age: 77
End: 2022-06-15
Payer: MEDICARE

## 2022-06-15 VITALS
DIASTOLIC BLOOD PRESSURE: 64 MMHG | HEART RATE: 60 BPM | TEMPERATURE: 99.1 F | SYSTOLIC BLOOD PRESSURE: 112 MMHG | OXYGEN SATURATION: 98 % | RESPIRATION RATE: 18 BRPM

## 2022-06-15 PROCEDURE — G0299 HHS/HOSPICE OF RN EA 15 MIN: HCPCS

## 2022-06-15 PROCEDURE — 400014 HH F/U

## 2022-06-15 ASSESSMENT — ENCOUNTER SYMPTOMS: HEMOPTYSIS: 0

## 2022-06-17 ENCOUNTER — HOME CARE VISIT (OUTPATIENT)
Dept: SCHEDULING | Facility: HOME HEALTH | Age: 77
End: 2022-06-17
Payer: MEDICARE

## 2022-06-17 VITALS
OXYGEN SATURATION: 96 % | HEART RATE: 63 BPM | RESPIRATION RATE: 16 BRPM | TEMPERATURE: 98.3 F | SYSTOLIC BLOOD PRESSURE: 102 MMHG | DIASTOLIC BLOOD PRESSURE: 60 MMHG

## 2022-06-17 PROCEDURE — G0299 HHS/HOSPICE OF RN EA 15 MIN: HCPCS

## 2022-06-17 ASSESSMENT — ENCOUNTER SYMPTOMS: PAIN LOCATION - PAIN QUALITY: ACHE

## 2022-06-22 ENCOUNTER — TELEPHONE (OUTPATIENT)
Dept: FAMILY MEDICINE CLINIC | Facility: CLINIC | Age: 77
End: 2022-06-22

## 2022-06-22 ENCOUNTER — HOME CARE VISIT (OUTPATIENT)
Dept: SCHEDULING | Facility: HOME HEALTH | Age: 77
End: 2022-06-22
Payer: MEDICARE

## 2022-06-22 VITALS
OXYGEN SATURATION: 99 % | RESPIRATION RATE: 18 BRPM | TEMPERATURE: 99.1 F | SYSTOLIC BLOOD PRESSURE: 128 MMHG | DIASTOLIC BLOOD PRESSURE: 70 MMHG | HEART RATE: 57 BPM

## 2022-06-22 DIAGNOSIS — R26.81 GAIT INSTABILITY: ICD-10-CM

## 2022-06-22 DIAGNOSIS — M15.9 GENERALIZED OSTEOARTHRITIS: ICD-10-CM

## 2022-06-22 PROCEDURE — G0299 HHS/HOSPICE OF RN EA 15 MIN: HCPCS

## 2022-06-22 RX ORDER — CALCIUM CARBONATE 160(400)MG
TABLET,CHEWABLE ORAL
Qty: 1 EACH | Refills: 0
Start: 2022-06-22

## 2022-06-22 ASSESSMENT — ENCOUNTER SYMPTOMS: HEMOPTYSIS: 0

## 2022-06-22 NOTE — LETTER
[facility] [address] [address]  Phone: [phone]  Fax: [phone]    Ric Kessler MD        June 22, 2022     Patient: Shyanne Randall. YOB: 1945   Date of Visit: 6/22/2022       Physician Orders:    Requested Prescriptions     Signed Prescriptions Disp Refills    Misc.  Devices (ROLLATOR ULTRA-LIGHT) MISC 1 each 0     Sig: Use daily as needed(R26.81) Gait instability, (M15.9) Generalized osteoarthritis     Authorizing Provider: Abril Dee MD

## 2022-06-22 NOTE — TELEPHONE ENCOUNTER
----- Message from Mirta Gasca sent at 6/22/2022  9:36 AM EDT -----  Subject: Message to Provider    QUESTIONS  Information for Provider? Majo Johnson from [facility] called due to   finding someone who could provide a rollator who also accepts patients   insurance. Please fax RX for rollator to: Nazanin Kwok Fax?   [phone].  ---------------------------------------------------------------------------  --------------  [address]  What is the best way for the office to contact you? OK to leave message on   voicemail  Preferred Call Back Phone Number? [phone]  ---------------------------------------------------------------------------  --------------  SCRIPT ANSWERS  Relationship to Patient? Third Party  Third Party Type? Durable Medical Equipment? Representative Name?  Elijah ''[email or web address]''

## 2022-06-24 ENCOUNTER — TELEPHONE (OUTPATIENT)
Dept: FAMILY MEDICINE CLINIC | Facility: CLINIC | Age: 77
End: 2022-06-24

## 2022-06-24 ENCOUNTER — HOME CARE VISIT (OUTPATIENT)
Dept: SCHEDULING | Facility: HOME HEALTH | Age: 77
End: 2022-06-24
Payer: MEDICARE

## 2022-06-24 VITALS
HEART RATE: 63 BPM | TEMPERATURE: 98.9 F | OXYGEN SATURATION: 95 % | DIASTOLIC BLOOD PRESSURE: 70 MMHG | RESPIRATION RATE: 14 BRPM | SYSTOLIC BLOOD PRESSURE: 130 MMHG

## 2022-06-24 PROCEDURE — G0299 HHS/HOSPICE OF RN EA 15 MIN: HCPCS

## 2022-06-24 ASSESSMENT — ENCOUNTER SYMPTOMS: PAIN LOCATION - PAIN QUALITY: ACHE

## 2022-06-27 ENCOUNTER — HOME CARE VISIT (OUTPATIENT)
Dept: SCHEDULING | Facility: HOME HEALTH | Age: 77
End: 2022-06-27
Payer: MEDICARE

## 2022-06-28 ENCOUNTER — OFFICE VISIT (OUTPATIENT)
Dept: CARDIOLOGY CLINIC | Age: 77
End: 2022-06-28
Payer: MEDICARE

## 2022-06-28 VITALS
HEIGHT: 71 IN | WEIGHT: 184 LBS | DIASTOLIC BLOOD PRESSURE: 70 MMHG | HEART RATE: 57 BPM | SYSTOLIC BLOOD PRESSURE: 116 MMHG | BODY MASS INDEX: 25.76 KG/M2

## 2022-06-28 DIAGNOSIS — E78.5 HYPERLIPIDEMIA, UNSPECIFIED HYPERLIPIDEMIA TYPE: ICD-10-CM

## 2022-06-28 DIAGNOSIS — I47.1 SVT (SUPRAVENTRICULAR TACHYCARDIA) (HCC): Primary | ICD-10-CM

## 2022-06-28 DIAGNOSIS — R06.09 DOE (DYSPNEA ON EXERTION): ICD-10-CM

## 2022-06-28 PROCEDURE — 1036F TOBACCO NON-USER: CPT | Performed by: INTERNAL MEDICINE

## 2022-06-28 PROCEDURE — 1123F ACP DISCUSS/DSCN MKR DOCD: CPT | Performed by: INTERNAL MEDICINE

## 2022-06-28 PROCEDURE — 3017F COLORECTAL CA SCREEN DOC REV: CPT | Performed by: INTERNAL MEDICINE

## 2022-06-28 PROCEDURE — 99214 OFFICE O/P EST MOD 30 MIN: CPT | Performed by: INTERNAL MEDICINE

## 2022-06-28 PROCEDURE — 93000 ELECTROCARDIOGRAM COMPLETE: CPT | Performed by: INTERNAL MEDICINE

## 2022-06-28 PROCEDURE — G8417 CALC BMI ABV UP PARAM F/U: HCPCS | Performed by: INTERNAL MEDICINE

## 2022-06-28 PROCEDURE — G8427 DOCREV CUR MEDS BY ELIG CLIN: HCPCS | Performed by: INTERNAL MEDICINE

## 2022-06-28 ASSESSMENT — ENCOUNTER SYMPTOMS
NAIL CHANGES: 0
EYE PAIN: 0
COUGH: 0
STRIDOR: 0
APHONIA: 0
ABDOMINAL PAIN: 0

## 2022-06-28 NOTE — PROGRESS NOTES
[address] [place], [place]  [address], [address] [facility], [address] [place] [address]  PHONE: [phone]    SUBJECTIVE:   Lacey Leal is a 77 y.o. male 1945   seen for a follow up visit regarding the following:     Chief Complaint   Patient presents with    Palpitations     f/u-pt was in hosp for wound care and had afib      History of present illness: 77 y.o. male presented for follow-up 6/28/22 male hospitalized in [address] for chest wall hematoma secondary to a fall and cellulitis in 2021. Fatigue worse taking 1/2 dose metoprolol XL due to palpations. VILLELA worse NST was discussed at lat appt not done. Assessment and Plan:    Dyspnea on exertion  ·   Recent echocardiogram  ·   Stress perfusion study to evaluate for underlying ischemia   Hx of Tachycardia. ·   History of atrial tachycardia in the past,     ·   It seems that infection preceded syncopal event and this occurred during acute illness. ·    Long discussion with the patient and family at last appt  indications for an implantable loop recorder due to palpitations, atrial arrhythmias  and recurrent syncope. ·    The patient stated that he would prefer not to have a noninvasive monitoring placed for multiple reasons including bandaging on left surgical site. Mitral insufficiency,  ·   moderate in the past.    ·   Most recent echocardiogram performed in 06/2021 with EF 55-65%. · No mitral regurgitation. History of rheumatoid arthritis. Hyperlipidemia. Lipids controlled. Place monitor NST        Interval Hx:    He presented for followup on 06/09/2016. The patient states that he has palpitations on a daily basis. These are associated with drinking coffee. Additionally, the patient states he was on chronic pain medications previously for his arthritis that  have been discontinued and this is resulting in more agitation, as well as perceived palpitations.         The patient has no history of ischemic or nonischemic cardiac evaluation. He denies family history of coronary disease. Wound culture +MRSA. Patient with witnessed syncope prior to event with chest Wall trauma. Wound in left chest with dressing in place. Family History:  The patient states that his mother had a history of unspecified cardiac disease. He does not recall if  she has coronary disease. Social History:  The patient does not smoke. Current Outpatient Medications   Medication Sig    Misc. Devices (ROLLATOR ULTRA-LIGHT) MISC Use daily as needed(R26.81) Gait instability, (M15.9) Generalized osteoarthritis    doxycycline hyclate (VIBRAMYCIN) 100 MG capsule Take 100 mg by mouth 2 times daily.  LORazepam (ATIVAN) 1 MG tablet Take 0.5 mg by mouth nightly as needed for Anxiety.  metoprolol succinate (TOPROL XL) 25 MG extended release tablet Take 25 mg by mouth daily.  loperamide (RA ANTI-DIARRHEAL) 2 MG capsule Take 2 mg by mouth as needed for Diarrhea. 2 caplets after the first loose stool; 1 caplet after each subsequent loose stool; max dose 4 cap in 24 hours    meclizine (ANTIVERT) 25 MG tablet Take 25 mg by mouth 3 times daily as needed for Dizziness.  VITAMIN A PO Take 2,400 mcg by mouth daily.  Cyanocobalamin (VITAMIN B 12 PO) Take 500 mcg by mouth daily.  oxymetazoline (AFRIN NASAL SPRAY) 0.05 % nasal spray 1 spray by Nasal route as needed for Congestion. daily as needed for congestion    metoprolol succinate (TOPROL XL) 25 MG extended release tablet Take 1 tablet by mouth daily    LORazepam (ATIVAN) 0.5 MG tablet Take 1 tablet by mouth nightly as needed for Anxiety for up to 30 days.     acetaminophen (TYLENOL) 500 MG tablet Take 1,000 mg by mouth every 6 hours as needed for Pain    Bismuth Subsalicylate 781 MG TABS 524 mg as needed (Patient not taking: Reported on 6/9/2022)    celecoxib (CELEBREX) 200 MG capsule Take 200 mg by mouth daily    DULoxetine (CYMBALTA) 60 MG extended release capsule Take 60 mg by mouth 2 times daily    hydrocortisone (HYTONE) 2.5 % lotion Apply 1 Dose topically daily apply to scalp    ketoconazole (NIZORAL) 2 % shampoo Apply 1 Dose topically daily    tamsulosin (FLOMAX) 0.4 MG capsule Take 0.8 mg by mouth daily    triamcinolone (KENALOG) 0.1 % cream Apply 1 Dose topically 2 times daily Apply to periwound     No current facility-administered medications for this visit. Past Medical History, Past Surgical History, Family history, Social History, and Medications were all reviewed with the patient today and updated as necessary. Allergies   Allergen Reactions    Vancomycin Rash     Past Medical History:   Diagnosis Date    Depression     H/O urinary frequency 2/1/2013    History of BPH 2/1/2013    HLD (hyperlipidemia) 2/1/2013    Hx: UTI (urinary tract infection) 2/1/2013    Insomnia     Psoriasis     Psoriatic arthritis ([facility] [address] 2/1/2013    Wears hearing aid 2/1/2013     No past surgical history on file. Family History   Problem Relation Age of Onset    Heart Attack Father     Other Brother         melanoma    Cancer Sister     Other Sister     Cancer Father         skin       Social History     Tobacco Use    Smoking status: Former Smoker    Smokeless tobacco: Never Used    Tobacco comment: Quit smoking: pt states quit in early 60's late 50's   Substance Use Topics    Alcohol use: No     Alcohol/week: 0.0 standard drinks       ROS:    Review of Systems   Constitutional: Negative for fever. HENT: Negative for stridor. Eyes: Negative for pain. Cardiovascular: Negative for chest pain. Respiratory: Negative for cough. Endocrine: Negative for cold intolerance. Skin: Negative for nail changes. Musculoskeletal: Negative for arthritis. Gastrointestinal: Negative for abdominal pain. Genitourinary: Negative for dysuria. Neurological: Negative for aphonia. Psychiatric/Behavioral: Negative for altered mental status.    Allergic/Immunologic: Negative for hives. PHYSICAL EXAM:    There were no vitals taken for this visit. Wt Readings from Last 3 Encounters:   06/10/22 185 lb (83.9 kg)   06/09/22 185 lb (83.9 kg)   05/05/22 180 lb 9.6 oz (81.9 kg)     BP Readings from Last 3 Encounters:   06/24/22 130/70   06/22/22 128/70   06/17/22 102/60         Physical Exam  Vitals reviewed. HENT:      Head: Normocephalic. Right Ear: External ear normal.      Left Ear: External ear normal.      Nose: Nose normal.   Eyes:      General: No scleral icterus. Pulmonary:      Effort: Pulmonary effort is normal.   Abdominal:      General: There is no distension. Musculoskeletal:      Cervical back: Neck supple. Skin:     General: Skin is warm. Neurological:      Mental Status: He is alert. Mental status is at baseline. Medical problems and test results were reviewed with the patient today. No results found for this or any previous visit (from the past 672 hour(s)). No results found for: CHOL, CHOLPOCT, CHOLX, CHLST, CHOLV, HDL, HDLPOC, HDLC, LDL, LDLC, VLDLC, VLDL, TGLX, TRIGL           PLAN  Wu Angulo was seen today for palpitations. Diagnoses and all orders for this visit:    SVT (supraventricular tachycardia) ([address]  -     EKG 12 lead    Hyperlipidemia, unspecified hyperlipidemia type  -     EKG 12 lead    VILLELA (dyspnea on exertion)  -     Nuclear stress test with myocardial perfusion; Future  -     Extended cardiac holter monitor (48 hrs - 15 days); Future      Return in about 3 weeks (around 7/19/2022).        Tara Gomes MD  6/28/2022  1:13 PM

## 2022-06-29 ENCOUNTER — TELEPHONE (OUTPATIENT)
Dept: FAMILY MEDICINE CLINIC | Facility: CLINIC | Age: 77
End: 2022-06-29

## 2022-06-29 ENCOUNTER — HOME CARE VISIT (OUTPATIENT)
Dept: SCHEDULING | Facility: HOME HEALTH | Age: 77
End: 2022-06-29
Payer: MEDICARE

## 2022-06-29 VITALS
DIASTOLIC BLOOD PRESSURE: 80 MMHG | RESPIRATION RATE: 18 BRPM | OXYGEN SATURATION: 98 % | TEMPERATURE: 98.9 F | HEART RATE: 60 BPM | SYSTOLIC BLOOD PRESSURE: 120 MMHG

## 2022-06-29 PROCEDURE — G0299 HHS/HOSPICE OF RN EA 15 MIN: HCPCS

## 2022-06-29 ASSESSMENT — ENCOUNTER SYMPTOMS: HEMOPTYSIS: 0

## 2022-06-30 PROCEDURE — A6223 GAUZE >16<=48 NO W/SAL W/O B: HCPCS

## 2022-06-30 PROCEDURE — A6210 FOAM DRG >16<=48 SQ IN W/O B: HCPCS

## 2022-06-30 PROCEDURE — A6212 FOAM DRG <=16 SQ IN W/BORDER: HCPCS

## 2022-07-01 ENCOUNTER — HOME CARE VISIT (OUTPATIENT)
Dept: SCHEDULING | Facility: HOME HEALTH | Age: 77
End: 2022-07-01
Payer: MEDICARE

## 2022-07-01 PROCEDURE — G0299 HHS/HOSPICE OF RN EA 15 MIN: HCPCS

## 2022-07-04 ENCOUNTER — HOME CARE VISIT (OUTPATIENT)
Dept: SCHEDULING | Facility: HOME HEALTH | Age: 77
End: 2022-07-04
Payer: MEDICARE

## 2022-07-04 VITALS
OXYGEN SATURATION: 97 % | SYSTOLIC BLOOD PRESSURE: 110 MMHG | DIASTOLIC BLOOD PRESSURE: 68 MMHG | RESPIRATION RATE: 18 BRPM | HEART RATE: 64 BPM | TEMPERATURE: 98.8 F

## 2022-07-04 PROCEDURE — G0299 HHS/HOSPICE OF RN EA 15 MIN: HCPCS

## 2022-07-04 ASSESSMENT — ENCOUNTER SYMPTOMS: HEMOPTYSIS: 0

## 2022-07-06 ENCOUNTER — HOME CARE VISIT (OUTPATIENT)
Dept: SCHEDULING | Facility: HOME HEALTH | Age: 77
End: 2022-07-06
Payer: MEDICARE

## 2022-07-07 VITALS
RESPIRATION RATE: 16 BRPM | DIASTOLIC BLOOD PRESSURE: 80 MMHG | TEMPERATURE: 98.1 F | OXYGEN SATURATION: 96 % | HEART RATE: 74 BPM | SYSTOLIC BLOOD PRESSURE: 130 MMHG

## 2022-07-07 ASSESSMENT — ENCOUNTER SYMPTOMS: PAIN LOCATION - PAIN QUALITY: ACHE

## 2022-07-08 ENCOUNTER — HOME CARE VISIT (OUTPATIENT)
Dept: SCHEDULING | Facility: HOME HEALTH | Age: 77
End: 2022-07-08
Payer: MEDICARE

## 2022-07-08 PROCEDURE — G0299 HHS/HOSPICE OF RN EA 15 MIN: HCPCS

## 2022-07-10 VITALS
OXYGEN SATURATION: 96 % | RESPIRATION RATE: 16 BRPM | DIASTOLIC BLOOD PRESSURE: 84 MMHG | HEART RATE: 60 BPM | TEMPERATURE: 98.8 F | SYSTOLIC BLOOD PRESSURE: 150 MMHG

## 2022-07-10 ASSESSMENT — ENCOUNTER SYMPTOMS: PAIN LOCATION - PAIN QUALITY: ACHE

## 2022-07-11 ENCOUNTER — HOME CARE VISIT (OUTPATIENT)
Dept: SCHEDULING | Facility: HOME HEALTH | Age: 77
End: 2022-07-11
Payer: MEDICARE

## 2022-07-11 VITALS
SYSTOLIC BLOOD PRESSURE: 130 MMHG | HEART RATE: 60 BPM | RESPIRATION RATE: 18 BRPM | TEMPERATURE: 99.6 F | DIASTOLIC BLOOD PRESSURE: 78 MMHG | OXYGEN SATURATION: 98 %

## 2022-07-11 PROCEDURE — G0299 HHS/HOSPICE OF RN EA 15 MIN: HCPCS

## 2022-07-11 ASSESSMENT — ENCOUNTER SYMPTOMS: HEMOPTYSIS: 0

## 2022-07-13 ENCOUNTER — HOME CARE VISIT (OUTPATIENT)
Dept: SCHEDULING | Facility: HOME HEALTH | Age: 77
End: 2022-07-13
Payer: MEDICARE

## 2022-07-15 ENCOUNTER — HOME CARE VISIT (OUTPATIENT)
Dept: SCHEDULING | Facility: HOME HEALTH | Age: 77
End: 2022-07-15
Payer: MEDICARE

## 2022-07-15 VITALS
TEMPERATURE: 98.9 F | OXYGEN SATURATION: 97 % | HEART RATE: 60 BPM | DIASTOLIC BLOOD PRESSURE: 60 MMHG | RESPIRATION RATE: 18 BRPM | SYSTOLIC BLOOD PRESSURE: 128 MMHG

## 2022-07-15 PROCEDURE — G0299 HHS/HOSPICE OF RN EA 15 MIN: HCPCS

## 2022-07-15 PROCEDURE — 400014 HH F/U

## 2022-07-15 ASSESSMENT — ENCOUNTER SYMPTOMS: PAIN LOCATION - PAIN QUALITY: ACHE

## 2022-07-18 ENCOUNTER — HOME CARE VISIT (OUTPATIENT)
Dept: SCHEDULING | Facility: HOME HEALTH | Age: 77
End: 2022-07-18
Payer: MEDICARE

## 2022-07-18 VITALS
SYSTOLIC BLOOD PRESSURE: 110 MMHG | OXYGEN SATURATION: 98 % | RESPIRATION RATE: 18 BRPM | HEART RATE: 58 BPM | TEMPERATURE: 98.7 F | DIASTOLIC BLOOD PRESSURE: 70 MMHG

## 2022-07-18 PROCEDURE — G0299 HHS/HOSPICE OF RN EA 15 MIN: HCPCS

## 2022-07-18 ASSESSMENT — ENCOUNTER SYMPTOMS: PAIN LOCATION - PAIN QUALITY: ARTHRITIS PAIN

## 2022-07-20 ENCOUNTER — HOME CARE VISIT (OUTPATIENT)
Dept: SCHEDULING | Facility: HOME HEALTH | Age: 77
End: 2022-07-20
Payer: MEDICARE

## 2022-07-20 PROCEDURE — G0299 HHS/HOSPICE OF RN EA 15 MIN: HCPCS

## 2022-07-21 VITALS
OXYGEN SATURATION: 98 % | RESPIRATION RATE: 18 BRPM | HEART RATE: 62 BPM | DIASTOLIC BLOOD PRESSURE: 84 MMHG | TEMPERATURE: 98.3 F | SYSTOLIC BLOOD PRESSURE: 138 MMHG

## 2022-07-21 ASSESSMENT — ENCOUNTER SYMPTOMS: PAIN LOCATION - PAIN QUALITY: ACHE

## 2022-07-22 ENCOUNTER — HOME CARE VISIT (OUTPATIENT)
Dept: SCHEDULING | Facility: HOME HEALTH | Age: 77
End: 2022-07-22
Payer: MEDICARE

## 2022-07-22 PROCEDURE — G0299 HHS/HOSPICE OF RN EA 15 MIN: HCPCS

## 2022-07-24 VITALS
OXYGEN SATURATION: 99 % | TEMPERATURE: 98.8 F | SYSTOLIC BLOOD PRESSURE: 134 MMHG | HEART RATE: 67 BPM | DIASTOLIC BLOOD PRESSURE: 80 MMHG | RESPIRATION RATE: 18 BRPM

## 2022-07-24 ASSESSMENT — ENCOUNTER SYMPTOMS: PAIN LOCATION - PAIN QUALITY: ACHE

## 2022-07-25 ENCOUNTER — HOME CARE VISIT (OUTPATIENT)
Dept: SCHEDULING | Facility: HOME HEALTH | Age: 77
End: 2022-07-25
Payer: MEDICARE

## 2022-07-25 VITALS
SYSTOLIC BLOOD PRESSURE: 104 MMHG | TEMPERATURE: 98.6 F | RESPIRATION RATE: 18 BRPM | OXYGEN SATURATION: 98 % | HEART RATE: 61 BPM | DIASTOLIC BLOOD PRESSURE: 68 MMHG

## 2022-07-25 PROCEDURE — G0299 HHS/HOSPICE OF RN EA 15 MIN: HCPCS

## 2022-07-25 ASSESSMENT — ENCOUNTER SYMPTOMS: HEMOPTYSIS: 0

## 2022-07-27 ENCOUNTER — HOME CARE VISIT (OUTPATIENT)
Dept: SCHEDULING | Facility: HOME HEALTH | Age: 77
End: 2022-07-27
Payer: MEDICARE

## 2022-07-29 ENCOUNTER — HOME CARE VISIT (OUTPATIENT)
Dept: SCHEDULING | Facility: HOME HEALTH | Age: 77
End: 2022-07-29
Payer: MEDICARE

## 2022-07-29 PROCEDURE — G0299 HHS/HOSPICE OF RN EA 15 MIN: HCPCS

## 2022-08-01 ENCOUNTER — HOME CARE VISIT (OUTPATIENT)
Dept: SCHEDULING | Facility: HOME HEALTH | Age: 77
End: 2022-08-01
Payer: MEDICARE

## 2022-08-01 VITALS
RESPIRATION RATE: 18 BRPM | DIASTOLIC BLOOD PRESSURE: 88 MMHG | HEART RATE: 61 BPM | TEMPERATURE: 98.7 F | SYSTOLIC BLOOD PRESSURE: 146 MMHG | OXYGEN SATURATION: 98 %

## 2022-08-01 VITALS
SYSTOLIC BLOOD PRESSURE: 140 MMHG | RESPIRATION RATE: 16 BRPM | TEMPERATURE: 98.6 F | HEART RATE: 62 BPM | OXYGEN SATURATION: 98 % | DIASTOLIC BLOOD PRESSURE: 80 MMHG

## 2022-08-01 PROCEDURE — G0299 HHS/HOSPICE OF RN EA 15 MIN: HCPCS

## 2022-08-01 ASSESSMENT — ENCOUNTER SYMPTOMS
HEMOPTYSIS: 0
PAIN LOCATION - PAIN QUALITY: ACHE

## 2022-08-03 ENCOUNTER — HOME CARE VISIT (OUTPATIENT)
Dept: SCHEDULING | Facility: HOME HEALTH | Age: 77
End: 2022-08-03
Payer: MEDICARE

## 2022-08-03 VITALS
RESPIRATION RATE: 18 BRPM | SYSTOLIC BLOOD PRESSURE: 138 MMHG | HEART RATE: 55 BPM | DIASTOLIC BLOOD PRESSURE: 78 MMHG | OXYGEN SATURATION: 98 % | TEMPERATURE: 99.1 F

## 2022-08-03 PROCEDURE — G0299 HHS/HOSPICE OF RN EA 15 MIN: HCPCS

## 2022-08-03 ASSESSMENT — ENCOUNTER SYMPTOMS: HEMOPTYSIS: 0

## 2022-08-05 ENCOUNTER — HOME CARE VISIT (OUTPATIENT)
Dept: SCHEDULING | Facility: HOME HEALTH | Age: 77
End: 2022-08-05
Payer: MEDICARE

## 2022-08-05 VITALS
RESPIRATION RATE: 18 BRPM | OXYGEN SATURATION: 97 % | DIASTOLIC BLOOD PRESSURE: 80 MMHG | HEART RATE: 65 BPM | SYSTOLIC BLOOD PRESSURE: 130 MMHG | TEMPERATURE: 98.7 F

## 2022-08-05 PROCEDURE — G0299 HHS/HOSPICE OF RN EA 15 MIN: HCPCS

## 2022-08-05 ASSESSMENT — ENCOUNTER SYMPTOMS: PAIN LOCATION - PAIN QUALITY: ACHE

## 2022-08-08 ENCOUNTER — HOME CARE VISIT (OUTPATIENT)
Dept: SCHEDULING | Facility: HOME HEALTH | Age: 77
End: 2022-08-08
Payer: MEDICARE

## 2022-08-08 VITALS
SYSTOLIC BLOOD PRESSURE: 130 MMHG | RESPIRATION RATE: 18 BRPM | DIASTOLIC BLOOD PRESSURE: 78 MMHG | HEART RATE: 67 BPM | OXYGEN SATURATION: 98 % | TEMPERATURE: 99.7 F

## 2022-08-08 PROCEDURE — G0299 HHS/HOSPICE OF RN EA 15 MIN: HCPCS

## 2022-08-08 ASSESSMENT — ENCOUNTER SYMPTOMS: HEMOPTYSIS: 0

## 2022-08-10 ENCOUNTER — HOME CARE VISIT (OUTPATIENT)
Dept: SCHEDULING | Facility: HOME HEALTH | Age: 77
End: 2022-08-10
Payer: MEDICARE

## 2022-08-11 ENCOUNTER — OFFICE VISIT (OUTPATIENT)
Dept: CARDIOLOGY CLINIC | Age: 77
End: 2022-08-11
Payer: MEDICARE

## 2022-08-11 VITALS
DIASTOLIC BLOOD PRESSURE: 70 MMHG | HEIGHT: 71 IN | WEIGHT: 186 LBS | BODY MASS INDEX: 26.04 KG/M2 | HEART RATE: 68 BPM | SYSTOLIC BLOOD PRESSURE: 134 MMHG

## 2022-08-11 DIAGNOSIS — I47.1 SVT (SUPRAVENTRICULAR TACHYCARDIA) (HCC): Primary | ICD-10-CM

## 2022-08-11 DIAGNOSIS — R06.09 DOE (DYSPNEA ON EXERTION): ICD-10-CM

## 2022-08-11 DIAGNOSIS — E78.5 HYPERLIPIDEMIA, UNSPECIFIED HYPERLIPIDEMIA TYPE: ICD-10-CM

## 2022-08-11 DIAGNOSIS — I45.89 CHRONOTROPIC INCOMPETENCE: ICD-10-CM

## 2022-08-11 DIAGNOSIS — I10 ESSENTIAL HYPERTENSION: ICD-10-CM

## 2022-08-11 PROCEDURE — 99214 OFFICE O/P EST MOD 30 MIN: CPT | Performed by: INTERNAL MEDICINE

## 2022-08-11 PROCEDURE — G8417 CALC BMI ABV UP PARAM F/U: HCPCS | Performed by: INTERNAL MEDICINE

## 2022-08-11 PROCEDURE — 1036F TOBACCO NON-USER: CPT | Performed by: INTERNAL MEDICINE

## 2022-08-11 PROCEDURE — G8427 DOCREV CUR MEDS BY ELIG CLIN: HCPCS | Performed by: INTERNAL MEDICINE

## 2022-08-11 PROCEDURE — 1123F ACP DISCUSS/DSCN MKR DOCD: CPT | Performed by: INTERNAL MEDICINE

## 2022-08-11 RX ORDER — METOPROLOL SUCCINATE 25 MG/1
12.5 TABLET, EXTENDED RELEASE ORAL DAILY
Qty: 90 TABLET | Refills: 3 | Status: SHIPPED | OUTPATIENT
Start: 2022-08-11

## 2022-08-11 ASSESSMENT — ENCOUNTER SYMPTOMS
APHONIA: 0
ABDOMINAL PAIN: 0
EYE PAIN: 0
COUGH: 0
STRIDOR: 0
NAIL CHANGES: 0

## 2022-08-11 NOTE — PROGRESS NOTES
[address] [place], [place]  [address], [address], [address] [place] [address]  PHONE: [phone]    SUBJECTIVE:   Nova Rodríguez is a 69 y.o. male 1945   seen for a follow up visit regarding the following:     Chief Complaint   Patient presents with    Tachycardia     3 week f/u      History of present illness: 69 y.o. male presented for follow-up 8/11/22 male hospitalized in [address] for chest wall hematoma secondary to a fall and cellulitis in 2021. Fatigue still persistent on beta-blocker due to episodes of palpitations. Reviewed cardiac monitor data with low average heart rate. Discussed taking lower doses of metoprolol    Cardiac history  6/2022 Holter monitor in place minimum heart rate 48 bpm maximal heart rate 156 bpm average heart rate 62 bpm episodes of SVT occurring  7/2022 stress perfusion study low risk study normal perfusion       Assessment and Plan:   Fatigue  Most recent monitor suggest chronotropic incompetence. We have discussed cutting beta-blocker down which has been effective at reducing his short events of faster heart rates. Discussed class II indications for device-based therapy  Plan for trial of lower dose beta-blocker first to see if symptoms are better controlled/improved. Patient would like to address at a later time   Dyspnea on exertion    Recent echocardiogram    Stress perfusion study low risk scan    Mitral insufficiency,    moderate in the past.      Most recent echocardiogram performed in 06/2021 with EF 55-65%. No mitral regurgitation. History of rheumatoid arthritis. Hyperlipidemia. Lipids controlled. Family History:  The patient states that his mother had a history of unspecified cardiac disease. He does not recall if  she has coronary disease. Social History:  The patient does not smoke.      Current Outpatient Medications   Medication Sig    metoprolol succinate (TOPROL XL) 25 MG extended release tablet Take 0.5 tablets by mouth in the morning. acetaminophen (TYLENOL) 650 MG extended release tablet Take 650 mg by mouth every 8 hours as needed for Pain. Polyethyl Glycol-Propyl Glycol (SYSTANE OP) Place 1 drop in ear(s) 4 times daily. Misc. Devices (ROLLATOR ULTRA-LIGHT) MISC Use daily as needed(R26.81) Gait instability, (M15.9) Generalized osteoarthritis    LORazepam (ATIVAN) 1 MG tablet Take 0.5 mg by mouth nightly as needed for Anxiety. loperamide (IMODIUM) 2 MG capsule Take 2 mg by mouth as needed for Diarrhea. 2 caplets after the first loose stool; 1 caplet after each subsequent loose stool; max dose 4 cap in 24 hours    meclizine (ANTIVERT) 25 MG tablet Take 25 mg by mouth 3 times daily as needed for Dizziness. VITAMIN A PO Take 2,400 mcg by mouth daily. Cyanocobalamin (VITAMIN B 12 PO) Take 500 mcg by mouth daily. oxymetazoline (AFRIN) 0.05 % nasal spray 1 spray by Nasal route as needed for Congestion. daily as needed for congestion    celecoxib (CELEBREX) 200 MG capsule Take 200 mg by mouth daily    DULoxetine (CYMBALTA) 60 MG extended release capsule Take 60 mg by mouth 2 times daily    hydrocortisone (HYTONE) 2.5 % lotion Apply 1 Dose topically daily apply to scalp    ketoconazole (NIZORAL) 2 % shampoo Apply 1 Dose topically daily    tamsulosin (FLOMAX) 0.4 MG capsule Take 0.8 mg by mouth daily    triamcinolone (KENALOG) 0.1 % cream Apply 1 Dose topically 2 times daily Apply to periwound     No current facility-administered medications for this visit. Past Medical History, Past Surgical History, Family history, Social History, and Medications were all reviewed with the patient today and updated as necessary.        Allergies   Allergen Reactions    Vancomycin Rash     Past Medical History:   Diagnosis Date    Depression     H/O urinary frequency 2/1/2013    History of BPH 2/1/2013    HLD (hyperlipidemia) 2/1/2013    Hx: UTI (urinary tract infection) 2/1/2013    Insomnia     Psoriasis     Psoriatic arthritis ([address] 2/1/2013    Wears hearing aid 2/1/2013     No past surgical history on file. Family History   Problem Relation Age of Onset    Heart Attack Father     Other Brother         melanoma    Cancer Sister     Other Sister     Cancer Father         skin       Social History     Tobacco Use    Smoking status: Former    Smokeless tobacco: Never    Tobacco comments:     Quit smoking: pt states quit in early 60's late 50's   Substance Use Topics    Alcohol use: No     Alcohol/week: 0.0 standard drinks       ROS:    Review of Systems   Constitutional: Negative for fever. HENT:  Negative for stridor. Eyes:  Negative for pain. Cardiovascular:  Negative for chest pain. Respiratory:  Negative for cough. Endocrine: Negative for cold intolerance. Skin:  Negative for nail changes. Musculoskeletal:  Negative for arthritis. Gastrointestinal:  Negative for abdominal pain. Genitourinary:  Negative for dysuria. Neurological:  Negative for aphonia. Psychiatric/Behavioral:  Negative for altered mental status. Allergic/Immunologic: Negative for hives. PHYSICAL EXAM:    /70   Pulse 68   Ht 5' 11\" (1.803 m)   Wt 186 lb (84.4 kg)   BMI 25.94 kg/m²        Wt Readings from Last 3 Encounters:   08/11/22 186 lb (84.4 kg)   07/19/22 184 lb (83.5 kg)   06/28/22 184 lb (83.5 kg)     BP Readings from Last 3 Encounters:   08/11/22 134/70   08/08/22 130/78   08/05/22 130/80         Physical Exam  Vitals reviewed. HENT:      Head: Normocephalic. Right Ear: External ear normal.      Left Ear: External ear normal.      Nose: Nose normal.   Eyes:      General: No scleral icterus. Pulmonary:      Effort: Pulmonary effort is normal.   Abdominal:      General: There is no distension. Musculoskeletal:      Cervical back: Neck supple. Skin:     General: Skin is warm. Neurological:      Mental Status: He is alert. Mental status is at baseline.        Medical problems and test results were reviewed with the patient today. Recent Results (from the past 672 hour(s))   Nuclear stress test with myocardial perfusion    Collection Time: 07/19/22 11:54 AM   Result Value Ref Range    Stress Target  bpm    Baseline HR 63 bpm    Baseline Systolic  mmHg    Baseline Diastolic BP 96 mmHg    Stress Peak HR 96 bpm    Stress Systolic  mmHg    Stress Diastolic BP 90 mmHg    Stress Rate Pressure Product 14,208 bpm*mmHg    Stress Percent HR Achieved 66 %    Body Surface Area 2.04 m2    Baseline ST Depression 0 mm    Stress ST Depression 0 mm    TID 0.97     Nuc Stress EF 56 %     No results found for: CHOL, CHOLPOCT, CHOLX, CHLST, CHOLV, HDL, HDLPOC, HDLC, LDL, LDLC, VLDLC, VLDL, TGLX, TRIGL           PLAN  Kentrell Mg was seen today for tachycardia. Diagnoses and all orders for this visit:    SVT (supraventricular tachycardia) ([address]    VILLELA (dyspnea on exertion)    Hyperlipidemia, unspecified hyperlipidemia type    Chronotropic incompetence    Essential hypertension  -     metoprolol succinate (TOPROL XL) 25 MG extended release tablet; Take 0.5 tablets by mouth in the morning. Return in about 3 months (around 11/11/2022).        David Avalos MD  8/11/2022  1:42 PM

## 2022-08-12 ENCOUNTER — HOME CARE VISIT (OUTPATIENT)
Dept: SCHEDULING | Facility: HOME HEALTH | Age: 77
End: 2022-08-12
Payer: MEDICARE

## 2022-08-12 PROCEDURE — G0299 HHS/HOSPICE OF RN EA 15 MIN: HCPCS

## 2022-08-14 VITALS
DIASTOLIC BLOOD PRESSURE: 80 MMHG | RESPIRATION RATE: 18 BRPM | SYSTOLIC BLOOD PRESSURE: 144 MMHG | OXYGEN SATURATION: 98 % | HEART RATE: 79 BPM | TEMPERATURE: 98.5 F

## 2022-08-14 ASSESSMENT — ENCOUNTER SYMPTOMS: PAIN LOCATION - PAIN QUALITY: ARTHRITIS PAIN

## 2022-08-15 ENCOUNTER — HOME CARE VISIT (OUTPATIENT)
Dept: SCHEDULING | Facility: HOME HEALTH | Age: 77
End: 2022-08-15
Payer: MEDICARE

## 2022-08-15 VITALS
SYSTOLIC BLOOD PRESSURE: 132 MMHG | DIASTOLIC BLOOD PRESSURE: 66 MMHG | OXYGEN SATURATION: 95 % | HEART RATE: 83 BPM | TEMPERATURE: 98.8 F | RESPIRATION RATE: 18 BRPM

## 2022-08-15 PROCEDURE — 400014 HH F/U

## 2022-08-15 PROCEDURE — G0299 HHS/HOSPICE OF RN EA 15 MIN: HCPCS

## 2022-08-15 ASSESSMENT — ENCOUNTER SYMPTOMS: HEMOPTYSIS: 0

## 2022-08-17 ENCOUNTER — HOME CARE VISIT (OUTPATIENT)
Dept: SCHEDULING | Facility: HOME HEALTH | Age: 77
End: 2022-08-17
Payer: MEDICARE

## 2022-08-17 VITALS
RESPIRATION RATE: 18 BRPM | OXYGEN SATURATION: 97 % | HEART RATE: 75 BPM | SYSTOLIC BLOOD PRESSURE: 160 MMHG | TEMPERATURE: 98.4 F | DIASTOLIC BLOOD PRESSURE: 88 MMHG

## 2022-08-17 PROCEDURE — G0299 HHS/HOSPICE OF RN EA 15 MIN: HCPCS

## 2022-08-17 ASSESSMENT — ENCOUNTER SYMPTOMS
HEMOPTYSIS: 0
PAIN LOCATION - PAIN QUALITY: SHARP
STOOL DESCRIPTION: FORMED

## 2022-08-19 ENCOUNTER — HOME CARE VISIT (OUTPATIENT)
Dept: SCHEDULING | Facility: HOME HEALTH | Age: 77
End: 2022-08-19
Payer: MEDICARE

## 2022-08-19 VITALS
SYSTOLIC BLOOD PRESSURE: 130 MMHG | OXYGEN SATURATION: 96 % | RESPIRATION RATE: 18 BRPM | DIASTOLIC BLOOD PRESSURE: 80 MMHG | TEMPERATURE: 98.1 F | HEART RATE: 74 BPM

## 2022-08-19 PROCEDURE — G0299 HHS/HOSPICE OF RN EA 15 MIN: HCPCS

## 2022-08-19 ASSESSMENT — ENCOUNTER SYMPTOMS: PAIN LOCATION - PAIN QUALITY: ACHE

## 2022-08-22 ENCOUNTER — HOME CARE VISIT (OUTPATIENT)
Dept: SCHEDULING | Facility: HOME HEALTH | Age: 77
End: 2022-08-22
Payer: MEDICARE

## 2022-08-22 VITALS
OXYGEN SATURATION: 98 % | RESPIRATION RATE: 18 BRPM | DIASTOLIC BLOOD PRESSURE: 88 MMHG | HEART RATE: 71 BPM | TEMPERATURE: 97.4 F | SYSTOLIC BLOOD PRESSURE: 152 MMHG

## 2022-08-22 PROCEDURE — G0299 HHS/HOSPICE OF RN EA 15 MIN: HCPCS

## 2022-08-22 ASSESSMENT — ENCOUNTER SYMPTOMS
HEMOPTYSIS: 0
PAIN LOCATION - PAIN QUALITY: SHARP

## 2022-08-24 ENCOUNTER — HOME CARE VISIT (OUTPATIENT)
Dept: SCHEDULING | Facility: HOME HEALTH | Age: 77
End: 2022-08-24
Payer: MEDICARE

## 2022-08-26 ENCOUNTER — HOME CARE VISIT (OUTPATIENT)
Dept: SCHEDULING | Facility: HOME HEALTH | Age: 77
End: 2022-08-26
Payer: MEDICARE

## 2022-08-26 VITALS
SYSTOLIC BLOOD PRESSURE: 132 MMHG | TEMPERATURE: 98.3 F | HEART RATE: 66 BPM | RESPIRATION RATE: 18 BRPM | OXYGEN SATURATION: 98 % | DIASTOLIC BLOOD PRESSURE: 80 MMHG

## 2022-08-26 PROCEDURE — G0299 HHS/HOSPICE OF RN EA 15 MIN: HCPCS

## 2022-08-26 ASSESSMENT — ENCOUNTER SYMPTOMS: PAIN LOCATION - PAIN QUALITY: STABBING

## 2022-08-29 ENCOUNTER — HOME CARE VISIT (OUTPATIENT)
Dept: SCHEDULING | Facility: HOME HEALTH | Age: 77
End: 2022-08-29
Payer: MEDICARE

## 2022-08-29 VITALS
OXYGEN SATURATION: 98 % | TEMPERATURE: 99.5 F | HEART RATE: 71 BPM | DIASTOLIC BLOOD PRESSURE: 84 MMHG | RESPIRATION RATE: 18 BRPM | SYSTOLIC BLOOD PRESSURE: 138 MMHG

## 2022-08-29 PROCEDURE — G0299 HHS/HOSPICE OF RN EA 15 MIN: HCPCS

## 2022-08-29 ASSESSMENT — ENCOUNTER SYMPTOMS: HEMOPTYSIS: 0

## 2022-08-31 ENCOUNTER — HOME CARE VISIT (OUTPATIENT)
Dept: SCHEDULING | Facility: HOME HEALTH | Age: 77
End: 2022-08-31
Payer: MEDICARE

## 2022-08-31 VITALS
OXYGEN SATURATION: 98 % | DIASTOLIC BLOOD PRESSURE: 76 MMHG | SYSTOLIC BLOOD PRESSURE: 120 MMHG | HEART RATE: 66 BPM | TEMPERATURE: 98.7 F | RESPIRATION RATE: 18 BRPM

## 2022-08-31 PROCEDURE — G0299 HHS/HOSPICE OF RN EA 15 MIN: HCPCS

## 2022-08-31 ASSESSMENT — ENCOUNTER SYMPTOMS: HEMOPTYSIS: 0

## 2022-09-02 ENCOUNTER — HOME CARE VISIT (OUTPATIENT)
Dept: SCHEDULING | Facility: HOME HEALTH | Age: 77
End: 2022-09-02
Payer: MEDICARE

## 2022-09-02 VITALS
TEMPERATURE: 99.2 F | SYSTOLIC BLOOD PRESSURE: 132 MMHG | HEART RATE: 76 BPM | DIASTOLIC BLOOD PRESSURE: 84 MMHG | OXYGEN SATURATION: 98 % | RESPIRATION RATE: 18 BRPM

## 2022-09-02 PROCEDURE — G0299 HHS/HOSPICE OF RN EA 15 MIN: HCPCS

## 2022-09-02 ASSESSMENT — ENCOUNTER SYMPTOMS: HEMOPTYSIS: 0

## 2022-09-05 ENCOUNTER — HOME CARE VISIT (OUTPATIENT)
Dept: SCHEDULING | Facility: HOME HEALTH | Age: 77
End: 2022-09-05
Payer: MEDICARE

## 2022-09-05 VITALS
HEART RATE: 77 BPM | RESPIRATION RATE: 18 BRPM | OXYGEN SATURATION: 98 % | DIASTOLIC BLOOD PRESSURE: 96 MMHG | TEMPERATURE: 98.9 F | SYSTOLIC BLOOD PRESSURE: 140 MMHG

## 2022-09-05 PROCEDURE — G0299 HHS/HOSPICE OF RN EA 15 MIN: HCPCS

## 2022-09-05 ASSESSMENT — ENCOUNTER SYMPTOMS: HEMOPTYSIS: 0

## 2022-09-07 ENCOUNTER — HOME CARE VISIT (OUTPATIENT)
Dept: SCHEDULING | Facility: HOME HEALTH | Age: 77
End: 2022-09-07
Payer: MEDICARE

## 2022-09-09 ENCOUNTER — HOME CARE VISIT (OUTPATIENT)
Dept: SCHEDULING | Facility: HOME HEALTH | Age: 77
End: 2022-09-09
Payer: MEDICARE

## 2022-09-09 ENCOUNTER — OFFICE VISIT (OUTPATIENT)
Dept: FAMILY MEDICINE CLINIC | Facility: CLINIC | Age: 77
End: 2022-09-09
Payer: MEDICARE

## 2022-09-09 ENCOUNTER — NURSE ONLY (OUTPATIENT)
Dept: FAMILY MEDICINE CLINIC | Facility: CLINIC | Age: 77
End: 2022-09-09

## 2022-09-09 VITALS
HEIGHT: 71 IN | BODY MASS INDEX: 26.88 KG/M2 | RESPIRATION RATE: 18 BRPM | WEIGHT: 192 LBS | DIASTOLIC BLOOD PRESSURE: 78 MMHG | TEMPERATURE: 97.2 F | OXYGEN SATURATION: 98 % | SYSTOLIC BLOOD PRESSURE: 140 MMHG | HEART RATE: 70 BPM

## 2022-09-09 DIAGNOSIS — M15.9 GENERALIZED OSTEOARTHRITIS: Primary | ICD-10-CM

## 2022-09-09 DIAGNOSIS — L23.1 ALLERGIC CONTACT DERMATITIS DUE TO ADHESIVES: ICD-10-CM

## 2022-09-09 DIAGNOSIS — F51.04 CHRONIC INSOMNIA: ICD-10-CM

## 2022-09-09 DIAGNOSIS — R53.83 OTHER FATIGUE: ICD-10-CM

## 2022-09-09 DIAGNOSIS — E78.2 MIXED HYPERLIPIDEMIA: ICD-10-CM

## 2022-09-09 DIAGNOSIS — Z48.02 ENCOUNTER FOR REMOVAL OF SUTURES: ICD-10-CM

## 2022-09-09 DIAGNOSIS — I10 ESSENTIAL HYPERTENSION: ICD-10-CM

## 2022-09-09 DIAGNOSIS — M17.0 BILATERAL PRIMARY OSTEOARTHRITIS OF KNEE: ICD-10-CM

## 2022-09-09 DIAGNOSIS — E53.8 DEFICIENCY OF OTHER SPECIFIED B GROUP VITAMINS: ICD-10-CM

## 2022-09-09 LAB
ALBUMIN SERPL-MCNC: 3.8 G/DL (ref 3.2–4.6)
ALBUMIN/GLOB SERPL: 1.2 (ref 1.2–3.5)
ALP SERPL-CCNC: 68 U/L (ref 50–136)
ALT SERPL-CCNC: 28 U/L (ref 12–65)
ANION GAP SERPL CALC-SCNC: 3 MMOL/L (ref 4–13)
AST SERPL-CCNC: 18 U/L (ref 15–37)
BASOPHILS # BLD: 0.1 K/UL (ref 0–0.2)
BASOPHILS NFR BLD: 1 % (ref 0–2)
BILIRUB SERPL-MCNC: 0.6 MG/DL (ref 0.2–1.1)
BUN SERPL-MCNC: 15 MG/DL (ref 8–23)
CALCIUM SERPL-MCNC: 8.7 MG/DL (ref 8.3–10.4)
CHLORIDE SERPL-SCNC: 106 MMOL/L (ref 101–110)
CHOLEST SERPL-MCNC: 185 MG/DL
CO2 SERPL-SCNC: 27 MMOL/L (ref 21–32)
CREAT SERPL-MCNC: 1.1 MG/DL (ref 0.8–1.5)
DIFFERENTIAL METHOD BLD: ABNORMAL
EOSINOPHIL # BLD: 0.1 K/UL (ref 0–0.8)
EOSINOPHIL NFR BLD: 2 % (ref 0.5–7.8)
ERYTHROCYTE [DISTWIDTH] IN BLOOD BY AUTOMATED COUNT: 14.2 % (ref 11.9–14.6)
GLOBULIN SER CALC-MCNC: 3.2 G/DL (ref 2.3–3.5)
GLUCOSE SERPL-MCNC: 110 MG/DL (ref 65–100)
HCT VFR BLD AUTO: 47.5 % (ref 41.1–50.3)
HDLC SERPL-MCNC: 48 MG/DL (ref 40–60)
HDLC SERPL: 3.9
HGB BLD-MCNC: 15.2 G/DL (ref 13.6–17.2)
IMM GRANULOCYTES # BLD AUTO: 0 K/UL (ref 0–0.5)
IMM GRANULOCYTES NFR BLD AUTO: 0 % (ref 0–5)
LDLC SERPL CALC-MCNC: 94.8 MG/DL
LYMPHOCYTES # BLD: 1.5 K/UL (ref 0.5–4.6)
LYMPHOCYTES NFR BLD: 22 % (ref 13–44)
MCH RBC QN AUTO: 33.6 PG (ref 26.1–32.9)
MCHC RBC AUTO-ENTMCNC: 32 G/DL (ref 31.4–35)
MCV RBC AUTO: 105.1 FL (ref 79.6–97.8)
MONOCYTES # BLD: 0.5 K/UL (ref 0.1–1.3)
MONOCYTES NFR BLD: 7 % (ref 4–12)
NEUTS SEG # BLD: 4.6 K/UL (ref 1.7–8.2)
NEUTS SEG NFR BLD: 68 % (ref 43–78)
NRBC # BLD: 0 K/UL (ref 0–0.2)
PLATELET # BLD AUTO: 150 K/UL (ref 150–450)
PMV BLD AUTO: 10.4 FL (ref 9.4–12.3)
POTASSIUM SERPL-SCNC: 4.5 MMOL/L (ref 3.5–5.1)
PROT SERPL-MCNC: 7 G/DL (ref 6.3–8.2)
RBC # BLD AUTO: 4.52 M/UL (ref 4.23–5.6)
SODIUM SERPL-SCNC: 136 MMOL/L (ref 136–145)
TRIGL SERPL-MCNC: 211 MG/DL (ref 35–150)
TSH, 3RD GENERATION: 2.82 UIU/ML (ref 0.36–3.74)
VIT B12 SERPL-MCNC: 874 PG/ML (ref 193–986)
VLDLC SERPL CALC-MCNC: 42.2 MG/DL (ref 6–23)
WBC # BLD AUTO: 6.8 K/UL (ref 4.3–11.1)

## 2022-09-09 PROCEDURE — 1036F TOBACCO NON-USER: CPT | Performed by: FAMILY MEDICINE

## 2022-09-09 PROCEDURE — G8427 DOCREV CUR MEDS BY ELIG CLIN: HCPCS | Performed by: FAMILY MEDICINE

## 2022-09-09 PROCEDURE — 99214 OFFICE O/P EST MOD 30 MIN: CPT | Performed by: FAMILY MEDICINE

## 2022-09-09 PROCEDURE — 1123F ACP DISCUSS/DSCN MKR DOCD: CPT | Performed by: FAMILY MEDICINE

## 2022-09-09 PROCEDURE — G8417 CALC BMI ABV UP PARAM F/U: HCPCS | Performed by: FAMILY MEDICINE

## 2022-09-09 PROCEDURE — G0299 HHS/HOSPICE OF RN EA 15 MIN: HCPCS

## 2022-09-09 PROCEDURE — S0630 REMOVAL OF SUTURES: HCPCS | Performed by: FAMILY MEDICINE

## 2022-09-09 RX ORDER — HYDROXYZINE HYDROCHLORIDE 25 MG/1
25 TABLET, FILM COATED ORAL EVERY 8 HOURS PRN
Qty: 30 TABLET | Refills: 5 | Status: SHIPPED | OUTPATIENT
Start: 2022-09-09 | End: 2022-09-19

## 2022-09-09 RX ORDER — LORAZEPAM 1 MG/1
1 TABLET ORAL NIGHTLY PRN
Qty: 60 TABLET | Refills: 5 | Status: SHIPPED | OUTPATIENT
Start: 2022-09-09 | End: 2023-03-08

## 2022-09-09 ASSESSMENT — PATIENT HEALTH QUESTIONNAIRE - PHQ9
10. IF YOU CHECKED OFF ANY PROBLEMS, HOW DIFFICULT HAVE THESE PROBLEMS MADE IT FOR YOU TO DO YOUR WORK, TAKE CARE OF THINGS AT HOME, OR GET ALONG WITH OTHER PEOPLE: 0
SUM OF ALL RESPONSES TO PHQ9 QUESTIONS 1 & 2: 0
1. LITTLE INTEREST OR PLEASURE IN DOING THINGS: 0
SUM OF ALL RESPONSES TO PHQ QUESTIONS 1-9: 0
5. POOR APPETITE OR OVEREATING: 0
SUM OF ALL RESPONSES TO PHQ QUESTIONS 1-9: 0
6. FEELING BAD ABOUT YOURSELF - OR THAT YOU ARE A FAILURE OR HAVE LET YOURSELF OR YOUR FAMILY DOWN: 0
7. TROUBLE CONCENTRATING ON THINGS, SUCH AS READING THE NEWSPAPER OR WATCHING TELEVISION: 0
8. MOVING OR SPEAKING SO SLOWLY THAT OTHER PEOPLE COULD HAVE NOTICED. OR THE OPPOSITE, BEING SO FIGETY OR RESTLESS THAT YOU HAVE BEEN MOVING AROUND A LOT MORE THAN USUAL: 0
SUM OF ALL RESPONSES TO PHQ QUESTIONS 1-9: 0
3. TROUBLE FALLING OR STAYING ASLEEP: 0
SUM OF ALL RESPONSES TO PHQ QUESTIONS 1-9: 0
9. THOUGHTS THAT YOU WOULD BE BETTER OFF DEAD, OR OF HURTING YOURSELF: 0
2. FEELING DOWN, DEPRESSED OR HOPELESS: 0
4. FEELING TIRED OR HAVING LITTLE ENERGY: 0

## 2022-09-10 VITALS
HEART RATE: 71 BPM | SYSTOLIC BLOOD PRESSURE: 118 MMHG | DIASTOLIC BLOOD PRESSURE: 66 MMHG | RESPIRATION RATE: 18 BRPM | TEMPERATURE: 99.2 F | OXYGEN SATURATION: 98 % | WEIGHT: 192 LBS | BODY MASS INDEX: 26.78 KG/M2

## 2022-09-10 ASSESSMENT — ENCOUNTER SYMPTOMS
DIARRHEA: 1
HEMOPTYSIS: 0

## 2022-09-10 NOTE — PROGRESS NOTES
Subjective:      Patient ID: Tal Skaggs. is a 69 y.o. male. HPI  Patient comes in today for follow-up on his blood pressure. Overall has been fairly stable at home on current medication. Home health continues to see him for wound care with her regarding a left chest wall open lesion that he is seen in wound care clinic as well about. Patient had to have a skin cancer removed from his right forearm about 12 days ago and was wondering if we could go and remove the sutures. He has been healing well denies any drainage or erythema and has been applying Vaseline. Past Medical History:   Diagnosis Date    Depression     H/O urinary frequency 2/1/2013    History of BPH 2/1/2013    HLD (hyperlipidemia) 2/1/2013    Hx: UTI (urinary tract infection) 2/1/2013    Insomnia     Psoriasis     Psoriatic arthritis ([address] 2/1/2013    Wears hearing aid 2/1/2013       Allergies   Allergen Reactions    Vancomycin Rash       Current Outpatient Medications   Medication Sig Dispense Refill    hydrOXYzine HCl (ATARAX) 25 MG tablet Take 1 tablet by mouth every 8 hours as needed for Itching 30 tablet 5    LORazepam (ATIVAN) 1 MG tablet Take 1 tablet by mouth nightly as needed for Anxiety for up to 180 days. 60 tablet 5    prednisoLONE acetate (PRED FORTE) 1 % ophthalmic suspension Place 1 drop into both eyes 3 times daily. instill one drop by opthalmic route starting after surgery in operated eye three times daily for 1 week then twice daily for 1 week      bromfenac (PROLENSA) 0.07 % SOLN Place 1 drop into both eyes daily. instill 1 drop to surgery eye every day beginning 3 days before surgery, continued until gone      Eyelid Cleansers (OCUSOFT HYPOCHLOR) SOLN Apply 1 spray topically 2 times daily. spray solution onto lint-free pad or swab until moistened. close eyes and gently apply to eyelids and eyelashes using side to side motions. use twice daily.       Calcium Carbonate Antacid (TUMS ULTRA 1000) 1000 MG CHEW Take 1 g by fatigue. Musculoskeletal:  Positive for arthralgias. Psychiatric/Behavioral:  Positive for sleep disturbance. The patient is nervous/anxious. Objective:   Physical Exam  Vitals reviewed. Constitutional:       General: He is not in acute distress. Appearance: Normal appearance. Cardiovascular:      Rate and Rhythm: Normal rate and regular rhythm. Heart sounds: No murmur heard. Pulmonary:      Effort: Pulmonary effort is normal.      Breath sounds: Normal breath sounds. Skin:     Comments: Excision site appears to be healed well without any drainage or significant erythema. Sutures were removed without difficulty   Neurological:      General: No focal deficit present. Mental Status: He is alert and oriented to person, place, and time. Assessment / Plan:       Yisel Dee was seen today for follow-up. Diagnoses and all orders for this visit:    Generalized osteoarthritis    Mixed hyperlipidemia  -     Lipid Panel; Future    Other fatigue  -     TSH; Future    Essential hypertension  -     CBC with Auto Differential; Future  -     Comprehensive Metabolic Panel; Future    Deficiency of other specified B group vitamins  -     Vitamin B12; Future    Allergic contact dermatitis due to adhesives  -     hydrOXYzine HCl (ATARAX) 25 MG tablet; Take 1 tablet by mouth every 8 hours as needed for Itching    Chronic insomnia  -     LORazepam (ATIVAN) 1 MG tablet; Take 1 tablet by mouth nightly as needed for Anxiety for up to 180 days. Bilateral primary osteoarthritis of knee  -     External Referral To Orthopedic Surgery       Other chronic medical conditions including hypertension, hyperlipidemia appear to be stable and tolerating current maintenance medications, I will refill or continue those as previously directed. Follow-up and Dispositions    Return in about 6 months (around 3/9/2023), or if symptoms worsen or fail to improve. Dictated using voice recognition software. Proofread, but unrecognized errors may exist.

## 2022-09-12 ENCOUNTER — HOME CARE VISIT (OUTPATIENT)
Dept: SCHEDULING | Facility: HOME HEALTH | Age: 77
End: 2022-09-12
Payer: MEDICARE

## 2022-09-12 VITALS
HEART RATE: 71 BPM | DIASTOLIC BLOOD PRESSURE: 88 MMHG | RESPIRATION RATE: 18 BRPM | TEMPERATURE: 99.4 F | SYSTOLIC BLOOD PRESSURE: 124 MMHG | OXYGEN SATURATION: 98 %

## 2022-09-12 PROCEDURE — G0299 HHS/HOSPICE OF RN EA 15 MIN: HCPCS

## 2022-09-12 ASSESSMENT — ENCOUNTER SYMPTOMS: HEMOPTYSIS: 0

## 2022-09-13 ENCOUNTER — TELEPHONE (OUTPATIENT)
Dept: FAMILY MEDICINE CLINIC | Facility: CLINIC | Age: 77
End: 2022-09-13

## 2022-09-13 RX ORDER — CELECOXIB 200 MG/1
200 CAPSULE ORAL DAILY
Qty: 90 CAPSULE | Refills: 0 | Status: SHIPPED | OUTPATIENT
Start: 2022-09-13

## 2022-09-13 NOTE — TELEPHONE ENCOUNTER
Sent in prescription for:     Requested Prescriptions     Signed Prescriptions Disp Refills    celecoxib (CELEBREX) 200 MG capsule 90 capsule 0     Sig: Take 1 capsule by mouth daily     Authorizing Provider: Taco Carr

## 2022-09-13 NOTE — TELEPHONE ENCOUNTER
Pt called requesting refill on Celebrex. Pt requesting it to got to [facility] [place] [id number] in [place].

## 2022-09-14 ENCOUNTER — HOME CARE VISIT (OUTPATIENT)
Dept: SCHEDULING | Facility: HOME HEALTH | Age: 77
End: 2022-09-14
Payer: MEDICARE

## 2022-09-14 VITALS
HEART RATE: 70 BPM | SYSTOLIC BLOOD PRESSURE: 120 MMHG | DIASTOLIC BLOOD PRESSURE: 80 MMHG | OXYGEN SATURATION: 98 % | TEMPERATURE: 99.1 F | RESPIRATION RATE: 18 BRPM

## 2022-09-14 PROCEDURE — 400014 HH F/U

## 2022-09-14 PROCEDURE — G0299 HHS/HOSPICE OF RN EA 15 MIN: HCPCS

## 2022-09-14 ASSESSMENT — ENCOUNTER SYMPTOMS: HEMOPTYSIS: 0

## 2022-09-16 ENCOUNTER — HOME CARE VISIT (OUTPATIENT)
Dept: SCHEDULING | Facility: HOME HEALTH | Age: 77
End: 2022-09-16
Payer: MEDICARE

## 2022-09-16 VITALS
RESPIRATION RATE: 18 BRPM | SYSTOLIC BLOOD PRESSURE: 132 MMHG | HEART RATE: 72 BPM | TEMPERATURE: 98.2 F | OXYGEN SATURATION: 98 % | DIASTOLIC BLOOD PRESSURE: 80 MMHG

## 2022-09-16 PROCEDURE — G0299 HHS/HOSPICE OF RN EA 15 MIN: HCPCS

## 2022-09-16 ASSESSMENT — ENCOUNTER SYMPTOMS: PAIN LOCATION - PAIN QUALITY: DULL ACHING

## 2022-09-19 ENCOUNTER — HOME CARE VISIT (OUTPATIENT)
Dept: SCHEDULING | Facility: HOME HEALTH | Age: 77
End: 2022-09-19
Payer: MEDICARE

## 2022-09-21 ENCOUNTER — HOME CARE VISIT (OUTPATIENT)
Dept: SCHEDULING | Facility: HOME HEALTH | Age: 77
End: 2022-09-21
Payer: MEDICARE

## 2022-09-21 VITALS
RESPIRATION RATE: 18 BRPM | SYSTOLIC BLOOD PRESSURE: 120 MMHG | DIASTOLIC BLOOD PRESSURE: 76 MMHG | OXYGEN SATURATION: 98 % | HEART RATE: 72 BPM | TEMPERATURE: 99.8 F

## 2022-09-21 PROCEDURE — G0299 HHS/HOSPICE OF RN EA 15 MIN: HCPCS

## 2022-09-21 ASSESSMENT — ENCOUNTER SYMPTOMS: HEMOPTYSIS: 0

## 2022-09-23 ENCOUNTER — HOME CARE VISIT (OUTPATIENT)
Dept: SCHEDULING | Facility: HOME HEALTH | Age: 77
End: 2022-09-23
Payer: MEDICARE

## 2022-09-23 PROCEDURE — G0299 HHS/HOSPICE OF RN EA 15 MIN: HCPCS

## 2022-09-26 ENCOUNTER — HOME CARE VISIT (OUTPATIENT)
Dept: SCHEDULING | Facility: HOME HEALTH | Age: 77
End: 2022-09-26
Payer: MEDICARE

## 2022-09-26 VITALS
TEMPERATURE: 98.8 F | DIASTOLIC BLOOD PRESSURE: 80 MMHG | RESPIRATION RATE: 18 BRPM | SYSTOLIC BLOOD PRESSURE: 128 MMHG | OXYGEN SATURATION: 95 % | HEART RATE: 65 BPM

## 2022-09-26 VITALS
HEART RATE: 68 BPM | SYSTOLIC BLOOD PRESSURE: 100 MMHG | RESPIRATION RATE: 12 BRPM | OXYGEN SATURATION: 97 % | DIASTOLIC BLOOD PRESSURE: 70 MMHG | TEMPERATURE: 97.7 F

## 2022-09-26 PROCEDURE — G0299 HHS/HOSPICE OF RN EA 15 MIN: HCPCS

## 2022-09-26 ASSESSMENT — ENCOUNTER SYMPTOMS: PAIN LOCATION - PAIN QUALITY: ACHING, DULL

## 2022-09-28 ENCOUNTER — HOME CARE VISIT (OUTPATIENT)
Dept: SCHEDULING | Facility: HOME HEALTH | Age: 77
End: 2022-09-28
Payer: MEDICARE

## 2022-09-28 VITALS
RESPIRATION RATE: 18 BRPM | DIASTOLIC BLOOD PRESSURE: 72 MMHG | OXYGEN SATURATION: 97 % | SYSTOLIC BLOOD PRESSURE: 118 MMHG | TEMPERATURE: 98.7 F | HEART RATE: 57 BPM

## 2022-09-28 PROCEDURE — G0299 HHS/HOSPICE OF RN EA 15 MIN: HCPCS

## 2022-09-28 ASSESSMENT — ENCOUNTER SYMPTOMS: HEMOPTYSIS: 0

## 2022-09-30 ENCOUNTER — HOME CARE VISIT (OUTPATIENT)
Dept: SCHEDULING | Facility: HOME HEALTH | Age: 77
End: 2022-09-30
Payer: MEDICARE

## 2022-09-30 VITALS
DIASTOLIC BLOOD PRESSURE: 84 MMHG | HEART RATE: 62 BPM | SYSTOLIC BLOOD PRESSURE: 120 MMHG | OXYGEN SATURATION: 97 % | TEMPERATURE: 99.7 F | RESPIRATION RATE: 18 BRPM

## 2022-09-30 PROCEDURE — G0299 HHS/HOSPICE OF RN EA 15 MIN: HCPCS

## 2022-09-30 ASSESSMENT — ENCOUNTER SYMPTOMS: HEMOPTYSIS: 0

## 2022-10-03 ENCOUNTER — HOME CARE VISIT (OUTPATIENT)
Dept: SCHEDULING | Facility: HOME HEALTH | Age: 77
End: 2022-10-03
Payer: MEDICARE

## 2022-10-05 ENCOUNTER — HOME CARE VISIT (OUTPATIENT)
Dept: SCHEDULING | Facility: HOME HEALTH | Age: 77
End: 2022-10-05
Payer: MEDICARE

## 2022-10-05 VITALS
TEMPERATURE: 99.1 F | SYSTOLIC BLOOD PRESSURE: 118 MMHG | DIASTOLIC BLOOD PRESSURE: 82 MMHG | RESPIRATION RATE: 18 BRPM | HEART RATE: 62 BPM | OXYGEN SATURATION: 98 %

## 2022-10-05 PROCEDURE — G0299 HHS/HOSPICE OF RN EA 15 MIN: HCPCS

## 2022-10-05 ASSESSMENT — ENCOUNTER SYMPTOMS: HEMOPTYSIS: 0

## 2022-10-07 ENCOUNTER — HOME CARE VISIT (OUTPATIENT)
Dept: SCHEDULING | Facility: HOME HEALTH | Age: 77
End: 2022-10-07
Payer: MEDICARE

## 2022-10-07 VITALS
OXYGEN SATURATION: 96 % | DIASTOLIC BLOOD PRESSURE: 72 MMHG | SYSTOLIC BLOOD PRESSURE: 112 MMHG | TEMPERATURE: 97.8 F | HEART RATE: 62 BPM | RESPIRATION RATE: 18 BRPM

## 2022-10-07 PROCEDURE — G0299 HHS/HOSPICE OF RN EA 15 MIN: HCPCS

## 2022-10-07 ASSESSMENT — ENCOUNTER SYMPTOMS: PAIN LOCATION - PAIN QUALITY: ACHE

## 2022-10-10 ENCOUNTER — HOME CARE VISIT (OUTPATIENT)
Dept: SCHEDULING | Facility: HOME HEALTH | Age: 77
End: 2022-10-10
Payer: MEDICARE

## 2022-10-10 VITALS
TEMPERATURE: 98.9 F | RESPIRATION RATE: 18 BRPM | HEART RATE: 60 BPM | SYSTOLIC BLOOD PRESSURE: 136 MMHG | DIASTOLIC BLOOD PRESSURE: 80 MMHG | OXYGEN SATURATION: 98 %

## 2022-10-10 PROCEDURE — G0299 HHS/HOSPICE OF RN EA 15 MIN: HCPCS

## 2022-10-10 ASSESSMENT — ENCOUNTER SYMPTOMS: HEMOPTYSIS: 0

## 2022-10-12 ENCOUNTER — HOME CARE VISIT (OUTPATIENT)
Dept: SCHEDULING | Facility: HOME HEALTH | Age: 77
End: 2022-10-12
Payer: MEDICARE

## 2022-10-12 VITALS
OXYGEN SATURATION: 98 % | HEART RATE: 62 BPM | BODY MASS INDEX: 26.22 KG/M2 | TEMPERATURE: 98.7 F | WEIGHT: 188 LBS | DIASTOLIC BLOOD PRESSURE: 80 MMHG | SYSTOLIC BLOOD PRESSURE: 110 MMHG | RESPIRATION RATE: 18 BRPM

## 2022-10-12 PROCEDURE — G0299 HHS/HOSPICE OF RN EA 15 MIN: HCPCS

## 2022-10-12 ASSESSMENT — ENCOUNTER SYMPTOMS
HEMOPTYSIS: 0
PAIN LOCATION - PAIN QUALITY: ACHY

## 2022-10-14 ENCOUNTER — HOME CARE VISIT (OUTPATIENT)
Dept: SCHEDULING | Facility: HOME HEALTH | Age: 77
End: 2022-10-14
Payer: MEDICARE

## 2022-10-14 VITALS
HEART RATE: 63 BPM | OXYGEN SATURATION: 96 % | DIASTOLIC BLOOD PRESSURE: 70 MMHG | TEMPERATURE: 97.7 F | SYSTOLIC BLOOD PRESSURE: 128 MMHG | RESPIRATION RATE: 18 BRPM

## 2022-10-14 PROCEDURE — 400014 HH F/U

## 2022-10-14 PROCEDURE — G0299 HHS/HOSPICE OF RN EA 15 MIN: HCPCS

## 2022-10-14 ASSESSMENT — ENCOUNTER SYMPTOMS: PAIN LOCATION - PAIN QUALITY: STABBING

## 2022-10-17 ENCOUNTER — HOME CARE VISIT (OUTPATIENT)
Dept: SCHEDULING | Facility: HOME HEALTH | Age: 77
End: 2022-10-17
Payer: MEDICARE

## 2022-10-17 VITALS
DIASTOLIC BLOOD PRESSURE: 86 MMHG | OXYGEN SATURATION: 98 % | HEART RATE: 67 BPM | RESPIRATION RATE: 18 BRPM | TEMPERATURE: 98.9 F | SYSTOLIC BLOOD PRESSURE: 132 MMHG

## 2022-10-17 PROCEDURE — G0299 HHS/HOSPICE OF RN EA 15 MIN: HCPCS

## 2022-10-17 ASSESSMENT — ENCOUNTER SYMPTOMS: HEMOPTYSIS: 0

## 2022-10-19 ENCOUNTER — HOME CARE VISIT (OUTPATIENT)
Dept: SCHEDULING | Facility: HOME HEALTH | Age: 77
End: 2022-10-19
Payer: MEDICARE

## 2022-10-21 ENCOUNTER — HOME CARE VISIT (OUTPATIENT)
Dept: SCHEDULING | Facility: HOME HEALTH | Age: 77
End: 2022-10-21
Payer: MEDICARE

## 2022-10-21 PROCEDURE — G0299 HHS/HOSPICE OF RN EA 15 MIN: HCPCS

## 2022-10-23 VITALS
HEART RATE: 73 BPM | SYSTOLIC BLOOD PRESSURE: 132 MMHG | TEMPERATURE: 97.9 F | DIASTOLIC BLOOD PRESSURE: 70 MMHG | RESPIRATION RATE: 18 BRPM | OXYGEN SATURATION: 97 %

## 2022-10-23 ASSESSMENT — ENCOUNTER SYMPTOMS: PAIN LOCATION - PAIN QUALITY: SHARP

## 2022-10-24 ENCOUNTER — HOME CARE VISIT (OUTPATIENT)
Dept: SCHEDULING | Facility: HOME HEALTH | Age: 77
End: 2022-10-24
Payer: MEDICARE

## 2022-10-24 VITALS
RESPIRATION RATE: 18 BRPM | SYSTOLIC BLOOD PRESSURE: 144 MMHG | OXYGEN SATURATION: 98 % | DIASTOLIC BLOOD PRESSURE: 86 MMHG | HEART RATE: 66 BPM | TEMPERATURE: 97.8 F

## 2022-10-24 PROCEDURE — G0299 HHS/HOSPICE OF RN EA 15 MIN: HCPCS

## 2022-10-24 ASSESSMENT — ENCOUNTER SYMPTOMS: HEMOPTYSIS: 0

## 2022-10-26 ENCOUNTER — HOME CARE VISIT (OUTPATIENT)
Dept: SCHEDULING | Facility: HOME HEALTH | Age: 77
End: 2022-10-26
Payer: MEDICARE

## 2022-10-28 ENCOUNTER — HOME CARE VISIT (OUTPATIENT)
Dept: SCHEDULING | Facility: HOME HEALTH | Age: 77
End: 2022-10-28
Payer: MEDICARE

## 2022-10-28 PROCEDURE — G0299 HHS/HOSPICE OF RN EA 15 MIN: HCPCS

## 2022-10-29 VITALS
DIASTOLIC BLOOD PRESSURE: 82 MMHG | RESPIRATION RATE: 18 BRPM | OXYGEN SATURATION: 97 % | TEMPERATURE: 98.6 F | HEART RATE: 65 BPM | SYSTOLIC BLOOD PRESSURE: 138 MMHG

## 2022-10-29 ASSESSMENT — ENCOUNTER SYMPTOMS: PAIN LOCATION - PAIN QUALITY: SHARP

## 2022-10-31 ENCOUNTER — HOME CARE VISIT (OUTPATIENT)
Dept: SCHEDULING | Facility: HOME HEALTH | Age: 77
End: 2022-10-31
Payer: MEDICARE

## 2022-10-31 VITALS
TEMPERATURE: 99.4 F | RESPIRATION RATE: 18 BRPM | DIASTOLIC BLOOD PRESSURE: 88 MMHG | HEART RATE: 71 BPM | OXYGEN SATURATION: 97 % | SYSTOLIC BLOOD PRESSURE: 140 MMHG

## 2022-10-31 PROCEDURE — G0299 HHS/HOSPICE OF RN EA 15 MIN: HCPCS

## 2022-10-31 ASSESSMENT — ENCOUNTER SYMPTOMS: HEMOPTYSIS: 0

## 2022-11-01 ENCOUNTER — NURSE ONLY (OUTPATIENT)
Dept: FAMILY MEDICINE CLINIC | Facility: CLINIC | Age: 77
End: 2022-11-01
Payer: MEDICARE

## 2022-11-01 DIAGNOSIS — Z23 ENCOUNTER FOR IMMUNIZATION: Primary | ICD-10-CM

## 2022-11-01 PROCEDURE — 90694 VACC AIIV4 NO PRSRV 0.5ML IM: CPT | Performed by: FAMILY MEDICINE

## 2022-11-01 PROCEDURE — G0008 ADMIN INFLUENZA VIRUS VAC: HCPCS | Performed by: FAMILY MEDICINE

## 2022-11-02 ENCOUNTER — HOME CARE VISIT (OUTPATIENT)
Dept: SCHEDULING | Facility: HOME HEALTH | Age: 77
End: 2022-11-02
Payer: MEDICARE

## 2022-11-02 VITALS
HEART RATE: 68 BPM | OXYGEN SATURATION: 98 % | SYSTOLIC BLOOD PRESSURE: 110 MMHG | DIASTOLIC BLOOD PRESSURE: 82 MMHG | TEMPERATURE: 98.8 F | RESPIRATION RATE: 18 BRPM

## 2022-11-02 PROCEDURE — G0299 HHS/HOSPICE OF RN EA 15 MIN: HCPCS

## 2022-11-02 ASSESSMENT — ENCOUNTER SYMPTOMS: HEMOPTYSIS: 0

## 2022-11-04 ENCOUNTER — HOME CARE VISIT (OUTPATIENT)
Dept: SCHEDULING | Facility: HOME HEALTH | Age: 77
End: 2022-11-04
Payer: MEDICARE

## 2022-11-04 PROCEDURE — G0299 HHS/HOSPICE OF RN EA 15 MIN: HCPCS

## 2022-11-05 VITALS
RESPIRATION RATE: 18 BRPM | DIASTOLIC BLOOD PRESSURE: 80 MMHG | HEART RATE: 86 BPM | TEMPERATURE: 97.6 F | OXYGEN SATURATION: 98 % | SYSTOLIC BLOOD PRESSURE: 146 MMHG

## 2022-11-05 ASSESSMENT — ENCOUNTER SYMPTOMS: PAIN LOCATION - PAIN QUALITY: SHARP

## 2022-11-07 ENCOUNTER — HOME CARE VISIT (OUTPATIENT)
Dept: SCHEDULING | Facility: HOME HEALTH | Age: 77
End: 2022-11-07
Payer: MEDICARE

## 2022-11-07 VITALS
RESPIRATION RATE: 16 BRPM | TEMPERATURE: 98.6 F | OXYGEN SATURATION: 97 % | DIASTOLIC BLOOD PRESSURE: 80 MMHG | SYSTOLIC BLOOD PRESSURE: 138 MMHG | HEART RATE: 66 BPM

## 2022-11-07 PROCEDURE — G0299 HHS/HOSPICE OF RN EA 15 MIN: HCPCS

## 2022-11-07 ASSESSMENT — ENCOUNTER SYMPTOMS
PAIN LOCATION - PAIN QUALITY: ACHING
STOOL DESCRIPTION: SOFT FORMED

## 2022-11-09 ENCOUNTER — HOME CARE VISIT (OUTPATIENT)
Dept: HOME HEALTH SERVICES | Facility: HOME HEALTH | Age: 77
End: 2022-11-09
Payer: MEDICARE

## 2022-11-10 ENCOUNTER — OFFICE VISIT (OUTPATIENT)
Dept: CARDIOLOGY CLINIC | Age: 77
End: 2022-11-10
Payer: MEDICARE

## 2022-11-10 VITALS
WEIGHT: 194 LBS | BODY MASS INDEX: 27.16 KG/M2 | SYSTOLIC BLOOD PRESSURE: 129 MMHG | HEART RATE: 66 BPM | HEIGHT: 71 IN | DIASTOLIC BLOOD PRESSURE: 83 MMHG

## 2022-11-10 DIAGNOSIS — I10 ESSENTIAL HYPERTENSION: ICD-10-CM

## 2022-11-10 DIAGNOSIS — I34.0 NONRHEUMATIC MITRAL VALVE REGURGITATION: ICD-10-CM

## 2022-11-10 DIAGNOSIS — R06.09 DOE (DYSPNEA ON EXERTION): ICD-10-CM

## 2022-11-10 DIAGNOSIS — E78.5 HYPERLIPIDEMIA, UNSPECIFIED HYPERLIPIDEMIA TYPE: Primary | ICD-10-CM

## 2022-11-10 DIAGNOSIS — I45.89 CHRONOTROPIC INCOMPETENCE: ICD-10-CM

## 2022-11-10 PROCEDURE — G8484 FLU IMMUNIZE NO ADMIN: HCPCS | Performed by: INTERNAL MEDICINE

## 2022-11-10 PROCEDURE — 99214 OFFICE O/P EST MOD 30 MIN: CPT | Performed by: INTERNAL MEDICINE

## 2022-11-10 PROCEDURE — G8428 CUR MEDS NOT DOCUMENT: HCPCS | Performed by: INTERNAL MEDICINE

## 2022-11-10 PROCEDURE — 3078F DIAST BP <80 MM HG: CPT | Performed by: INTERNAL MEDICINE

## 2022-11-10 PROCEDURE — 1036F TOBACCO NON-USER: CPT | Performed by: INTERNAL MEDICINE

## 2022-11-10 PROCEDURE — G8417 CALC BMI ABV UP PARAM F/U: HCPCS | Performed by: INTERNAL MEDICINE

## 2022-11-10 PROCEDURE — 1123F ACP DISCUSS/DSCN MKR DOCD: CPT | Performed by: INTERNAL MEDICINE

## 2022-11-10 PROCEDURE — 3074F SYST BP LT 130 MM HG: CPT | Performed by: INTERNAL MEDICINE

## 2022-11-10 ASSESSMENT — ENCOUNTER SYMPTOMS
ABDOMINAL PAIN: 0
EYE PAIN: 0
COUGH: 0
NAIL CHANGES: 0
STRIDOR: 0
APHONIA: 0

## 2022-11-10 NOTE — PROGRESS NOTES
ARIEL Lugo  [address] [facility], [address] [facility], [address] [place] [address]  PHONE: [phone]    SUBJECTIVE:   Vy Gabriel is a 69 y.o. male 1945   seen for a follow up visit regarding the following:     Chief Complaint   Patient presents with    Heart Murmur            History of present illness: 69 y.o. male presented for follow-up 11/10/22 felling better on low dose metroprolol. We have discussed PM in the past. The patient declined device placement. No recent syncope or other sx. Has wound on left back area wound dressing in place and is healing. Interval Hx    hospitalized in [address] for chest wall hematoma secondary to a fall and cellulitis in 2021. Fatigue still persistent on beta-blocker due to episodes of palpitations. Reviewed cardiac monitor data with low average heart rate. Discussed taking lower doses of metoprolol    Cardiac history  6/2022 Holter monitor in place minimum heart rate 48 bpm maximal heart rate 156 bpm average heart rate 62 bpm episodes of SVT occurring  7/2022 stress perfusion study low risk study normal perfusion       Assessment and Plan:   Chronotropic incompetence. Discussed class II indications for device. Has healing wound on back after infection and débridement   Dyspnea on exertion    Recent echocardiogram    Stress perfusion study low risk scan    Mitral insufficiency,    moderate in the past.      Most recent echocardiogram performed in 06/2021 with EF 55-65%. No mitral regurgitation. History of rheumatoid arthritis. Hyperlipidemia. Lipids controlled. Family History:  The patient states that his mother had a history of unspecified cardiac disease. He does not recall if  she has coronary disease. Social History:  The patient does not smoke. Current Outpatient Medications   Medication Sig    Cetirizine HCl (ZYRTEC ALLERGY) 10 MG CAPS Take 10 mg by mouth daily.     celecoxib (CELEBREX) 200 MG capsule Take 1 capsule by mouth daily    LORazepam (ATIVAN) 1 MG tablet Take 1 tablet by mouth nightly as needed for Anxiety for up to 180 days. bromfenac (PROLENSA) 0.07 % SOLN Place 1 drop into both eyes daily. instill 1 drop to surgery eye every day beginning 3 days before surgery, continued until gone    Eyelid Cleansers (OCUSOFT HYPOCHLOR) SOLN Apply 1 spray topically 2 times daily. spray solution onto lint-free pad or swab until moistened. close eyes and gently apply to eyelids and eyelashes using side to side motions. use twice daily. Calcium Carbonate Antacid (TUMS ULTRA 1000) 1000 MG CHEW Take 1 g by mouth daily as needed (heartburn). metoprolol succinate (TOPROL XL) 25 MG extended release tablet Take 0.5 tablets by mouth in the morning. (Patient taking differently: Take 0.5 tablets by mouth nightly)    acetaminophen (TYLENOL) 650 MG extended release tablet Take 650 mg by mouth every 8 hours as needed for Pain. Polyethyl Glycol-Propyl Glycol (SYSTANE OP) Place 1 drop in ear(s) 4 times daily. Misc. Devices (ROLLATOR ULTRA-LIGHT) MISC Use daily as needed(R26.81) Gait instability, (M15.9) Generalized osteoarthritis    loperamide (IMODIUM) 2 MG capsule Take 2 mg by mouth as needed for Diarrhea. 2 caplets after the first loose stool; 1 caplet after each subsequent loose stool; max dose 4 cap in 24 hours    meclizine (ANTIVERT) 25 MG tablet Take 25 mg by mouth 3 times daily as needed for Dizziness. VITAMIN A PO Take 2,400 mcg by mouth daily. Cyanocobalamin (VITAMIN B 12 PO) Take 500 mcg by mouth daily. oxymetazoline (AFRIN) 0.05 % nasal spray 1 spray by Nasal route as needed for Congestion.  daily as needed for congestion    DULoxetine (CYMBALTA) 60 MG extended release capsule Take 60 mg by mouth 2 times daily    hydrocortisone (HYTONE) 2.5 % lotion Apply 1 Dose topically daily apply to scalp    ketoconazole (NIZORAL) 2 % shampoo Apply 1 Dose topically daily    tamsulosin (FLOMAX) 0.4 MG capsule Take 0.8 mg by mouth daily    triamcinolone (KENALOG) 0.1 % cream Apply 1 Dose topically 2 times daily Apply to periwound     No current facility-administered medications for this visit. Past Medical History, Past Surgical History, Family history, Social History, and Medications were all reviewed with the patient today and updated as necessary. Allergies   Allergen Reactions    Vancomycin Rash     Past Medical History:   Diagnosis Date    Depression     H/O urinary frequency 2/1/2013    History of BPH 2/1/2013    HLD (hyperlipidemia) 2/1/2013    Hx: UTI (urinary tract infection) 2/1/2013    Insomnia     Psoriasis     Psoriatic arthritis ([address] 2/1/2013    Wears hearing aid 2/1/2013     No past surgical history on file. Family History   Problem Relation Age of Onset    Heart Attack Father     Other Brother         melanoma    Cancer Sister     Other Sister     Cancer Father         skin       Social History     Tobacco Use    Smoking status: Former    Smokeless tobacco: Never    Tobacco comments:     Quit smoking: pt states quit in early 60's late 50's   Substance Use Topics    Alcohol use: No     Alcohol/week: 0.0 standard drinks       ROS:    Review of Systems   Constitutional: Negative for fever. HENT:  Negative for stridor. Eyes:  Negative for pain. Cardiovascular:  Negative for chest pain. Respiratory:  Negative for cough. Endocrine: Negative for cold intolerance. Skin:  Negative for nail changes. Musculoskeletal:  Negative for arthritis. Gastrointestinal:  Negative for abdominal pain. Genitourinary:  Negative for dysuria. Neurological:  Negative for aphonia. Psychiatric/Behavioral:  Negative for altered mental status. Allergic/Immunologic: Negative for hives.          PHYSICAL EXAM:    /83   Pulse 66   Ht 5' 11\" (1.803 m)   Wt 194 lb (88 kg)   BMI 27.06 kg/m²        Wt Readings from Last 3 Encounters:   11/10/22 194 lb (88 kg)   10/12/22 188 lb (85.3 kg)   09/09/22 192 lb (87.1 kg)     BP Readings from Last 3 Encounters:   11/10/22 129/83   11/07/22 138/80   11/04/22 (!) 146/80         Physical Exam  Vitals reviewed. HENT:      Head: Normocephalic. Right Ear: External ear normal.      Left Ear: External ear normal.      Nose: Nose normal.   Eyes:      General: No scleral icterus. Cardiovascular:      Heart sounds: Murmur heard. Pulmonary:      Effort: Pulmonary effort is normal.   Abdominal:      General: There is no distension. Musculoskeletal:      Cervical back: Neck supple. Skin:     General: Skin is warm. Comments: Dressing on left back in place. Neurological:      Mental Status: He is alert. Mental status is at baseline. Medical problems and test results were reviewed with the patient today. No results found for this or any previous visit (from the past 672 hour(s)). Lab Results   Component Value Date/Time    CHOL 185 09/09/2022 12:01 PM    HDL 48 09/09/2022 12:01 PM              [place]  Valentino Dewalter was seen today for heart murmur. Diagnoses and all orders for this visit:    Hyperlipidemia, unspecified hyperlipidemia type    VILLELA (dyspnea on exertion)    Chronotropic incompetence    Essential hypertension    Nonrheumatic mitral valve regurgitation  -     Transthoracic echocardiogram (TTE) complete with contrast, bubble, strain, and 3D PRN; Future    Return in about 6 months (around 5/10/2023).        Juhi Caldera MD  11/10/2022  1:53 PM

## 2022-11-11 ENCOUNTER — HOME CARE VISIT (OUTPATIENT)
Dept: SCHEDULING | Facility: HOME HEALTH | Age: 77
End: 2022-11-11
Payer: MEDICARE

## 2022-11-11 PROCEDURE — G0299 HHS/HOSPICE OF RN EA 15 MIN: HCPCS

## 2022-11-13 VITALS
DIASTOLIC BLOOD PRESSURE: 82 MMHG | RESPIRATION RATE: 16 BRPM | SYSTOLIC BLOOD PRESSURE: 138 MMHG | TEMPERATURE: 98.5 F | OXYGEN SATURATION: 96 % | HEART RATE: 61 BPM

## 2022-11-14 ENCOUNTER — HOME CARE VISIT (OUTPATIENT)
Dept: SCHEDULING | Facility: HOME HEALTH | Age: 77
End: 2022-11-14
Payer: MEDICARE

## 2022-11-14 VITALS
RESPIRATION RATE: 18 BRPM | SYSTOLIC BLOOD PRESSURE: 114 MMHG | HEART RATE: 66 BPM | OXYGEN SATURATION: 98 % | TEMPERATURE: 99.2 F | DIASTOLIC BLOOD PRESSURE: 78 MMHG

## 2022-11-14 PROCEDURE — G0299 HHS/HOSPICE OF RN EA 15 MIN: HCPCS

## 2022-11-14 ASSESSMENT — ENCOUNTER SYMPTOMS: HEMOPTYSIS: 0

## 2022-11-16 ENCOUNTER — HOME CARE VISIT (OUTPATIENT)
Dept: SCHEDULING | Facility: HOME HEALTH | Age: 77
End: 2022-11-16
Payer: MEDICARE

## 2022-11-16 VITALS
TEMPERATURE: 98.5 F | HEART RATE: 65 BPM | DIASTOLIC BLOOD PRESSURE: 94 MMHG | SYSTOLIC BLOOD PRESSURE: 130 MMHG | RESPIRATION RATE: 18 BRPM | OXYGEN SATURATION: 98 %

## 2022-11-16 PROCEDURE — G0299 HHS/HOSPICE OF RN EA 15 MIN: HCPCS

## 2022-11-16 ASSESSMENT — ENCOUNTER SYMPTOMS: HEMOPTYSIS: 0

## 2022-11-18 ENCOUNTER — HOME CARE VISIT (OUTPATIENT)
Dept: SCHEDULING | Facility: HOME HEALTH | Age: 77
End: 2022-11-18
Payer: MEDICARE

## 2022-11-21 ENCOUNTER — HOME CARE VISIT (OUTPATIENT)
Dept: SCHEDULING | Facility: HOME HEALTH | Age: 77
End: 2022-11-21
Payer: MEDICARE

## 2022-11-21 VITALS
RESPIRATION RATE: 18 BRPM | DIASTOLIC BLOOD PRESSURE: 78 MMHG | TEMPERATURE: 99.5 F | HEART RATE: 66 BPM | OXYGEN SATURATION: 98 % | SYSTOLIC BLOOD PRESSURE: 140 MMHG

## 2022-11-21 PROCEDURE — G0299 HHS/HOSPICE OF RN EA 15 MIN: HCPCS

## 2022-11-21 ASSESSMENT — ENCOUNTER SYMPTOMS: HEMOPTYSIS: 0

## 2022-11-23 ENCOUNTER — HOME CARE VISIT (OUTPATIENT)
Dept: SCHEDULING | Facility: HOME HEALTH | Age: 77
End: 2022-11-23
Payer: MEDICARE

## 2022-11-23 VITALS
DIASTOLIC BLOOD PRESSURE: 84 MMHG | TEMPERATURE: 98.5 F | RESPIRATION RATE: 18 BRPM | OXYGEN SATURATION: 98 % | SYSTOLIC BLOOD PRESSURE: 132 MMHG | HEART RATE: 65 BPM

## 2022-11-23 PROCEDURE — G0299 HHS/HOSPICE OF RN EA 15 MIN: HCPCS

## 2022-11-23 ASSESSMENT — ENCOUNTER SYMPTOMS: HEMOPTYSIS: 0

## 2022-11-25 ENCOUNTER — HOME CARE VISIT (OUTPATIENT)
Dept: SCHEDULING | Facility: HOME HEALTH | Age: 77
End: 2022-11-25
Payer: MEDICARE

## 2022-11-25 VITALS
TEMPERATURE: 99.8 F | DIASTOLIC BLOOD PRESSURE: 84 MMHG | RESPIRATION RATE: 18 BRPM | OXYGEN SATURATION: 98 % | SYSTOLIC BLOOD PRESSURE: 130 MMHG | HEART RATE: 66 BPM

## 2022-11-25 PROCEDURE — G0299 HHS/HOSPICE OF RN EA 15 MIN: HCPCS

## 2022-11-25 ASSESSMENT — ENCOUNTER SYMPTOMS: HEMOPTYSIS: 0

## 2022-11-28 ENCOUNTER — HOME CARE VISIT (OUTPATIENT)
Dept: SCHEDULING | Facility: HOME HEALTH | Age: 77
End: 2022-11-28
Payer: MEDICARE

## 2022-11-28 VITALS
SYSTOLIC BLOOD PRESSURE: 146 MMHG | DIASTOLIC BLOOD PRESSURE: 90 MMHG | HEART RATE: 86 BPM | OXYGEN SATURATION: 96 % | RESPIRATION RATE: 18 BRPM | TEMPERATURE: 98.8 F

## 2022-11-28 PROCEDURE — G0299 HHS/HOSPICE OF RN EA 15 MIN: HCPCS

## 2022-11-28 ASSESSMENT — ENCOUNTER SYMPTOMS: HEMOPTYSIS: 0

## 2022-11-30 ENCOUNTER — HOME CARE VISIT (OUTPATIENT)
Dept: SCHEDULING | Facility: HOME HEALTH | Age: 77
End: 2022-11-30
Payer: MEDICARE

## 2022-11-30 VITALS
RESPIRATION RATE: 18 BRPM | SYSTOLIC BLOOD PRESSURE: 138 MMHG | TEMPERATURE: 99 F | HEART RATE: 64 BPM | OXYGEN SATURATION: 98 % | DIASTOLIC BLOOD PRESSURE: 86 MMHG

## 2022-11-30 PROCEDURE — G0299 HHS/HOSPICE OF RN EA 15 MIN: HCPCS

## 2022-11-30 ASSESSMENT — ENCOUNTER SYMPTOMS: HEMOPTYSIS: 0

## 2022-12-02 ENCOUNTER — HOME CARE VISIT (OUTPATIENT)
Dept: SCHEDULING | Facility: HOME HEALTH | Age: 77
End: 2022-12-02
Payer: MEDICARE

## 2022-12-05 ENCOUNTER — HOME CARE VISIT (OUTPATIENT)
Dept: SCHEDULING | Facility: HOME HEALTH | Age: 77
End: 2022-12-05
Payer: MEDICARE

## 2022-12-05 VITALS
SYSTOLIC BLOOD PRESSURE: 128 MMHG | DIASTOLIC BLOOD PRESSURE: 88 MMHG | OXYGEN SATURATION: 97 % | HEART RATE: 69 BPM | TEMPERATURE: 98.3 F | RESPIRATION RATE: 18 BRPM

## 2022-12-05 PROCEDURE — G0299 HHS/HOSPICE OF RN EA 15 MIN: HCPCS

## 2022-12-05 ASSESSMENT — ENCOUNTER SYMPTOMS: HEMOPTYSIS: 0

## 2022-12-06 RX ORDER — CELECOXIB 200 MG/1
200 CAPSULE ORAL DAILY
Qty: 90 CAPSULE | Refills: 0 | Status: SHIPPED | OUTPATIENT
Start: 2022-12-06

## 2022-12-07 ENCOUNTER — HOME CARE VISIT (OUTPATIENT)
Dept: SCHEDULING | Facility: HOME HEALTH | Age: 77
End: 2022-12-07

## 2022-12-07 VITALS
RESPIRATION RATE: 18 BRPM | DIASTOLIC BLOOD PRESSURE: 80 MMHG | SYSTOLIC BLOOD PRESSURE: 130 MMHG | HEART RATE: 60 BPM | TEMPERATURE: 99.1 F | OXYGEN SATURATION: 98 %

## 2022-12-07 PROCEDURE — G0299 HHS/HOSPICE OF RN EA 15 MIN: HCPCS

## 2022-12-07 ASSESSMENT — ENCOUNTER SYMPTOMS
HEMOPTYSIS: 0
PAIN LOCATION - PAIN QUALITY: ACHY

## 2022-12-09 ENCOUNTER — HOME CARE VISIT (OUTPATIENT)
Dept: SCHEDULING | Facility: HOME HEALTH | Age: 77
End: 2022-12-09
Payer: MEDICARE

## 2022-12-09 RX ORDER — TAMSULOSIN HYDROCHLORIDE 0.4 MG/1
0.8 CAPSULE ORAL DAILY
Qty: 180 CAPSULE | Refills: 3 | Status: SHIPPED | OUTPATIENT
Start: 2022-12-09

## 2022-12-09 NOTE — TELEPHONE ENCOUNTER
Medication Refill Request      Name of Medication : Tamsulosin      Strength of Medication: 0.4 mg       Directions: 2 caps by mouth daily      30 day or 90 day supply: 90 day supply      Preferred Pharmacy: [facility] [facility] For Provider:      Medication Refill Request      Name of Medication : FluocinoNide       Strength of Medication: 0.05%       Directions: apply cream to scalp twice daily      30 day or 90 day supply: 90 day supply      Preferred Pharmacy: [facility] in [address] [facility] For Provider:

## 2022-12-12 ENCOUNTER — HOME CARE VISIT (OUTPATIENT)
Dept: SCHEDULING | Facility: HOME HEALTH | Age: 77
End: 2022-12-12
Payer: MEDICARE

## 2022-12-12 VITALS
HEART RATE: 61 BPM | DIASTOLIC BLOOD PRESSURE: 78 MMHG | OXYGEN SATURATION: 98 % | RESPIRATION RATE: 18 BRPM | SYSTOLIC BLOOD PRESSURE: 124 MMHG | TEMPERATURE: 98.6 F

## 2022-12-12 PROCEDURE — G0299 HHS/HOSPICE OF RN EA 15 MIN: HCPCS

## 2022-12-12 ASSESSMENT — ENCOUNTER SYMPTOMS: HEMOPTYSIS: 0

## 2022-12-14 ENCOUNTER — HOME CARE VISIT (OUTPATIENT)
Dept: SCHEDULING | Facility: HOME HEALTH | Age: 77
End: 2022-12-14
Payer: MEDICARE

## 2022-12-14 VITALS
SYSTOLIC BLOOD PRESSURE: 118 MMHG | DIASTOLIC BLOOD PRESSURE: 74 MMHG | TEMPERATURE: 98.5 F | HEART RATE: 60 BPM | OXYGEN SATURATION: 97 % | RESPIRATION RATE: 18 BRPM

## 2022-12-14 PROCEDURE — G0299 HHS/HOSPICE OF RN EA 15 MIN: HCPCS

## 2022-12-14 ASSESSMENT — ENCOUNTER SYMPTOMS: HEMOPTYSIS: 0

## 2022-12-16 ENCOUNTER — HOME CARE VISIT (OUTPATIENT)
Dept: HOME HEALTH SERVICES | Facility: HOME HEALTH | Age: 77
End: 2022-12-16
Payer: MEDICARE

## 2022-12-19 ENCOUNTER — HOME CARE VISIT (OUTPATIENT)
Dept: SCHEDULING | Facility: HOME HEALTH | Age: 77
End: 2022-12-19
Payer: MEDICARE

## 2022-12-19 VITALS
OXYGEN SATURATION: 98 % | RESPIRATION RATE: 18 BRPM | TEMPERATURE: 98.3 F | DIASTOLIC BLOOD PRESSURE: 80 MMHG | SYSTOLIC BLOOD PRESSURE: 124 MMHG | HEART RATE: 70 BPM

## 2022-12-19 PROCEDURE — G0299 HHS/HOSPICE OF RN EA 15 MIN: HCPCS

## 2022-12-19 ASSESSMENT — ENCOUNTER SYMPTOMS: HEMOPTYSIS: 0

## 2022-12-21 ENCOUNTER — HOME CARE VISIT (OUTPATIENT)
Dept: SCHEDULING | Facility: HOME HEALTH | Age: 77
End: 2022-12-21
Payer: MEDICARE

## 2022-12-21 VITALS
RESPIRATION RATE: 18 BRPM | HEART RATE: 60 BPM | OXYGEN SATURATION: 98 % | TEMPERATURE: 99.7 F | DIASTOLIC BLOOD PRESSURE: 82 MMHG | SYSTOLIC BLOOD PRESSURE: 134 MMHG

## 2022-12-21 PROCEDURE — G0299 HHS/HOSPICE OF RN EA 15 MIN: HCPCS

## 2022-12-21 ASSESSMENT — ENCOUNTER SYMPTOMS: HEMOPTYSIS: 0

## 2022-12-23 ENCOUNTER — HOME CARE VISIT (OUTPATIENT)
Dept: SCHEDULING | Facility: HOME HEALTH | Age: 77
End: 2022-12-23
Payer: MEDICARE

## 2022-12-23 VITALS
OXYGEN SATURATION: 99 % | RESPIRATION RATE: 18 BRPM | HEART RATE: 62 BPM | TEMPERATURE: 99.1 F | DIASTOLIC BLOOD PRESSURE: 84 MMHG | SYSTOLIC BLOOD PRESSURE: 120 MMHG

## 2022-12-23 PROCEDURE — G0299 HHS/HOSPICE OF RN EA 15 MIN: HCPCS

## 2022-12-23 ASSESSMENT — ENCOUNTER SYMPTOMS: HEMOPTYSIS: 0

## 2022-12-26 ENCOUNTER — HOME CARE VISIT (OUTPATIENT)
Dept: SCHEDULING | Facility: HOME HEALTH | Age: 77
End: 2022-12-26
Payer: MEDICARE

## 2022-12-26 VITALS
OXYGEN SATURATION: 97 % | SYSTOLIC BLOOD PRESSURE: 112 MMHG | HEART RATE: 60 BPM | DIASTOLIC BLOOD PRESSURE: 80 MMHG | RESPIRATION RATE: 18 BRPM | TEMPERATURE: 98.2 F

## 2022-12-26 PROCEDURE — G0299 HHS/HOSPICE OF RN EA 15 MIN: HCPCS

## 2022-12-26 ASSESSMENT — ENCOUNTER SYMPTOMS: HEMOPTYSIS: 0

## 2022-12-28 ENCOUNTER — HOME CARE VISIT (OUTPATIENT)
Dept: SCHEDULING | Facility: HOME HEALTH | Age: 77
End: 2022-12-28
Payer: MEDICARE

## 2022-12-28 VITALS
RESPIRATION RATE: 16 BRPM | TEMPERATURE: 98.6 F | SYSTOLIC BLOOD PRESSURE: 122 MMHG | DIASTOLIC BLOOD PRESSURE: 68 MMHG | OXYGEN SATURATION: 98 % | HEART RATE: 65 BPM

## 2022-12-28 PROCEDURE — G0299 HHS/HOSPICE OF RN EA 15 MIN: HCPCS

## 2022-12-30 ENCOUNTER — HOME CARE VISIT (OUTPATIENT)
Dept: SCHEDULING | Facility: HOME HEALTH | Age: 77
End: 2022-12-30
Payer: MEDICARE

## 2023-01-02 ENCOUNTER — HOME CARE VISIT (OUTPATIENT)
Dept: SCHEDULING | Facility: HOME HEALTH | Age: 78
End: 2023-01-02
Payer: MEDICARE

## 2023-01-02 VITALS
HEART RATE: 54 BPM | SYSTOLIC BLOOD PRESSURE: 124 MMHG | TEMPERATURE: 98.8 F | RESPIRATION RATE: 18 BRPM | OXYGEN SATURATION: 97 % | DIASTOLIC BLOOD PRESSURE: 80 MMHG

## 2023-01-02 PROCEDURE — G0299 HHS/HOSPICE OF RN EA 15 MIN: HCPCS

## 2023-01-02 ASSESSMENT — ENCOUNTER SYMPTOMS: HEMOPTYSIS: 0

## 2023-01-03 ENCOUNTER — HOME CARE VISIT (OUTPATIENT)
Dept: SCHEDULING | Facility: HOME HEALTH | Age: 78
End: 2023-01-03
Payer: MEDICARE

## 2023-01-03 VITALS
SYSTOLIC BLOOD PRESSURE: 150 MMHG | TEMPERATURE: 99.6 F | RESPIRATION RATE: 18 BRPM | DIASTOLIC BLOOD PRESSURE: 72 MMHG | HEART RATE: 62 BPM | OXYGEN SATURATION: 98 %

## 2023-01-03 PROCEDURE — G0299 HHS/HOSPICE OF RN EA 15 MIN: HCPCS

## 2023-01-04 ENCOUNTER — HOME CARE VISIT (OUTPATIENT)
Dept: SCHEDULING | Facility: HOME HEALTH | Age: 78
End: 2023-01-04
Payer: MEDICARE

## 2023-01-04 VITALS
TEMPERATURE: 98 F | DIASTOLIC BLOOD PRESSURE: 82 MMHG | OXYGEN SATURATION: 97 % | RESPIRATION RATE: 18 BRPM | HEART RATE: 65 BPM | SYSTOLIC BLOOD PRESSURE: 118 MMHG

## 2023-01-04 PROCEDURE — G0299 HHS/HOSPICE OF RN EA 15 MIN: HCPCS

## 2023-01-04 ASSESSMENT — ENCOUNTER SYMPTOMS: HEMOPTYSIS: 0

## 2023-01-05 ENCOUNTER — HOME CARE VISIT (OUTPATIENT)
Dept: SCHEDULING | Facility: HOME HEALTH | Age: 78
End: 2023-01-05
Payer: MEDICARE

## 2023-01-05 VITALS
SYSTOLIC BLOOD PRESSURE: 118 MMHG | HEART RATE: 63 BPM | TEMPERATURE: 98 F | DIASTOLIC BLOOD PRESSURE: 82 MMHG | RESPIRATION RATE: 18 BRPM | OXYGEN SATURATION: 98 %

## 2023-01-05 PROCEDURE — G0299 HHS/HOSPICE OF RN EA 15 MIN: HCPCS

## 2023-01-05 ASSESSMENT — ENCOUNTER SYMPTOMS: HEMOPTYSIS: 0

## 2023-01-09 ENCOUNTER — HOME CARE VISIT (OUTPATIENT)
Dept: SCHEDULING | Facility: HOME HEALTH | Age: 78
End: 2023-01-09
Payer: MEDICARE

## 2023-01-09 VITALS
SYSTOLIC BLOOD PRESSURE: 112 MMHG | TEMPERATURE: 97.9 F | RESPIRATION RATE: 18 BRPM | DIASTOLIC BLOOD PRESSURE: 82 MMHG | HEART RATE: 62 BPM | OXYGEN SATURATION: 95 %

## 2023-01-09 PROCEDURE — G0299 HHS/HOSPICE OF RN EA 15 MIN: HCPCS

## 2023-01-09 ASSESSMENT — ENCOUNTER SYMPTOMS: HEMOPTYSIS: 0

## 2023-01-11 ENCOUNTER — HOME CARE VISIT (OUTPATIENT)
Dept: SCHEDULING | Facility: HOME HEALTH | Age: 78
End: 2023-01-11
Payer: MEDICARE

## 2023-01-11 VITALS
SYSTOLIC BLOOD PRESSURE: 128 MMHG | RESPIRATION RATE: 18 BRPM | OXYGEN SATURATION: 98 % | HEART RATE: 66 BPM | TEMPERATURE: 97.9 F | DIASTOLIC BLOOD PRESSURE: 86 MMHG

## 2023-01-11 PROCEDURE — G0299 HHS/HOSPICE OF RN EA 15 MIN: HCPCS

## 2023-01-11 ASSESSMENT — ENCOUNTER SYMPTOMS: HEMOPTYSIS: 0

## 2023-01-13 ENCOUNTER — HOME CARE VISIT (OUTPATIENT)
Dept: SCHEDULING | Facility: HOME HEALTH | Age: 78
End: 2023-01-13
Payer: MEDICARE

## 2023-01-16 ENCOUNTER — HOME CARE VISIT (OUTPATIENT)
Dept: SCHEDULING | Facility: HOME HEALTH | Age: 78
End: 2023-01-16
Payer: MEDICARE

## 2023-01-16 VITALS
HEART RATE: 63 BPM | TEMPERATURE: 98.2 F | SYSTOLIC BLOOD PRESSURE: 132 MMHG | DIASTOLIC BLOOD PRESSURE: 86 MMHG | OXYGEN SATURATION: 99 % | RESPIRATION RATE: 18 BRPM

## 2023-01-16 PROCEDURE — G0299 HHS/HOSPICE OF RN EA 15 MIN: HCPCS

## 2023-01-16 ASSESSMENT — ENCOUNTER SYMPTOMS: HEMOPTYSIS: 0

## 2023-01-18 ENCOUNTER — HOME CARE VISIT (OUTPATIENT)
Dept: SCHEDULING | Facility: HOME HEALTH | Age: 78
End: 2023-01-18
Payer: MEDICARE

## 2023-01-18 VITALS
RESPIRATION RATE: 18 BRPM | OXYGEN SATURATION: 98 % | SYSTOLIC BLOOD PRESSURE: 148 MMHG | TEMPERATURE: 97.9 F | DIASTOLIC BLOOD PRESSURE: 92 MMHG | HEART RATE: 62 BPM

## 2023-01-18 PROCEDURE — G0299 HHS/HOSPICE OF RN EA 15 MIN: HCPCS

## 2023-01-18 ASSESSMENT — ENCOUNTER SYMPTOMS: HEMOPTYSIS: 0

## 2023-01-20 ENCOUNTER — HOME CARE VISIT (OUTPATIENT)
Dept: HOME HEALTH SERVICES | Facility: HOME HEALTH | Age: 78
End: 2023-01-20
Payer: MEDICARE

## 2023-01-20 ENCOUNTER — OFFICE VISIT (OUTPATIENT)
Dept: FAMILY MEDICINE CLINIC | Facility: CLINIC | Age: 78
End: 2023-01-20
Payer: MEDICARE

## 2023-01-20 VITALS
OXYGEN SATURATION: 99 % | HEART RATE: 62 BPM | BODY MASS INDEX: 27.27 KG/M2 | HEIGHT: 71 IN | RESPIRATION RATE: 16 BRPM | TEMPERATURE: 98.1 F | SYSTOLIC BLOOD PRESSURE: 159 MMHG | WEIGHT: 194.8 LBS | DIASTOLIC BLOOD PRESSURE: 90 MMHG

## 2023-01-20 DIAGNOSIS — I10 ESSENTIAL HYPERTENSION: ICD-10-CM

## 2023-01-20 DIAGNOSIS — M17.0 BILATERAL PRIMARY OSTEOARTHRITIS OF KNEE: Primary | ICD-10-CM

## 2023-01-20 DIAGNOSIS — R20.2 PARESTHESIA: ICD-10-CM

## 2023-01-20 DIAGNOSIS — E78.2 MIXED HYPERLIPIDEMIA: ICD-10-CM

## 2023-01-20 DIAGNOSIS — L40.50 ARTHROPATHIC PSORIASIS, UNSPECIFIED (HCC): ICD-10-CM

## 2023-01-20 PROCEDURE — G8417 CALC BMI ABV UP PARAM F/U: HCPCS | Performed by: FAMILY MEDICINE

## 2023-01-20 PROCEDURE — G8484 FLU IMMUNIZE NO ADMIN: HCPCS | Performed by: FAMILY MEDICINE

## 2023-01-20 PROCEDURE — 1036F TOBACCO NON-USER: CPT | Performed by: FAMILY MEDICINE

## 2023-01-20 PROCEDURE — 99213 OFFICE O/P EST LOW 20 MIN: CPT | Performed by: FAMILY MEDICINE

## 2023-01-20 PROCEDURE — 3077F SYST BP >= 140 MM HG: CPT | Performed by: FAMILY MEDICINE

## 2023-01-20 PROCEDURE — 1123F ACP DISCUSS/DSCN MKR DOCD: CPT | Performed by: FAMILY MEDICINE

## 2023-01-20 PROCEDURE — 3080F DIAST BP >= 90 MM HG: CPT | Performed by: FAMILY MEDICINE

## 2023-01-20 PROCEDURE — G8427 DOCREV CUR MEDS BY ELIG CLIN: HCPCS | Performed by: FAMILY MEDICINE

## 2023-01-20 RX ORDER — GABAPENTIN 300 MG/1
300 CAPSULE ORAL NIGHTLY
Qty: 30 CAPSULE | Refills: 5 | Status: SHIPPED | OUTPATIENT
Start: 2023-01-20 | End: 2023-07-19

## 2023-01-21 ENCOUNTER — HOME CARE VISIT (OUTPATIENT)
Dept: HOME HEALTH SERVICES | Facility: HOME HEALTH | Age: 78
End: 2023-01-21
Payer: MEDICARE

## 2023-01-21 PROCEDURE — G0299 HHS/HOSPICE OF RN EA 15 MIN: HCPCS

## 2023-01-21 NOTE — PROGRESS NOTES
Subjective:      Patient ID: Christina Ann. is a 69 y.o. male. HPI  Patient comes in today for follow-up. He still was having some struggles sleeping at night. Also has had some numbness along his left upper chest shoulder into his left neck. He denies any weakness of upper extremities, no change in speech or swallowing. The patient noticed this after he had an abscess of the left chest wall which is required extensive wound care. He denies any neck pain. Does have a history of arthritis and psoriasis but would like to get referred to a different rheumatologist as his previous rheumatologist no longer accepts his insurance. Past Medical History:   Diagnosis Date    Depression     H/O urinary frequency 2/1/2013    History of BPH 2/1/2013    HLD (hyperlipidemia) 2/1/2013    Hx: UTI (urinary tract infection) 2/1/2013    Insomnia     Psoriasis     Psoriatic arthritis ([address] 2/1/2013    Wears hearing aid 2/1/2013       Allergies   Allergen Reactions    Vancomycin Rash       Current Outpatient Medications   Medication Sig Dispense Refill    gabapentin (NEURONTIN) 300 MG capsule Take 1 capsule by mouth nightly for 180 days. 30 capsule 5    Azelastine HCl 137 MCG/SPRAY SOLN 205.5 mcg by Nasal route 2 times daily. tamsulosin (FLOMAX) 0.4 MG capsule Take 2 capsules by mouth daily 180 capsule 3    celecoxib (CELEBREX) 200 MG capsule TAKE 1 CAPSULE BY MOUTH DAILY 90 capsule 0    Multiple Vitamin (DAILY VITAMIN PO) Take 1 tablet by mouth at bedtime. Nervive-Nerve relief      diclofenac sodium (VOLTAREN) 1 % GEL Apply 4 g topically 4 times daily. Apply topically to bilateral knees 4 times daily      Cetirizine HCl (ZYRTEC ALLERGY) 10 MG CAPS Take 10 mg by mouth daily. LORazepam (ATIVAN) 1 MG tablet Take 1 tablet by mouth nightly as needed for Anxiety for up to 180 days. 60 tablet 5    bromfenac (PROLENSA) 0.07 % SOLN Place 1 drop into both eyes daily.  instill 1 drop to surgery eye every day beginning 3 days before surgery, continued until gone      Eyelid Cleansers (OCUSOFT HYPOCHLOR) SOLN Apply 1 spray topically 2 times daily. spray solution onto lint-free pad or swab until moistened. close eyes and gently apply to eyelids and eyelashes using side to side motions. use twice daily. Calcium Carbonate Antacid (TUMS ULTRA 1000) 1000 MG CHEW Take 1 g by mouth daily as needed (heartburn). metoprolol succinate (TOPROL XL) 25 MG extended release tablet Take 0.5 tablets by mouth in the morning. (Patient taking differently: Take 0.5 tablets by mouth nightly) 90 tablet 3    acetaminophen (TYLENOL) 650 MG extended release tablet Take 650 mg by mouth every 8 hours as needed for Pain. Polyethyl Glycol-Propyl Glycol (SYSTANE OP) Place 1 drop in ear(s) 4 times daily. Misc. Devices (ROLLATOR ULTRA-LIGHT) MISC Use daily as needed(R26.81) Gait instability, (M15.9) Generalized osteoarthritis 1 each 0    loperamide (IMODIUM) 2 MG capsule Take 2 mg by mouth as needed for Diarrhea. 2 caplets after the first loose stool; 1 caplet after each subsequent loose stool; max dose 4 cap in 24 hours      meclizine (ANTIVERT) 25 MG tablet Take 25 mg by mouth 3 times daily as needed for Dizziness. VITAMIN A PO Take 2,400 mcg by mouth daily. Cyanocobalamin (VITAMIN B 12 PO) Take 500 mcg by mouth daily. oxymetazoline (AFRIN) 0.05 % nasal spray 1 spray by Nasal route as needed for Congestion. daily as needed for congestion      DULoxetine (CYMBALTA) 60 MG extended release capsule Take 60 mg by mouth 2 times daily      hydrocortisone (HYTONE) 2.5 % lotion Apply 1 Dose topically daily apply to scalp      ketoconazole (NIZORAL) 2 % shampoo Apply 1 Dose topically daily      triamcinolone (KENALOG) 0.1 % cream Apply 1 Dose topically 2 times daily Apply to periwound       No current facility-administered medications for this visit.        Social History     Tobacco Use    Smoking status: Former    Smokeless tobacco: Never Tobacco comments:     Quit smoking: pt states quit in early 60's late 50's   Substance Use Topics    Alcohol use: No     Alcohol/week: 0.0 standard drinks    Drug use: No     Review of Systems   Constitutional:  Positive for fatigue. Musculoskeletal:  Positive for arthralgias. Neurological:  Positive for numbness. Negative for dizziness, speech difficulty, weakness and headaches. Blood pressure (!) 159/90, pulse 62, temperature 98.1 °F (36.7 °C), temperature source Temporal, resp. rate 16, height 5' 11\" (1.803 m), weight 194 lb 12.8 oz (88.4 kg), SpO2 99 %. Objective:   Physical Exam  Vitals reviewed. Constitutional:       General: He is not in acute distress. Appearance: Normal appearance. Cardiovascular:      Rate and Rhythm: Normal rate and regular rhythm. Heart sounds: No murmur heard. Pulmonary:      Effort: Pulmonary effort is normal.      Breath sounds: Normal breath sounds. Neurological:      General: No focal deficit present. Mental Status: He is alert and oriented to person, place, and time. Assessment / Plan:    Tono Beard was seen today for referral - general, tingling and knee pain. Diagnoses and all orders for this visit:    Bilateral primary osteoarthritis of knee  -     External Referral To Rheumatology    Arthropathic psoriasis, unspecified ([facility] [address]  -     External Referral To Rheumatology    Mixed hyperlipidemia    Essential hypertension    Paresthesia  -     [address]  -     gabapentin (NEURONTIN) 300 MG capsule; Take 1 capsule by mouth nightly for 180 days. Will try gabapentin as he also is having some burning and numbness in his feet. Will refer to neurology regarding the paresthesia. Also refer to Rheumatologist.  Follow-up and Dispositions    Return in about 3 months (around 4/20/2023), or if symptoms worsen or fail to improve. Dictated using voice recognition software.  Proofread, but unrecognized errors may exist.

## 2023-01-22 VITALS
HEART RATE: 60 BPM | SYSTOLIC BLOOD PRESSURE: 142 MMHG | RESPIRATION RATE: 16 BRPM | DIASTOLIC BLOOD PRESSURE: 82 MMHG | OXYGEN SATURATION: 96 % | TEMPERATURE: 99 F

## 2023-01-23 ENCOUNTER — HOME CARE VISIT (OUTPATIENT)
Dept: SCHEDULING | Facility: HOME HEALTH | Age: 78
End: 2023-01-23
Payer: MEDICARE

## 2023-01-23 VITALS
TEMPERATURE: 97.8 F | SYSTOLIC BLOOD PRESSURE: 120 MMHG | OXYGEN SATURATION: 97 % | RESPIRATION RATE: 18 BRPM | HEART RATE: 68 BPM | DIASTOLIC BLOOD PRESSURE: 70 MMHG

## 2023-01-23 PROCEDURE — G0299 HHS/HOSPICE OF RN EA 15 MIN: HCPCS

## 2023-01-23 ASSESSMENT — ENCOUNTER SYMPTOMS: HEMOPTYSIS: 0

## 2023-01-25 ENCOUNTER — HOME CARE VISIT (OUTPATIENT)
Dept: SCHEDULING | Facility: HOME HEALTH | Age: 78
End: 2023-01-25
Payer: MEDICARE

## 2023-01-25 VITALS
DIASTOLIC BLOOD PRESSURE: 88 MMHG | TEMPERATURE: 97.8 F | HEART RATE: 62 BPM | OXYGEN SATURATION: 98 % | RESPIRATION RATE: 18 BRPM | SYSTOLIC BLOOD PRESSURE: 136 MMHG

## 2023-01-25 PROCEDURE — G0299 HHS/HOSPICE OF RN EA 15 MIN: HCPCS

## 2023-01-25 ASSESSMENT — ENCOUNTER SYMPTOMS: HEMOPTYSIS: 0

## 2023-01-27 ENCOUNTER — HOME CARE VISIT (OUTPATIENT)
Dept: SCHEDULING | Facility: HOME HEALTH | Age: 78
End: 2023-01-27
Payer: MEDICARE

## 2023-01-30 ENCOUNTER — HOME CARE VISIT (OUTPATIENT)
Dept: SCHEDULING | Facility: HOME HEALTH | Age: 78
End: 2023-01-30
Payer: MEDICARE

## 2023-01-30 VITALS
DIASTOLIC BLOOD PRESSURE: 78 MMHG | RESPIRATION RATE: 18 BRPM | HEART RATE: 61 BPM | SYSTOLIC BLOOD PRESSURE: 130 MMHG | OXYGEN SATURATION: 98 % | TEMPERATURE: 97.9 F

## 2023-01-30 PROCEDURE — G0299 HHS/HOSPICE OF RN EA 15 MIN: HCPCS

## 2023-01-30 ASSESSMENT — ENCOUNTER SYMPTOMS: HEMOPTYSIS: 0

## 2023-02-01 ENCOUNTER — HOME CARE VISIT (OUTPATIENT)
Dept: SCHEDULING | Facility: HOME HEALTH | Age: 78
End: 2023-02-01
Payer: MEDICARE

## 2023-02-01 VITALS
OXYGEN SATURATION: 98 % | TEMPERATURE: 98 F | DIASTOLIC BLOOD PRESSURE: 88 MMHG | RESPIRATION RATE: 18 BRPM | SYSTOLIC BLOOD PRESSURE: 122 MMHG | HEART RATE: 62 BPM

## 2023-02-01 PROCEDURE — G0299 HHS/HOSPICE OF RN EA 15 MIN: HCPCS

## 2023-02-01 ASSESSMENT — ENCOUNTER SYMPTOMS: HEMOPTYSIS: 0

## 2023-02-03 ENCOUNTER — HOME CARE VISIT (OUTPATIENT)
Dept: SCHEDULING | Facility: HOME HEALTH | Age: 78
End: 2023-02-03
Payer: MEDICARE

## 2023-02-06 ENCOUNTER — HOME CARE VISIT (OUTPATIENT)
Dept: SCHEDULING | Facility: HOME HEALTH | Age: 78
End: 2023-02-06
Payer: MEDICARE

## 2023-02-06 VITALS
HEART RATE: 61 BPM | DIASTOLIC BLOOD PRESSURE: 80 MMHG | OXYGEN SATURATION: 98 % | TEMPERATURE: 97.8 F | SYSTOLIC BLOOD PRESSURE: 128 MMHG | RESPIRATION RATE: 18 BRPM

## 2023-02-06 PROCEDURE — G0299 HHS/HOSPICE OF RN EA 15 MIN: HCPCS

## 2023-02-06 ASSESSMENT — ENCOUNTER SYMPTOMS: HEMOPTYSIS: 0

## 2023-02-08 ENCOUNTER — HOME CARE VISIT (OUTPATIENT)
Dept: SCHEDULING | Facility: HOME HEALTH | Age: 78
End: 2023-02-08

## 2023-02-08 VITALS
BODY MASS INDEX: 26.5 KG/M2 | DIASTOLIC BLOOD PRESSURE: 76 MMHG | OXYGEN SATURATION: 98 % | WEIGHT: 190 LBS | SYSTOLIC BLOOD PRESSURE: 110 MMHG | HEART RATE: 64 BPM | RESPIRATION RATE: 18 BRPM | TEMPERATURE: 97.9 F

## 2023-02-08 PROCEDURE — G0299 HHS/HOSPICE OF RN EA 15 MIN: HCPCS

## 2023-02-08 ASSESSMENT — ENCOUNTER SYMPTOMS
PAIN LOCATION - PAIN QUALITY: ACHY
HEMOPTYSIS: 0

## 2023-02-13 ENCOUNTER — HOME CARE VISIT (OUTPATIENT)
Dept: SCHEDULING | Facility: HOME HEALTH | Age: 78
End: 2023-02-13
Payer: MEDICARE

## 2023-02-13 VITALS
SYSTOLIC BLOOD PRESSURE: 118 MMHG | DIASTOLIC BLOOD PRESSURE: 80 MMHG | HEART RATE: 66 BPM | RESPIRATION RATE: 18 BRPM | TEMPERATURE: 97.8 F | OXYGEN SATURATION: 97 %

## 2023-02-13 PROCEDURE — G0299 HHS/HOSPICE OF RN EA 15 MIN: HCPCS

## 2023-02-13 ASSESSMENT — ENCOUNTER SYMPTOMS: HEMOPTYSIS: 0

## 2023-02-15 ENCOUNTER — HOME CARE VISIT (OUTPATIENT)
Dept: SCHEDULING | Facility: HOME HEALTH | Age: 78
End: 2023-02-15
Payer: MEDICARE

## 2023-02-15 VITALS
RESPIRATION RATE: 18 BRPM | DIASTOLIC BLOOD PRESSURE: 78 MMHG | HEART RATE: 69 BPM | OXYGEN SATURATION: 98 % | TEMPERATURE: 97.8 F | SYSTOLIC BLOOD PRESSURE: 126 MMHG

## 2023-02-15 PROCEDURE — G0299 HHS/HOSPICE OF RN EA 15 MIN: HCPCS

## 2023-02-15 ASSESSMENT — ENCOUNTER SYMPTOMS: HEMOPTYSIS: 0

## 2023-02-17 ENCOUNTER — HOME CARE VISIT (OUTPATIENT)
Dept: SCHEDULING | Facility: HOME HEALTH | Age: 78
End: 2023-02-17
Payer: MEDICARE

## 2023-02-17 VITALS
TEMPERATURE: 98.6 F | HEART RATE: 64 BPM | SYSTOLIC BLOOD PRESSURE: 138 MMHG | OXYGEN SATURATION: 97 % | RESPIRATION RATE: 18 BRPM | DIASTOLIC BLOOD PRESSURE: 90 MMHG

## 2023-02-17 PROCEDURE — G0299 HHS/HOSPICE OF RN EA 15 MIN: HCPCS

## 2023-02-17 ASSESSMENT — ENCOUNTER SYMPTOMS: HEMOPTYSIS: 0

## 2023-02-20 ENCOUNTER — HOME CARE VISIT (OUTPATIENT)
Dept: SCHEDULING | Facility: HOME HEALTH | Age: 78
End: 2023-02-20
Payer: MEDICARE

## 2023-02-20 VITALS
OXYGEN SATURATION: 98 % | RESPIRATION RATE: 18 BRPM | TEMPERATURE: 98.6 F | DIASTOLIC BLOOD PRESSURE: 80 MMHG | HEART RATE: 69 BPM | SYSTOLIC BLOOD PRESSURE: 128 MMHG

## 2023-02-20 PROCEDURE — G0299 HHS/HOSPICE OF RN EA 15 MIN: HCPCS

## 2023-02-20 ASSESSMENT — ENCOUNTER SYMPTOMS: HEMOPTYSIS: 0

## 2023-02-22 ENCOUNTER — HOME CARE VISIT (OUTPATIENT)
Dept: SCHEDULING | Facility: HOME HEALTH | Age: 78
End: 2023-02-22
Payer: MEDICARE

## 2023-02-22 VITALS
SYSTOLIC BLOOD PRESSURE: 120 MMHG | HEART RATE: 72 BPM | RESPIRATION RATE: 18 BRPM | TEMPERATURE: 99 F | OXYGEN SATURATION: 98 % | DIASTOLIC BLOOD PRESSURE: 80 MMHG

## 2023-02-22 PROCEDURE — G0299 HHS/HOSPICE OF RN EA 15 MIN: HCPCS

## 2023-02-22 ASSESSMENT — ENCOUNTER SYMPTOMS: HEMOPTYSIS: 0

## 2023-02-24 ENCOUNTER — HOME CARE VISIT (OUTPATIENT)
Dept: SCHEDULING | Facility: HOME HEALTH | Age: 78
End: 2023-02-24
Payer: MEDICARE

## 2023-02-27 ENCOUNTER — HOME CARE VISIT (OUTPATIENT)
Dept: SCHEDULING | Facility: HOME HEALTH | Age: 78
End: 2023-02-27
Payer: MEDICARE

## 2023-02-27 VITALS
HEART RATE: 63 BPM | DIASTOLIC BLOOD PRESSURE: 80 MMHG | SYSTOLIC BLOOD PRESSURE: 126 MMHG | OXYGEN SATURATION: 98 % | TEMPERATURE: 99 F | RESPIRATION RATE: 18 BRPM

## 2023-02-27 PROCEDURE — G0299 HHS/HOSPICE OF RN EA 15 MIN: HCPCS

## 2023-02-27 ASSESSMENT — ENCOUNTER SYMPTOMS: HEMOPTYSIS: 0

## 2023-03-01 ENCOUNTER — HOME CARE VISIT (OUTPATIENT)
Dept: SCHEDULING | Facility: HOME HEALTH | Age: 78
End: 2023-03-01
Payer: MEDICARE

## 2023-03-01 VITALS
OXYGEN SATURATION: 98 % | TEMPERATURE: 99 F | HEART RATE: 65 BPM | DIASTOLIC BLOOD PRESSURE: 90 MMHG | SYSTOLIC BLOOD PRESSURE: 154 MMHG | RESPIRATION RATE: 18 BRPM

## 2023-03-01 PROCEDURE — G0299 HHS/HOSPICE OF RN EA 15 MIN: HCPCS

## 2023-03-01 ASSESSMENT — ENCOUNTER SYMPTOMS: HEMOPTYSIS: 0

## 2023-03-03 ENCOUNTER — HOME CARE VISIT (OUTPATIENT)
Dept: SCHEDULING | Facility: HOME HEALTH | Age: 78
End: 2023-03-03
Payer: MEDICARE

## 2023-03-03 VITALS
RESPIRATION RATE: 18 BRPM | DIASTOLIC BLOOD PRESSURE: 80 MMHG | HEART RATE: 60 BPM | OXYGEN SATURATION: 98 % | SYSTOLIC BLOOD PRESSURE: 148 MMHG | TEMPERATURE: 99 F

## 2023-03-03 PROCEDURE — G0299 HHS/HOSPICE OF RN EA 15 MIN: HCPCS

## 2023-03-03 ASSESSMENT — ENCOUNTER SYMPTOMS: HEMOPTYSIS: 0

## 2023-03-06 ENCOUNTER — HOME CARE VISIT (OUTPATIENT)
Dept: SCHEDULING | Facility: HOME HEALTH | Age: 78
End: 2023-03-06
Payer: MEDICARE

## 2023-03-06 VITALS
OXYGEN SATURATION: 98 % | SYSTOLIC BLOOD PRESSURE: 140 MMHG | DIASTOLIC BLOOD PRESSURE: 78 MMHG | RESPIRATION RATE: 18 BRPM | HEART RATE: 61 BPM | TEMPERATURE: 99 F

## 2023-03-06 PROCEDURE — G0299 HHS/HOSPICE OF RN EA 15 MIN: HCPCS

## 2023-03-06 RX ORDER — CELECOXIB 200 MG/1
200 CAPSULE ORAL DAILY
Qty: 90 CAPSULE | Refills: 0 | Status: SHIPPED | OUTPATIENT
Start: 2023-03-06

## 2023-03-06 ASSESSMENT — ENCOUNTER SYMPTOMS: HEMOPTYSIS: 0

## 2023-03-08 ENCOUNTER — HOME CARE VISIT (OUTPATIENT)
Dept: SCHEDULING | Facility: HOME HEALTH | Age: 78
End: 2023-03-08
Payer: MEDICARE

## 2023-03-08 VITALS
DIASTOLIC BLOOD PRESSURE: 88 MMHG | TEMPERATURE: 98.7 F | SYSTOLIC BLOOD PRESSURE: 132 MMHG | RESPIRATION RATE: 18 BRPM | HEART RATE: 58 BPM | OXYGEN SATURATION: 97 %

## 2023-03-08 PROCEDURE — G0299 HHS/HOSPICE OF RN EA 15 MIN: HCPCS

## 2023-03-08 ASSESSMENT — ENCOUNTER SYMPTOMS: HEMOPTYSIS: 0

## 2023-03-10 ENCOUNTER — HOME CARE VISIT (OUTPATIENT)
Dept: SCHEDULING | Facility: HOME HEALTH | Age: 78
End: 2023-03-10
Payer: MEDICARE

## 2023-03-13 ENCOUNTER — HOME CARE VISIT (OUTPATIENT)
Dept: SCHEDULING | Facility: HOME HEALTH | Age: 78
End: 2023-03-13
Payer: MEDICARE

## 2023-03-13 VITALS
DIASTOLIC BLOOD PRESSURE: 84 MMHG | SYSTOLIC BLOOD PRESSURE: 150 MMHG | HEART RATE: 68 BPM | TEMPERATURE: 98.9 F | RESPIRATION RATE: 18 BRPM | OXYGEN SATURATION: 98 %

## 2023-03-13 PROCEDURE — G0299 HHS/HOSPICE OF RN EA 15 MIN: HCPCS

## 2023-03-13 ASSESSMENT — ENCOUNTER SYMPTOMS: HEMOPTYSIS: 0

## 2023-03-15 ENCOUNTER — HOME CARE VISIT (OUTPATIENT)
Dept: SCHEDULING | Facility: HOME HEALTH | Age: 78
End: 2023-03-15
Payer: MEDICARE

## 2023-03-15 VITALS
OXYGEN SATURATION: 98 % | HEART RATE: 68 BPM | DIASTOLIC BLOOD PRESSURE: 80 MMHG | SYSTOLIC BLOOD PRESSURE: 128 MMHG | TEMPERATURE: 99.4 F | RESPIRATION RATE: 18 BRPM

## 2023-03-15 PROCEDURE — G0299 HHS/HOSPICE OF RN EA 15 MIN: HCPCS

## 2023-03-15 ASSESSMENT — ENCOUNTER SYMPTOMS: HEMOPTYSIS: 0

## 2023-03-20 ENCOUNTER — HOME CARE VISIT (OUTPATIENT)
Dept: SCHEDULING | Facility: HOME HEALTH | Age: 78
End: 2023-03-20
Payer: MEDICARE

## 2023-03-20 VITALS
TEMPERATURE: 98.9 F | HEART RATE: 67 BPM | OXYGEN SATURATION: 97 % | RESPIRATION RATE: 15 BRPM | SYSTOLIC BLOOD PRESSURE: 120 MMHG | DIASTOLIC BLOOD PRESSURE: 76 MMHG

## 2023-03-20 PROCEDURE — G0299 HHS/HOSPICE OF RN EA 15 MIN: HCPCS

## 2023-03-21 ENCOUNTER — TELEPHONE (OUTPATIENT)
Dept: FAMILY MEDICINE CLINIC | Facility: CLINIC | Age: 78
End: 2023-03-21

## 2023-03-21 RX ORDER — GABAPENTIN 300 MG/1
600 CAPSULE ORAL NIGHTLY
Qty: 180 CAPSULE | Refills: 5 | Status: SHIPPED | OUTPATIENT
Start: 2023-03-21 | End: 2023-09-17

## 2023-03-21 NOTE — TELEPHONE ENCOUNTER
Patient would like to know if he can take 2 gabapentin every night instead of 1 He is still having a lot of burning in his foot     and he would also like to switch pharmacies to [facility] in [place]

## 2023-03-21 NOTE — TELEPHONE ENCOUNTER
Sent in prescription for:     Requested Prescriptions     Signed Prescriptions Disp Refills    gabapentin (NEURONTIN) 300 MG capsule 180 capsule 5     Sig: Take 2 capsules by mouth at bedtime for 180 days.      Authorizing Provider: Hira Catalan

## 2023-03-22 ENCOUNTER — HOME CARE VISIT (OUTPATIENT)
Dept: SCHEDULING | Facility: HOME HEALTH | Age: 78
End: 2023-03-22
Payer: MEDICARE

## 2023-03-22 VITALS
SYSTOLIC BLOOD PRESSURE: 140 MMHG | DIASTOLIC BLOOD PRESSURE: 82 MMHG | HEART RATE: 66 BPM | OXYGEN SATURATION: 98 % | TEMPERATURE: 97.2 F | RESPIRATION RATE: 15 BRPM

## 2023-03-22 PROCEDURE — G0299 HHS/HOSPICE OF RN EA 15 MIN: HCPCS

## 2023-03-24 ENCOUNTER — HOME CARE VISIT (OUTPATIENT)
Dept: SCHEDULING | Facility: HOME HEALTH | Age: 78
End: 2023-03-24
Payer: MEDICARE

## 2023-03-24 VITALS
SYSTOLIC BLOOD PRESSURE: 120 MMHG | HEART RATE: 74 BPM | RESPIRATION RATE: 16 BRPM | DIASTOLIC BLOOD PRESSURE: 68 MMHG | OXYGEN SATURATION: 96 % | TEMPERATURE: 98.2 F

## 2023-03-24 PROCEDURE — G0299 HHS/HOSPICE OF RN EA 15 MIN: HCPCS

## 2023-03-27 ENCOUNTER — HOME CARE VISIT (OUTPATIENT)
Dept: SCHEDULING | Facility: HOME HEALTH | Age: 78
End: 2023-03-27
Payer: MEDICARE

## 2023-03-27 VITALS
HEART RATE: 68 BPM | OXYGEN SATURATION: 98 % | DIASTOLIC BLOOD PRESSURE: 80 MMHG | SYSTOLIC BLOOD PRESSURE: 126 MMHG | TEMPERATURE: 98.8 F | RESPIRATION RATE: 18 BRPM

## 2023-03-27 PROCEDURE — G0299 HHS/HOSPICE OF RN EA 15 MIN: HCPCS

## 2023-03-27 ASSESSMENT — ENCOUNTER SYMPTOMS: HEMOPTYSIS: 0

## 2023-03-29 ENCOUNTER — HOME CARE VISIT (OUTPATIENT)
Dept: SCHEDULING | Facility: HOME HEALTH | Age: 78
End: 2023-03-29
Payer: MEDICARE

## 2023-03-29 VITALS
TEMPERATURE: 97.6 F | OXYGEN SATURATION: 98 % | SYSTOLIC BLOOD PRESSURE: 110 MMHG | HEART RATE: 64 BPM | DIASTOLIC BLOOD PRESSURE: 64 MMHG | RESPIRATION RATE: 18 BRPM

## 2023-03-29 PROCEDURE — G0299 HHS/HOSPICE OF RN EA 15 MIN: HCPCS

## 2023-03-29 ASSESSMENT — ENCOUNTER SYMPTOMS: HEMOPTYSIS: 0

## 2023-03-31 ENCOUNTER — HOME CARE VISIT (OUTPATIENT)
Dept: SCHEDULING | Facility: HOME HEALTH | Age: 78
End: 2023-03-31
Payer: MEDICARE

## 2023-04-01 ENCOUNTER — HOME CARE VISIT (OUTPATIENT)
Dept: SCHEDULING | Facility: HOME HEALTH | Age: 78
End: 2023-04-01
Payer: MEDICARE

## 2023-04-01 VITALS
TEMPERATURE: 98.6 F | DIASTOLIC BLOOD PRESSURE: 80 MMHG | RESPIRATION RATE: 18 BRPM | SYSTOLIC BLOOD PRESSURE: 126 MMHG | HEART RATE: 70 BPM | OXYGEN SATURATION: 96 %

## 2023-04-01 PROCEDURE — G0299 HHS/HOSPICE OF RN EA 15 MIN: HCPCS

## 2023-04-03 ENCOUNTER — OFFICE VISIT (OUTPATIENT)
Dept: FAMILY MEDICINE CLINIC | Facility: CLINIC | Age: 78
End: 2023-04-03
Payer: MEDICARE

## 2023-04-03 ENCOUNTER — HOME CARE VISIT (OUTPATIENT)
Dept: SCHEDULING | Facility: HOME HEALTH | Age: 78
End: 2023-04-03
Payer: MEDICARE

## 2023-04-03 VITALS
TEMPERATURE: 98.7 F | WEIGHT: 194 LBS | RESPIRATION RATE: 16 BRPM | SYSTOLIC BLOOD PRESSURE: 139 MMHG | HEIGHT: 71 IN | DIASTOLIC BLOOD PRESSURE: 68 MMHG | OXYGEN SATURATION: 100 % | BODY MASS INDEX: 27.16 KG/M2 | HEART RATE: 45 BPM

## 2023-04-03 VITALS
HEART RATE: 65 BPM | SYSTOLIC BLOOD PRESSURE: 104 MMHG | DIASTOLIC BLOOD PRESSURE: 68 MMHG | OXYGEN SATURATION: 99 % | RESPIRATION RATE: 18 BRPM | TEMPERATURE: 98.3 F

## 2023-04-03 DIAGNOSIS — F41.9 ANXIETY DISORDER, UNSPECIFIED TYPE: ICD-10-CM

## 2023-04-03 DIAGNOSIS — M17.0 BILATERAL PRIMARY OSTEOARTHRITIS OF KNEE: Primary | ICD-10-CM

## 2023-04-03 DIAGNOSIS — E78.2 MIXED HYPERLIPIDEMIA: ICD-10-CM

## 2023-04-03 DIAGNOSIS — R20.2 PARESTHESIA: ICD-10-CM

## 2023-04-03 DIAGNOSIS — I10 ESSENTIAL HYPERTENSION: ICD-10-CM

## 2023-04-03 PROCEDURE — G8427 DOCREV CUR MEDS BY ELIG CLIN: HCPCS | Performed by: FAMILY MEDICINE

## 2023-04-03 PROCEDURE — G8417 CALC BMI ABV UP PARAM F/U: HCPCS | Performed by: FAMILY MEDICINE

## 2023-04-03 PROCEDURE — 3074F SYST BP LT 130 MM HG: CPT | Performed by: FAMILY MEDICINE

## 2023-04-03 PROCEDURE — 1123F ACP DISCUSS/DSCN MKR DOCD: CPT | Performed by: FAMILY MEDICINE

## 2023-04-03 PROCEDURE — 1036F TOBACCO NON-USER: CPT | Performed by: FAMILY MEDICINE

## 2023-04-03 PROCEDURE — 99213 OFFICE O/P EST LOW 20 MIN: CPT | Performed by: FAMILY MEDICINE

## 2023-04-03 PROCEDURE — 3078F DIAST BP <80 MM HG: CPT | Performed by: FAMILY MEDICINE

## 2023-04-03 PROCEDURE — G0299 HHS/HOSPICE OF RN EA 15 MIN: HCPCS

## 2023-04-03 RX ORDER — GABAPENTIN 800 MG/1
800 TABLET ORAL EVERY EVENING
Qty: 90 TABLET | Refills: 3 | Status: SHIPPED | OUTPATIENT
Start: 2023-04-03 | End: 2024-03-28

## 2023-04-03 RX ORDER — KETOCONAZOLE 20 MG/G
CREAM TOPICAL
Qty: 60 G | Refills: 3 | Status: SHIPPED | OUTPATIENT
Start: 2023-04-03

## 2023-04-03 RX ORDER — KETOCONAZOLE 20 MG/ML
SHAMPOO TOPICAL
Qty: 120 ML | Refills: 3 | Status: SHIPPED | OUTPATIENT
Start: 2023-04-03

## 2023-04-03 RX ORDER — LORAZEPAM 1 MG/1
1 TABLET ORAL 2 TIMES DAILY
Qty: 60 TABLET | Refills: 5 | Status: SHIPPED | OUTPATIENT
Start: 2023-04-03 | End: 2023-09-30

## 2023-04-03 SDOH — ECONOMIC STABILITY: FOOD INSECURITY: WITHIN THE PAST 12 MONTHS, THE FOOD YOU BOUGHT JUST DIDN'T LAST AND YOU DIDN'T HAVE MONEY TO GET MORE.: NEVER TRUE

## 2023-04-03 SDOH — ECONOMIC STABILITY: INCOME INSECURITY: HOW HARD IS IT FOR YOU TO PAY FOR THE VERY BASICS LIKE FOOD, HOUSING, MEDICAL CARE, AND HEATING?: NOT HARD AT ALL

## 2023-04-03 SDOH — ECONOMIC STABILITY: HOUSING INSECURITY
IN THE LAST 12 MONTHS, WAS THERE A TIME WHEN YOU DID NOT HAVE A STEADY PLACE TO SLEEP OR SLEPT IN A SHELTER (INCLUDING NOW)?: NO

## 2023-04-03 SDOH — ECONOMIC STABILITY: FOOD INSECURITY: WITHIN THE PAST 12 MONTHS, YOU WORRIED THAT YOUR FOOD WOULD RUN OUT BEFORE YOU GOT MONEY TO BUY MORE.: NEVER TRUE

## 2023-04-03 ASSESSMENT — PATIENT HEALTH QUESTIONNAIRE - PHQ9
1. LITTLE INTEREST OR PLEASURE IN DOING THINGS: 0
7. TROUBLE CONCENTRATING ON THINGS, SUCH AS READING THE NEWSPAPER OR WATCHING TELEVISION: 0
10. IF YOU CHECKED OFF ANY PROBLEMS, HOW DIFFICULT HAVE THESE PROBLEMS MADE IT FOR YOU TO DO YOUR WORK, TAKE CARE OF THINGS AT HOME, OR GET ALONG WITH OTHER PEOPLE: 0
SUM OF ALL RESPONSES TO PHQ QUESTIONS 1-9: 0
SUM OF ALL RESPONSES TO PHQ QUESTIONS 1-9: 0
4. FEELING TIRED OR HAVING LITTLE ENERGY: 0
9. THOUGHTS THAT YOU WOULD BE BETTER OFF DEAD, OR OF HURTING YOURSELF: 0
6. FEELING BAD ABOUT YOURSELF - OR THAT YOU ARE A FAILURE OR HAVE LET YOURSELF OR YOUR FAMILY DOWN: 0
5. POOR APPETITE OR OVEREATING: 0
8. MOVING OR SPEAKING SO SLOWLY THAT OTHER PEOPLE COULD HAVE NOTICED. OR THE OPPOSITE, BEING SO FIGETY OR RESTLESS THAT YOU HAVE BEEN MOVING AROUND A LOT MORE THAN USUAL: 0
SUM OF ALL RESPONSES TO PHQ QUESTIONS 1-9: 0
SUM OF ALL RESPONSES TO PHQ9 QUESTIONS 1 & 2: 0
3. TROUBLE FALLING OR STAYING ASLEEP: 0
2. FEELING DOWN, DEPRESSED OR HOPELESS: 0
SUM OF ALL RESPONSES TO PHQ QUESTIONS 1-9: 0

## 2023-04-03 ASSESSMENT — ENCOUNTER SYMPTOMS: HEMOPTYSIS: 0

## 2023-04-04 NOTE — PROGRESS NOTES
until moistened. close eyes and gently apply to eyelids and eyelashes using side to side motions. use twice daily. Calcium Carbonate Antacid (TUMS ULTRA 1000) 1000 MG CHEW Take 1 g by mouth daily as needed (heartburn). metoprolol succinate (TOPROL XL) 25 MG extended release tablet Take 0.5 tablets by mouth in the morning. (Patient taking differently: Take 0.5 tablets by mouth nightly) 90 tablet 3    acetaminophen (TYLENOL) 650 MG extended release tablet Take 1 tablet by mouth every 8 hours as needed for Pain      Polyethyl Glycol-Propyl Glycol (SYSTANE OP) Place 1 drop in ear(s) 4 times daily. Misc. Devices (ROLLATOR ULTRA-LIGHT) MISC Use daily as needed(R26.81) Gait instability, (M15.9) Generalized osteoarthritis 1 each 0    loperamide (IMODIUM) 2 MG capsule Take 1 capsule by mouth as needed for Diarrhea 2 caplets after the first loose stool; 1 caplet after each subsequent loose stool; max dose 4 cap in 24 hours      meclizine (ANTIVERT) 25 MG tablet Take 1 tablet by mouth 3 times daily as needed for Dizziness      VITAMIN A PO Take 2,400 mcg by mouth daily. Cyanocobalamin (VITAMIN B 12 PO) Take 500 mcg by mouth daily. oxymetazoline (AFRIN) 0.05 % nasal spray 1 spray by Nasal route as needed for Congestion daily as needed for congestion      DULoxetine (CYMBALTA) 60 MG extended release capsule Take 1 capsule by mouth 2 times daily      hydrocortisone (HYTONE) 2.5 % lotion Apply 1 Dose topically daily apply to scalp      ketoconazole (NIZORAL) 2 % shampoo Apply 1 Dose topically daily      triamcinolone (KENALOG) 0.1 % cream Apply 1 Dose topically 2 times daily Apply to periwound       No current facility-administered medications for this visit.        Social History     Tobacco Use    Smoking status: Former    Smokeless tobacco: Never    Tobacco comments:     Quit smoking: pt states quit in early 60's late 50's   Substance Use Topics    Alcohol use: No     Alcohol/week: 0.0 standard drinks

## 2023-04-05 ENCOUNTER — HOME CARE VISIT (OUTPATIENT)
Dept: SCHEDULING | Facility: HOME HEALTH | Age: 78
End: 2023-04-05
Payer: MEDICARE

## 2023-04-05 VITALS
TEMPERATURE: 98.9 F | HEART RATE: 66 BPM | RESPIRATION RATE: 18 BRPM | DIASTOLIC BLOOD PRESSURE: 78 MMHG | SYSTOLIC BLOOD PRESSURE: 128 MMHG | OXYGEN SATURATION: 99 %

## 2023-04-05 PROCEDURE — G0299 HHS/HOSPICE OF RN EA 15 MIN: HCPCS

## 2023-04-05 ASSESSMENT — ENCOUNTER SYMPTOMS: HEMOPTYSIS: 0

## 2023-04-07 ENCOUNTER — HOME CARE VISIT (OUTPATIENT)
Dept: SCHEDULING | Facility: HOME HEALTH | Age: 78
End: 2023-04-07
Payer: MEDICARE

## 2023-04-07 VITALS
RESPIRATION RATE: 18 BRPM | TEMPERATURE: 98.8 F | OXYGEN SATURATION: 97 % | HEART RATE: 67 BPM | SYSTOLIC BLOOD PRESSURE: 120 MMHG | DIASTOLIC BLOOD PRESSURE: 78 MMHG

## 2023-04-07 PROCEDURE — G0299 HHS/HOSPICE OF RN EA 15 MIN: HCPCS

## 2023-04-07 ASSESSMENT — ENCOUNTER SYMPTOMS: HEMOPTYSIS: 0

## 2023-04-17 ENCOUNTER — HOME CARE VISIT (OUTPATIENT)
Dept: SCHEDULING | Facility: HOME HEALTH | Age: 78
End: 2023-04-17
Payer: MEDICARE

## 2023-04-17 VITALS
TEMPERATURE: 98.4 F | SYSTOLIC BLOOD PRESSURE: 118 MMHG | HEART RATE: 73 BPM | OXYGEN SATURATION: 98 % | RESPIRATION RATE: 18 BRPM | DIASTOLIC BLOOD PRESSURE: 74 MMHG

## 2023-04-17 PROCEDURE — G0299 HHS/HOSPICE OF RN EA 15 MIN: HCPCS

## 2023-04-17 ASSESSMENT — ENCOUNTER SYMPTOMS: HEMOPTYSIS: 0

## 2023-04-19 ENCOUNTER — HOME CARE VISIT (OUTPATIENT)
Dept: SCHEDULING | Facility: HOME HEALTH | Age: 78
End: 2023-04-19
Payer: MEDICARE

## 2023-04-19 VITALS
TEMPERATURE: 99.2 F | SYSTOLIC BLOOD PRESSURE: 110 MMHG | HEART RATE: 71 BPM | OXYGEN SATURATION: 98 % | DIASTOLIC BLOOD PRESSURE: 66 MMHG | RESPIRATION RATE: 18 BRPM

## 2023-04-19 PROCEDURE — G0299 HHS/HOSPICE OF RN EA 15 MIN: HCPCS

## 2023-04-19 ASSESSMENT — ENCOUNTER SYMPTOMS: HEMOPTYSIS: 0

## 2023-04-21 ENCOUNTER — HOME CARE VISIT (OUTPATIENT)
Dept: SCHEDULING | Facility: HOME HEALTH | Age: 78
End: 2023-04-21
Payer: MEDICARE

## 2023-04-24 ENCOUNTER — HOME CARE VISIT (OUTPATIENT)
Dept: SCHEDULING | Facility: HOME HEALTH | Age: 78
End: 2023-04-24
Payer: MEDICARE

## 2023-04-24 VITALS
DIASTOLIC BLOOD PRESSURE: 74 MMHG | HEART RATE: 57 BPM | OXYGEN SATURATION: 98 % | SYSTOLIC BLOOD PRESSURE: 108 MMHG | RESPIRATION RATE: 18 BRPM | TEMPERATURE: 98.3 F

## 2023-04-24 PROCEDURE — G0299 HHS/HOSPICE OF RN EA 15 MIN: HCPCS

## 2023-04-24 ASSESSMENT — ENCOUNTER SYMPTOMS: HEMOPTYSIS: 0

## 2023-04-25 RX ORDER — CELECOXIB 200 MG/1
200 CAPSULE ORAL DAILY
Qty: 90 CAPSULE | Refills: 0 | Status: SHIPPED | OUTPATIENT
Start: 2023-04-25

## 2023-04-25 NOTE — TELEPHONE ENCOUNTER
Needs a refill on celecoxib 200 mg capsules take 1 a day by mouth Is not using [facility] any more call into [facility] in [place] # [phone]

## 2023-04-26 ENCOUNTER — HOME CARE VISIT (OUTPATIENT)
Dept: SCHEDULING | Facility: HOME HEALTH | Age: 78
End: 2023-04-26
Payer: MEDICARE

## 2023-04-26 VITALS
SYSTOLIC BLOOD PRESSURE: 110 MMHG | TEMPERATURE: 98.9 F | DIASTOLIC BLOOD PRESSURE: 70 MMHG | RESPIRATION RATE: 18 BRPM | OXYGEN SATURATION: 98 % | HEART RATE: 63 BPM

## 2023-04-26 PROCEDURE — G0299 HHS/HOSPICE OF RN EA 15 MIN: HCPCS

## 2023-04-26 ASSESSMENT — ENCOUNTER SYMPTOMS: HEMOPTYSIS: 0

## 2023-04-28 ENCOUNTER — HOME CARE VISIT (OUTPATIENT)
Dept: SCHEDULING | Facility: HOME HEALTH | Age: 78
End: 2023-04-28
Payer: MEDICARE

## 2023-04-28 VITALS
DIASTOLIC BLOOD PRESSURE: 72 MMHG | HEART RATE: 63 BPM | RESPIRATION RATE: 18 BRPM | OXYGEN SATURATION: 96 % | TEMPERATURE: 98.1 F | SYSTOLIC BLOOD PRESSURE: 118 MMHG

## 2023-04-28 PROCEDURE — G0299 HHS/HOSPICE OF RN EA 15 MIN: HCPCS

## 2023-04-28 ASSESSMENT — ENCOUNTER SYMPTOMS: HEMOPTYSIS: 0

## 2023-05-01 ENCOUNTER — HOME CARE VISIT (OUTPATIENT)
Dept: SCHEDULING | Facility: HOME HEALTH | Age: 78
End: 2023-05-01
Payer: MEDICARE

## 2023-05-01 VITALS
HEART RATE: 66 BPM | TEMPERATURE: 98.9 F | SYSTOLIC BLOOD PRESSURE: 132 MMHG | OXYGEN SATURATION: 98 % | DIASTOLIC BLOOD PRESSURE: 78 MMHG | RESPIRATION RATE: 18 BRPM

## 2023-05-01 PROCEDURE — G0299 HHS/HOSPICE OF RN EA 15 MIN: HCPCS

## 2023-05-01 ASSESSMENT — ENCOUNTER SYMPTOMS: HEMOPTYSIS: 0

## 2023-05-03 ENCOUNTER — HOME CARE VISIT (OUTPATIENT)
Dept: SCHEDULING | Facility: HOME HEALTH | Age: 78
End: 2023-05-03
Payer: MEDICARE

## 2023-05-03 VITALS
SYSTOLIC BLOOD PRESSURE: 136 MMHG | DIASTOLIC BLOOD PRESSURE: 84 MMHG | HEART RATE: 56 BPM | OXYGEN SATURATION: 98 % | RESPIRATION RATE: 18 BRPM | TEMPERATURE: 98.6 F

## 2023-05-03 PROCEDURE — G0299 HHS/HOSPICE OF RN EA 15 MIN: HCPCS

## 2023-05-03 ASSESSMENT — ENCOUNTER SYMPTOMS: HEMOPTYSIS: 0

## 2023-05-05 ENCOUNTER — HOME CARE VISIT (OUTPATIENT)
Dept: SCHEDULING | Facility: HOME HEALTH | Age: 78
End: 2023-05-05
Payer: MEDICARE

## 2023-05-08 ENCOUNTER — HOME CARE VISIT (OUTPATIENT)
Dept: SCHEDULING | Facility: HOME HEALTH | Age: 78
End: 2023-05-08
Payer: MEDICARE

## 2023-05-08 VITALS
RESPIRATION RATE: 18 BRPM | HEART RATE: 70 BPM | DIASTOLIC BLOOD PRESSURE: 88 MMHG | OXYGEN SATURATION: 98 % | SYSTOLIC BLOOD PRESSURE: 140 MMHG | TEMPERATURE: 98.6 F

## 2023-05-08 PROCEDURE — G0299 HHS/HOSPICE OF RN EA 15 MIN: HCPCS

## 2023-05-08 ASSESSMENT — ENCOUNTER SYMPTOMS: HEMOPTYSIS: 0

## 2023-05-10 ENCOUNTER — HOME CARE VISIT (OUTPATIENT)
Dept: SCHEDULING | Facility: HOME HEALTH | Age: 78
End: 2023-05-10
Payer: MEDICARE

## 2023-05-10 VITALS
DIASTOLIC BLOOD PRESSURE: 70 MMHG | OXYGEN SATURATION: 98 % | SYSTOLIC BLOOD PRESSURE: 120 MMHG | TEMPERATURE: 98.7 F | RESPIRATION RATE: 18 BRPM | HEART RATE: 62 BPM

## 2023-05-10 PROCEDURE — G0299 HHS/HOSPICE OF RN EA 15 MIN: HCPCS

## 2023-05-10 ASSESSMENT — ENCOUNTER SYMPTOMS: HEMOPTYSIS: 0

## 2023-05-12 ENCOUNTER — HOME CARE VISIT (OUTPATIENT)
Dept: SCHEDULING | Facility: HOME HEALTH | Age: 78
End: 2023-05-12
Payer: MEDICARE

## 2023-05-12 VITALS
TEMPERATURE: 98.9 F | SYSTOLIC BLOOD PRESSURE: 120 MMHG | DIASTOLIC BLOOD PRESSURE: 68 MMHG | HEART RATE: 73 BPM | OXYGEN SATURATION: 98 % | RESPIRATION RATE: 18 BRPM

## 2023-05-12 PROCEDURE — G0299 HHS/HOSPICE OF RN EA 15 MIN: HCPCS

## 2023-05-12 ASSESSMENT — ENCOUNTER SYMPTOMS: HEMOPTYSIS: 0

## 2023-05-15 ENCOUNTER — HOME CARE VISIT (OUTPATIENT)
Dept: SCHEDULING | Facility: HOME HEALTH | Age: 78
End: 2023-05-15
Payer: MEDICARE

## 2023-05-15 VITALS
TEMPERATURE: 98.6 F | SYSTOLIC BLOOD PRESSURE: 134 MMHG | HEART RATE: 69 BPM | OXYGEN SATURATION: 99 % | DIASTOLIC BLOOD PRESSURE: 80 MMHG | RESPIRATION RATE: 18 BRPM

## 2023-05-15 PROCEDURE — G0299 HHS/HOSPICE OF RN EA 15 MIN: HCPCS

## 2023-05-15 ASSESSMENT — ENCOUNTER SYMPTOMS: HEMOPTYSIS: 0

## 2023-05-17 ENCOUNTER — HOME CARE VISIT (OUTPATIENT)
Dept: SCHEDULING | Facility: HOME HEALTH | Age: 78
End: 2023-05-17
Payer: MEDICARE

## 2023-05-17 VITALS
HEART RATE: 62 BPM | OXYGEN SATURATION: 98 % | DIASTOLIC BLOOD PRESSURE: 68 MMHG | SYSTOLIC BLOOD PRESSURE: 110 MMHG | RESPIRATION RATE: 18 BRPM | TEMPERATURE: 98.7 F

## 2023-05-17 PROCEDURE — G0299 HHS/HOSPICE OF RN EA 15 MIN: HCPCS

## 2023-05-17 ASSESSMENT — ENCOUNTER SYMPTOMS: HEMOPTYSIS: 0

## 2023-05-19 ENCOUNTER — HOME CARE VISIT (OUTPATIENT)
Dept: SCHEDULING | Facility: HOME HEALTH | Age: 78
End: 2023-05-19
Payer: MEDICARE

## 2023-05-19 ENCOUNTER — OFFICE VISIT (OUTPATIENT)
Age: 78
End: 2023-05-19
Payer: MEDICARE

## 2023-05-19 VITALS
BODY MASS INDEX: 27.44 KG/M2 | WEIGHT: 196 LBS | HEART RATE: 70 BPM | DIASTOLIC BLOOD PRESSURE: 80 MMHG | SYSTOLIC BLOOD PRESSURE: 130 MMHG | HEIGHT: 71 IN

## 2023-05-19 DIAGNOSIS — I34.0 NONRHEUMATIC MITRAL VALVE REGURGITATION: ICD-10-CM

## 2023-05-19 DIAGNOSIS — I45.89 CHRONOTROPIC INCOMPETENCE: ICD-10-CM

## 2023-05-19 DIAGNOSIS — I77.9 AORTA DISORDER (HCC): ICD-10-CM

## 2023-05-19 DIAGNOSIS — E78.5 HYPERLIPIDEMIA LDL GOAL <70: ICD-10-CM

## 2023-05-19 DIAGNOSIS — R06.09 DOE (DYSPNEA ON EXERTION): Primary | ICD-10-CM

## 2023-05-19 PROCEDURE — 1123F ACP DISCUSS/DSCN MKR DOCD: CPT | Performed by: INTERNAL MEDICINE

## 2023-05-19 PROCEDURE — 99214 OFFICE O/P EST MOD 30 MIN: CPT | Performed by: INTERNAL MEDICINE

## 2023-05-19 ASSESSMENT — ENCOUNTER SYMPTOMS
NAIL CHANGES: 0
STRIDOR: 0
EYE PAIN: 0
APHONIA: 0
COUGH: 0
ABDOMINAL PAIN: 0

## 2023-05-19 NOTE — PROGRESS NOTES
for heart murmur. Diagnoses and all orders for this visit:    VILLELA (dyspnea on exertion)    Nonrheumatic mitral valve regurgitation    Hyperlipidemia LDL goal <70    Chronotropic incompetence    Aorta disorder ([address]  -     CT CHEST W CONTRAST; Future      Return in about 6 months (around 11/19/2023).        Melissa Freeman MD  5/19/2023  1:44 PM

## 2023-05-20 ENCOUNTER — HOME CARE VISIT (OUTPATIENT)
Dept: HOME HEALTH SERVICES | Facility: HOME HEALTH | Age: 78
End: 2023-05-20
Payer: MEDICARE

## 2023-05-20 PROCEDURE — G0299 HHS/HOSPICE OF RN EA 15 MIN: HCPCS

## 2023-05-21 VITALS
OXYGEN SATURATION: 96 % | DIASTOLIC BLOOD PRESSURE: 82 MMHG | SYSTOLIC BLOOD PRESSURE: 132 MMHG | RESPIRATION RATE: 16 BRPM | TEMPERATURE: 99.2 F | HEART RATE: 62 BPM

## 2023-05-22 ENCOUNTER — HOME CARE VISIT (OUTPATIENT)
Dept: SCHEDULING | Facility: HOME HEALTH | Age: 78
End: 2023-05-22
Payer: MEDICARE

## 2023-05-22 VITALS
DIASTOLIC BLOOD PRESSURE: 86 MMHG | RESPIRATION RATE: 18 BRPM | TEMPERATURE: 98.5 F | HEART RATE: 62 BPM | OXYGEN SATURATION: 98 % | SYSTOLIC BLOOD PRESSURE: 142 MMHG

## 2023-05-22 PROCEDURE — G0299 HHS/HOSPICE OF RN EA 15 MIN: HCPCS

## 2023-05-22 ASSESSMENT — ENCOUNTER SYMPTOMS: HEMOPTYSIS: 0

## 2023-05-24 ENCOUNTER — HOME CARE VISIT (OUTPATIENT)
Dept: SCHEDULING | Facility: HOME HEALTH | Age: 78
End: 2023-05-24
Payer: MEDICARE

## 2023-05-24 VITALS
SYSTOLIC BLOOD PRESSURE: 140 MMHG | DIASTOLIC BLOOD PRESSURE: 88 MMHG | OXYGEN SATURATION: 99 % | TEMPERATURE: 98.6 F | RESPIRATION RATE: 18 BRPM | HEART RATE: 60 BPM

## 2023-05-24 PROCEDURE — G0299 HHS/HOSPICE OF RN EA 15 MIN: HCPCS

## 2023-05-24 ASSESSMENT — ENCOUNTER SYMPTOMS: HEMOPTYSIS: 0

## 2023-05-26 ENCOUNTER — HOME CARE VISIT (OUTPATIENT)
Dept: SCHEDULING | Facility: HOME HEALTH | Age: 78
End: 2023-05-26
Payer: MEDICARE

## 2023-05-26 VITALS
HEART RATE: 71 BPM | TEMPERATURE: 98 F | OXYGEN SATURATION: 98 % | RESPIRATION RATE: 18 BRPM | DIASTOLIC BLOOD PRESSURE: 84 MMHG | SYSTOLIC BLOOD PRESSURE: 138 MMHG

## 2023-05-26 PROCEDURE — G0299 HHS/HOSPICE OF RN EA 15 MIN: HCPCS

## 2023-05-26 ASSESSMENT — ENCOUNTER SYMPTOMS: HEMOPTYSIS: 0

## 2023-05-29 ENCOUNTER — HOME CARE VISIT (OUTPATIENT)
Dept: SCHEDULING | Facility: HOME HEALTH | Age: 78
End: 2023-05-29
Payer: MEDICARE

## 2023-05-29 VITALS
DIASTOLIC BLOOD PRESSURE: 90 MMHG | RESPIRATION RATE: 18 BRPM | HEART RATE: 60 BPM | OXYGEN SATURATION: 98 % | SYSTOLIC BLOOD PRESSURE: 150 MMHG | TEMPERATURE: 98.6 F

## 2023-05-29 PROCEDURE — G0299 HHS/HOSPICE OF RN EA 15 MIN: HCPCS

## 2023-05-29 ASSESSMENT — ENCOUNTER SYMPTOMS: HEMOPTYSIS: 0

## 2023-05-31 ENCOUNTER — HOME CARE VISIT (OUTPATIENT)
Dept: SCHEDULING | Facility: HOME HEALTH | Age: 78
End: 2023-05-31
Payer: MEDICARE

## 2023-05-31 VITALS
HEART RATE: 62 BPM | SYSTOLIC BLOOD PRESSURE: 140 MMHG | OXYGEN SATURATION: 98 % | DIASTOLIC BLOOD PRESSURE: 80 MMHG | TEMPERATURE: 98.6 F | RESPIRATION RATE: 18 BRPM

## 2023-05-31 PROCEDURE — G0299 HHS/HOSPICE OF RN EA 15 MIN: HCPCS

## 2023-05-31 ASSESSMENT — ENCOUNTER SYMPTOMS: HEMOPTYSIS: 0

## 2023-06-02 ENCOUNTER — HOME CARE VISIT (OUTPATIENT)
Dept: SCHEDULING | Facility: HOME HEALTH | Age: 78
End: 2023-06-02
Payer: MEDICARE

## 2023-06-05 ENCOUNTER — HOME CARE VISIT (OUTPATIENT)
Dept: SCHEDULING | Facility: HOME HEALTH | Age: 78
End: 2023-06-05
Payer: MEDICARE

## 2023-06-05 VITALS
SYSTOLIC BLOOD PRESSURE: 132 MMHG | OXYGEN SATURATION: 97 % | DIASTOLIC BLOOD PRESSURE: 70 MMHG | TEMPERATURE: 98.5 F | RESPIRATION RATE: 20 BRPM | HEART RATE: 87 BPM

## 2023-06-05 PROCEDURE — G0299 HHS/HOSPICE OF RN EA 15 MIN: HCPCS

## 2023-06-05 ASSESSMENT — ENCOUNTER SYMPTOMS
STOOL DESCRIPTION: FORMED
HEMOPTYSIS: 0

## 2023-06-07 ENCOUNTER — HOME CARE VISIT (OUTPATIENT)
Dept: SCHEDULING | Facility: HOME HEALTH | Age: 78
End: 2023-06-07
Payer: MEDICARE

## 2023-06-07 VITALS
SYSTOLIC BLOOD PRESSURE: 120 MMHG | RESPIRATION RATE: 20 BRPM | HEART RATE: 82 BPM | DIASTOLIC BLOOD PRESSURE: 78 MMHG | TEMPERATURE: 98.6 F | OXYGEN SATURATION: 96 %

## 2023-06-07 PROCEDURE — G0299 HHS/HOSPICE OF RN EA 15 MIN: HCPCS

## 2023-06-08 ASSESSMENT — ENCOUNTER SYMPTOMS
STOOL DESCRIPTION: FORMED
HEMOPTYSIS: 0

## 2023-06-09 ENCOUNTER — HOME CARE VISIT (OUTPATIENT)
Dept: SCHEDULING | Facility: HOME HEALTH | Age: 78
End: 2023-06-09

## 2023-06-09 PROCEDURE — G0299 HHS/HOSPICE OF RN EA 15 MIN: HCPCS

## 2023-06-11 VITALS
SYSTOLIC BLOOD PRESSURE: 126 MMHG | TEMPERATURE: 97.9 F | RESPIRATION RATE: 17 BRPM | HEART RATE: 79 BPM | OXYGEN SATURATION: 97 % | DIASTOLIC BLOOD PRESSURE: 72 MMHG

## 2023-06-11 ASSESSMENT — ENCOUNTER SYMPTOMS
PAIN LOCATION - PAIN QUALITY: SORE
STOOL DESCRIPTION: FORMED
DYSPNEA ACTIVITY LEVEL: AFTER AMBULATING 10 - 20 FT

## 2023-06-19 ENCOUNTER — HOME CARE VISIT (OUTPATIENT)
Dept: SCHEDULING | Facility: HOME HEALTH | Age: 78
End: 2023-06-19
Payer: MEDICARE

## 2023-06-19 VITALS
RESPIRATION RATE: 18 BRPM | OXYGEN SATURATION: 98 % | HEART RATE: 70 BPM | DIASTOLIC BLOOD PRESSURE: 84 MMHG | SYSTOLIC BLOOD PRESSURE: 136 MMHG | TEMPERATURE: 98.7 F

## 2023-06-19 PROCEDURE — G0299 HHS/HOSPICE OF RN EA 15 MIN: HCPCS

## 2023-06-19 ASSESSMENT — ENCOUNTER SYMPTOMS: HEMOPTYSIS: 0

## 2023-06-21 ENCOUNTER — HOME CARE VISIT (OUTPATIENT)
Dept: SCHEDULING | Facility: HOME HEALTH | Age: 78
End: 2023-06-21
Payer: MEDICARE

## 2023-06-21 VITALS
RESPIRATION RATE: 18 BRPM | DIASTOLIC BLOOD PRESSURE: 86 MMHG | TEMPERATURE: 98.4 F | OXYGEN SATURATION: 96 % | HEART RATE: 60 BPM | SYSTOLIC BLOOD PRESSURE: 130 MMHG

## 2023-06-21 PROCEDURE — G0299 HHS/HOSPICE OF RN EA 15 MIN: HCPCS

## 2023-06-21 ASSESSMENT — ENCOUNTER SYMPTOMS: HEMOPTYSIS: 0

## 2023-06-23 ENCOUNTER — HOME CARE VISIT (OUTPATIENT)
Dept: SCHEDULING | Facility: HOME HEALTH | Age: 78
End: 2023-06-23
Payer: MEDICARE

## 2023-06-23 VITALS
OXYGEN SATURATION: 98 % | SYSTOLIC BLOOD PRESSURE: 146 MMHG | RESPIRATION RATE: 18 BRPM | HEART RATE: 64 BPM | TEMPERATURE: 98 F | DIASTOLIC BLOOD PRESSURE: 88 MMHG

## 2023-06-23 PROCEDURE — G0299 HHS/HOSPICE OF RN EA 15 MIN: HCPCS

## 2023-06-23 ASSESSMENT — ENCOUNTER SYMPTOMS: HEMOPTYSIS: 0

## 2023-06-26 ENCOUNTER — HOME CARE VISIT (OUTPATIENT)
Dept: SCHEDULING | Facility: HOME HEALTH | Age: 78
End: 2023-06-26
Payer: MEDICARE

## 2023-06-26 VITALS
RESPIRATION RATE: 18 BRPM | SYSTOLIC BLOOD PRESSURE: 116 MMHG | DIASTOLIC BLOOD PRESSURE: 88 MMHG | HEART RATE: 61 BPM | OXYGEN SATURATION: 98 % | TEMPERATURE: 98.2 F

## 2023-06-26 PROCEDURE — G0299 HHS/HOSPICE OF RN EA 15 MIN: HCPCS

## 2023-06-26 ASSESSMENT — ENCOUNTER SYMPTOMS: HEMOPTYSIS: 0

## 2023-06-28 ENCOUNTER — HOME CARE VISIT (OUTPATIENT)
Dept: SCHEDULING | Facility: HOME HEALTH | Age: 78
End: 2023-06-28
Payer: MEDICARE

## 2023-06-28 VITALS
OXYGEN SATURATION: 98 % | TEMPERATURE: 98.6 F | SYSTOLIC BLOOD PRESSURE: 132 MMHG | RESPIRATION RATE: 18 BRPM | DIASTOLIC BLOOD PRESSURE: 80 MMHG | HEART RATE: 62 BPM

## 2023-06-28 PROCEDURE — G0299 HHS/HOSPICE OF RN EA 15 MIN: HCPCS

## 2023-06-28 ASSESSMENT — ENCOUNTER SYMPTOMS: HEMOPTYSIS: 0

## 2023-06-30 ENCOUNTER — HOME CARE VISIT (OUTPATIENT)
Dept: SCHEDULING | Facility: HOME HEALTH | Age: 78
End: 2023-06-30
Payer: MEDICARE

## 2023-07-03 ENCOUNTER — HOME CARE VISIT (OUTPATIENT)
Dept: SCHEDULING | Facility: HOME HEALTH | Age: 78
End: 2023-07-03
Payer: MEDICARE

## 2023-07-03 VITALS
HEART RATE: 62 BPM | SYSTOLIC BLOOD PRESSURE: 130 MMHG | OXYGEN SATURATION: 98 % | RESPIRATION RATE: 18 BRPM | DIASTOLIC BLOOD PRESSURE: 88 MMHG | TEMPERATURE: 98.8 F

## 2023-07-03 PROCEDURE — G0299 HHS/HOSPICE OF RN EA 15 MIN: HCPCS

## 2023-07-03 ASSESSMENT — ENCOUNTER SYMPTOMS: HEMOPTYSIS: 0

## 2023-07-05 ENCOUNTER — HOME CARE VISIT (OUTPATIENT)
Dept: SCHEDULING | Facility: HOME HEALTH | Age: 78
End: 2023-07-05
Payer: MEDICARE

## 2023-07-05 VITALS
TEMPERATURE: 98.9 F | DIASTOLIC BLOOD PRESSURE: 84 MMHG | OXYGEN SATURATION: 97 % | SYSTOLIC BLOOD PRESSURE: 130 MMHG | RESPIRATION RATE: 18 BRPM | HEART RATE: 71 BPM

## 2023-07-05 PROCEDURE — G0299 HHS/HOSPICE OF RN EA 15 MIN: HCPCS

## 2023-07-05 ASSESSMENT — ENCOUNTER SYMPTOMS: HEMOPTYSIS: 0

## 2023-07-07 ENCOUNTER — HOME CARE VISIT (OUTPATIENT)
Dept: SCHEDULING | Facility: HOME HEALTH | Age: 78
End: 2023-07-07
Payer: MEDICARE

## 2023-07-07 VITALS
HEART RATE: 54 BPM | TEMPERATURE: 98.9 F | OXYGEN SATURATION: 98 % | DIASTOLIC BLOOD PRESSURE: 90 MMHG | SYSTOLIC BLOOD PRESSURE: 152 MMHG | RESPIRATION RATE: 18 BRPM

## 2023-07-07 PROCEDURE — G0299 HHS/HOSPICE OF RN EA 15 MIN: HCPCS

## 2023-07-07 ASSESSMENT — ENCOUNTER SYMPTOMS: HEMOPTYSIS: 0

## 2023-07-10 ENCOUNTER — HOME CARE VISIT (OUTPATIENT)
Dept: SCHEDULING | Facility: HOME HEALTH | Age: 78
End: 2023-07-10
Payer: MEDICARE

## 2023-07-10 VITALS
OXYGEN SATURATION: 98 % | RESPIRATION RATE: 18 BRPM | DIASTOLIC BLOOD PRESSURE: 88 MMHG | SYSTOLIC BLOOD PRESSURE: 136 MMHG | HEART RATE: 56 BPM | TEMPERATURE: 98.6 F

## 2023-07-10 PROCEDURE — G0299 HHS/HOSPICE OF RN EA 15 MIN: HCPCS

## 2023-07-10 ASSESSMENT — ENCOUNTER SYMPTOMS: HEMOPTYSIS: 0

## 2023-07-11 RX ORDER — HYDROXYZINE HYDROCHLORIDE 25 MG/1
25 TABLET, FILM COATED ORAL EVERY 8 HOURS PRN
Qty: 90 TABLET | Refills: 0 | Status: SHIPPED | OUTPATIENT
Start: 2023-07-11

## 2023-07-11 RX ORDER — HYDROXYZINE HYDROCHLORIDE 25 MG/1
25 TABLET, FILM COATED ORAL EVERY 8 HOURS PRN
COMMUNITY
End: 2023-07-11 | Stop reason: SDUPTHER

## 2023-07-11 NOTE — TELEPHONE ENCOUNTER
Medication Refill Request      Name of Medication : Hydroxiyzine       Strength of Medication: 25 mg tabs      Directions: every 8 hrs for itching      30 day or 90 day supply: 90 days       Preferred Pharmacy: [facility] in [address] [place] [address],  [id number] For Provider:

## 2023-07-12 ENCOUNTER — HOME CARE VISIT (OUTPATIENT)
Dept: SCHEDULING | Facility: HOME HEALTH | Age: 78
End: 2023-07-12
Payer: MEDICARE

## 2023-07-12 ENCOUNTER — TELEPHONE (OUTPATIENT)
Age: 78
End: 2023-07-12

## 2023-07-12 ENCOUNTER — NURSE ONLY (OUTPATIENT)
Age: 78
End: 2023-07-12

## 2023-07-12 ENCOUNTER — TELEPHONE (OUTPATIENT)
Dept: FAMILY MEDICINE CLINIC | Facility: CLINIC | Age: 78
End: 2023-07-12

## 2023-07-12 VITALS — HEART RATE: 80 BPM | DIASTOLIC BLOOD PRESSURE: 94 MMHG | SYSTOLIC BLOOD PRESSURE: 174 MMHG

## 2023-07-12 VITALS
HEART RATE: 55 BPM | DIASTOLIC BLOOD PRESSURE: 90 MMHG | TEMPERATURE: 98.8 F | SYSTOLIC BLOOD PRESSURE: 170 MMHG | RESPIRATION RATE: 18 BRPM | OXYGEN SATURATION: 98 %

## 2023-07-12 PROCEDURE — G0299 HHS/HOSPICE OF RN EA 15 MIN: HCPCS

## 2023-07-12 RX ORDER — AMLODIPINE BESYLATE 5 MG/1
5 TABLET ORAL DAILY
Qty: 90 TABLET | Refills: 3 | Status: SHIPPED | OUTPATIENT
Start: 2023-07-12

## 2023-07-12 ASSESSMENT — ENCOUNTER SYMPTOMS: HEMOPTYSIS: 0

## 2023-07-12 NOTE — TELEPHONE ENCOUNTER
/92  and then after wound care 170/90  Takes 12.5mg Metoprolol and has took this AM at 7:30  Heart rate 55  Please call

## 2023-07-12 NOTE — TELEPHONE ENCOUNTER
[facility] Pt with elevated blood pressure. Advised to have Pt to go to ER for Evaluation.

## 2023-07-12 NOTE — PROGRESS NOTES
Pt came in for a BP check after [facility] called to report high BP. BP after resting was 174/94 and HR is 80. 5 minute recheck was 158/92. Per Dr. Agustin Cain, \"Add amlodpine 5 mg daily and continue metoprolol 12.5 mg daily, minimize sodium and stay hydrated. Give the medication 5 days to work and start keeping a daily BP log. Call in BP's for Dr. Evangelina Ojeda in about a week\". Pt informed of Dr. Agustin Cain' response. Pt thanked and voiced understanding.

## 2023-07-12 NOTE — TELEPHONE ENCOUNTER
Called s/w [facility]. He reports that PCP office was informed of pt's BP. NP advised pt to go to ER. Alysia Aceves states pt refused to go to ER. hospitals pt is headed to the [place] office for BP at this time.

## 2023-07-13 NOTE — TELEPHONE ENCOUNTER
Can you call the patient to see how he is doing and will copy to home health to see if they can let us know if blood pressure is continuing to be elevated

## 2023-07-14 ENCOUNTER — HOME CARE VISIT (OUTPATIENT)
Dept: HOME HEALTH SERVICES | Facility: HOME HEALTH | Age: 78
End: 2023-07-14
Payer: MEDICARE

## 2023-07-17 ENCOUNTER — HOME CARE VISIT (OUTPATIENT)
Dept: SCHEDULING | Facility: HOME HEALTH | Age: 78
End: 2023-07-17
Payer: MEDICARE

## 2023-07-17 VITALS
HEART RATE: 64 BPM | SYSTOLIC BLOOD PRESSURE: 136 MMHG | DIASTOLIC BLOOD PRESSURE: 82 MMHG | TEMPERATURE: 98.7 F | OXYGEN SATURATION: 98 % | RESPIRATION RATE: 18 BRPM

## 2023-07-17 PROCEDURE — G0299 HHS/HOSPICE OF RN EA 15 MIN: HCPCS

## 2023-07-17 ASSESSMENT — ENCOUNTER SYMPTOMS: HEMOPTYSIS: 0

## 2023-07-19 ENCOUNTER — HOME CARE VISIT (OUTPATIENT)
Dept: SCHEDULING | Facility: HOME HEALTH | Age: 78
End: 2023-07-19
Payer: MEDICARE

## 2023-07-19 VITALS
SYSTOLIC BLOOD PRESSURE: 130 MMHG | HEART RATE: 65 BPM | OXYGEN SATURATION: 98 % | TEMPERATURE: 98.5 F | RESPIRATION RATE: 18 BRPM | DIASTOLIC BLOOD PRESSURE: 82 MMHG

## 2023-07-19 PROCEDURE — G0299 HHS/HOSPICE OF RN EA 15 MIN: HCPCS

## 2023-07-19 ASSESSMENT — ENCOUNTER SYMPTOMS
HEMOPTYSIS: 0
DIARRHEA: 1

## 2023-07-20 ENCOUNTER — OFFICE VISIT (OUTPATIENT)
Dept: FAMILY MEDICINE CLINIC | Facility: CLINIC | Age: 78
End: 2023-07-20
Payer: MEDICARE

## 2023-07-20 VITALS
DIASTOLIC BLOOD PRESSURE: 86 MMHG | WEIGHT: 192.4 LBS | HEART RATE: 74 BPM | SYSTOLIC BLOOD PRESSURE: 155 MMHG | TEMPERATURE: 97.7 F | RESPIRATION RATE: 18 BRPM | HEIGHT: 71 IN | BODY MASS INDEX: 26.94 KG/M2 | OXYGEN SATURATION: 98 %

## 2023-07-20 DIAGNOSIS — I10 ESSENTIAL HYPERTENSION: ICD-10-CM

## 2023-07-20 DIAGNOSIS — R19.7 DIARRHEA, UNSPECIFIED TYPE: ICD-10-CM

## 2023-07-20 DIAGNOSIS — R35.0 BENIGN PROSTATIC HYPERPLASIA WITH URINARY FREQUENCY: ICD-10-CM

## 2023-07-20 DIAGNOSIS — F51.04 CHRONIC INSOMNIA: ICD-10-CM

## 2023-07-20 DIAGNOSIS — R53.1 WEAKNESS: ICD-10-CM

## 2023-07-20 DIAGNOSIS — M15.9 GENERALIZED OSTEOARTHRITIS: ICD-10-CM

## 2023-07-20 DIAGNOSIS — N40.1 BENIGN PROSTATIC HYPERPLASIA WITH URINARY FREQUENCY: ICD-10-CM

## 2023-07-20 DIAGNOSIS — M17.0 BILATERAL PRIMARY OSTEOARTHRITIS OF KNEE: Primary | ICD-10-CM

## 2023-07-20 DIAGNOSIS — E78.2 MIXED HYPERLIPIDEMIA: ICD-10-CM

## 2023-07-20 PROBLEM — I47.10 SVT (SUPRAVENTRICULAR TACHYCARDIA): Status: RESOLVED | Noted: 2021-06-15 | Resolved: 2023-07-20

## 2023-07-20 PROBLEM — I47.1 SVT (SUPRAVENTRICULAR TACHYCARDIA) (HCC): Status: RESOLVED | Noted: 2021-06-15 | Resolved: 2023-07-20

## 2023-07-20 LAB
ALBUMIN SERPL-MCNC: 3.7 G/DL (ref 3.2–4.6)
ALBUMIN/GLOB SERPL: 1.1 (ref 0.4–1.6)
ALP SERPL-CCNC: 81 U/L (ref 50–136)
ALT SERPL-CCNC: 31 U/L (ref 12–65)
ANION GAP SERPL CALC-SCNC: 5 MMOL/L (ref 2–11)
AST SERPL-CCNC: 21 U/L (ref 15–37)
BASOPHILS # BLD: 0 K/UL (ref 0–0.2)
BASOPHILS NFR BLD: 1 % (ref 0–2)
BILIRUB SERPL-MCNC: 0.3 MG/DL (ref 0.2–1.1)
BUN SERPL-MCNC: 12 MG/DL (ref 8–23)
CALCIUM SERPL-MCNC: 8.8 MG/DL (ref 8.3–10.4)
CHLORIDE SERPL-SCNC: 109 MMOL/L (ref 101–110)
CO2 SERPL-SCNC: 24 MMOL/L (ref 21–32)
CREAT SERPL-MCNC: 1.2 MG/DL (ref 0.8–1.5)
DIFFERENTIAL METHOD BLD: ABNORMAL
EOSINOPHIL # BLD: 0.1 K/UL (ref 0–0.8)
EOSINOPHIL NFR BLD: 1 % (ref 0.5–7.8)
ERYTHROCYTE [DISTWIDTH] IN BLOOD BY AUTOMATED COUNT: 15.2 % (ref 11.9–14.6)
GLOBULIN SER CALC-MCNC: 3.4 G/DL (ref 2.8–4.5)
GLUCOSE SERPL-MCNC: 115 MG/DL (ref 65–100)
HCT VFR BLD AUTO: 37.9 % (ref 41.1–50.3)
HGB BLD-MCNC: 11.5 G/DL (ref 13.6–17.2)
IMM GRANULOCYTES # BLD AUTO: 0 K/UL (ref 0–0.5)
IMM GRANULOCYTES NFR BLD AUTO: 1 % (ref 0–5)
LYMPHOCYTES # BLD: 1.4 K/UL (ref 0.5–4.6)
LYMPHOCYTES NFR BLD: 26 % (ref 13–44)
MCH RBC QN AUTO: 26.7 PG (ref 26.1–32.9)
MCHC RBC AUTO-ENTMCNC: 30.3 G/DL (ref 31.4–35)
MCV RBC AUTO: 87.9 FL (ref 82–102)
MONOCYTES # BLD: 0.5 K/UL (ref 0.1–1.3)
MONOCYTES NFR BLD: 8 % (ref 4–12)
NEUTS SEG # BLD: 3.6 K/UL (ref 1.7–8.2)
NEUTS SEG NFR BLD: 64 % (ref 43–78)
NRBC # BLD: 0 K/UL (ref 0–0.2)
PLATELET # BLD AUTO: 184 K/UL (ref 150–450)
PMV BLD AUTO: 10.5 FL (ref 9.4–12.3)
POTASSIUM SERPL-SCNC: 4.3 MMOL/L (ref 3.5–5.1)
PROT SERPL-MCNC: 7.1 G/DL (ref 6.3–8.2)
RBC # BLD AUTO: 4.31 M/UL (ref 4.23–5.6)
SODIUM SERPL-SCNC: 138 MMOL/L (ref 133–143)
TSH, 3RD GENERATION: 1.39 UIU/ML (ref 0.36–3.74)
WBC # BLD AUTO: 5.6 K/UL (ref 4.3–11.1)

## 2023-07-20 PROCEDURE — 99214 OFFICE O/P EST MOD 30 MIN: CPT | Performed by: FAMILY MEDICINE

## 2023-07-20 PROCEDURE — 3077F SYST BP >= 140 MM HG: CPT | Performed by: FAMILY MEDICINE

## 2023-07-20 PROCEDURE — 3079F DIAST BP 80-89 MM HG: CPT | Performed by: FAMILY MEDICINE

## 2023-07-20 PROCEDURE — 1123F ACP DISCUSS/DSCN MKR DOCD: CPT | Performed by: FAMILY MEDICINE

## 2023-07-20 PROCEDURE — 1036F TOBACCO NON-USER: CPT | Performed by: FAMILY MEDICINE

## 2023-07-20 PROCEDURE — G8427 DOCREV CUR MEDS BY ELIG CLIN: HCPCS | Performed by: FAMILY MEDICINE

## 2023-07-20 PROCEDURE — G8417 CALC BMI ABV UP PARAM F/U: HCPCS | Performed by: FAMILY MEDICINE

## 2023-07-20 RX ORDER — MONTELUKAST SODIUM 4 MG/1
1 TABLET, CHEWABLE ORAL 2 TIMES DAILY
Qty: 60 TABLET | Refills: 3 | Status: SHIPPED | OUTPATIENT
Start: 2023-07-20

## 2023-07-20 RX ORDER — ALPRAZOLAM 2 MG/1
2 TABLET ORAL NIGHTLY PRN
Qty: 30 TABLET | Refills: 3 | Status: SHIPPED | OUTPATIENT
Start: 2023-07-20 | End: 2023-11-17

## 2023-07-20 ASSESSMENT — ENCOUNTER SYMPTOMS
NAUSEA: 0
DIARRHEA: 1
ABDOMINAL PAIN: 0
BLOOD IN STOOL: 0

## 2023-07-20 NOTE — PROGRESS NOTES
Subjective:      Patient ID: Alexei Snell. is a 69 y.o. male. HPI  Patient comes in with some weakness and diarrhea that has been going on for several weeks to months. Has taken several things over-the-counter including Imodium without much improvement. The patient also has noticed he is having difficulty sleeping at night and in the past he has taken alprazolam 2 mg nightly. He has tried melatonin without any benefit. When he took the alprazolam before did not cause any hangover sedation the next day. Past Medical History:   Diagnosis Date    Depression     H/O urinary frequency 2/1/2013    History of BPH 2/1/2013    HLD (hyperlipidemia) 2/1/2013    Hx: UTI (urinary tract infection) 2/1/2013    Insomnia     Psoriasis     Psoriatic arthritis ([address]) 2/1/2013    Wears hearing aid 2/1/2013       Allergies   Allergen Reactions    Vancomycin Rash       Current Outpatient Medications   Medication Sig Dispense Refill    colestipol (COLESTID) 1 g tablet Take 1 tablet by mouth 2 times daily 60 tablet 3    ALPRAZolam (XANAX) 2 MG tablet Take 1 tablet by mouth nightly as needed for Sleep for up to 120 days. Stop lorazepam Max Daily Amount: 2 mg 30 tablet 3    amLODIPine (NORVASC) 5 MG tablet Take 1 tablet by mouth daily 90 tablet 3    hydrOXYzine HCl (ATARAX) 25 MG tablet Take 1 tablet by mouth every 8 hours as needed for Itching 90 tablet 0    celecoxib (CELEBREX) 200 MG capsule Take 1 capsule by mouth daily 90 capsule 0    Wound Dressings (TRIAD HYDROPHILIC WOUND DRESS EX) Apply 1 Dose topically three times a week. Apply to wound bed 3 times a week      gabapentin (NEURONTIN) 800 MG tablet Take 1 tablet by mouth every evening for 360 days. 90 tablet 3    ketoconazole (NIZORAL) 2 % shampoo Apply topically daily as needed. 120 mL 3    ketoconazole (NIZORAL) 2 % cream Apply topically daily. 60 g 3    Azelastine HCl 137 MCG/SPRAY SOLN 205.5 mcg by Nasal route 2 times daily.       tamsulosin (FLOMAX) 0.4 MG capsule

## 2023-07-21 ENCOUNTER — TELEPHONE (OUTPATIENT)
Age: 78
End: 2023-07-21

## 2023-07-21 ENCOUNTER — HOME CARE VISIT (OUTPATIENT)
Dept: SCHEDULING | Facility: HOME HEALTH | Age: 78
End: 2023-07-21
Payer: MEDICARE

## 2023-07-21 VITALS
RESPIRATION RATE: 20 BRPM | TEMPERATURE: 98.8 F | SYSTOLIC BLOOD PRESSURE: 120 MMHG | HEART RATE: 72 BPM | DIASTOLIC BLOOD PRESSURE: 68 MMHG | OXYGEN SATURATION: 97 %

## 2023-07-21 PROCEDURE — G0299 HHS/HOSPICE OF RN EA 15 MIN: HCPCS

## 2023-07-21 ASSESSMENT — ENCOUNTER SYMPTOMS
STOOL DESCRIPTION: LOOSE
HEMOPTYSIS: 0

## 2023-07-21 NOTE — TELEPHONE ENCOUNTER
BP readings    07/11  158/92  07/12  115/83  07/13  121/78  0714  117/75  07/15  122/79  07/16  136/82  07/17  125/77    See Nurse visit 07/12/2023    added amlodpine 5 MG

## 2023-07-21 NOTE — RESULT ENCOUNTER NOTE
Let patient know that sodium and potassium levels are good, blood sugar is normal at 115, kidney function and liver enzymes are normal.  Thyroid test is good. Hemoglobin is okay at 11. 5.

## 2023-07-24 ENCOUNTER — HOME CARE VISIT (OUTPATIENT)
Dept: SCHEDULING | Facility: HOME HEALTH | Age: 78
End: 2023-07-24
Payer: MEDICARE

## 2023-07-24 VITALS
TEMPERATURE: 99.3 F | OXYGEN SATURATION: 98 % | RESPIRATION RATE: 20 BRPM | SYSTOLIC BLOOD PRESSURE: 124 MMHG | HEART RATE: 76 BPM | DIASTOLIC BLOOD PRESSURE: 76 MMHG

## 2023-07-24 PROCEDURE — G0299 HHS/HOSPICE OF RN EA 15 MIN: HCPCS

## 2023-07-24 ASSESSMENT — ENCOUNTER SYMPTOMS
HEMOPTYSIS: 0
STOOL DESCRIPTION: FORMED

## 2023-07-26 ENCOUNTER — HOME CARE VISIT (OUTPATIENT)
Dept: SCHEDULING | Facility: HOME HEALTH | Age: 78
End: 2023-07-26
Payer: MEDICARE

## 2023-07-28 ENCOUNTER — HOME CARE VISIT (OUTPATIENT)
Dept: SCHEDULING | Facility: HOME HEALTH | Age: 78
End: 2023-07-28
Payer: MEDICARE

## 2023-07-28 VITALS
OXYGEN SATURATION: 98 % | SYSTOLIC BLOOD PRESSURE: 130 MMHG | TEMPERATURE: 98.5 F | DIASTOLIC BLOOD PRESSURE: 78 MMHG | RESPIRATION RATE: 16 BRPM | HEART RATE: 70 BPM

## 2023-07-28 PROCEDURE — G0299 HHS/HOSPICE OF RN EA 15 MIN: HCPCS

## 2023-07-31 ENCOUNTER — HOME CARE VISIT (OUTPATIENT)
Dept: SCHEDULING | Facility: HOME HEALTH | Age: 78
End: 2023-07-31
Payer: MEDICARE

## 2023-07-31 VITALS
RESPIRATION RATE: 18 BRPM | HEART RATE: 74 BPM | DIASTOLIC BLOOD PRESSURE: 74 MMHG | SYSTOLIC BLOOD PRESSURE: 128 MMHG | TEMPERATURE: 98.7 F | OXYGEN SATURATION: 98 %

## 2023-07-31 PROCEDURE — G0299 HHS/HOSPICE OF RN EA 15 MIN: HCPCS

## 2023-07-31 ASSESSMENT — ENCOUNTER SYMPTOMS: HEMOPTYSIS: 0

## 2023-08-02 ENCOUNTER — HOME CARE VISIT (OUTPATIENT)
Dept: SCHEDULING | Facility: HOME HEALTH | Age: 78
End: 2023-08-02
Payer: MEDICARE

## 2023-08-02 VITALS
TEMPERATURE: 98.4 F | SYSTOLIC BLOOD PRESSURE: 130 MMHG | HEART RATE: 71 BPM | RESPIRATION RATE: 18 BRPM | OXYGEN SATURATION: 98 % | DIASTOLIC BLOOD PRESSURE: 86 MMHG

## 2023-08-02 PROCEDURE — G0299 HHS/HOSPICE OF RN EA 15 MIN: HCPCS

## 2023-08-02 ASSESSMENT — ENCOUNTER SYMPTOMS: HEMOPTYSIS: 0

## 2023-08-04 ENCOUNTER — HOME CARE VISIT (OUTPATIENT)
Dept: SCHEDULING | Facility: HOME HEALTH | Age: 78
End: 2023-08-04
Payer: MEDICARE

## 2023-08-04 VITALS
SYSTOLIC BLOOD PRESSURE: 140 MMHG | OXYGEN SATURATION: 98 % | DIASTOLIC BLOOD PRESSURE: 80 MMHG | RESPIRATION RATE: 18 BRPM | HEART RATE: 67 BPM | TEMPERATURE: 98.3 F

## 2023-08-04 PROCEDURE — G0299 HHS/HOSPICE OF RN EA 15 MIN: HCPCS

## 2023-08-04 ASSESSMENT — ENCOUNTER SYMPTOMS: HEMOPTYSIS: 0

## 2023-08-07 ENCOUNTER — HOME CARE VISIT (OUTPATIENT)
Dept: SCHEDULING | Facility: HOME HEALTH | Age: 78
End: 2023-08-07
Payer: MEDICARE

## 2023-08-07 VITALS
HEART RATE: 72 BPM | SYSTOLIC BLOOD PRESSURE: 138 MMHG | WEIGHT: 190 LBS | RESPIRATION RATE: 18 BRPM | TEMPERATURE: 98.8 F | OXYGEN SATURATION: 98 % | BODY MASS INDEX: 26.5 KG/M2 | DIASTOLIC BLOOD PRESSURE: 84 MMHG

## 2023-08-07 PROCEDURE — G0299 HHS/HOSPICE OF RN EA 15 MIN: HCPCS

## 2023-08-07 ASSESSMENT — ENCOUNTER SYMPTOMS
HEMOPTYSIS: 0
PAIN LOCATION - PAIN QUALITY: ACHY

## 2023-08-09 ENCOUNTER — HOME CARE VISIT (OUTPATIENT)
Dept: SCHEDULING | Facility: HOME HEALTH | Age: 78
End: 2023-08-09
Payer: MEDICARE

## 2023-08-09 VITALS
OXYGEN SATURATION: 98 % | TEMPERATURE: 98.4 F | RESPIRATION RATE: 18 BRPM | DIASTOLIC BLOOD PRESSURE: 88 MMHG | SYSTOLIC BLOOD PRESSURE: 140 MMHG | HEART RATE: 69 BPM

## 2023-08-09 PROCEDURE — G0299 HHS/HOSPICE OF RN EA 15 MIN: HCPCS

## 2023-08-09 ASSESSMENT — ENCOUNTER SYMPTOMS: HEMOPTYSIS: 0

## 2023-08-11 ENCOUNTER — HOME CARE VISIT (OUTPATIENT)
Dept: SCHEDULING | Facility: HOME HEALTH | Age: 78
End: 2023-08-11
Payer: MEDICARE

## 2023-08-11 VITALS
SYSTOLIC BLOOD PRESSURE: 130 MMHG | TEMPERATURE: 98.6 F | DIASTOLIC BLOOD PRESSURE: 84 MMHG | RESPIRATION RATE: 18 BRPM | HEART RATE: 63 BPM | OXYGEN SATURATION: 98 %

## 2023-08-11 PROCEDURE — G0299 HHS/HOSPICE OF RN EA 15 MIN: HCPCS

## 2023-08-11 ASSESSMENT — ENCOUNTER SYMPTOMS: HEMOPTYSIS: 0

## 2023-08-16 ENCOUNTER — HOME CARE VISIT (OUTPATIENT)
Dept: SCHEDULING | Facility: HOME HEALTH | Age: 78
End: 2023-08-16
Payer: MEDICARE

## 2023-08-16 VITALS
TEMPERATURE: 98.8 F | HEART RATE: 67 BPM | RESPIRATION RATE: 18 BRPM | DIASTOLIC BLOOD PRESSURE: 70 MMHG | OXYGEN SATURATION: 98 % | SYSTOLIC BLOOD PRESSURE: 132 MMHG

## 2023-08-16 PROCEDURE — G0299 HHS/HOSPICE OF RN EA 15 MIN: HCPCS

## 2023-08-16 ASSESSMENT — ENCOUNTER SYMPTOMS: HEMOPTYSIS: 0

## 2023-08-18 ENCOUNTER — HOME CARE VISIT (OUTPATIENT)
Dept: SCHEDULING | Facility: HOME HEALTH | Age: 78
End: 2023-08-18
Payer: MEDICARE

## 2023-08-18 VITALS
RESPIRATION RATE: 20 BRPM | HEART RATE: 78 BPM | TEMPERATURE: 98.7 F | SYSTOLIC BLOOD PRESSURE: 138 MMHG | OXYGEN SATURATION: 98 % | DIASTOLIC BLOOD PRESSURE: 72 MMHG

## 2023-08-18 PROCEDURE — G0299 HHS/HOSPICE OF RN EA 15 MIN: HCPCS

## 2023-08-18 ASSESSMENT — ENCOUNTER SYMPTOMS
HEMOPTYSIS: 0
STOOL DESCRIPTION: FORMED
DYSPNEA ACTIVITY LEVEL: AFTER AMBULATING MORE THAN 20 FT

## 2023-08-21 ENCOUNTER — HOME CARE VISIT (OUTPATIENT)
Dept: SCHEDULING | Facility: HOME HEALTH | Age: 78
End: 2023-08-21
Payer: MEDICARE

## 2023-08-21 VITALS
SYSTOLIC BLOOD PRESSURE: 120 MMHG | RESPIRATION RATE: 18 BRPM | TEMPERATURE: 99 F | DIASTOLIC BLOOD PRESSURE: 60 MMHG | OXYGEN SATURATION: 98 % | HEART RATE: 73 BPM

## 2023-08-21 PROCEDURE — G0299 HHS/HOSPICE OF RN EA 15 MIN: HCPCS

## 2023-08-21 ASSESSMENT — ENCOUNTER SYMPTOMS: HEMOPTYSIS: 0

## 2023-08-23 ENCOUNTER — HOME CARE VISIT (OUTPATIENT)
Dept: SCHEDULING | Facility: HOME HEALTH | Age: 78
End: 2023-08-23
Payer: MEDICARE

## 2023-08-23 VITALS
TEMPERATURE: 98.8 F | SYSTOLIC BLOOD PRESSURE: 136 MMHG | DIASTOLIC BLOOD PRESSURE: 70 MMHG | HEART RATE: 73 BPM | RESPIRATION RATE: 18 BRPM | OXYGEN SATURATION: 98 %

## 2023-08-23 PROCEDURE — G0299 HHS/HOSPICE OF RN EA 15 MIN: HCPCS

## 2023-08-23 ASSESSMENT — ENCOUNTER SYMPTOMS
HEMOPTYSIS: 0
DIARRHEA: 1

## 2023-08-25 ENCOUNTER — HOME CARE VISIT (OUTPATIENT)
Dept: SCHEDULING | Facility: HOME HEALTH | Age: 78
End: 2023-08-25
Payer: MEDICARE

## 2023-08-25 VITALS
HEART RATE: 72 BPM | SYSTOLIC BLOOD PRESSURE: 132 MMHG | RESPIRATION RATE: 20 BRPM | TEMPERATURE: 98.9 F | OXYGEN SATURATION: 97 % | DIASTOLIC BLOOD PRESSURE: 70 MMHG

## 2023-08-25 PROCEDURE — G0299 HHS/HOSPICE OF RN EA 15 MIN: HCPCS

## 2023-08-25 ASSESSMENT — ENCOUNTER SYMPTOMS
HEMOPTYSIS: 0
STOOL DESCRIPTION: FORMED

## 2023-08-28 ENCOUNTER — HOME CARE VISIT (OUTPATIENT)
Dept: SCHEDULING | Facility: HOME HEALTH | Age: 78
End: 2023-08-28
Payer: MEDICARE

## 2023-08-28 VITALS
SYSTOLIC BLOOD PRESSURE: 118 MMHG | DIASTOLIC BLOOD PRESSURE: 64 MMHG | RESPIRATION RATE: 18 BRPM | HEART RATE: 60 BPM | TEMPERATURE: 98.5 F | OXYGEN SATURATION: 98 %

## 2023-08-28 PROCEDURE — G0299 HHS/HOSPICE OF RN EA 15 MIN: HCPCS

## 2023-08-28 ASSESSMENT — ENCOUNTER SYMPTOMS
DIARRHEA: 1
HEMOPTYSIS: 0

## 2023-08-29 ENCOUNTER — TELEPHONE (OUTPATIENT)
Dept: FAMILY MEDICINE CLINIC | Facility: CLINIC | Age: 78
End: 2023-08-29

## 2023-08-30 ENCOUNTER — HOME CARE VISIT (OUTPATIENT)
Dept: SCHEDULING | Facility: HOME HEALTH | Age: 78
End: 2023-08-30
Payer: MEDICARE

## 2023-08-30 VITALS
OXYGEN SATURATION: 98 % | SYSTOLIC BLOOD PRESSURE: 110 MMHG | HEART RATE: 63 BPM | RESPIRATION RATE: 18 BRPM | TEMPERATURE: 98.6 F | DIASTOLIC BLOOD PRESSURE: 62 MMHG

## 2023-08-30 PROCEDURE — G0299 HHS/HOSPICE OF RN EA 15 MIN: HCPCS

## 2023-08-30 ASSESSMENT — ENCOUNTER SYMPTOMS
HEMOPTYSIS: 0
DIARRHEA: 1

## 2023-08-31 ENCOUNTER — OFFICE VISIT (OUTPATIENT)
Dept: FAMILY MEDICINE CLINIC | Facility: CLINIC | Age: 78
End: 2023-08-31
Payer: MEDICARE

## 2023-08-31 VITALS
OXYGEN SATURATION: 99 % | DIASTOLIC BLOOD PRESSURE: 65 MMHG | TEMPERATURE: 97.3 F | HEART RATE: 62 BPM | SYSTOLIC BLOOD PRESSURE: 131 MMHG | BODY MASS INDEX: 27.8 KG/M2 | RESPIRATION RATE: 16 BRPM | HEIGHT: 71 IN | WEIGHT: 198.6 LBS

## 2023-08-31 DIAGNOSIS — R19.7 DIARRHEA, UNSPECIFIED TYPE: ICD-10-CM

## 2023-08-31 DIAGNOSIS — J32.9 CHRONIC SINUSITIS, UNSPECIFIED LOCATION: Primary | ICD-10-CM

## 2023-08-31 PROCEDURE — 1123F ACP DISCUSS/DSCN MKR DOCD: CPT | Performed by: FAMILY MEDICINE

## 2023-08-31 PROCEDURE — G8427 DOCREV CUR MEDS BY ELIG CLIN: HCPCS | Performed by: FAMILY MEDICINE

## 2023-08-31 PROCEDURE — G8417 CALC BMI ABV UP PARAM F/U: HCPCS | Performed by: FAMILY MEDICINE

## 2023-08-31 PROCEDURE — 1036F TOBACCO NON-USER: CPT | Performed by: FAMILY MEDICINE

## 2023-08-31 PROCEDURE — 99213 OFFICE O/P EST LOW 20 MIN: CPT | Performed by: FAMILY MEDICINE

## 2023-08-31 RX ORDER — MONTELUKAST SODIUM 10 MG/1
10 TABLET ORAL DAILY
Qty: 30 TABLET | Refills: 3 | Status: SHIPPED | OUTPATIENT
Start: 2023-08-31

## 2023-08-31 RX ORDER — METHYLPREDNISOLONE 4 MG/1
TABLET ORAL
Qty: 1 KIT | Refills: 0 | Status: SHIPPED | OUTPATIENT
Start: 2023-08-31

## 2023-08-31 RX ORDER — HYDROXYZINE HYDROCHLORIDE 10 MG/1
10 TABLET, FILM COATED ORAL EVERY 8 HOURS PRN
Qty: 30 TABLET | Refills: 5 | Status: SHIPPED | OUTPATIENT
Start: 2023-08-31

## 2023-08-31 RX ORDER — MONTELUKAST SODIUM 4 MG/1
2 TABLET, CHEWABLE ORAL 2 TIMES DAILY
Qty: 120 TABLET | Refills: 5 | Status: SHIPPED | OUTPATIENT
Start: 2023-08-31

## 2023-08-31 ASSESSMENT — ENCOUNTER SYMPTOMS
ABDOMINAL PAIN: 0
DIARRHEA: 1

## 2023-08-31 NOTE — PROGRESS NOTES
Apply to wound bed 3 times a week      gabapentin (NEURONTIN) 800 MG tablet Take 1 tablet by mouth every evening for 360 days. 90 tablet 3    ketoconazole (NIZORAL) 2 % shampoo Apply topically daily as needed. 120 mL 3    ketoconazole (NIZORAL) 2 % cream Apply topically daily. 60 g 3    Azelastine HCl 137 MCG/SPRAY SOLN 205.5 mcg by Nasal route 2 times daily. tamsulosin (FLOMAX) 0.4 MG capsule Take 2 capsules by mouth daily 180 capsule 3    Multiple Vitamin (DAILY VITAMIN PO) Take 1 tablet by mouth at bedtime. Nervive-Nerve relief      diclofenac sodium (VOLTAREN) 1 % GEL Apply 4 g topically 4 times daily Apply topically to bilateral knees 4 times daily      Cetirizine HCl (ZYRTEC ALLERGY) 10 MG CAPS Take 10 mg by mouth daily. bromfenac (PROLENSA) 0.07 % SOLN Place 1 drop into both eyes daily instill 1 drop to surgery eye every day beginning 3 days before surgery, continued until gone      Eyelid Cleansers (OCUSOFT HYPOCHLOR) SOLN Apply 1 spray topically 2 times daily. spray solution onto lint-free pad or swab until moistened. close eyes and gently apply to eyelids and eyelashes using side to side motions. use twice daily. Calcium Carbonate Antacid (TUMS ULTRA 1000) 1000 MG CHEW Take 1 g by mouth daily as needed (heartburn). metoprolol succinate (TOPROL XL) 25 MG extended release tablet Take 0.5 tablets by mouth in the morning. 90 tablet 3    acetaminophen (TYLENOL) 650 MG extended release tablet Take 1 tablet by mouth every 8 hours as needed for Pain      Polyethyl Glycol-Propyl Glycol (SYSTANE OP) Place 1 drop in ear(s) 4 times daily. Misc.  Devices (ROLLATOR ULTRA-LIGHT) MISC Use daily as needed(R26.81) Gait instability, (M15.9) Generalized osteoarthritis 1 each 0    loperamide (IMODIUM) 2 MG capsule Take 1 capsule by mouth as needed for Diarrhea 2 caplets after the first loose stool; 1 caplet after each subsequent loose stool; max dose 4 cap in 24 hours      meclizine (ANTIVERT) 25 MG

## 2023-09-01 ENCOUNTER — HOME CARE VISIT (OUTPATIENT)
Dept: SCHEDULING | Facility: HOME HEALTH | Age: 78
End: 2023-09-01
Payer: MEDICARE

## 2023-09-04 ENCOUNTER — HOME CARE VISIT (OUTPATIENT)
Dept: SCHEDULING | Facility: HOME HEALTH | Age: 78
End: 2023-09-04
Payer: MEDICARE

## 2023-09-04 VITALS
TEMPERATURE: 98.6 F | SYSTOLIC BLOOD PRESSURE: 138 MMHG | HEART RATE: 54 BPM | DIASTOLIC BLOOD PRESSURE: 80 MMHG | RESPIRATION RATE: 18 BRPM | OXYGEN SATURATION: 99 %

## 2023-09-04 PROCEDURE — G0299 HHS/HOSPICE OF RN EA 15 MIN: HCPCS

## 2023-09-04 ASSESSMENT — ENCOUNTER SYMPTOMS: HEMOPTYSIS: 0

## 2023-09-06 ENCOUNTER — HOME CARE VISIT (OUTPATIENT)
Dept: SCHEDULING | Facility: HOME HEALTH | Age: 78
End: 2023-09-06
Payer: MEDICARE

## 2023-09-06 VITALS
RESPIRATION RATE: 18 BRPM | DIASTOLIC BLOOD PRESSURE: 80 MMHG | TEMPERATURE: 98.5 F | HEART RATE: 56 BPM | OXYGEN SATURATION: 98 % | SYSTOLIC BLOOD PRESSURE: 138 MMHG

## 2023-09-06 PROCEDURE — G0299 HHS/HOSPICE OF RN EA 15 MIN: HCPCS

## 2023-09-06 ASSESSMENT — ENCOUNTER SYMPTOMS: HEMOPTYSIS: 0

## 2023-09-08 ENCOUNTER — HOME CARE VISIT (OUTPATIENT)
Dept: SCHEDULING | Facility: HOME HEALTH | Age: 78
End: 2023-09-08

## 2023-09-11 ENCOUNTER — HOME CARE VISIT (OUTPATIENT)
Dept: SCHEDULING | Facility: HOME HEALTH | Age: 78
End: 2023-09-11
Payer: MEDICARE

## 2023-09-11 VITALS — RESPIRATION RATE: 18 BRPM

## 2023-09-11 PROCEDURE — G0299 HHS/HOSPICE OF RN EA 15 MIN: HCPCS

## 2023-09-11 ASSESSMENT — ENCOUNTER SYMPTOMS: HEMOPTYSIS: 0

## 2023-11-17 ENCOUNTER — NURSE ONLY (OUTPATIENT)
Dept: FAMILY MEDICINE CLINIC | Facility: CLINIC | Age: 78
End: 2023-11-17

## 2023-11-17 DIAGNOSIS — Z23 ENCOUNTER FOR IMMUNIZATION: Primary | ICD-10-CM

## 2023-12-07 ENCOUNTER — OFFICE VISIT (OUTPATIENT)
Age: 78
End: 2023-12-07
Payer: MEDICARE

## 2023-12-07 VITALS
BODY MASS INDEX: 25.48 KG/M2 | DIASTOLIC BLOOD PRESSURE: 80 MMHG | WEIGHT: 182 LBS | SYSTOLIC BLOOD PRESSURE: 136 MMHG | HEART RATE: 57 BPM | HEIGHT: 71 IN

## 2023-12-07 DIAGNOSIS — R06.09 DOE (DYSPNEA ON EXERTION): Primary | ICD-10-CM

## 2023-12-07 DIAGNOSIS — I10 ESSENTIAL HYPERTENSION: ICD-10-CM

## 2023-12-07 DIAGNOSIS — I77.9 AORTA DISORDER (HCC): ICD-10-CM

## 2023-12-07 PROCEDURE — 3075F SYST BP GE 130 - 139MM HG: CPT | Performed by: INTERNAL MEDICINE

## 2023-12-07 PROCEDURE — 99214 OFFICE O/P EST MOD 30 MIN: CPT | Performed by: INTERNAL MEDICINE

## 2023-12-07 PROCEDURE — 1123F ACP DISCUSS/DSCN MKR DOCD: CPT | Performed by: INTERNAL MEDICINE

## 2023-12-07 PROCEDURE — G8427 DOCREV CUR MEDS BY ELIG CLIN: HCPCS | Performed by: INTERNAL MEDICINE

## 2023-12-07 PROCEDURE — 3079F DIAST BP 80-89 MM HG: CPT | Performed by: INTERNAL MEDICINE

## 2023-12-07 PROCEDURE — G8484 FLU IMMUNIZE NO ADMIN: HCPCS | Performed by: INTERNAL MEDICINE

## 2023-12-07 PROCEDURE — G8417 CALC BMI ABV UP PARAM F/U: HCPCS | Performed by: INTERNAL MEDICINE

## 2023-12-07 PROCEDURE — 1036F TOBACCO NON-USER: CPT | Performed by: INTERNAL MEDICINE

## 2023-12-07 PROCEDURE — 93000 ELECTROCARDIOGRAM COMPLETE: CPT | Performed by: INTERNAL MEDICINE

## 2023-12-07 RX ORDER — AMLODIPINE BESYLATE 5 MG/1
5 TABLET ORAL DAILY
Qty: 90 TABLET | Refills: 3 | Status: SHIPPED | OUTPATIENT
Start: 2023-12-07

## 2023-12-07 ASSESSMENT — ENCOUNTER SYMPTOMS
STRIDOR: 0
NAIL CHANGES: 0
APHONIA: 0
ABDOMINAL PAIN: 0
EYE PAIN: 0
COUGH: 0

## 2023-12-07 NOTE — PROGRESS NOTES
bradycardia  Ongoing discussion regarding class II indications for device-based therapies  Previous wound on back with infection and debridement  On low dose BB due to palpations and SVT   No class I indications BB. This could be stopped if there are sx   Mitral insufficiency  Moderate  Aorta disorder   The 2022 American College of Cardiology/American Heart Association (ACC/AHA) aortic disease guidelines  Sporadic/degenerative thoracic aortic aneurysm (TAA). The use of antihypertensive medications is recommended among patients with sporadic TAA and blood pressure (BP) ?130/80 mm Hg. In the absence of contraindications, the use of beta-blockers and/or ARB therapy is reasonable. Moderate- or high-intensity statin therapy is reasonable in patients with TAA and clinical or imaging evidence of atherosclerosis. Low-dose aspirin is reasonable in patients with TAA and concomitant atherosclerosis or SAMAN. TTE is recommended at the time of diagnosis to assess aortic valve anatomy and function and thoracic aortic diameters. CT or MRI is reasonable at the time of diagnosis. Surveillance imaging with TTE, CT, or MRI is reasonable after 6-12 months, and then, if stable, every 6-24 months. For aortic root or ascending aorta aneurysm, surgery is recommended for symptoms attributable to the aneurysm, diameter ? 5.5 cm, or rapid growth; and reasonable at the time of tricuspid aortic valve replacement with an aortic diameter ? 5.0 cm. With an experienced surgeon, surgery is reasonable with an aortic diameter ? 5.0 cm (or in a patient with height >1 standard deviation above or below the mean with a cross-sectional aortic area to height ratio ? 10 cm2/m), or at the time of tricuspid aortic valve replacement with an aortic diameter ? 4.5 cm.   Thoracic endovascular aortic repair (TEVAR) is recommended over open surgery in patients without MFS, LDS, or vascular Beni-Danlos syndrome who have descending TAA meeting criteria for intervention

## 2023-12-14 ENCOUNTER — OFFICE VISIT (OUTPATIENT)
Dept: FAMILY MEDICINE CLINIC | Facility: CLINIC | Age: 78
End: 2023-12-14
Payer: MEDICARE

## 2023-12-14 VITALS
RESPIRATION RATE: 16 BRPM | TEMPERATURE: 97.8 F | OXYGEN SATURATION: 98 % | BODY MASS INDEX: 25.87 KG/M2 | DIASTOLIC BLOOD PRESSURE: 81 MMHG | HEART RATE: 56 BPM | HEIGHT: 71 IN | SYSTOLIC BLOOD PRESSURE: 138 MMHG | WEIGHT: 184.8 LBS

## 2023-12-14 DIAGNOSIS — I10 ESSENTIAL HYPERTENSION: ICD-10-CM

## 2023-12-14 DIAGNOSIS — M15.9 GENERALIZED OSTEOARTHRITIS: ICD-10-CM

## 2023-12-14 DIAGNOSIS — F51.04 CHRONIC INSOMNIA: ICD-10-CM

## 2023-12-14 DIAGNOSIS — Z00.00 MEDICARE ANNUAL WELLNESS VISIT, SUBSEQUENT: Primary | ICD-10-CM

## 2023-12-14 DIAGNOSIS — M17.0 BILATERAL PRIMARY OSTEOARTHRITIS OF KNEE: ICD-10-CM

## 2023-12-14 DIAGNOSIS — N40.1 BENIGN PROSTATIC HYPERPLASIA WITH LOWER URINARY TRACT SYMPTOMS, SYMPTOM DETAILS UNSPECIFIED: ICD-10-CM

## 2023-12-14 LAB
BASOPHILS # BLD: 0 K/UL (ref 0–0.2)
BASOPHILS NFR BLD: 1 % (ref 0–2)
DIFFERENTIAL METHOD BLD: ABNORMAL
EOSINOPHIL # BLD: 0.1 K/UL (ref 0–0.8)
EOSINOPHIL NFR BLD: 2 % (ref 0.5–7.8)
ERYTHROCYTE [DISTWIDTH] IN BLOOD BY AUTOMATED COUNT: 15.9 % (ref 11.9–14.6)
HCT VFR BLD AUTO: 38.5 % (ref 41.1–50.3)
HGB BLD-MCNC: 11.7 G/DL (ref 13.6–17.2)
IMM GRANULOCYTES # BLD AUTO: 0 K/UL (ref 0–0.5)
IMM GRANULOCYTES NFR BLD AUTO: 0 % (ref 0–5)
LYMPHOCYTES # BLD: 1.6 K/UL (ref 0.5–4.6)
LYMPHOCYTES NFR BLD: 27 % (ref 13–44)
MCH RBC QN AUTO: 27.7 PG (ref 26.1–32.9)
MCHC RBC AUTO-ENTMCNC: 30.4 G/DL (ref 31.4–35)
MCV RBC AUTO: 91 FL (ref 82–102)
MONOCYTES # BLD: 0.5 K/UL (ref 0.1–1.3)
MONOCYTES NFR BLD: 9 % (ref 4–12)
NEUTS SEG # BLD: 3.6 K/UL (ref 1.7–8.2)
NEUTS SEG NFR BLD: 61 % (ref 43–78)
NRBC # BLD: 0 K/UL (ref 0–0.2)
PLATELET # BLD AUTO: 158 K/UL (ref 150–450)
PMV BLD AUTO: 10.7 FL (ref 9.4–12.3)
RBC # BLD AUTO: 4.23 M/UL (ref 4.23–5.6)
WBC # BLD AUTO: 6 K/UL (ref 4.3–11.1)

## 2023-12-14 PROCEDURE — G0439 PPPS, SUBSEQ VISIT: HCPCS | Performed by: FAMILY MEDICINE

## 2023-12-14 PROCEDURE — 3075F SYST BP GE 130 - 139MM HG: CPT | Performed by: FAMILY MEDICINE

## 2023-12-14 PROCEDURE — 1123F ACP DISCUSS/DSCN MKR DOCD: CPT | Performed by: FAMILY MEDICINE

## 2023-12-14 PROCEDURE — G8484 FLU IMMUNIZE NO ADMIN: HCPCS | Performed by: FAMILY MEDICINE

## 2023-12-14 PROCEDURE — 3079F DIAST BP 80-89 MM HG: CPT | Performed by: FAMILY MEDICINE

## 2023-12-14 RX ORDER — OXYCODONE HYDROCHLORIDE AND ACETAMINOPHEN 5; 325 MG/1; MG/1
TABLET ORAL
COMMUNITY
Start: 2023-12-13

## 2023-12-14 RX ORDER — FINASTERIDE 5 MG/1
5 TABLET, FILM COATED ORAL DAILY
Qty: 90 TABLET | Refills: 3 | Status: SHIPPED | OUTPATIENT
Start: 2023-12-14

## 2023-12-14 RX ORDER — ALPRAZOLAM 2 MG/1
2 TABLET ORAL NIGHTLY PRN
COMMUNITY
End: 2023-12-14 | Stop reason: SDUPTHER

## 2023-12-14 RX ORDER — ALPRAZOLAM 2 MG/1
2 TABLET ORAL NIGHTLY PRN
Qty: 30 TABLET | Refills: 5 | Status: SHIPPED | OUTPATIENT
Start: 2023-12-14 | End: 2024-06-11

## 2023-12-14 ASSESSMENT — PATIENT HEALTH QUESTIONNAIRE - PHQ9
8. MOVING OR SPEAKING SO SLOWLY THAT OTHER PEOPLE COULD HAVE NOTICED. OR THE OPPOSITE, BEING SO FIGETY OR RESTLESS THAT YOU HAVE BEEN MOVING AROUND A LOT MORE THAN USUAL: 0
SUM OF ALL RESPONSES TO PHQ QUESTIONS 1-9: 0
9. THOUGHTS THAT YOU WOULD BE BETTER OFF DEAD, OR OF HURTING YOURSELF: 0
SUM OF ALL RESPONSES TO PHQ QUESTIONS 1-9: 0
1. LITTLE INTEREST OR PLEASURE IN DOING THINGS: 0
4. FEELING TIRED OR HAVING LITTLE ENERGY: 0
6. FEELING BAD ABOUT YOURSELF - OR THAT YOU ARE A FAILURE OR HAVE LET YOURSELF OR YOUR FAMILY DOWN: 0
2. FEELING DOWN, DEPRESSED OR HOPELESS: 0
5. POOR APPETITE OR OVEREATING: 0
SUM OF ALL RESPONSES TO PHQ9 QUESTIONS 1 & 2: 0
SUM OF ALL RESPONSES TO PHQ QUESTIONS 1-9: 0
3. TROUBLE FALLING OR STAYING ASLEEP: 0
SUM OF ALL RESPONSES TO PHQ QUESTIONS 1-9: 0
10. IF YOU CHECKED OFF ANY PROBLEMS, HOW DIFFICULT HAVE THESE PROBLEMS MADE IT FOR YOU TO DO YOUR WORK, TAKE CARE OF THINGS AT HOME, OR GET ALONG WITH OTHER PEOPLE: 0
7. TROUBLE CONCENTRATING ON THINGS, SUCH AS READING THE NEWSPAPER OR WATCHING TELEVISION: 0

## 2023-12-14 ASSESSMENT — LIFESTYLE VARIABLES
HOW MANY STANDARD DRINKS CONTAINING ALCOHOL DO YOU HAVE ON A TYPICAL DAY: PATIENT DOES NOT DRINK
HOW OFTEN DO YOU HAVE A DRINK CONTAINING ALCOHOL: NEVER

## 2023-12-14 NOTE — PROGRESS NOTES
capsule Take 1 capsule by mouth 2 times daily Yes Automatic Reconciliation, Ar   hydrocortisone (HYTONE) 2.5 % lotion Apply 1 Dose topically daily apply to scalp Yes Automatic Reconciliation, Ar   triamcinolone (KENALOG) 0.1 % cream Apply 1 Dose topically 2 times daily Apply to periwound Yes Automatic Reconciliation, Ar       CareTeam (Including outside providers/suppliers regularly involved in providing care):   Patient Care Team:  Mauricio Kaur MD as PCP - Machelle Mcmillan MD as PCP - Empaneled Provider  Cassie Young MD as Consulting Physician (Cardiology)     Reviewed and updated this visit:  Tobacco  Allergies  Meds  Problems  Med Hx  Surg Hx  Soc Hx  Fam Hx

## 2023-12-15 LAB
ALBUMIN SERPL-MCNC: 3.9 G/DL (ref 3.2–4.6)
ALBUMIN/GLOB SERPL: 1.3 (ref 0.4–1.6)
ALP SERPL-CCNC: 72 U/L (ref 50–136)
ALT SERPL-CCNC: 28 U/L (ref 12–65)
ANION GAP SERPL CALC-SCNC: 4 MMOL/L (ref 2–11)
AST SERPL-CCNC: 15 U/L (ref 15–37)
BILIRUB SERPL-MCNC: 0.2 MG/DL (ref 0.2–1.1)
BUN SERPL-MCNC: 14 MG/DL (ref 8–23)
CALCIUM SERPL-MCNC: 8.5 MG/DL (ref 8.3–10.4)
CHLORIDE SERPL-SCNC: 107 MMOL/L (ref 103–113)
CO2 SERPL-SCNC: 29 MMOL/L (ref 21–32)
CREAT SERPL-MCNC: 1 MG/DL (ref 0.8–1.5)
GLOBULIN SER CALC-MCNC: 3 G/DL (ref 2.8–4.5)
GLUCOSE SERPL-MCNC: 90 MG/DL (ref 65–100)
POTASSIUM SERPL-SCNC: 4.5 MMOL/L (ref 3.5–5.1)
PROT SERPL-MCNC: 6.9 G/DL (ref 6.3–8.2)
PSA SERPL-MCNC: 0.3 NG/ML
SODIUM SERPL-SCNC: 140 MMOL/L (ref 136–146)

## 2023-12-15 NOTE — RESULT ENCOUNTER NOTE
Hemoglobin appears stable at 11.7. PSA is 0.3 with normal less than 4.   Blood sugar is good at 90, kidney function and liver enzymes are normal

## 2023-12-19 ENCOUNTER — TELEPHONE (OUTPATIENT)
Dept: FAMILY MEDICINE CLINIC | Facility: CLINIC | Age: 78
End: 2023-12-19

## 2024-02-23 RX ORDER — TAMSULOSIN HYDROCHLORIDE 0.4 MG/1
0.8 CAPSULE ORAL DAILY
Qty: 180 CAPSULE | Refills: 3 | Status: SHIPPED | OUTPATIENT
Start: 2024-02-23

## 2024-03-15 ENCOUNTER — HOSPITAL ENCOUNTER (OUTPATIENT)
Dept: CT IMAGING | Age: 79
End: 2024-03-15
Attending: INTERNAL MEDICINE
Payer: MEDICARE

## 2024-03-15 DIAGNOSIS — I77.9 AORTA DISORDER (HCC): ICD-10-CM

## 2024-03-15 LAB — CREAT BLD-MCNC: 1.03 MG/DL (ref 0.8–1.5)

## 2024-03-15 PROCEDURE — 71275 CT ANGIOGRAPHY CHEST: CPT

## 2024-03-15 PROCEDURE — 82565 ASSAY OF CREATININE: CPT

## 2024-03-15 PROCEDURE — 71275 CT ANGIOGRAPHY CHEST: CPT | Performed by: RADIOLOGY

## 2024-03-15 PROCEDURE — 6360000004 HC RX CONTRAST MEDICATION: Performed by: INTERNAL MEDICINE

## 2024-03-15 RX ADMIN — IOPAMIDOL 100 ML: 755 INJECTION, SOLUTION INTRAVENOUS at 14:13

## 2024-04-25 RX ORDER — TAMSULOSIN HYDROCHLORIDE 0.4 MG/1
0.8 CAPSULE ORAL DAILY
Qty: 180 CAPSULE | Refills: 3 | Status: SHIPPED | OUTPATIENT
Start: 2024-04-25

## 2024-06-12 ENCOUNTER — OFFICE VISIT (OUTPATIENT)
Age: 79
End: 2024-06-12
Payer: MEDICARE

## 2024-06-12 VITALS
BODY MASS INDEX: 24.92 KG/M2 | HEIGHT: 71 IN | WEIGHT: 178 LBS | SYSTOLIC BLOOD PRESSURE: 104 MMHG | DIASTOLIC BLOOD PRESSURE: 62 MMHG | HEART RATE: 68 BPM

## 2024-06-12 DIAGNOSIS — I77.9 AORTA DISORDER (HCC): Primary | ICD-10-CM

## 2024-06-12 DIAGNOSIS — I47.10 SVT (SUPRAVENTRICULAR TACHYCARDIA) (HCC): ICD-10-CM

## 2024-06-12 DIAGNOSIS — I10 ESSENTIAL HYPERTENSION: ICD-10-CM

## 2024-06-12 DIAGNOSIS — I34.0 NONRHEUMATIC MITRAL VALVE REGURGITATION: ICD-10-CM

## 2024-06-12 PROCEDURE — 3074F SYST BP LT 130 MM HG: CPT | Performed by: INTERNAL MEDICINE

## 2024-06-12 PROCEDURE — 3078F DIAST BP <80 MM HG: CPT | Performed by: INTERNAL MEDICINE

## 2024-06-12 PROCEDURE — 99214 OFFICE O/P EST MOD 30 MIN: CPT | Performed by: INTERNAL MEDICINE

## 2024-06-12 PROCEDURE — 1123F ACP DISCUSS/DSCN MKR DOCD: CPT | Performed by: INTERNAL MEDICINE

## 2024-06-12 ASSESSMENT — ENCOUNTER SYMPTOMS
COUGH: 0
EYE PAIN: 0
ABDOMINAL PAIN: 0
STRIDOR: 0
APHONIA: 0
NAIL CHANGES: 0

## 2024-06-12 NOTE — PATIENT INSTRUCTIONS
Here are some recommendations that may be helpful:    DO:  Maintain strict blood pressure control (~130/80).  Maintain a healthy weight.  Regular mild-moderate physical activity, such as walking o swimming o light jogging biking dancing or stair climbing    DO NOT:  Push, pull, bear down or lift anything heavier than 30 pounds.  Get tattoos or piercings.  Smoke (avoid second hand smoke as well) or use any tobacco products.  Shovel snow, chop wood, dig earth or use a sledgehammer.  Lift heavy weights (heavy = 30 pounds or more).  Use a shotgun that has a recoil energy more than 45 foot-pounds (do ask aspecialist for advice/products to reduce recoil energy).

## 2024-06-12 NOTE — PROGRESS NOTES
12/07/23 136/80         Physical Exam  Vitals reviewed.   HENT:      Head: Normocephalic.      Right Ear: External ear normal.      Left Ear: External ear normal.      Nose: Nose normal.   Eyes:      General: No scleral icterus.  Cardiovascular:      Rate and Rhythm: Bradycardia present.      Heart sounds: Murmur heard.   Pulmonary:      Effort: Pulmonary effort is normal.   Abdominal:      General: There is no distension.   Musculoskeletal:      Cervical back: Neck supple.      Comments: Left flack wound healing    Skin:     General: Skin is warm.      Comments: Dressing on left back in place.    Neurological:      Mental Status: He is alert. Mental status is at baseline.         Medical problems and test results were reviewed with the patient today.           No results found for this or any previous visit (from the past 672 hour(s)).    Lab Results   Component Value Date/Time    CHOL 185 09/09/2022 12:01 PM    HDL 48 09/09/2022 12:01 PM    LDL 94.8 09/09/2022 12:01 PM    VLDL 42.2 09/09/2022 12:01 PM              AGAPITO Polancoy was seen today for coronary artery disease.    Diagnoses and all orders for this visit:    Aorta disorder (HCC)  -     CTA CHEST W CONTRAST; Future    Essential hypertension    SVT (supraventricular tachycardia) ([facility])    Nonrheumatic mitral valve regurgitation      Return in about 1 year (around 6/12/2025).       Cornel Garnica MD  6/12/2024  1:14 PM

## 2024-06-14 ENCOUNTER — OFFICE VISIT (OUTPATIENT)
Dept: FAMILY MEDICINE CLINIC | Facility: CLINIC | Age: 79
End: 2024-06-14
Payer: MEDICARE

## 2024-06-14 VITALS
TEMPERATURE: 98 F | HEART RATE: 50 BPM | DIASTOLIC BLOOD PRESSURE: 66 MMHG | RESPIRATION RATE: 16 BRPM | OXYGEN SATURATION: 96 % | WEIGHT: 181 LBS | SYSTOLIC BLOOD PRESSURE: 112 MMHG | BODY MASS INDEX: 25.34 KG/M2 | HEIGHT: 71 IN

## 2024-06-14 DIAGNOSIS — M15.9 GENERALIZED OSTEOARTHRITIS: ICD-10-CM

## 2024-06-14 DIAGNOSIS — R20.2 PARESTHESIA: ICD-10-CM

## 2024-06-14 DIAGNOSIS — F51.04 CHRONIC INSOMNIA: ICD-10-CM

## 2024-06-14 DIAGNOSIS — I10 ESSENTIAL HYPERTENSION: Primary | ICD-10-CM

## 2024-06-14 DIAGNOSIS — R19.7 DIARRHEA, UNSPECIFIED TYPE: ICD-10-CM

## 2024-06-14 LAB
ANION GAP SERPL CALC-SCNC: 7 MMOL/L (ref 9–18)
BASOPHILS # BLD: 0 K/UL (ref 0–0.2)
BASOPHILS NFR BLD: 1 % (ref 0–2)
BUN SERPL-MCNC: 12 MG/DL (ref 8–23)
CALCIUM SERPL-MCNC: 9.1 MG/DL (ref 8.8–10.2)
CHLORIDE SERPL-SCNC: 102 MMOL/L (ref 98–107)
CO2 SERPL-SCNC: 27 MMOL/L (ref 20–28)
CREAT SERPL-MCNC: 1 MG/DL (ref 0.8–1.3)
DIFFERENTIAL METHOD BLD: ABNORMAL
EOSINOPHIL # BLD: 0.1 K/UL (ref 0–0.8)
EOSINOPHIL NFR BLD: 1 % (ref 0.5–7.8)
ERYTHROCYTE [DISTWIDTH] IN BLOOD BY AUTOMATED COUNT: 14.8 % (ref 11.9–14.6)
GLUCOSE SERPL-MCNC: 106 MG/DL (ref 70–99)
HCT VFR BLD AUTO: 39.6 % (ref 41.1–50.3)
HGB BLD-MCNC: 12.4 G/DL (ref 13.6–17.2)
IMM GRANULOCYTES # BLD AUTO: 0 K/UL (ref 0–0.5)
IMM GRANULOCYTES NFR BLD AUTO: 0 % (ref 0–5)
LYMPHOCYTES # BLD: 1.8 K/UL (ref 0.5–4.6)
LYMPHOCYTES NFR BLD: 36 % (ref 13–44)
MCH RBC QN AUTO: 29.9 PG (ref 26.1–32.9)
MCHC RBC AUTO-ENTMCNC: 31.3 G/DL (ref 31.4–35)
MCV RBC AUTO: 95.4 FL (ref 82–102)
MONOCYTES # BLD: 0.5 K/UL (ref 0.1–1.3)
MONOCYTES NFR BLD: 9 % (ref 4–12)
NEUTS SEG # BLD: 2.6 K/UL (ref 1.7–8.2)
NEUTS SEG NFR BLD: 53 % (ref 43–78)
NRBC # BLD: 0 K/UL (ref 0–0.2)
PLATELET # BLD AUTO: 141 K/UL (ref 150–450)
PMV BLD AUTO: 11 FL (ref 9.4–12.3)
POTASSIUM SERPL-SCNC: 4.8 MMOL/L (ref 3.5–5.1)
RBC # BLD AUTO: 4.15 M/UL (ref 4.23–5.6)
SODIUM SERPL-SCNC: 136 MMOL/L (ref 136–145)
VIT B12 SERPL-MCNC: 322 PG/ML (ref 193–986)
WBC # BLD AUTO: 5 K/UL (ref 4.3–11.1)

## 2024-06-14 PROCEDURE — 3078F DIAST BP <80 MM HG: CPT | Performed by: FAMILY MEDICINE

## 2024-06-14 PROCEDURE — 99214 OFFICE O/P EST MOD 30 MIN: CPT | Performed by: FAMILY MEDICINE

## 2024-06-14 PROCEDURE — 1123F ACP DISCUSS/DSCN MKR DOCD: CPT | Performed by: FAMILY MEDICINE

## 2024-06-14 PROCEDURE — 3074F SYST BP LT 130 MM HG: CPT | Performed by: FAMILY MEDICINE

## 2024-06-14 RX ORDER — ALPRAZOLAM 0.25 MG/1
0.25 TABLET ORAL NIGHTLY PRN
COMMUNITY
End: 2024-06-14 | Stop reason: SDUPTHER

## 2024-06-14 RX ORDER — GABAPENTIN 800 MG/1
800 TABLET ORAL EVERY EVENING
Qty: 90 TABLET | Refills: 3 | Status: CANCELLED | OUTPATIENT
Start: 2024-06-14 | End: 2025-06-09

## 2024-06-14 RX ORDER — ALPRAZOLAM 0.25 MG/1
0.25 TABLET ORAL NIGHTLY PRN
Qty: 30 TABLET | Refills: 5 | Status: SHIPPED | OUTPATIENT
Start: 2024-06-14 | End: 2024-12-11

## 2024-06-14 RX ORDER — MONTELUKAST SODIUM 4 MG/1
2 TABLET, CHEWABLE ORAL 2 TIMES DAILY
Qty: 120 TABLET | Refills: 5 | Status: SHIPPED | OUTPATIENT
Start: 2024-06-14

## 2024-06-14 RX ORDER — CELECOXIB 200 MG/1
200 CAPSULE ORAL DAILY
Qty: 30 CAPSULE | Refills: 5 | Status: SHIPPED | OUTPATIENT
Start: 2024-06-14

## 2024-06-14 RX ORDER — PREGABALIN 100 MG/1
100 CAPSULE ORAL
Qty: 90 CAPSULE | Refills: 1 | Status: SHIPPED | OUTPATIENT
Start: 2024-06-14 | End: 2024-12-11

## 2024-06-14 SDOH — ECONOMIC STABILITY: FOOD INSECURITY: WITHIN THE PAST 12 MONTHS, YOU WORRIED THAT YOUR FOOD WOULD RUN OUT BEFORE YOU GOT MONEY TO BUY MORE.: NEVER TRUE

## 2024-06-14 SDOH — ECONOMIC STABILITY: FOOD INSECURITY: WITHIN THE PAST 12 MONTHS, THE FOOD YOU BOUGHT JUST DIDN'T LAST AND YOU DIDN'T HAVE MONEY TO GET MORE.: NEVER TRUE

## 2024-06-14 SDOH — ECONOMIC STABILITY: INCOME INSECURITY: HOW HARD IS IT FOR YOU TO PAY FOR THE VERY BASICS LIKE FOOD, HOUSING, MEDICAL CARE, AND HEATING?: NOT VERY HARD

## 2024-06-14 NOTE — PROGRESS NOTES
HISTORY OF PRESENT ILLNESS     Tyler Mccurdy Jr. is a 78 y.o. male who presents for       HPI  Patient comes in today for follow-up on his blood pressure, medication refills.  He has been taking gabapentin 800 mg at night but does not seem to be helping with his symptoms of numbness with burning type pain in his feet consistent with neuropathy.  He was asking if something different could be tried.  He also has some psoriatic arthritis and has not taken Celebrex in over a year but previously thought that may have been helpful.    Past Medical History:   Diagnosis Date    Depression     H/O urinary frequency 2/1/2013    History of BPH 2/1/2013    HLD (hyperlipidemia) 2/1/2013    Hx: UTI (urinary tract infection) 2/1/2013    Insomnia     Psoriasis     Psoriatic arthritis (HCC) 2/1/2013    Wears hearing aid 2/1/2013       Allergies   Allergen Reactions    Vancomycin Rash       Current Outpatient Medications   Medication Sig Dispense Refill    colestipol (COLESTID) 1 g tablet Take 2 tablets by mouth 2 times daily 120 tablet 5    ALPRAZolam (XANAX) 0.25 MG tablet Take 1 tablet by mouth nightly as needed for Sleep for up to 180 days. Max Daily Amount: 0.25 mg 30 tablet 5    pregabalin (LYRICA) 100 MG capsule Take 1 capsule by mouth nightly for 180 days. Stop Gabapentin Max Daily Amount: 100 mg 90 capsule 1    celecoxib (CELEBREX) 200 MG capsule Take 1 capsule by mouth daily 30 capsule 5    tamsulosin (FLOMAX) 0.4 MG capsule Take 2 capsules by mouth daily 180 capsule 3    oxyCODONE-acetaminophen (PERCOCET) 5-325 MG per tablet       finasteride (PROSCAR) 5 MG tablet Take 1 tablet by mouth daily 90 tablet 3    amLODIPine (NORVASC) 5 MG tablet Take 1 tablet by mouth daily 90 tablet 3    Multiple Vitamins-Minerals (PRESERVISION AREDS 2 PO) Take 2 capsules by mouth daily.      gabapentin (NEURONTIN) 800 MG tablet Take 1 tablet by mouth every evening for 360 days. 90 tablet 3    Azelastine HCl 137 MCG/SPRAY SOLN 205.5 mcg by

## 2024-06-17 DIAGNOSIS — F51.04 CHRONIC INSOMNIA: ICD-10-CM

## 2024-06-17 RX ORDER — ALPRAZOLAM 2 MG/1
2 TABLET ORAL NIGHTLY PRN
Qty: 30 TABLET | Refills: 5 | Status: SHIPPED | OUTPATIENT
Start: 2024-06-17 | End: 2024-12-14

## 2024-06-17 NOTE — TELEPHONE ENCOUNTER
Pt was given Rx for Xanax 0.25.   Pt in office stating Rx should have been for 2 mg. Pt has picked up the Rx for the 0.25 mg.  Rx to be sent to [facility]

## 2024-07-26 RX ORDER — DULOXETIN HYDROCHLORIDE 60 MG/1
60 CAPSULE, DELAYED RELEASE ORAL 2 TIMES DAILY
Qty: 180 CAPSULE | Refills: 1 | Status: SHIPPED | OUTPATIENT
Start: 2024-07-26

## 2024-07-26 RX ORDER — DULOXETIN HYDROCHLORIDE 60 MG/1
60 CAPSULE, DELAYED RELEASE ORAL 2 TIMES DAILY
Qty: 180 CAPSULE | Refills: 1 | Status: SHIPPED | OUTPATIENT
Start: 2024-07-26 | End: 2024-07-26 | Stop reason: SDUPTHER

## 2024-08-15 DIAGNOSIS — R20.2 PARESTHESIA: ICD-10-CM

## 2024-08-15 RX ORDER — GABAPENTIN 800 MG/1
800 TABLET ORAL NIGHTLY
Qty: 90 TABLET | Refills: 3 | Status: SHIPPED | OUTPATIENT
Start: 2024-08-15 | End: 2025-08-10

## 2024-11-13 ENCOUNTER — NURSE ONLY (OUTPATIENT)
Dept: FAMILY MEDICINE CLINIC | Facility: CLINIC | Age: 79
End: 2024-11-13
Payer: MEDICARE

## 2024-11-13 ENCOUNTER — TELEPHONE (OUTPATIENT)
Dept: FAMILY MEDICINE CLINIC | Facility: CLINIC | Age: 79
End: 2024-11-13

## 2024-11-13 DIAGNOSIS — R19.7 DIARRHEA, UNSPECIFIED TYPE: ICD-10-CM

## 2024-11-13 DIAGNOSIS — Z23 NEED FOR IMMUNIZATION AGAINST INFLUENZA: Primary | ICD-10-CM

## 2024-11-13 PROCEDURE — G0008 ADMIN INFLUENZA VIRUS VAC: HCPCS | Performed by: FAMILY MEDICINE

## 2024-11-13 PROCEDURE — 90653 IIV ADJUVANT VACCINE IM: CPT | Performed by: FAMILY MEDICINE

## 2024-11-13 RX ORDER — MONTELUKAST SODIUM 4 MG/1
2 TABLET, CHEWABLE ORAL 2 TIMES DAILY
Qty: 120 TABLET | Refills: 5 | Status: SHIPPED | OUTPATIENT
Start: 2024-11-13

## 2024-11-14 NOTE — TELEPHONE ENCOUNTER
Sent in prescription for:     Requested Prescriptions     Signed Prescriptions Disp Refills    colestipol (COLESTID) 1 g tablet 120 tablet 5     Sig: Take 2 tablets by mouth 2 times daily     Authorizing Provider: MICK CONNELL

## 2024-12-16 ENCOUNTER — OFFICE VISIT (OUTPATIENT)
Dept: FAMILY MEDICINE CLINIC | Facility: CLINIC | Age: 79
End: 2024-12-16
Payer: MEDICARE

## 2024-12-16 VITALS
TEMPERATURE: 98 F | SYSTOLIC BLOOD PRESSURE: 130 MMHG | HEIGHT: 71 IN | RESPIRATION RATE: 16 BRPM | HEART RATE: 55 BPM | BODY MASS INDEX: 25.68 KG/M2 | OXYGEN SATURATION: 98 % | WEIGHT: 183.4 LBS | DIASTOLIC BLOOD PRESSURE: 75 MMHG

## 2024-12-16 DIAGNOSIS — F51.04 CHRONIC INSOMNIA: ICD-10-CM

## 2024-12-16 DIAGNOSIS — L40.50 ARTHROPATHIC PSORIASIS, UNSPECIFIED (HCC): ICD-10-CM

## 2024-12-16 DIAGNOSIS — L21.9 SEBORRHEIC DERMATITIS: ICD-10-CM

## 2024-12-16 DIAGNOSIS — Z00.00 MEDICARE ANNUAL WELLNESS VISIT, SUBSEQUENT: Primary | ICD-10-CM

## 2024-12-16 DIAGNOSIS — N40.1 BENIGN PROSTATIC HYPERPLASIA WITH LOWER URINARY TRACT SYMPTOMS, SYMPTOM DETAILS UNSPECIFIED: ICD-10-CM

## 2024-12-16 DIAGNOSIS — I10 ESSENTIAL HYPERTENSION: ICD-10-CM

## 2024-12-16 DIAGNOSIS — R20.2 PARESTHESIA: ICD-10-CM

## 2024-12-16 LAB
ALBUMIN SERPL-MCNC: 3.9 G/DL (ref 3.2–4.6)
ALBUMIN/GLOB SERPL: 1.2 (ref 1–1.9)
ALP SERPL-CCNC: 80 U/L (ref 40–129)
ALT SERPL-CCNC: 22 U/L (ref 8–55)
ANION GAP SERPL CALC-SCNC: 8 MMOL/L (ref 7–16)
AST SERPL-CCNC: 26 U/L (ref 15–37)
BASOPHILS # BLD: 0 K/UL (ref 0–0.2)
BASOPHILS NFR BLD: 1 % (ref 0–2)
BILIRUB SERPL-MCNC: 0.3 MG/DL (ref 0–1.2)
BUN SERPL-MCNC: 12 MG/DL (ref 8–23)
CALCIUM SERPL-MCNC: 9.1 MG/DL (ref 8.8–10.2)
CHLORIDE SERPL-SCNC: 103 MMOL/L (ref 98–107)
CHOLEST SERPL-MCNC: 166 MG/DL (ref 0–200)
CO2 SERPL-SCNC: 28 MMOL/L (ref 20–29)
CREAT SERPL-MCNC: 0.98 MG/DL (ref 0.8–1.3)
DIFFERENTIAL METHOD BLD: NORMAL
EOSINOPHIL # BLD: 0.1 K/UL (ref 0–0.8)
EOSINOPHIL NFR BLD: 1 % (ref 0.5–7.8)
ERYTHROCYTE [DISTWIDTH] IN BLOOD BY AUTOMATED COUNT: 12.9 % (ref 11.9–14.6)
GLOBULIN SER CALC-MCNC: 3.2 G/DL (ref 2.3–3.5)
GLUCOSE SERPL-MCNC: 95 MG/DL (ref 70–99)
HCT VFR BLD AUTO: 44 % (ref 41.1–50.3)
HDLC SERPL-MCNC: 45 MG/DL (ref 40–60)
HDLC SERPL: 3.7 (ref 0–5)
HGB BLD-MCNC: 14.4 G/DL (ref 13.6–17.2)
IMM GRANULOCYTES # BLD AUTO: 0 K/UL (ref 0–0.5)
IMM GRANULOCYTES NFR BLD AUTO: 0 % (ref 0–5)
LDLC SERPL CALC-MCNC: 103 MG/DL (ref 0–100)
LYMPHOCYTES # BLD: 1.5 K/UL (ref 0.5–4.6)
LYMPHOCYTES NFR BLD: 30 % (ref 13–44)
MCH RBC QN AUTO: 31.4 PG (ref 26.1–32.9)
MCHC RBC AUTO-ENTMCNC: 32.7 G/DL (ref 31.4–35)
MCV RBC AUTO: 95.9 FL (ref 82–102)
MONOCYTES # BLD: 0.5 K/UL (ref 0.1–1.3)
MONOCYTES NFR BLD: 10 % (ref 4–12)
NEUTS SEG # BLD: 2.9 K/UL (ref 1.7–8.2)
NEUTS SEG NFR BLD: 58 % (ref 43–78)
NRBC # BLD: 0 K/UL (ref 0–0.2)
PLATELET # BLD AUTO: 154 K/UL (ref 150–450)
PMV BLD AUTO: 10.8 FL (ref 9.4–12.3)
POTASSIUM SERPL-SCNC: 4.7 MMOL/L (ref 3.5–5.1)
PROT SERPL-MCNC: 7.1 G/DL (ref 6.3–8.2)
PSA SERPL-MCNC: 0.2 NG/ML (ref 0–4)
RBC # BLD AUTO: 4.59 M/UL (ref 4.23–5.6)
SODIUM SERPL-SCNC: 138 MMOL/L (ref 136–145)
TRIGL SERPL-MCNC: 93 MG/DL (ref 0–150)
TSH, 3RD GENERATION: 2.09 UIU/ML (ref 0.27–4.2)
VIT B12 SERPL-MCNC: 437 PG/ML (ref 193–986)
VLDLC SERPL CALC-MCNC: 19 MG/DL (ref 6–23)
WBC # BLD AUTO: 5 K/UL (ref 4.3–11.1)

## 2024-12-16 PROCEDURE — 3078F DIAST BP <80 MM HG: CPT | Performed by: FAMILY MEDICINE

## 2024-12-16 PROCEDURE — G0439 PPPS, SUBSEQ VISIT: HCPCS | Performed by: FAMILY MEDICINE

## 2024-12-16 PROCEDURE — 1125F AMNT PAIN NOTED PAIN PRSNT: CPT | Performed by: FAMILY MEDICINE

## 2024-12-16 PROCEDURE — 3075F SYST BP GE 130 - 139MM HG: CPT | Performed by: FAMILY MEDICINE

## 2024-12-16 PROCEDURE — 1159F MED LIST DOCD IN RCRD: CPT | Performed by: FAMILY MEDICINE

## 2024-12-16 PROCEDURE — 1123F ACP DISCUSS/DSCN MKR DOCD: CPT | Performed by: FAMILY MEDICINE

## 2024-12-16 RX ORDER — ALPRAZOLAM 2 MG/1
2 TABLET ORAL NIGHTLY PRN
Qty: 30 TABLET | Refills: 5 | Status: SHIPPED | OUTPATIENT
Start: 2024-12-16 | End: 2025-06-14

## 2024-12-16 RX ORDER — GABAPENTIN 800 MG/1
800 TABLET ORAL 3 TIMES DAILY
Qty: 270 TABLET | Refills: 3 | Status: SHIPPED | OUTPATIENT
Start: 2024-12-16 | End: 2025-12-11

## 2024-12-16 RX ORDER — DULOXETIN HYDROCHLORIDE 60 MG/1
60 CAPSULE, DELAYED RELEASE ORAL 2 TIMES DAILY
Qty: 180 CAPSULE | Refills: 1 | Status: SHIPPED | OUTPATIENT
Start: 2024-12-16

## 2024-12-16 RX ORDER — CLOTRIMAZOLE AND BETAMETHASONE DIPROPIONATE 10; .64 MG/G; MG/G
CREAM TOPICAL
Qty: 45 G | Refills: 3 | Status: SHIPPED | OUTPATIENT
Start: 2024-12-16

## 2024-12-16 RX ORDER — GABAPENTIN 800 MG/1
800 TABLET ORAL NIGHTLY
Qty: 90 TABLET | Refills: 3 | Status: SHIPPED | OUTPATIENT
Start: 2024-12-16 | End: 2024-12-16 | Stop reason: SDUPTHER

## 2024-12-16 RX ORDER — ALPRAZOLAM 2 MG/1
2 TABLET ORAL NIGHTLY PRN
COMMUNITY
Start: 2024-12-11

## 2024-12-16 RX ORDER — FLUOCINONIDE 0.5 MG/G
CREAM TOPICAL
Qty: 60 G | Refills: 5 | Status: SHIPPED | OUTPATIENT
Start: 2024-12-16

## 2024-12-16 RX ORDER — FINASTERIDE 5 MG/1
5 TABLET, FILM COATED ORAL DAILY
Qty: 90 TABLET | Refills: 3 | Status: SHIPPED | OUTPATIENT
Start: 2024-12-16

## 2024-12-16 ASSESSMENT — LIFESTYLE VARIABLES
HOW OFTEN DO YOU HAVE A DRINK CONTAINING ALCOHOL: NEVER
HOW MANY STANDARD DRINKS CONTAINING ALCOHOL DO YOU HAVE ON A TYPICAL DAY: PATIENT DOES NOT DRINK

## 2024-12-16 NOTE — PROGRESS NOTES
Medicare Annual Wellness Visit    Tyler Mccurdy  is here for Medicare AWV    Assessment & Plan   Medicare annual wellness visit, subsequent  Paresthesia  -     gabapentin (NEURONTIN) 800 MG tablet; Take 1 tablet by mouth 3 times daily for 360 days. Changing from once at night to tid, Disp-270 tablet, R-3Normal  -     Vitamin B12; Future  -     TSH; Future  Benign prostatic hyperplasia with lower urinary tract symptoms, symptom details unspecified  -     finasteride (PROSCAR) 5 MG tablet; Take 1 tablet by mouth daily, Disp-90 tablet, R-3Normal  -     PSA, Diagnostic; Future  Chronic insomnia  -     ALPRAZolam (XANAX) 2 MG tablet; Take 1 tablet by mouth nightly as needed for Sleep for up to 180 days. Max Daily Amount: 2 mg, Disp-30 tablet, R-5Normal  Seborrheic dermatitis  -     fluocinonide (LIDEX) 0.05 % cream; Apply topically 2 times daily scalp., Disp-60 g, R-5, Normal  Arthropathic psoriasis, unspecified (HCC)  Essential hypertension  -     CBC with Auto Differential; Future  -     Comprehensive Metabolic Panel; Future  -     Lipid Panel; Future    Recommendations for Preventive Services Due: see orders and patient instructions/AVS.  Recommended screening schedule for the next 5-10 years is provided to the patient in written form: see Patient Instructions/AVS.  Patient has found that the Lotrisone seems to help along his left chest wall and axilla where he had a previous abscess on the chest wall that required wound care and extensive draining but he also was found that Lidex helped with his scalp type seborrheic dermatitis so I will refill both of those.  Check some additional labs today but increase to gabapentin which she is tolerating well to 3 times a day and see if that helps with the pain in his feet that sounds to be more consistent with neuropathy but also will repeat B12 level as well as additional labs.  He does report that his had his flu shot already this year.  Will notify patient of test

## 2024-12-18 NOTE — RESULT ENCOUNTER NOTE
Left message informing labs were normal 
The cholesterol is 166 with normal less than 200.  Thyroid test is normal.  Blood sugar is good at 95, kidney function and liver enzymes are normal.  B12 level is good.  PSA is 0.2 and normal less than 4.  Hemoglobin is good at 14.4.
Micth PGY3

## 2025-02-14 RX ORDER — AMLODIPINE BESYLATE 5 MG/1
5 TABLET ORAL DAILY
Qty: 90 TABLET | Refills: 3 | Status: SHIPPED | OUTPATIENT
Start: 2025-02-14

## 2025-05-15 ENCOUNTER — OFFICE VISIT (OUTPATIENT)
Age: 80
End: 2025-05-15
Payer: MEDICARE

## 2025-05-15 VITALS
DIASTOLIC BLOOD PRESSURE: 82 MMHG | BODY MASS INDEX: 26.46 KG/M2 | WEIGHT: 189 LBS | SYSTOLIC BLOOD PRESSURE: 142 MMHG | HEART RATE: 60 BPM | HEIGHT: 71 IN

## 2025-05-15 DIAGNOSIS — I10 ESSENTIAL HYPERTENSION: ICD-10-CM

## 2025-05-15 DIAGNOSIS — I47.10 SVT (SUPRAVENTRICULAR TACHYCARDIA): Primary | ICD-10-CM

## 2025-05-15 DIAGNOSIS — I34.0 NONRHEUMATIC MITRAL VALVE REGURGITATION: ICD-10-CM

## 2025-05-15 DIAGNOSIS — I77.810 DILATATION OF THORACIC AORTA: ICD-10-CM

## 2025-05-15 DIAGNOSIS — Z01.818 PRE-OP EVALUATION: ICD-10-CM

## 2025-05-15 DIAGNOSIS — R01.1 HEART MURMUR: ICD-10-CM

## 2025-05-15 PROCEDURE — 93000 ELECTROCARDIOGRAM COMPLETE: CPT | Performed by: INTERNAL MEDICINE

## 2025-05-15 PROCEDURE — 1123F ACP DISCUSS/DSCN MKR DOCD: CPT | Performed by: INTERNAL MEDICINE

## 2025-05-15 PROCEDURE — 3079F DIAST BP 80-89 MM HG: CPT | Performed by: INTERNAL MEDICINE

## 2025-05-15 PROCEDURE — 3077F SYST BP >= 140 MM HG: CPT | Performed by: INTERNAL MEDICINE

## 2025-05-15 PROCEDURE — 99214 OFFICE O/P EST MOD 30 MIN: CPT | Performed by: INTERNAL MEDICINE

## 2025-05-15 PROCEDURE — 1159F MED LIST DOCD IN RCRD: CPT | Performed by: INTERNAL MEDICINE

## 2025-05-15 PROCEDURE — 1126F AMNT PAIN NOTED NONE PRSNT: CPT | Performed by: INTERNAL MEDICINE

## 2025-05-15 RX ORDER — AMLODIPINE BESYLATE 5 MG/1
5 TABLET ORAL DAILY
Qty: 90 TABLET | Refills: 3 | Status: SHIPPED | OUTPATIENT
Start: 2025-05-15

## 2025-05-15 RX ORDER — METOPROLOL SUCCINATE 25 MG/1
12.5 TABLET, EXTENDED RELEASE ORAL DAILY
Qty: 90 TABLET | Refills: 3 | Status: SHIPPED | OUTPATIENT
Start: 2025-05-15

## 2025-05-15 RX ORDER — TAMSULOSIN HYDROCHLORIDE 0.4 MG/1
0.8 CAPSULE ORAL DAILY
Qty: 180 CAPSULE | Refills: 0 | Status: SHIPPED | OUTPATIENT
Start: 2025-05-15 | End: 2025-05-16 | Stop reason: SDUPTHER

## 2025-05-15 NOTE — PROGRESS NOTES
[facility] CARDIOLOGY, [address], [address]  [place] [address]  PHONE: [phone]    SUBJECTIVE:   Tyler Mccurdy Jr. is a 79 y.o. male 1945   seen for a follow up visit regarding the following:     Chief Complaint   Patient presents with    Shortness of Breath    Other     Pre-op         History of Present Illness  The patient is a 79-year-old individual with a medical history significant for mitral valve insufficiency, aortic disorder, and supraventricular tachycardia.    The patient denies experiencing chest pain or dyspnea. Respiratory function is reported as satisfactory, although the patient notes persistent generalized weakness. The patient's health status has remained stable over the past year.    The patient is scheduled for knee arthroplasty and expresses concern regarding cardiac function adequacy for the surgical procedure.   SOCIAL HISTORY  - Lives alone          Cardiac history  6/2022 Holter monitor in place minimum heart rate 48 bpm maximal heart rate 156 bpm average heart rate 62 bpm episodes of SVT occurring  7/2022 stress perfusion study low risk study normal perfusion  5/19/2022 Left Ventricle: Normal left ventricular systolic function. EF by 2D Simpsons Biplane is 64%. Left ventricle size is normal. Increased wall thickness. Normal wall motion. Normal diastolic function.Aortic Valve: Valve structure is normal. Mild sclerosis of the aortic valve cusp. Mitral Valve: Mildly thickened leaflet. Mild regurgitation with multiple jets.Left Atrium: Left atrium is dilated.Aorta: Ao root diameter is 3.8 cm. Ao Root Index is 1.83 cm/m2. Ao ascending diameter is 4.6 cm.  12/7/2023 EKG sinus bradycardia nonspecific ST depression     Results  - CTA chest:    - Sinotubular junction: 3.0    - EKG (05/15/2025):    - Sinus rhythm with PAC       Assessment & Plan  Mitral insufficiency:  -  mild to Moderate    Aortic disorder:  - Sinotubular junction 3.0 on CTA  - Follow-up CTA

## 2025-05-16 ENCOUNTER — TELEPHONE (OUTPATIENT)
Dept: FAMILY MEDICINE CLINIC | Facility: CLINIC | Age: 80
End: 2025-05-16

## 2025-05-16 RX ORDER — TAMSULOSIN HYDROCHLORIDE 0.4 MG/1
0.8 CAPSULE ORAL DAILY
Qty: 180 CAPSULE | Refills: 3 | Status: SHIPPED | OUTPATIENT
Start: 2025-05-16

## 2025-05-16 NOTE — TELEPHONE ENCOUNTER
Pt called needs refill sent into [facility] in [facility]. Has none available for the weekend. Needs immediately. Please advise pt.         tamsulosin (FLOMAX) 0.4 MG capsule

## 2025-05-16 NOTE — TELEPHONE ENCOUNTER
Sent in prescription for:     Requested Prescriptions     Signed Prescriptions Disp Refills    tamsulosin (FLOMAX) 0.4 MG capsule 180 capsule 3     Sig: Take 2 capsules by mouth daily     Authorizing Provider: MICK CONNELL

## 2025-05-30 ENCOUNTER — OFFICE VISIT (OUTPATIENT)
Dept: FAMILY MEDICINE CLINIC | Facility: CLINIC | Age: 80
End: 2025-05-30
Payer: MEDICARE

## 2025-05-30 VITALS
RESPIRATION RATE: 16 BRPM | HEIGHT: 71 IN | DIASTOLIC BLOOD PRESSURE: 85 MMHG | TEMPERATURE: 98.3 F | BODY MASS INDEX: 26.1 KG/M2 | HEART RATE: 62 BPM | SYSTOLIC BLOOD PRESSURE: 146 MMHG | WEIGHT: 186.4 LBS | OXYGEN SATURATION: 98 %

## 2025-05-30 DIAGNOSIS — R20.2 PARESTHESIA: Primary | ICD-10-CM

## 2025-05-30 PROCEDURE — 1123F ACP DISCUSS/DSCN MKR DOCD: CPT | Performed by: PHYSICIAN ASSISTANT

## 2025-05-30 PROCEDURE — 99213 OFFICE O/P EST LOW 20 MIN: CPT | Performed by: PHYSICIAN ASSISTANT

## 2025-05-30 PROCEDURE — 1159F MED LIST DOCD IN RCRD: CPT | Performed by: PHYSICIAN ASSISTANT

## 2025-05-30 PROCEDURE — 1160F RVW MEDS BY RX/DR IN RCRD: CPT | Performed by: PHYSICIAN ASSISTANT

## 2025-05-30 RX ORDER — GABAPENTIN 800 MG/1
800 TABLET ORAL 3 TIMES DAILY
Qty: 270 TABLET | Refills: 3 | Status: SHIPPED | OUTPATIENT
Start: 2025-05-30 | End: 2026-05-25

## 2025-05-30 ASSESSMENT — PATIENT HEALTH QUESTIONNAIRE - PHQ9
7. TROUBLE CONCENTRATING ON THINGS, SUCH AS READING THE NEWSPAPER OR WATCHING TELEVISION: NOT AT ALL
9. THOUGHTS THAT YOU WOULD BE BETTER OFF DEAD, OR OF HURTING YOURSELF: NOT AT ALL
SUM OF ALL RESPONSES TO PHQ QUESTIONS 1-9: 2
10. IF YOU CHECKED OFF ANY PROBLEMS, HOW DIFFICULT HAVE THESE PROBLEMS MADE IT FOR YOU TO DO YOUR WORK, TAKE CARE OF THINGS AT HOME, OR GET ALONG WITH OTHER PEOPLE: SOMEWHAT DIFFICULT
3. TROUBLE FALLING OR STAYING ASLEEP: NOT AT ALL
8. MOVING OR SPEAKING SO SLOWLY THAT OTHER PEOPLE COULD HAVE NOTICED. OR THE OPPOSITE, BEING SO FIGETY OR RESTLESS THAT YOU HAVE BEEN MOVING AROUND A LOT MORE THAN USUAL: NOT AT ALL
4. FEELING TIRED OR HAVING LITTLE ENERGY: SEVERAL DAYS
1. LITTLE INTEREST OR PLEASURE IN DOING THINGS: NOT AT ALL
6. FEELING BAD ABOUT YOURSELF - OR THAT YOU ARE A FAILURE OR HAVE LET YOURSELF OR YOUR FAMILY DOWN: NOT AT ALL
2. FEELING DOWN, DEPRESSED OR HOPELESS: SEVERAL DAYS
SUM OF ALL RESPONSES TO PHQ QUESTIONS 1-9: 2
SUM OF ALL RESPONSES TO PHQ QUESTIONS 1-9: 2
5. POOR APPETITE OR OVEREATING: NOT AT ALL
SUM OF ALL RESPONSES TO PHQ QUESTIONS 1-9: 2

## 2025-05-30 NOTE — PROGRESS NOTES
[facility] [address] [place] [address]  Phone [phone]      Patient: Tyler Mccurdy  YOB: 1945  Age 79 y.o.  Sex male  Medical Record:  [id number]  Visit Date: 05/30/25  Author:  Phill Bowser PA-C    [facility] Note    Chief Complaint   Patient presents with    Other     States his arms feel heavy.  About a month now        History of Present Illness  History of Present Illness  The patient is a 79-year-old male who presents for evaluation of numbness in his fingers and weakness in his arms.    He reports experiencing intermittent numbness/tingling in his fingers.  This condition has been present for approximately 5 weeks and has progressively worsened, leading to difficulty in performing simple tasks such as shaving or brushing his teeth. He also notes that his fingers have limited flexibility due to arthritis.     Additionally, he describes a sensation of weakness in both arms, which he first noticed a few weeks after the onset of his finger numbness. He has not experienced any similar symptoms in the past.  Denies any loss of  strength.  Denies neck pain.    He is currently on a regimen of gabapentin 800 mg once daily.  Chart reflects that his dosage had been increased to 3 times a day but he states that he has not been taking it this way.  Unclear as to why.      Past History:    Past Medical history   Past Medical History:   Diagnosis Date    Depression     H/O urinary frequency 2/1/2013    History of BPH 2/1/2013    HLD (hyperlipidemia) 2/1/2013    Hx: UTI (urinary tract infection) 2/1/2013    Insomnia     Psoriasis     Psoriatic arthritis (HCC) 2/1/2013    Wears hearing aid 2/1/2013       Current Problem List:   Patient Active Problem List   Diagnosis    Murmur    Acute renal insufficiency    Psoriasis    Tachycardia    History of BPH    Sundowning    Open wound of left axillary region    Dermatitis    Psoriatic arthritis (HCC)    Wears

## 2025-05-30 NOTE — PATIENT INSTRUCTIONS
*Increase your 800 mg dose of gabapentin to 3 times a day.  *You have a follow-up scheduled with Dr. Guzman in a couple of weeks and we can recheck on your symptoms at that time.

## 2025-06-03 ENCOUNTER — TELEPHONE (OUTPATIENT)
Dept: FAMILY MEDICINE CLINIC | Facility: CLINIC | Age: 80
End: 2025-06-03

## 2025-06-03 NOTE — TELEPHONE ENCOUNTER
If you would like to get a motorized wheelchair will have to schedule a face-to-face visit specifically for a wheelchair or mobility device in order to try to get that covered.  If he would like a manual wheelchair I can just write an order for that

## 2025-07-08 ENCOUNTER — TELEPHONE (OUTPATIENT)
Dept: FAMILY MEDICINE CLINIC | Facility: CLINIC | Age: 80
End: 2025-07-08

## 2025-07-25 ENCOUNTER — TELEPHONE (OUTPATIENT)
Dept: FAMILY MEDICINE CLINIC | Facility: CLINIC | Age: 80
End: 2025-07-25

## 2025-07-25 NOTE — TELEPHONE ENCOUNTER
Nayeli from [facility] calling to let provider know pt has small wound on left ankle that measured one by one and half by 0.2. She dressed it and put on borders foam and alginate because it had a yellow slough on it. Please call her back at [phone] with any questions.

## 2025-08-01 ENCOUNTER — TELEPHONE (OUTPATIENT)
Dept: FAMILY MEDICINE CLINIC | Facility: CLINIC | Age: 80
End: 2025-08-01

## 2025-08-01 DIAGNOSIS — M17.0 BILATERAL PRIMARY OSTEOARTHRITIS OF KNEE: Primary | ICD-10-CM

## (undated) DEVICE — DRAPE TWL SURG 16X26IN BLU ORB04] ALLCARE INC]

## (undated) DEVICE — SPONGE LAP 18X18IN STRL -- 5/PK

## (undated) DEVICE — SYSTEM CULT COLL OR TRNSPRT CLR DBL SWAB W/ MOD AERB AMIES

## (undated) DEVICE — TRAY,URINE METER,100% SILICONE,16FR10ML: Brand: MEDLINE

## (undated) DEVICE — Device

## (undated) DEVICE — REM POLYHESIVE ADULT PATIENT RETURN ELECTRODE: Brand: VALLEYLAB

## (undated) DEVICE — 2000CC GUARDIAN II: Brand: GUARDIAN

## (undated) DEVICE — DRAPE,TOP,102X53,STERILE: Brand: MEDLINE

## (undated) DEVICE — BUTTON SWITCH PENCIL BLADE ELECTRODE, HOLSTER: Brand: EDGE

## (undated) DEVICE — CARDINAL HEALTH FLEXIBLE LIGHT HANDLE COVER: Brand: CARDINAL HEALTH

## (undated) DEVICE — MAJOR GENERAL: Brand: MEDLINE INDUSTRIES, INC.

## (undated) DEVICE — TRAY PREP DRY W/ PREM GLV 2 APPL 6 SPNG 2 UNDPD 1 OVERWRAP

## (undated) DEVICE — CONTAINER SPEC HISTOLOGY 900ML POLYPR

## (undated) DEVICE — NDL FLTR TIP 5 MIC 18GX1.5IN --

## (undated) DEVICE — BANDAGE,GAUZE,BULKEE II,4.5"X4.1YD,STRL: Brand: MEDLINE

## (undated) DEVICE — GARMENT,MEDLINE,DVT,INT,CALF,MED, GEN2: Brand: MEDLINE

## (undated) DEVICE — SHEET, DRAPE, SPLIT, STERILE: Brand: MEDLINE

## (undated) DEVICE — SUTURE PERMAHAND SZ 2-0 L12X18IN NONABSORBABLE BLK SILK A185H

## (undated) DEVICE — SOLUTION IV 1000ML 0.9% SOD CHL

## (undated) DEVICE — PAD,ABDOMINAL,5"X9",ST,LF,25/BX: Brand: MEDLINE INDUSTRIES, INC.